# Patient Record
Sex: MALE | Race: WHITE | Employment: OTHER | URBAN - METROPOLITAN AREA
[De-identification: names, ages, dates, MRNs, and addresses within clinical notes are randomized per-mention and may not be internally consistent; named-entity substitution may affect disease eponyms.]

---

## 2017-01-15 ENCOUNTER — HOSPITAL ENCOUNTER (EMERGENCY)
Age: 67
Discharge: HOME | End: 2017-01-15
Attending: EMERGENCY MEDICINE
Payer: COMMERCIAL

## 2017-01-15 ENCOUNTER — APPOINTMENT (OUTPATIENT)
Dept: CT IMAGING | Age: 67
End: 2017-01-15
Attending: PHYSICIAN ASSISTANT
Payer: COMMERCIAL

## 2017-01-15 VITALS
RESPIRATION RATE: 17 BRPM | HEART RATE: 88 BPM | DIASTOLIC BLOOD PRESSURE: 57 MMHG | SYSTOLIC BLOOD PRESSURE: 143 MMHG | BODY MASS INDEX: 39.24 KG/M2 | OXYGEN SATURATION: 95 % | HEIGHT: 67 IN | WEIGHT: 250 LBS | TEMPERATURE: 97.4 F

## 2017-01-15 DIAGNOSIS — R10.9 LEFT FLANK PAIN: Primary | ICD-10-CM

## 2017-01-15 DIAGNOSIS — N21.0 BLADDER STONE: ICD-10-CM

## 2017-01-15 LAB
ALBUMIN SERPL BCP-MCNC: 3.1 G/DL (ref 3.5–5)
ALBUMIN/GLOB SERPL: 0.6 {RATIO} (ref 1.1–2.2)
ALP SERPL-CCNC: 81 U/L (ref 45–117)
ALT SERPL-CCNC: 14 U/L (ref 12–78)
ANION GAP BLD CALC-SCNC: 9 MMOL/L (ref 5–15)
APPEARANCE UR: CLEAR
AST SERPL W P-5'-P-CCNC: 15 U/L (ref 15–37)
BACTERIA URNS QL MICRO: NEGATIVE /HPF
BASOPHILS # BLD AUTO: 0 K/UL (ref 0–0.1)
BASOPHILS # BLD: 0 % (ref 0–1)
BILIRUB SERPL-MCNC: 0.7 MG/DL (ref 0.2–1)
BILIRUB UR QL: NEGATIVE
BUN SERPL-MCNC: 16 MG/DL (ref 6–20)
BUN/CREAT SERPL: 12 (ref 12–20)
CALCIUM SERPL-MCNC: 8.8 MG/DL (ref 8.5–10.1)
CHLORIDE SERPL-SCNC: 105 MMOL/L (ref 97–108)
CO2 SERPL-SCNC: 29 MMOL/L (ref 21–32)
COLOR UR: ABNORMAL
CREAT SERPL-MCNC: 1.29 MG/DL (ref 0.7–1.3)
EOSINOPHIL # BLD: 0.1 K/UL (ref 0–0.4)
EOSINOPHIL NFR BLD: 1 % (ref 0–7)
EPITH CASTS URNS QL MICRO: ABNORMAL /LPF
ERYTHROCYTE [DISTWIDTH] IN BLOOD BY AUTOMATED COUNT: 16.1 % (ref 11.5–14.5)
GLOBULIN SER CALC-MCNC: 4.9 G/DL (ref 2–4)
GLUCOSE SERPL-MCNC: 66 MG/DL (ref 65–100)
GLUCOSE UR STRIP.AUTO-MCNC: NEGATIVE MG/DL
HCT VFR BLD AUTO: 35.1 % (ref 36.6–50.3)
HGB BLD-MCNC: 11 G/DL (ref 12.1–17)
HGB UR QL STRIP: ABNORMAL
KETONES UR QL STRIP.AUTO: NEGATIVE MG/DL
LEUKOCYTE ESTERASE UR QL STRIP.AUTO: NEGATIVE
LYMPHOCYTES # BLD AUTO: 20 % (ref 12–49)
LYMPHOCYTES # BLD: 1.7 K/UL (ref 0.8–3.5)
MCH RBC QN AUTO: 28.2 PG (ref 26–34)
MCHC RBC AUTO-ENTMCNC: 31.3 G/DL (ref 30–36.5)
MCV RBC AUTO: 90 FL (ref 80–99)
MONOCYTES # BLD: 0.6 K/UL (ref 0–1)
MONOCYTES NFR BLD AUTO: 7 % (ref 5–13)
MUCOUS THREADS URNS QL MICRO: ABNORMAL /LPF
NEUTS SEG # BLD: 6.3 K/UL (ref 1.8–8)
NEUTS SEG NFR BLD AUTO: 72 % (ref 32–75)
NITRITE UR QL STRIP.AUTO: NEGATIVE
PH UR STRIP: 6 [PH] (ref 5–8)
PLATELET # BLD AUTO: 235 K/UL (ref 150–400)
POTASSIUM SERPL-SCNC: 3.2 MMOL/L (ref 3.5–5.1)
PROT SERPL-MCNC: 8 G/DL (ref 6.4–8.2)
PROT UR STRIP-MCNC: 30 MG/DL
RBC # BLD AUTO: 3.9 M/UL (ref 4.1–5.7)
RBC #/AREA URNS HPF: ABNORMAL /HPF (ref 0–5)
SODIUM SERPL-SCNC: 143 MMOL/L (ref 136–145)
SP GR UR REFRACTOMETRY: 1.02 (ref 1–1.03)
UA: UC IF INDICATED,UAUC: ABNORMAL
UROBILINOGEN UR QL STRIP.AUTO: 1 EU/DL (ref 0.2–1)
WBC # BLD AUTO: 8.7 K/UL (ref 4.1–11.1)
WBC URNS QL MICRO: ABNORMAL /HPF (ref 0–4)

## 2017-01-15 PROCEDURE — 80053 COMPREHEN METABOLIC PANEL: CPT | Performed by: PHYSICIAN ASSISTANT

## 2017-01-15 PROCEDURE — 74011250636 HC RX REV CODE- 250/636: Performed by: PHYSICIAN ASSISTANT

## 2017-01-15 PROCEDURE — 96374 THER/PROPH/DIAG INJ IV PUSH: CPT

## 2017-01-15 PROCEDURE — 74176 CT ABD & PELVIS W/O CONTRAST: CPT

## 2017-01-15 PROCEDURE — 36415 COLL VENOUS BLD VENIPUNCTURE: CPT | Performed by: PHYSICIAN ASSISTANT

## 2017-01-15 PROCEDURE — 81001 URINALYSIS AUTO W/SCOPE: CPT | Performed by: PHYSICIAN ASSISTANT

## 2017-01-15 PROCEDURE — 51701 INSERT BLADDER CATHETER: CPT

## 2017-01-15 PROCEDURE — 77030005520 HC CATH URETH FOL38 BARD -A

## 2017-01-15 PROCEDURE — 96361 HYDRATE IV INFUSION ADD-ON: CPT

## 2017-01-15 PROCEDURE — 96375 TX/PRO/DX INJ NEW DRUG ADDON: CPT

## 2017-01-15 PROCEDURE — 85025 COMPLETE CBC W/AUTO DIFF WBC: CPT | Performed by: PHYSICIAN ASSISTANT

## 2017-01-15 PROCEDURE — 99283 EMERGENCY DEPT VISIT LOW MDM: CPT

## 2017-01-15 PROCEDURE — 77030011943

## 2017-01-15 RX ORDER — KETOROLAC TROMETHAMINE 30 MG/ML
15 INJECTION, SOLUTION INTRAMUSCULAR; INTRAVENOUS
Status: COMPLETED | OUTPATIENT
Start: 2017-01-15 | End: 2017-01-15

## 2017-01-15 RX ORDER — ONDANSETRON 4 MG/1
4 TABLET, ORALLY DISINTEGRATING ORAL
Qty: 10 TAB | Refills: 0 | Status: SHIPPED | OUTPATIENT
Start: 2017-01-15 | End: 2017-01-26

## 2017-01-15 RX ORDER — MORPHINE SULFATE 2 MG/ML
4 INJECTION, SOLUTION INTRAMUSCULAR; INTRAVENOUS
Status: COMPLETED | OUTPATIENT
Start: 2017-01-15 | End: 2017-01-15

## 2017-01-15 RX ORDER — ERGOCALCIFEROL 1.25 MG/1
50000 CAPSULE ORAL
COMMUNITY
End: 2019-01-07 | Stop reason: ALTCHOICE

## 2017-01-15 RX ORDER — HYDROCODONE BITARTRATE AND ACETAMINOPHEN 5; 325 MG/1; MG/1
1 TABLET ORAL
Qty: 20 TAB | Refills: 0 | Status: SHIPPED | OUTPATIENT
Start: 2017-01-15 | End: 2017-01-26

## 2017-01-15 RX ORDER — ONDANSETRON 2 MG/ML
4 INJECTION INTRAMUSCULAR; INTRAVENOUS
Status: COMPLETED | OUTPATIENT
Start: 2017-01-15 | End: 2017-01-15

## 2017-01-15 RX ORDER — ONDANSETRON 4 MG/1
4 TABLET, ORALLY DISINTEGRATING ORAL
Qty: 10 TAB | Refills: 0 | Status: SHIPPED | OUTPATIENT
Start: 2017-01-15 | End: 2017-01-15

## 2017-01-15 RX ADMIN — Medication 4 MG: at 11:55

## 2017-01-15 RX ADMIN — SODIUM CHLORIDE 1000 ML: 900 INJECTION, SOLUTION INTRAVENOUS at 11:54

## 2017-01-15 RX ADMIN — KETOROLAC TROMETHAMINE 15 MG: 30 INJECTION, SOLUTION INTRAMUSCULAR at 11:55

## 2017-01-15 RX ADMIN — ONDANSETRON 4 MG: 2 INJECTION INTRAMUSCULAR; INTRAVENOUS at 11:55

## 2017-01-15 NOTE — DISCHARGE INSTRUCTIONS
Abdominal Pain: Care Instructions  Your Care Instructions    Abdominal pain has many possible causes. Some aren't serious and get better on their own in a few days. Others need more testing and treatment. If your pain continues or gets worse, you need to be rechecked and may need more tests to find out what is wrong. You may need surgery to correct the problem. Don't ignore new symptoms, such as fever, nausea and vomiting, urination problems, pain that gets worse, and dizziness. These may be signs of a more serious problem. Your doctor may have recommended a follow-up visit in the next 8 to 12 hours. If you are not getting better, you may need more tests or treatment. The doctor has checked you carefully, but problems can develop later. If you notice any problems or new symptoms, get medical treatment right away. Follow-up care is a key part of your treatment and safety. Be sure to make and go to all appointments, and call your doctor if you are having problems. It's also a good idea to know your test results and keep a list of the medicines you take. How can you care for yourself at home? · Rest until you feel better. · To prevent dehydration, drink plenty of fluids, enough so that your urine is light yellow or clear like water. Choose water and other caffeine-free clear liquids until you feel better. If you have kidney, heart, or liver disease and have to limit fluids, talk with your doctor before you increase the amount of fluids you drink. · If your stomach is upset, eat mild foods, such as rice, dry toast or crackers, bananas, and applesauce. Try eating several small meals instead of two or three large ones. · Wait until 48 hours after all symptoms have gone away before you have spicy foods, alcohol, and drinks that contain caffeine. · Do not eat foods that are high in fat. · Avoid anti-inflammatory medicines such as aspirin, ibuprofen (Advil, Motrin), and naproxen (Aleve).  These can cause stomach upset. Talk to your doctor if you take daily aspirin for another health problem. When should you call for help? Call 911 anytime you think you may need emergency care. For example, call if:  · You passed out (lost consciousness). · You pass maroon or very bloody stools. · You vomit blood or what looks like coffee grounds. · You have new, severe belly pain. Call your doctor now or seek immediate medical care if:  · Your pain gets worse, especially if it becomes focused in one area of your belly. · You have a new or higher fever. · Your stools are black and look like tar, or they have streaks of blood. · You have unexpected vaginal bleeding. · You have symptoms of a urinary tract infection. These may include:  ¨ Pain when you urinate. ¨ Urinating more often than usual.  ¨ Blood in your urine. · You are dizzy or lightheaded, or you feel like you may faint. Watch closely for changes in your health, and be sure to contact your doctor if:  · You are not getting better after 1 day (24 hours). Where can you learn more? Go to http://delisa-yahaira.info/. Enter J496      Learning About Diet for Kidney Stone Prevention  What are kidney stones? Kidney stones are made of salts and minerals in the urine that form small \"kvng. \" Stones can form in the kidneys and the ureters (the tubes that lead from the kidneys to the bladder). They can also form in the bladder. Stones may not cause a problem as long as they stay in the kidneys. But they can cause sudden, severe pain. Pain is most likely when the stones travel from the kidneys to the bladder. Kidney stones can cause bloody urine. Kidney stones often run in families. You are more likely to get them if you don't drink enough fluids, mainly water. Certain foods and drinks and some dietary supplements may also increase your risk for kidney stones if you consume too much of them. What can you do to prevent kidney stones?   Changing what you eat may not prevent all types of kidney stones. But for people who have a history of certain kinds of kidney stones, some changes in diet may help. A dietitian can help you set up a meal plan that includes healthy, low-oxalate choices. Here are some general guidelines to get you started. Plan your meals and snacks around foods that are low in oxalate. These foods include:  · Corn, kale, parsnips, and squash,. · Beef, chicken, pork, turkey, and fish. · Milk, butter, cheese, and yogurt. You can eat certain foods that are medium-high in oxalate, but eat them only once in a while. These foods include:  · Bread. · Brown rice. · English muffins. · Figs. · Popcorn. · String beans. · Tomatoes. Limit very high-oxalate foods, including:  · Black tea. · Coffee. · Chocolate. · Dark green vegetables. · Nuts. Here are some other things you can do to help prevent kidney stones. · Drink plenty of fluids. If you have kidney, heart, or liver disease and have to limit fluids, talk with your doctor before you increase the amount of fluids you drink. · Do not take more than the recommended daily dose of vitamins C and D.  · Limit the salt in your diet. · Eat a balanced diet that is not too high in protein. Follow-up care is a key part of your treatment and safety. Be sure to make and go to all appointments, and call your doctor if you are having problems. It's also a good idea to know your test results and keep a list of the medicines you take. Where can you learn more? Go to http://delisa-yahaira.info/. Enter C138 in the search box to learn more about \"Learning About Diet for Kidney Stone Prevention. \"  Current as of: July 26, 2016  Content Version: 11.1  © 0184-6176 [employer], [employer]. Care instructions adapted under license by [employer] (which disclaims liability or warranty for this information).  If you have questions about a medical condition or this instruction, always ask your healthcare professional. [place] [address] any warranty or liability for your use of this information. Kidney Stone: Care Instructions  Your Care Instructions    Kidney stones are formed when salts, minerals, and other substances normally found in the urine clump together. They can be as small as grains of sand or, rarely, as large as golf balls. While the stone is traveling through the ureter, which is the tube that carries urine from the kidney to the bladder, you will probably feel pain. The pain may be mild or very severe. You may also have some blood in your urine. As soon as the stone reaches the bladder, any intense pain should go away. If a stone is too large to pass on its own, you may need a medical procedure to help you pass the stone. The doctor has checked you carefully, but problems can develop later. If you notice any problems or new symptoms, get medical treatment right away. Follow-up care is a key part of your treatment and safety. Be sure to make and go to all appointments, and call your doctor if you are having problems. It's also a good idea to know your test results and keep a list of the medicines you take. How can you care for yourself at home? · Drink plenty of fluids, enough so that your urine is light yellow or clear like water. If you have kidney, heart, or liver disease and have to limit fluids, talk with your doctor before you increase the amount of fluids you drink. · Take pain medicines exactly as directed. Call your doctor if you think you are having a problem with your medicine. ¨ If the doctor gave you a prescription medicine for pain, take it as prescribed. ¨ If you are not taking a prescription pain medicine, ask your doctor if you can take an over-the-counter medicine. Read and follow all instructions on the label. · Your doctor may ask you to strain your urine so that you can collect your kidney stone when it passes.  You can use a kitchen strainer or a tea strainer to catch the stone. Store it in a plastic bag until you see your doctor again. Preventing future kidney stones  Some changes in your diet may help prevent kidney stones. Depending on the cause of your stones, your doctor may recommend that you:  · Drink plenty of fluids, enough so that your urine is light yellow or clear like water. If you have kidney, heart, or liver disease and have to limit fluids, talk with your doctor before you increase the amount of fluids you drink. · Limit coffee, tea, and alcohol. Also avoid grapefruit juice. · Do not take more than the recommended daily dose of vitamins C and D.  · Avoid antacids such as Gaviscon, Maalox, Mylanta, or Tums. · Limit the amount of salt (sodium) in your diet. · Eat a balanced diet that is not too high in protein. · Limit foods that are high in a substance called oxalate, which can cause kidney stones. These foods include dark green vegetables, rhubarb, chocolate, wheat bran, nuts, cranberries, and beans. When should you call for help? Call your doctor now or seek immediate medical care if:  · You cannot keep down fluids. · Your pain gets worse. · You have a fever or chills. · You have new or worse pain in your back just below your rib cage (the flank area). · You have new or more blood in your urine. Watch closely for changes in your health, and be sure to contact your doctor if:  · You do not get better as expected. Where can you learn more? Go to http://delisa-yahaira.info/. Enter E578 in the search box to learn more about \"Kidney Stone: Care Instructions. \"  Current as of: November 20, 2015  Content Version: 11.1  © 5698-8563 [employer]. Care instructions adapted under license by [employer] (which disclaims liability or warranty for this information).  If you have questions about a medical condition or this instruction, always ask your healthcare professional. [employer], [facility] disclaims any warranty or liability for your use of this information. Flank Pain: Care Instructions  Your Care Instructions  Flank pain is pain on the side of the back just below the rib cage and above the waist. It can be on one or both sides. Flank pain has many possible causes, including a kidney stone, a urinary tract infection, or back strain. Flank pain may get better on its own. But don't ignore new symptoms, such as fever, nausea and vomiting, urination problems, pain that gets worse, and dizziness. These may be signs of a more serious problem. You may have to have tests or other treatment. Follow-up care is a key part of your treatment and safety. Be sure to make and go to all appointments, and call your doctor if you are having problems. It's also a good idea to know your test results and keep a list of the medicines you take. How can you care for yourself at home? · Rest until you feel better. · Take pain medicines exactly as directed. ¨ If the doctor gave you a prescription medicine for pain, take it as prescribed. ¨ If you are not taking a prescription pain medicine, ask your doctor if you can take an over-the-counter pain medicine, such as acetaminophen (Tylenol), ibuprofen (Advil, Motrin), or naproxen (Aleve). Read and follow all instructions on the label. · If your doctor prescribed antibiotics, take them as directed. Do not stop taking them just because you feel better. You need to take the full course of antibiotics. · To apply heat, put a warm water bottle, a heating pad set on low, or a warm cloth on the painful area. Do not go to sleep with a heating pad on your skin. · To prevent dehydration, drink plenty of fluids, enough so that your urine is light yellow or clear like water. Choose water and other caffeine-free clear liquids until you feel better.  If you have kidney, heart, or liver disease and have to limit fluids, talk with your doctor before you increase the amount of fluids you drink. When should you call for help? Call your doctor now or seek immediate medical care if:  · Your flank pain gets worse. · You have new symptoms, such as fever, nausea, or vomiting. · You have symptoms of a urinary problem. For example:  ¨ You have blood or pus in your urine. ¨ You have chills or body aches. ¨ It hurts to urinate. ¨ You have groin or belly pain. Watch closely for changes in your health, and be sure to contact your doctor if you do not get better as expected. Where can you learn more? Go to http://delisa-yahaira.info/. Enter S191 in the search box to learn more about \"Flank Pain: Care Instructions. \"  Current as of: May 27, 2016  Content Version: 11.1  © 9749-7578 [employer]. Care instructions adapted under license by [employer] (which disclaims liability or warranty for this information). If you have questions about a medical condition or this instruction, always ask your healthcare professional. [address] any warranty or liability for your use of this information. in the search box to learn more about \"Abdominal Pain: Care Instructions. \"  Current as of: May 27, 2016  Content Version: 11.1  © 1168-5374 [employer]. Care instructions adapted under license by [employer] (which disclaims liability or warranty for this information). If you have questions about a medical condition or this instruction, always ask your healthcare professional. [address] any warranty or liability for your use of this information. We hope that we have addressed all of your medical concerns. The examination and treatment you received in the Emergency Department were for an emergent problem and were not intended as complete care. It is important that you follow up with your healthcare provider(s) for ongoing care.  If your symptoms worsen or do not improve as expected, and you are unable to reach your usual health care provider(s), you should return to the Emergency Department. Today's healthcare is undergoing tremendous change, and patient satisfaction surveys are one of the many tools to assess the quality of medical care. You may receive a survey from the [employer] organization regarding your experience in the Emergency Department. I hope that your experience has been completely positive, particularly the medical care that I provided. As such, please participate in the survey; anything less than excellent does not meet my expectations or intentions. [facility] and [facility] participate in nationally recognized quality of care measures. If your blood pressure is greater than 120/80, as reported below, we urge that you seek medical care to address the potential of high blood pressure, commonly known as hypertension. Hypertension can be hereditary or can be caused by certain medical conditions, pain, stress, or \"white coat syndrome. \"       Please make an appointment with your health care provider(s) for follow up of your Emergency Department visit. VITALS:   Patient Vitals for the past 8 hrs:   Temp Pulse Resp BP SpO2   01/15/17 1111 97.4 °F (36.3 °C) 88 17 180/81 96 %          Thank you for allowing us to provide you with medical care today. We realize that you have many choices for your emergency care needs. Please choose us in the future for any continued health care needs. Luther Smith, [address]: [phone]            Recent Results (from the past 24 hour(s))   CBC WITH AUTOMATED DIFF    Collection Time: 01/15/17 11:47 AM   Result Value Ref Range    WBC 8.7 4.1 - 11.1 K/uL    RBC 3.90 (L) 4.10 - 5.70 M/uL    HGB 11.0 (L) 12.1 - 17.0 g/dL    HCT 35.1 (L) 36.6 - 50.3 %    MCV 90.0 80.0 - 99.0 FL    MCH 28.2 26.0 - 34.0 PG MCHC 31.3 30.0 - 36.5 g/dL    RDW 16.1 (H) 11.5 - 14.5 %    PLATELET 515 601 - 054 K/uL    NEUTROPHILS 72 32 - 75 %    LYMPHOCYTES 20 12 - 49 %    MONOCYTES 7 5 - 13 %    EOSINOPHILS 1 0 - 7 %    BASOPHILS 0 0 - 1 %    ABS. NEUTROPHILS 6.3 1.8 - 8.0 K/UL    ABS. LYMPHOCYTES 1.7 0.8 - 3.5 K/UL    ABS. MONOCYTES 0.6 0.0 - 1.0 K/UL    ABS. EOSINOPHILS 0.1 0.0 - 0.4 K/UL    ABS. BASOPHILS 0.0 0.0 - 0.1 K/UL   METABOLIC PANEL, COMPREHENSIVE    Collection Time: 01/15/17 11:47 AM   Result Value Ref Range    Sodium 143 136 - 145 mmol/L    Potassium 3.2 (L) 3.5 - 5.1 mmol/L    Chloride 105 97 - 108 mmol/L    CO2 29 21 - 32 mmol/L    Anion gap 9 5 - 15 mmol/L    Glucose 66 65 - 100 mg/dL    BUN 16 6 - 20 MG/DL    Creatinine 1.29 0.70 - 1.30 MG/DL    BUN/Creatinine ratio 12 12 - 20      GFR est AA >60 >60 ml/min/1.73m2    GFR est non-AA 56 (L) >60 ml/min/1.73m2    Calcium 8.8 8.5 - 10.1 MG/DL    Bilirubin, total 0.7 0.2 - 1.0 MG/DL    ALT 14 12 - 78 U/L    AST 15 15 - 37 U/L    Alk.  phosphatase 81 45 - 117 U/L    Protein, total 8.0 6.4 - 8.2 g/dL    Albumin 3.1 (L) 3.5 - 5.0 g/dL    Globulin 4.9 (H) 2.0 - 4.0 g/dL    A-G Ratio 0.6 (L) 1.1 - 2.2     URINALYSIS W/ REFLEX CULTURE    Collection Time: 01/15/17  1:14 PM   Result Value Ref Range    Color DARK YELLOW      Appearance CLEAR CLEAR      Specific gravity 1.019 1.003 - 1.030      pH (UA) 6.0 5.0 - 8.0      Protein 30 (A) NEG mg/dL    Glucose NEGATIVE  NEG mg/dL    Ketone NEGATIVE  NEG mg/dL    Bilirubin NEGATIVE  NEG      Blood SMALL (A) NEG      Urobilinogen 1.0 0.2 - 1.0 EU/dL    Nitrites NEGATIVE  NEG      Leukocyte Esterase NEGATIVE  NEG      WBC 0-4 0 - 4 /hpf    RBC 5-10 0 - 5 /hpf    Epithelial cells FEW FEW /lpf    Bacteria NEGATIVE  NEG /hpf    UA:UC IF INDICATED CULTURE NOT INDICATED BY UA RESULT CNI      Mucus TRACE (A) NEG /lpf       Ct Abd Pelv Wo Cont    Result Date: 1/15/2017  INDICATION: Right-sided back pain radiating to front, symptoms since last Wednesday. History of kidney stones. COMPARISON: None TECHNIQUE: Thin axial images were obtained through the abdomen and pelvis. Coronal and sagittal reconstructions were generated. Oral contrast was not administered. CT dose reduction was achieved through use of a standardized protocol tailored for this examination and automatic exposure control for dose modulation. The absence of intravenous contrast material reduces the sensitivity and specificity for evaluation of the solid parenchymal organs of the abdomen. The absence of oral contrast substantially diminishes the capacity of CT to determine bowel abnormalities. FINDINGS: LUNG BASES: Dependent left basilar patchy atelectasis versus consolidation. Small bilateral pleural effusions, greater on the left. INCIDENTALLY IMAGED HEART AND MEDIASTINUM: Unremarkable. LIVER: No mass or biliary dilatation. GALLBLADDER: Unremarkable. SPLEEN: No mass. PANCREAS: No mass or ductal dilatation. ADRENALS: Unremarkable. KIDNEYS/URETERS: No mass, calculus, or hydronephrosis. STOMACH: No oral contrast. Nondistended. SMALL BOWEL: No oral contrast. Nondistended. COLON: No oral contrast. Mild-moderate retained fecal material in the ascending colon through mid transverse colon. Numerous descending and sigmoid colonic diverticula. APPENDIX: Visualized. Nondistended. PERITONEUM: No ascites or pneumoperitoneum. RETROPERITONEUM: No lymphadenopathy or aortic aneurysm. REPRODUCTIVE ORGANS: Substantially enlarged prostate gland measuring nearly 9 cm craniocaudal and up to 6.1 x 6.1 cm transversely. URINARY BLADDER: Diffuse moderate mural thickening. 2 calcifications consistent with retained calculi near right trigonal region, measuring 8 mm and 9 mm, respectively. BONES: Normal bone mineralization. Mild to moderate lumbar and lower thoracic degenerative changes. Mild left SI joint and bilateral hip joint osteoarthrosis.  ADDITIONAL COMMENTS: N/A     IMPRESSION: 1. Substantial enlargement of prostate gland diffuse mural thickening of urinary bladder. 8 mm and 9 mm calculi in dependent portion of urinary bladder. 2. Small bilateral pleural effusions, larger on the left. 3. Left basilar consolidation versus atelectasis.

## 2017-01-15 NOTE — ED TRIAGE NOTES
Patient arrived with c/o right sided back pain radiating into front but not into groin since last Wednesday-no issues urinating. Hx of kidney stones and denies injury or trauma.

## 2017-01-15 NOTE — LETTER
[address] 
[facility] EMERGENCY DEPT 
[address] [facility] [address] [id number] [phone] Work/School Note Date: 1/15/2017 To Whom It May concern: 
 
Kimberly Hayes was seen and treated today in the emergency room by the following provider(s): 
Attending Provider: Mitchel Piña MD.   
 
Kimberly Hayes may return to work on 1/18/17.  
 
Sincerely, 
 
 
 
 
Sona Akins PA-C

## 2017-01-15 NOTE — ED PROVIDER NOTES
HPI Comments: Elise Greenfield is a 73 y.o. male who presents ambulatory to the ED with a c/o flank pain. Pt states left -sided flank pain has been worsening for 4 days (1/11/17). Pt states pain is exacerbated with movement and improved with lying still. Pt states pain is similar to pain he experienced with prior kidney stones. Pt states he was followed by [facility] for prior kidney stones and prostate issues. Pt can ambulate without assistance. He specifically denies any dysuria, hematuria, tingling, numbness, saddle anesthesia, fevers, chills, nausea, vomiting, chest pain, shortness of breath, headache, rash, diarrhea, sweating or weight loss. PCP: Thanh Gale MD  PMHx significant for: kidney stones, HTN, murmur, mitral regurgitation, chronic right knee pain, BPH, DM, arthritis, hypercholesterolemia, enlarged prostate, obesity, dyslipidemia, sleep apnea, colonoscopy, knee replacement  PSHx significant for: stress test, 2D echo, lithotripsy,   Social Hx: Tobacco: former tobacco smoker EtOH: rarely uses Illicit drug use: denies    There are no further complaints or symptoms at this time. Note written by Mauro Lombardi, as dictated by Snehal Smith PA-C 11:23 AM      The history is provided by the patient and the spouse.         Past Medical History:   Diagnosis Date    Abnormal EKG      he says it has been abnormal for years    Arthritis     Chronic pain      right knee    Diabetes ([facility] [address]      type II    Dyslipidemia     Enlarged prostate Dr. Avril Woodson    HTN (hypertension)     Hypercholesterolemia     Kidney stone     Mitral regurgitation      mod-severe on echo 1/5/12    Murmur     Obesity     Other ill-defined conditions(799.89)      BPH    Sleep apnea      stopped using CPAP many years ago       Past Surgical History:   Procedure Laterality Date    Stress test cardiolite  1/4/12     walked 4:30, stopping for severe FISH, no ischemic EKG changes (inferolateral TWI at start), normal perfusion (diaphragmatic attenuation), LVEF 48%, hypertensive respone to exercise    Echo 2d adult  1/4/12     mild-mod LVH, LVEF 60%, probably grade 1 diastolic dysfunction, mod LAE, mod-severe MR (eccentroic ) - had severe HTN during study (173/108),     Echo limited  1/20/12     mod-severe MR    Hx lithotripsy  2011    Hx other surgical       LONNIE 1/31/12 - LVEF 60%, mod-severe MR (post directed), mild-mod LAE, mild atheroma aorta    Hx other surgical       Echo 9/5/13 - mild LVH, LVEF 60 % to 65 %. Mod LAE. Mod MR (post directed)     Hx colonoscopy  2013     small polyps removed; repeat in 5 years; Dr. Anabel Lara Hx knee replacement  9/2014     RIGHT    Hx other surgical       Echo 9/5/13 - mild LVH, LVEF 60 % to 65 %. Mod LAE. Mod MR (post directed)          Family History:   Problem Relation Age of Onset    Coronary Artery Disease Father     Heart Disease Father     Stroke Father     Cancer Father      prostate    Hypertension Mother     Malignant Hyperthermia Neg Hx     Pseudocholinesterase Deficiency Neg Hx     Delayed Awakening Neg Hx     Post-op Nausea/Vomiting Neg Hx     Emergence Delirium Neg Hx     Post-op Cognitive Dysfunction Neg Hx     Other Neg Hx        Social History     Social History    Marital status:      Spouse name: N/A    Number of children: N/A    Years of education: N/A     Occupational History     [place]     [employer]      Social History Main Topics    Smoking status: Former Smoker     Packs/day: 0.25     Years: 20.00     Quit date: 9/9/2013    Smokeless tobacco: Never Used    Alcohol use No      Comment: rarely    Drug use: No    Sexual activity: Not on file     Other Topics Concern    Not on file     Social History Narrative         ALLERGIES: Cephalexin;  Oxycodone; Sulfa (sulfonamide antibiotics); and Tamsulosin    Review of Systems   Constitutional: Negative for appetite change, chills, fatigue and fever. HENT: Negative for congestion, ear pain, postnasal drip, rhinorrhea and sore throat. Eyes: Negative for visual disturbance. Respiratory: Negative for cough, shortness of breath and wheezing. Cardiovascular: Negative for chest pain, palpitations and leg swelling. Gastrointestinal: Negative for abdominal pain, anal bleeding, constipation, diarrhea, nausea and vomiting. Genitourinary: Positive for flank pain. Negative for dysuria and hematuria. Musculoskeletal: Negative for arthralgias and myalgias. Skin: Negative for rash. Allergic/Immunologic: Negative for immunocompromised state. Neurological: Negative for weakness, light-headedness, numbness and headaches. Patient Vitals for the past 12 hrs:   Temp Pulse Resp BP SpO2   01/15/17 1430 - - - 143/57 95 %   01/15/17 1330 - - - 154/63 92 %   01/15/17 1111 97.4 °F (36.3 °C) 88 17 180/81 96 %              Physical Exam   Constitutional: He is oriented to person, place, and time. He appears well-developed and well-nourished. No distress. Above average bmi male   HENT:   Head: Normocephalic and atraumatic. Right Ear: External ear normal.   Left Ear: External ear normal.   Neck: Neck supple. Cardiovascular: Normal rate, regular rhythm, normal heart sounds and intact distal pulses. Exam reveals no gallop and no friction rub. No murmur heard. Pulmonary/Chest: Effort normal and breath sounds normal. No stridor. No respiratory distress. He has no wheezes. He has no rales. He exhibits no tenderness. Abdominal: Soft. Bowel sounds are normal. He exhibits no distension and no mass. There is tenderness. There is no rebound and no guarding. Diffuse left flank TTP with radiation to left groin   Musculoskeletal: Normal range of motion. He exhibits no edema, tenderness or deformity. BACK:diffuse left flank TTP without midline vertebral TTP or TTP throughout no swelling or step off. No discoloration. No deformity or lesions. Neg SLR neg EHL neg DAPHNE. Ambulates without assistance. Distal n/v intact. Cap refill brisk   Neurological: He is alert and oriented to person, place, and time. No cranial nerve deficit. Coordination normal.   Skin: No rash noted. No erythema. No pallor. Psychiatric: He has a normal mood and affect. His behavior is normal.   Nursing note and vitals reviewed. MDM  Number of Diagnoses or Management Options  Bladder stone:   Left flank pain:      Amount and/or Complexity of Data Reviewed  Clinical lab tests: ordered and reviewed  Tests in the radiology section of CPT®: ordered and reviewed  Tests in the medicine section of CPT®: reviewed and ordered  Obtain history from someone other than the patient: yes (wife)  Review and summarize past medical records: yes  Independent visualization of images, tracings, or specimens: yes    Patient Progress  Patient progress: stable    ED Course       Procedures    12:23 PM  Discussed pt, sx, hx and current findings with Dr. Malvin Tineo. He is in agreement with plan   [place]. AUTUMN Smith    1:15 PM  Pt notes he has had to self cath because of his prostate in the past. Requests to self cath for urine  Richard Smith PA-C    2:34 PM   Feeling better, ready to go home  [employer]. AUTUMN Smith    LABORATORY TESTS:  Recent Results (from the past 12 hour(s))   CBC WITH AUTOMATED DIFF    Collection Time: 01/15/17 11:47 AM   Result Value Ref Range    WBC 8.7 4.1 - 11.1 K/uL    RBC 3.90 (L) 4.10 - 5.70 M/uL    HGB 11.0 (L) 12.1 - 17.0 g/dL    HCT 35.1 (L) 36.6 - 50.3 %    MCV 90.0 80.0 - 99.0 FL    MCH 28.2 26.0 - 34.0 PG    MCHC 31.3 30.0 - 36.5 g/dL    RDW 16.1 (H) 11.5 - 14.5 %    PLATELET 647 012 - 885 K/uL    NEUTROPHILS 72 32 - 75 %    LYMPHOCYTES 20 12 - 49 %    MONOCYTES 7 5 - 13 %    EOSINOPHILS 1 0 - 7 %    BASOPHILS 0 0 - 1 %    ABS. NEUTROPHILS 6.3 1.8 - 8.0 K/UL    ABS. LYMPHOCYTES 1.7 0.8 - 3.5 K/UL    ABS. MONOCYTES 0.6 0.0 - 1.0 K/UL    ABS.  EOSINOPHILS 0.1 0.0 - 0.4 K/UL    ABS. BASOPHILS 0.0 0.0 - 0.1 K/UL   METABOLIC PANEL, COMPREHENSIVE    Collection Time: 01/15/17 11:47 AM   Result Value Ref Range    Sodium 143 136 - 145 mmol/L    Potassium 3.2 (L) 3.5 - 5.1 mmol/L    Chloride 105 97 - 108 mmol/L    CO2 29 21 - 32 mmol/L    Anion gap 9 5 - 15 mmol/L    Glucose 66 65 - 100 mg/dL    BUN 16 6 - 20 MG/DL    Creatinine 1.29 0.70 - 1.30 MG/DL    BUN/Creatinine ratio 12 12 - 20      GFR est AA >60 >60 ml/min/1.73m2    GFR est non-AA 56 (L) >60 ml/min/1.73m2    Calcium 8.8 8.5 - 10.1 MG/DL    Bilirubin, total 0.7 0.2 - 1.0 MG/DL    ALT 14 12 - 78 U/L    AST 15 15 - 37 U/L    Alk. phosphatase 81 45 - 117 U/L    Protein, total 8.0 6.4 - 8.2 g/dL    Albumin 3.1 (L) 3.5 - 5.0 g/dL    Globulin 4.9 (H) 2.0 - 4.0 g/dL    A-G Ratio 0.6 (L) 1.1 - 2.2     URINALYSIS W/ REFLEX CULTURE    Collection Time: 01/15/17  1:14 PM   Result Value Ref Range    Color DARK YELLOW      Appearance CLEAR CLEAR      Specific gravity 1.019 1.003 - 1.030      pH (UA) 6.0 5.0 - 8.0      Protein 30 (A) NEG mg/dL    Glucose NEGATIVE  NEG mg/dL    Ketone NEGATIVE  NEG mg/dL    Bilirubin NEGATIVE  NEG      Blood SMALL (A) NEG      Urobilinogen 1.0 0.2 - 1.0 EU/dL    Nitrites NEGATIVE  NEG      Leukocyte Esterase NEGATIVE  NEG      WBC 0-4 0 - 4 /hpf    RBC 5-10 0 - 5 /hpf    Epithelial cells FEW FEW /lpf    Bacteria NEGATIVE  NEG /hpf    UA:UC IF INDICATED CULTURE NOT INDICATED BY UA RESULT CNI      Mucus TRACE (A) NEG /lpf       IMAGING RESULTS:  CT ABD PELV WO CONT   Final Result        Study Result      INDICATION: Right-sided back pain radiating to front, symptoms since last  Wednesday. History of kidney stones.     COMPARISON: None     TECHNIQUE:   Thin axial images were obtained through the abdomen and pelvis. Coronal and  sagittal reconstructions were generated. Oral contrast was not administered.  CT  dose reduction was achieved through use of a standardized protocol tailored for  this examination and automatic exposure control for dose modulation.      The absence of intravenous contrast material reduces the sensitivity and  specificity for evaluation of the solid parenchymal organs of the abdomen. The  absence of oral contrast substantially diminishes the capacity of CT to  determine bowel abnormalities.     FINDINGS:   LUNG BASES: Dependent left basilar patchy atelectasis versus consolidation. Small bilateral pleural effusions, greater on the left. INCIDENTALLY IMAGED HEART AND MEDIASTINUM: Unremarkable. LIVER: No mass or biliary dilatation. GALLBLADDER: Unremarkable. SPLEEN: No mass. PANCREAS: No mass or ductal dilatation. ADRENALS: Unremarkable. KIDNEYS/URETERS: No mass, calculus, or hydronephrosis. STOMACH: No oral contrast. Nondistended. SMALL BOWEL: No oral contrast. Nondistended. COLON: No oral contrast. Mild-moderate retained fecal material in the ascending  colon through mid transverse colon. Numerous descending and sigmoid colonic  diverticula. APPENDIX: Visualized. Nondistended. PERITONEUM: No ascites or pneumoperitoneum. RETROPERITONEUM: No lymphadenopathy or aortic aneurysm. REPRODUCTIVE ORGANS: Substantially enlarged prostate gland measuring nearly 9 cm  craniocaudal and up to 6.1 x 6.1 cm transversely. URINARY BLADDER: Diffuse moderate mural thickening. 2 calcifications consistent  with retained calculi near right trigonal region, measuring 8 mm and 9 mm,  respectively. BONES: Normal bone mineralization. Mild to moderate lumbar and lower thoracic  degenerative changes. Mild left SI joint and bilateral hip joint osteoarthrosis. ADDITIONAL COMMENTS: N/A     IMPRESSION  IMPRESSION:     1. Substantial enlargement of prostate gland diffuse mural thickening of urinary  bladder. 8 mm and 9 mm calculi in dependent portion of urinary bladder. 2. Small bilateral pleural effusions, larger on the left.   3. Left basilar consolidation versus atelectasis.                MEDICATIONS GIVEN:  Medications   sodium chloride 0.9 % bolus infusion 1,000 mL (0 mL IntraVENous IV Completed 1/15/17 1441)   morphine injection 4 mg (4 mg IntraVENous Given 1/15/17 1155)   ondansetron (ZOFRAN) injection 4 mg (4 mg IntraVENous Given 1/15/17 1155)   ketorolac (TORADOL) injection 15 mg (15 mg IntraVENous Given 1/15/17 1155)       IMPRESSION:  1. Left flank pain    2. Bladder stone        PLAN:  1. Discharge Medication List as of 1/15/2017  2:40 PM      START taking these medications    Details   HYDROcodone-acetaminophen (NORCO) 5-325 mg per tablet Take 1 Tab by mouth every four (4) hours as needed for Pain. Max Daily Amount: 6 Tabs., Print, Disp-20 Tab, R-0         CONTINUE these medications which have CHANGED    Details   ondansetron (ZOFRAN ODT) 4 mg disintegrating tablet Take 1 Tab by mouth every eight (8) hours as needed for Nausea. , Print, Disp-10 Tab, R-0         CONTINUE these medications which have NOT CHANGED    Details   ergocalciferol (VITAMIN D2) 50,000 unit capsule Take 50,000 Units by mouth., Historical Med      insulin detemir (LEVEMIR) 100 unit/mL injection by SubCUTAneous route nightly., Historical Med      insulin lispro (HUMALOG) 100 unit/mL injection by SubCUTAneous route., Historical Med      traMADol (ULTRAM) 50 mg tablet Take 50 mg by mouth every six (6) hours as needed for Pain., Historical Med      finasteride (PROSCAR) 5 mg tablet Take 5 mg by mouth daily. , Historical Med      amlodipine (NORVASC) 5 mg tablet Take 1 tablet by mouth daily. , Normal, Disp-90 tablet, R-3      losartan (COZAAR) 100 mg tablet TAKE 1 TABLET BY MOUTH DAILY, NormalPatient will need to be seen prior to additional refills. Disp-30 Tab, R-0      aspirin delayed-release 81 mg tablet Take  by mouth daily. , Historical Med      ascorbic acid (VITAMIN C) 500 mg tablet Take 500 mg by mouth daily. Historical Med, 500 mg           2.    Follow-up Information     Follow up With Details [facility] Schedule an appointment as soon as possible for a visit 2-4 days for recheck [phone]          Return to ED if worse       2:34 PM  Pt has been reexamined. Pt has no new complaints, changes or physical findings. Care plan outlined and precautions discussed. All available results were reviewed with pt. All medications were reviewed with pt. All of pt's questions and concerns were addressed. Pt agrees to F/U as instructed and agrees to return to ED upon further deterioration. Pt is ready to go home.   Felix Avila PA-C

## 2017-01-26 ENCOUNTER — HOSPITAL ENCOUNTER (OUTPATIENT)
Dept: PREADMISSION TESTING | Age: 67
Discharge: HOME | End: 2017-01-26
Payer: COMMERCIAL

## 2017-01-26 VITALS
HEART RATE: 70 BPM | SYSTOLIC BLOOD PRESSURE: 143 MMHG | DIASTOLIC BLOOD PRESSURE: 68 MMHG | OXYGEN SATURATION: 96 % | HEIGHT: 67 IN | WEIGHT: 239 LBS | BODY MASS INDEX: 37.51 KG/M2 | RESPIRATION RATE: 20 BRPM | TEMPERATURE: 97.7 F

## 2017-01-26 PROBLEM — N13.8 BPH (BENIGN PROSTATIC HYPERTROPHY) WITH URINARY OBSTRUCTION: Status: ACTIVE | Noted: 2017-01-26

## 2017-01-26 PROBLEM — N40.1 BPH (BENIGN PROSTATIC HYPERTROPHY) WITH URINARY OBSTRUCTION: Status: ACTIVE | Noted: 2017-01-26

## 2017-01-26 PROBLEM — N21.0 BLADDER STONES: Status: ACTIVE | Noted: 2017-01-26

## 2017-01-26 PROCEDURE — 80048 BASIC METABOLIC PNL TOTAL CA: CPT | Performed by: UROLOGY

## 2017-01-26 PROCEDURE — 36415 COLL VENOUS BLD VENIPUNCTURE: CPT | Performed by: UROLOGY

## 2017-01-26 PROCEDURE — 85027 COMPLETE CBC AUTOMATED: CPT | Performed by: UROLOGY

## 2017-01-26 RX ORDER — AMLODIPINE BESYLATE 10 MG/1
10 TABLET ORAL DAILY
COMMUNITY

## 2017-01-26 RX ORDER — CELECOXIB 400 MG/1
400 CAPSULE ORAL DAILY
Status: ON HOLD | COMMUNITY
End: 2017-03-15 | Stop reason: CLARIF

## 2017-01-26 RX ORDER — CIPROFLOXACIN 500 MG/1
TABLET ORAL 2 TIMES DAILY
Status: ON HOLD | COMMUNITY
End: 2017-03-15 | Stop reason: CLARIF

## 2017-01-26 NOTE — PERIOP NOTES
[facility]  PREOPERATIVE INSTRUCTIONS    Surgery Date:   2/2/17  Surgery arrival time given by surgeon: NO   If no,[facility] [address] staff will call you between 4 PM- 8 PM the day before surgery with your arrival time. If your surgery is on a Monday, we will call you the preceding Friday. Please call [phone] after 8 PM if you did not receive your arrival time. 1. Please report at the designated time to the [facility] [address]. Bring your insurance card, photo identification, and any copayment ( if applicable). 2. You must have a responsible adult to drive you home. You need to have a responsible adult to stay with you the first 24 hours after surgery if you are going home the same day of your surgery and you should not drive a car for 24 hours following your surgery. 3. Nothing to eat or drink after midnight the night before surgery. This includes no water, gum, mints, coffee, juice, etc.  Please note special instructions, if applicable, below for medications. 4. MEDICATIONS TO TAKE THE MORNING OF SURGERY WITH A SIP OF WATER: evening dose of Levemir 10 units 2/1/17,Amlodipine  5. No alcoholic beverages 24 hours before or after your surgery. 6. If you are being admitted to the hospital,please leave personal belongings/luggage in your car until you have an assigned hospital room number. 7. Stop Aspirin and/or any non-steroidal anti-inflammatory drugs (i.e. Ibuprofen, Naproxen, Advil, Aleve) as directed by your surgeon. You may take Tylenol. Stop herbal supplements 1 week prior to  surgery. 8. If you are currently taking Plavix, Coumadin,or any other blood-thinning/anticoagulant medication contact your surgeon for instructions. 9. Please wear comfortable clothes. Wear your glasses instead of contacts. We ask that all money, jewelry and valuables be left at home. Wear no make up, particularly mascara, the day of surgery. 10.  All body piercings, rings,and jewelry need to be removed and left at home. Please wear your hair loose or down. Please no pony-tails, buns, or any metal hair accessories. If you shower the morning of surgery, please do not apply any lotions, powders, or deodorants afterwards. Do not shave any body area within 24 hours of your surgery. 11. Please follow all instructions to avoid any potential surgical cancellation. 12.  Should your physical condition change, (i.e. fever, cold, flu, etc.) please notify your surgeon as soon as possible. 13. It is important to be on time. If a situation occurs where you may be delayed, please call:  [phone] / onthe day of surgery. 14. The Preadmission Testing staff can be reached at 21 [phone]. .  15. Special instructions: free  parking    The patient was contacted  in person. He  verbalize  understanding of all instructions does not  need reinforcement.

## 2017-01-27 LAB
ANION GAP BLD CALC-SCNC: 9 MMOL/L (ref 5–15)
BUN SERPL-MCNC: 24 MG/DL (ref 6–20)
BUN/CREAT SERPL: 18 (ref 12–20)
CALCIUM SERPL-MCNC: 8.9 MG/DL (ref 8.5–10.1)
CHLORIDE SERPL-SCNC: 103 MMOL/L (ref 97–108)
CO2 SERPL-SCNC: 30 MMOL/L (ref 21–32)
CREAT SERPL-MCNC: 1.37 MG/DL (ref 0.7–1.3)
ERYTHROCYTE [DISTWIDTH] IN BLOOD BY AUTOMATED COUNT: 16.5 % (ref 11.5–14.5)
GLUCOSE SERPL-MCNC: 88 MG/DL (ref 65–100)
HCT VFR BLD AUTO: 35.6 % (ref 36.6–50.3)
HGB BLD-MCNC: 10.7 G/DL (ref 12.1–17)
MCH RBC QN AUTO: 28.1 PG (ref 26–34)
MCHC RBC AUTO-ENTMCNC: 30.1 G/DL (ref 30–36.5)
MCV RBC AUTO: 93.4 FL (ref 80–99)
PLATELET # BLD AUTO: 242 K/UL (ref 150–400)
POTASSIUM SERPL-SCNC: 3.3 MMOL/L (ref 3.5–5.1)
RBC # BLD AUTO: 3.81 M/UL (ref 4.1–5.7)
SODIUM SERPL-SCNC: 142 MMOL/L (ref 136–145)
WBC # BLD AUTO: 8.4 K/UL (ref 4.1–11.1)

## 2017-02-01 ENCOUNTER — ANESTHESIA EVENT (OUTPATIENT)
Dept: SURGERY | Age: 67
DRG: 713 | End: 2017-02-01
Payer: COMMERCIAL

## 2017-02-01 NOTE — H&P
[employer]   urology   [facility]  [address] Sab[address] Rivers  [phone]  [address]   [phone]       ProgressNotes: Osorio Arellano MD          Current Medications Reason for Appointment    1. F/U BLADDER STONES. F/U Male    History of Present Illness    BPH / LUTS:   Presentation   Date 01/16/2017  Main c/o Irritative, Obstructive bladder calculi - Back Pain  Duration 1-2, weeks  Nature Constant, Stable  Other Sxs denies hematuria, N/V/F/C  Risk Factors / Pertinant +/- none  Previous Treatment Proscar He had visual changes w/ one dose of Flomax in the past!!! He had severe constipation on initial visit - now resolved. He is ready to proceed w/ cystolitholipaxy / TURP. Taking  Vital Signs   Humalog  /80 mm Hg, Ht 67 in, Wt 250 lbs, BMI 39.15 Index. Levemir     Amlodipine Besylate     Losartan Potassium     ASA     Finasteride , Notes: 5MG     Tramadol-Acetaminophen     Colcrys     Ciprofloxacin HCl 500 MG Tablet 1 tablet Orally every 12 hrs, stop date 03/17/2017     Ibuprofen 600 MG Tablet 1 tablet Orally Three times a day     Medication List reviewed and reconciled with the patient      Examination    Urinalysis:  RBCs: none. WBCs: none. BACTERIA: none. EPITHELIAL CELLS: none. Crystals none. Past Medical History Physical Examination   SLEEP APNEA/CPAP. Male Pre-op:   Genl: WDWN, NAD. Skin No obvious lesion or rash. HEENT grossly nl, EOMI, neck supple - FROM, nl dentition. Chest unlabored / nl respiratory mvmt w/o audible wheeze. Heart RRR, no MGR. Lung CTA, BSEB. Abd S/NT/ND, no hernia, no CVAT.  Nl male, T b/l descended w/o tenderness or mass. Rectal Nl tone / no masses 4+ Prostate w/o nodule or induration. Musculo-Skeletal / Ext FROM, no edema, Nl gait. Neuro non-focal.      LEAKY HEART VALVE.      GOUT.      HTN. ELEVATED CHOLESTEROL. DM.      KIDNEY STONES. Surgical History Assessments   ESWL  1.  Benign prostatic hyperplasia with lower urinary tract symptoms - N40.1 (Primary)   KNEE REPLACEMENT  2. Chronic prostatitis without hematuria - N41.1    3. Bladder calculi - N21.0    4. Left flank pain - R10.9    5. Other obstructive and reflux uropathy - N13.8    6. Low back pain - M54.5    7. Other chronic pain - G89.29   Family History Treatment   No Family History documented. 1. Benign prostatic hyperplasia with lower urinary tract symptoms   Notes: For TURP - , d/w pt options / P/P/R/B, pt consents to proceed. Routine pre-op instructions given. Will schedule. 2. Chronic prostatitis without hematuria   Notes: as above. 3. Bladder calculi   Notes: For Cystolitholipaxy, d/w pt options / P/P/R/B, pt consents to proceed. Routine pre-op instructions given. Will schedule. 4. Low back pain   Start Celebrex Capsule, 400 MG, 1 capsule with food, Orally, Once a day, 30 day(s), 30, Refills 3  Notes: ? neuro / related to constipation - eval per PCP. 5. Others   Notes: Learning About Transurethral Resection of the Prostate (TURP) material was printed. Social History    Tobacco/EtOH/Other:   Cigarette Hx: former smoker, QUIT 2016. Other Tobacco Products: none. Alcohol Use: social.   Drug Use: never. Allergies    SULFA    OXYCODONE    TAMSULOSIN    CEPHALEXIN    Hospitalization/Major Diagnostic Procedure Procedure Codes   No Hospitalization History. [id number] URINALYSIS   Review of Systems Follow Up   14 Point ROS:   General Feeling well / no F/C, weight changes. Eyes Denies dry or irritated eyes / vision changes. Head Pt denies nosebleeds, sinus issues, sore throat, and dry mouth. CV Denies recent CP, palpitations, edema. Pulm Denies cough, wheezing, SOB. GI Denies abd pain, N/V, constipation, and diarrhea. Musculo-Skeletal Denies muscle / joint ache, back pain, swelling. Skin Denies lesion or rash. Neuro Denies weakness, numbness, dizziness, and HA.  Psych Denies depression, anxiety, EtOH, and other substance abuse. Endocrine Denies fatigue, increased thirst, temp intolerance. Heme / Hermilo Fickle Denies swollen glands, easy bruising or bleeding. Allergy Denies runny nose, hives, itching, and sneezing. Other Pt denies other symptoms. .  See HPI.     - Surgery and post-op scheduling TBA

## 2017-02-02 ENCOUNTER — HOSPITAL ENCOUNTER (INPATIENT)
Age: 67
LOS: 2 days | Discharge: HOME | DRG: 713 | End: 2017-02-04
Attending: UROLOGY | Admitting: UROLOGY
Payer: COMMERCIAL

## 2017-02-02 ENCOUNTER — ANESTHESIA (OUTPATIENT)
Dept: SURGERY | Age: 67
DRG: 713 | End: 2017-02-02
Payer: COMMERCIAL

## 2017-02-02 ENCOUNTER — SURGERY (OUTPATIENT)
Age: 67
End: 2017-02-02

## 2017-02-02 PROBLEM — N40.0 BPH (BENIGN PROSTATIC HYPERPLASIA): Status: ACTIVE | Noted: 2017-02-02

## 2017-02-02 LAB
GLUCOSE BLD STRIP.AUTO-MCNC: 108 MG/DL (ref 65–100)
GLUCOSE BLD STRIP.AUTO-MCNC: 141 MG/DL (ref 65–100)
GLUCOSE BLD STRIP.AUTO-MCNC: 148 MG/DL (ref 65–100)
SERVICE CMNT-IMP: ABNORMAL

## 2017-02-02 PROCEDURE — 74011636637 HC RX REV CODE- 636/637: Performed by: UROLOGY

## 2017-02-02 PROCEDURE — 74011250636 HC RX REV CODE- 250/636

## 2017-02-02 PROCEDURE — 77030018830 HC SOL IRR GLYC ICUM-A: Performed by: UROLOGY

## 2017-02-02 PROCEDURE — 88305 TISSUE EXAM BY PATHOLOGIST: CPT | Performed by: UROLOGY

## 2017-02-02 PROCEDURE — 74011000250 HC RX REV CODE- 250

## 2017-02-02 PROCEDURE — 0TCB8ZZ EXTIRPATION OF MATTER FROM BLADDER, VIA NATURAL OR ARTIFICIAL OPENING ENDOSCOPIC: ICD-10-PCS | Performed by: UROLOGY

## 2017-02-02 PROCEDURE — 77030019927 HC TBNG IRR CYSTO BAXT -A: Performed by: UROLOGY

## 2017-02-02 PROCEDURE — 77030032490 HC SLV COMPR SCD KNE COVD -B

## 2017-02-02 PROCEDURE — 77030027138 HC INCENT SPIROMETER -A

## 2017-02-02 PROCEDURE — 76060000064 HC AMB SURG ANES 2 TO 2.5 HR: Performed by: UROLOGY

## 2017-02-02 PROCEDURE — 77030008684 HC TU ET CUF COVD -B: Performed by: NURSE ANESTHETIST, CERTIFIED REGISTERED

## 2017-02-02 PROCEDURE — 77030020782 HC GWN BAIR PAWS FLX 3M -B

## 2017-02-02 PROCEDURE — 76030000004 HC AMB SURG OR TIME 2 TO 2.5: Performed by: UROLOGY

## 2017-02-02 PROCEDURE — 74011250636 HC RX REV CODE- 250/636: Performed by: UROLOGY

## 2017-02-02 PROCEDURE — 77030012890

## 2017-02-02 PROCEDURE — 74011000250 HC RX REV CODE- 250: Performed by: ANESTHESIOLOGY

## 2017-02-02 PROCEDURE — 77030026438 HC STYL ET INTUB CARD -A: Performed by: NURSE ANESTHETIST, CERTIFIED REGISTERED

## 2017-02-02 PROCEDURE — 82962 GLUCOSE BLOOD TEST: CPT

## 2017-02-02 PROCEDURE — 77030018836 HC SOL IRR NACL ICUM -A

## 2017-02-02 PROCEDURE — 77030018846 HC SOL IRR STRL H20 ICUM -A: Performed by: UROLOGY

## 2017-02-02 PROCEDURE — 0VT08ZZ RESECTION OF PROSTATE, VIA NATURAL OR ARTIFICIAL OPENING ENDOSCOPIC: ICD-10-PCS | Performed by: UROLOGY

## 2017-02-02 PROCEDURE — 77030032490 HC SLV COMPR SCD KNE COVD -B: Performed by: UROLOGY

## 2017-02-02 PROCEDURE — 74011250636 HC RX REV CODE- 250/636: Performed by: ANESTHESIOLOGY

## 2017-02-02 PROCEDURE — 77030005546 HC CATH URETH FOL 3W BARD -A: Performed by: UROLOGY

## 2017-02-02 PROCEDURE — 77030011640 HC PAD GRND REM COVD -A: Performed by: UROLOGY

## 2017-02-02 PROCEDURE — 77030010547 HC BG URIN W/UMETER -A

## 2017-02-02 PROCEDURE — 65270000029 HC RM PRIVATE

## 2017-02-02 PROCEDURE — 77030011274 HC ELECTRD CUT LP RWOL -F: Performed by: UROLOGY

## 2017-02-02 PROCEDURE — 74011258636 HC RX REV CODE- 258/636: Performed by: UROLOGY

## 2017-02-02 PROCEDURE — 76210000036 HC AMBSU PH I REC 1.5 TO 2 HR: Performed by: UROLOGY

## 2017-02-02 PROCEDURE — 74011250637 HC RX REV CODE- 250/637: Performed by: UROLOGY

## 2017-02-02 PROCEDURE — 76030000021 HC AMB SURG 2 TO 2.5 HR INTENSV-TIER 1: Performed by: UROLOGY

## 2017-02-02 RX ORDER — ROCURONIUM BROMIDE 10 MG/ML
INJECTION, SOLUTION INTRAVENOUS AS NEEDED
Status: DISCONTINUED | OUTPATIENT
Start: 2017-02-02 | End: 2017-02-02 | Stop reason: HOSPADM

## 2017-02-02 RX ORDER — SODIUM CHLORIDE 0.9 % (FLUSH) 0.9 %
5-10 SYRINGE (ML) INJECTION AS NEEDED
Status: DISCONTINUED | OUTPATIENT
Start: 2017-02-02 | End: 2017-02-04 | Stop reason: HOSPADM

## 2017-02-02 RX ORDER — FINASTERIDE 5 MG/1
5 TABLET, FILM COATED ORAL DAILY
Status: DISCONTINUED | OUTPATIENT
Start: 2017-02-03 | End: 2017-02-04 | Stop reason: HOSPADM

## 2017-02-02 RX ORDER — GLYCOPYRROLATE 0.2 MG/ML
INJECTION INTRAMUSCULAR; INTRAVENOUS AS NEEDED
Status: DISCONTINUED | OUTPATIENT
Start: 2017-02-02 | End: 2017-02-02 | Stop reason: HOSPADM

## 2017-02-02 RX ORDER — PHENAZOPYRIDINE HYDROCHLORIDE 100 MG/1
100 TABLET, FILM COATED ORAL
Status: DISCONTINUED | OUTPATIENT
Start: 2017-02-02 | End: 2017-02-04 | Stop reason: HOSPADM

## 2017-02-02 RX ORDER — SUCCINYLCHOLINE CHLORIDE 20 MG/ML
INJECTION INTRAMUSCULAR; INTRAVENOUS AS NEEDED
Status: DISCONTINUED | OUTPATIENT
Start: 2017-02-02 | End: 2017-02-02 | Stop reason: HOSPADM

## 2017-02-02 RX ORDER — LEVOFLOXACIN 5 MG/ML
500 INJECTION, SOLUTION INTRAVENOUS
Status: COMPLETED | OUTPATIENT
Start: 2017-02-02 | End: 2017-02-02

## 2017-02-02 RX ORDER — SODIUM CHLORIDE 0.9 % (FLUSH) 0.9 %
5-10 SYRINGE (ML) INJECTION EVERY 8 HOURS
Status: DISCONTINUED | OUTPATIENT
Start: 2017-02-02 | End: 2017-02-02 | Stop reason: HOSPADM

## 2017-02-02 RX ORDER — SODIUM CHLORIDE, SODIUM LACTATE, POTASSIUM CHLORIDE, CALCIUM CHLORIDE 600; 310; 30; 20 MG/100ML; MG/100ML; MG/100ML; MG/100ML
125 INJECTION, SOLUTION INTRAVENOUS CONTINUOUS
Status: DISCONTINUED | OUTPATIENT
Start: 2017-02-02 | End: 2017-02-02 | Stop reason: HOSPADM

## 2017-02-02 RX ORDER — ATROPA BELLADONNA AND OPIUM 16.2; 6 MG/1; MG/1
SUPPOSITORY RECTAL AS NEEDED
Status: DISCONTINUED | OUTPATIENT
Start: 2017-02-02 | End: 2017-02-02 | Stop reason: HOSPADM

## 2017-02-02 RX ORDER — PROCHLORPERAZINE EDISYLATE 5 MG/ML
5 INJECTION INTRAMUSCULAR; INTRAVENOUS
Status: DISCONTINUED | OUTPATIENT
Start: 2017-02-02 | End: 2017-02-04 | Stop reason: HOSPADM

## 2017-02-02 RX ORDER — LEVOFLOXACIN 5 MG/ML
500 INJECTION, SOLUTION INTRAVENOUS EVERY 24 HOURS
Status: COMPLETED | OUTPATIENT
Start: 2017-02-03 | End: 2017-02-03

## 2017-02-02 RX ORDER — DEXTROSE, SODIUM CHLORIDE, SODIUM LACTATE, POTASSIUM CHLORIDE, AND CALCIUM CHLORIDE 5; .6; .31; .03; .02 G/100ML; G/100ML; G/100ML; G/100ML; G/100ML
75 INJECTION, SOLUTION INTRAVENOUS CONTINUOUS
Status: DISCONTINUED | OUTPATIENT
Start: 2017-02-02 | End: 2017-02-03

## 2017-02-02 RX ORDER — ONDANSETRON 2 MG/ML
INJECTION INTRAMUSCULAR; INTRAVENOUS AS NEEDED
Status: DISCONTINUED | OUTPATIENT
Start: 2017-02-02 | End: 2017-02-02 | Stop reason: HOSPADM

## 2017-02-02 RX ORDER — DOCUSATE SODIUM 100 MG/1
100 CAPSULE, LIQUID FILLED ORAL 2 TIMES DAILY
Status: DISCONTINUED | OUTPATIENT
Start: 2017-02-02 | End: 2017-02-04 | Stop reason: HOSPADM

## 2017-02-02 RX ORDER — ASCORBIC ACID 500 MG
500 TABLET ORAL DAILY
Status: DISCONTINUED | OUTPATIENT
Start: 2017-02-03 | End: 2017-02-04 | Stop reason: HOSPADM

## 2017-02-02 RX ORDER — FENTANYL CITRATE 50 UG/ML
INJECTION, SOLUTION INTRAMUSCULAR; INTRAVENOUS AS NEEDED
Status: DISCONTINUED | OUTPATIENT
Start: 2017-02-02 | End: 2017-02-02 | Stop reason: HOSPADM

## 2017-02-02 RX ORDER — CELECOXIB 100 MG/1
400 CAPSULE ORAL DAILY
Status: DISCONTINUED | OUTPATIENT
Start: 2017-02-03 | End: 2017-02-04 | Stop reason: HOSPADM

## 2017-02-02 RX ORDER — ACETAMINOPHEN 325 MG/1
650 TABLET ORAL
Status: DISCONTINUED | OUTPATIENT
Start: 2017-02-02 | End: 2017-02-04 | Stop reason: HOSPADM

## 2017-02-02 RX ORDER — AMLODIPINE BESYLATE 5 MG/1
10 TABLET ORAL DAILY
Status: DISCONTINUED | OUTPATIENT
Start: 2017-02-03 | End: 2017-02-04 | Stop reason: HOSPADM

## 2017-02-02 RX ORDER — MIDAZOLAM HYDROCHLORIDE 1 MG/ML
INJECTION, SOLUTION INTRAMUSCULAR; INTRAVENOUS AS NEEDED
Status: DISCONTINUED | OUTPATIENT
Start: 2017-02-02 | End: 2017-02-02 | Stop reason: HOSPADM

## 2017-02-02 RX ORDER — PROPOFOL 10 MG/ML
INJECTION, EMULSION INTRAVENOUS AS NEEDED
Status: DISCONTINUED | OUTPATIENT
Start: 2017-02-02 | End: 2017-02-02 | Stop reason: HOSPADM

## 2017-02-02 RX ORDER — NEOSTIGMINE METHYLSULFATE 1 MG/ML
INJECTION INTRAVENOUS AS NEEDED
Status: DISCONTINUED | OUTPATIENT
Start: 2017-02-02 | End: 2017-02-02 | Stop reason: HOSPADM

## 2017-02-02 RX ORDER — ATROPA BELLADONNA AND OPIUM 16.2; 6 MG/1; MG/1
1 SUPPOSITORY RECTAL
Status: DISCONTINUED | OUTPATIENT
Start: 2017-02-02 | End: 2017-02-04 | Stop reason: HOSPADM

## 2017-02-02 RX ORDER — SODIUM CHLORIDE 0.9 % (FLUSH) 0.9 %
5-10 SYRINGE (ML) INJECTION EVERY 8 HOURS
Status: DISCONTINUED | OUTPATIENT
Start: 2017-02-02 | End: 2017-02-04 | Stop reason: HOSPADM

## 2017-02-02 RX ORDER — SODIUM CHLORIDE 0.9 % (FLUSH) 0.9 %
5-10 SYRINGE (ML) INJECTION AS NEEDED
Status: DISCONTINUED | OUTPATIENT
Start: 2017-02-02 | End: 2017-02-02 | Stop reason: HOSPADM

## 2017-02-02 RX ORDER — LOSARTAN POTASSIUM 50 MG/1
100 TABLET ORAL DAILY
Status: DISCONTINUED | OUTPATIENT
Start: 2017-02-03 | End: 2017-02-04 | Stop reason: HOSPADM

## 2017-02-02 RX ORDER — NALOXONE HYDROCHLORIDE 0.4 MG/ML
0.2 INJECTION, SOLUTION INTRAMUSCULAR; INTRAVENOUS; SUBCUTANEOUS
Status: DISCONTINUED | OUTPATIENT
Start: 2017-02-02 | End: 2017-02-02 | Stop reason: HOSPADM

## 2017-02-02 RX ORDER — LIDOCAINE HYDROCHLORIDE 10 MG/ML
0.1 INJECTION, SOLUTION EPIDURAL; INFILTRATION; INTRACAUDAL; PERINEURAL AS NEEDED
Status: DISCONTINUED | OUTPATIENT
Start: 2017-02-02 | End: 2017-02-02 | Stop reason: HOSPADM

## 2017-02-02 RX ORDER — HYDROMORPHONE HYDROCHLORIDE 1 MG/ML
.25-1 INJECTION, SOLUTION INTRAMUSCULAR; INTRAVENOUS; SUBCUTANEOUS
Status: DISCONTINUED | OUTPATIENT
Start: 2017-02-02 | End: 2017-02-02 | Stop reason: HOSPADM

## 2017-02-02 RX ORDER — INSULIN LISPRO 100 [IU]/ML
20 INJECTION, SOLUTION INTRAVENOUS; SUBCUTANEOUS
Status: DISCONTINUED | OUTPATIENT
Start: 2017-02-02 | End: 2017-02-04 | Stop reason: HOSPADM

## 2017-02-02 RX ORDER — FLUMAZENIL 0.1 MG/ML
0.2 INJECTION INTRAVENOUS
Status: DISCONTINUED | OUTPATIENT
Start: 2017-02-02 | End: 2017-02-02 | Stop reason: HOSPADM

## 2017-02-02 RX ORDER — DIPHENHYDRAMINE HYDROCHLORIDE 50 MG/ML
12.5 INJECTION, SOLUTION INTRAMUSCULAR; INTRAVENOUS AS NEEDED
Status: DISCONTINUED | OUTPATIENT
Start: 2017-02-02 | End: 2017-02-02 | Stop reason: HOSPADM

## 2017-02-02 RX ORDER — LIDOCAINE HYDROCHLORIDE 20 MG/ML
INJECTION, SOLUTION EPIDURAL; INFILTRATION; INTRACAUDAL; PERINEURAL AS NEEDED
Status: DISCONTINUED | OUTPATIENT
Start: 2017-02-02 | End: 2017-02-02 | Stop reason: HOSPADM

## 2017-02-02 RX ORDER — HYDROCODONE BITARTRATE AND ACETAMINOPHEN 10; 325 MG/1; MG/1
1 TABLET ORAL
Status: DISCONTINUED | OUTPATIENT
Start: 2017-02-02 | End: 2017-02-04 | Stop reason: HOSPADM

## 2017-02-02 RX ORDER — HYDROMORPHONE HYDROCHLORIDE 1 MG/ML
1 INJECTION, SOLUTION INTRAMUSCULAR; INTRAVENOUS; SUBCUTANEOUS
Status: DISCONTINUED | OUTPATIENT
Start: 2017-02-02 | End: 2017-02-04 | Stop reason: HOSPADM

## 2017-02-02 RX ORDER — ERGOCALCIFEROL 1.25 MG/1
50000 CAPSULE ORAL
Status: DISCONTINUED | OUTPATIENT
Start: 2017-02-08 | End: 2017-02-04 | Stop reason: HOSPADM

## 2017-02-02 RX ADMIN — FENTANYL CITRATE 100 MCG: 50 INJECTION, SOLUTION INTRAMUSCULAR; INTRAVENOUS at 12:41

## 2017-02-02 RX ADMIN — DOCUSATE SODIUM 100 MG: 100 CAPSULE ORAL at 18:12

## 2017-02-02 RX ADMIN — NEOSTIGMINE METHYLSULFATE 2 MG: 1 INJECTION INTRAVENOUS at 14:52

## 2017-02-02 RX ADMIN — GLYCOPYRROLATE 0.4 MG: 0.2 INJECTION INTRAMUSCULAR; INTRAVENOUS at 14:52

## 2017-02-02 RX ADMIN — SODIUM CHLORIDE, SODIUM LACTATE, POTASSIUM CHLORIDE, AND CALCIUM CHLORIDE: 600; 310; 30; 20 INJECTION, SOLUTION INTRAVENOUS at 13:32

## 2017-02-02 RX ADMIN — ROCURONIUM BROMIDE 10 MG: 10 INJECTION, SOLUTION INTRAVENOUS at 13:45

## 2017-02-02 RX ADMIN — ATROPA BELLADONNA AND OPIUM 1 SUPPOSITORY: 16.2; 6 SUPPOSITORY RECTAL at 13:55

## 2017-02-02 RX ADMIN — SODIUM CHLORIDE, SODIUM LACTATE, POTASSIUM CHLORIDE, AND CALCIUM CHLORIDE: 600; 310; 30; 20 INJECTION, SOLUTION INTRAVENOUS at 15:01

## 2017-02-02 RX ADMIN — LIDOCAINE HYDROCHLORIDE 60 MG: 20 INJECTION, SOLUTION EPIDURAL; INFILTRATION; INTRACAUDAL; PERINEURAL at 12:50

## 2017-02-02 RX ADMIN — Medication 10 ML: at 22:00

## 2017-02-02 RX ADMIN — MIDAZOLAM HYDROCHLORIDE 2 MG: 1 INJECTION, SOLUTION INTRAMUSCULAR; INTRAVENOUS at 12:41

## 2017-02-02 RX ADMIN — LIDOCAINE HYDROCHLORIDE 0.1 ML: 10 INJECTION, SOLUTION EPIDURAL; INFILTRATION; INTRACAUDAL; PERINEURAL at 12:02

## 2017-02-02 RX ADMIN — HYDROMORPHONE HYDROCHLORIDE 1 MG: 1 INJECTION, SOLUTION INTRAMUSCULAR; INTRAVENOUS; SUBCUTANEOUS at 22:00

## 2017-02-02 RX ADMIN — INSULIN DETEMIR 20 UNITS: 100 INJECTION, SOLUTION SUBCUTANEOUS at 21:57

## 2017-02-02 RX ADMIN — ROCURONIUM BROMIDE 10 MG: 10 INJECTION, SOLUTION INTRAVENOUS at 12:50

## 2017-02-02 RX ADMIN — SUCCINYLCHOLINE CHLORIDE 100 MG: 20 INJECTION INTRAMUSCULAR; INTRAVENOUS at 12:50

## 2017-02-02 RX ADMIN — LEVOFLOXACIN 500 MG: 5 INJECTION, SOLUTION INTRAVENOUS at 12:38

## 2017-02-02 RX ADMIN — FENTANYL CITRATE 50 MCG: 50 INJECTION, SOLUTION INTRAMUSCULAR; INTRAVENOUS at 14:42

## 2017-02-02 RX ADMIN — INSULIN LISPRO 20 UNITS: 100 INJECTION, SOLUTION INTRAVENOUS; SUBCUTANEOUS at 18:00

## 2017-02-02 RX ADMIN — PHENAZOPYRIDINE HYDROCHLORIDE 100 MG: 100 TABLET ORAL at 19:26

## 2017-02-02 RX ADMIN — SODIUM CHLORIDE, SODIUM LACTATE, POTASSIUM CHLORIDE, AND CALCIUM CHLORIDE 125 ML/HR: 600; 310; 30; 20 INJECTION, SOLUTION INTRAVENOUS at 12:02

## 2017-02-02 RX ADMIN — SODIUM CHLORIDE, SODIUM LACTATE, POTASSIUM CHLORIDE, CALCIUM CHLORIDE, AND DEXTROSE MONOHYDRATE 75 ML/HR: 600; 310; 30; 20; 5 INJECTION, SOLUTION INTRAVENOUS at 22:05

## 2017-02-02 RX ADMIN — ROCURONIUM BROMIDE 20 MG: 10 INJECTION, SOLUTION INTRAVENOUS at 12:59

## 2017-02-02 RX ADMIN — ONDANSETRON 4 MG: 2 INJECTION INTRAMUSCULAR; INTRAVENOUS at 13:00

## 2017-02-02 RX ADMIN — FENTANYL CITRATE 50 MCG: 50 INJECTION, SOLUTION INTRAMUSCULAR; INTRAVENOUS at 14:01

## 2017-02-02 RX ADMIN — PROPOFOL 150 MG: 10 INJECTION, EMULSION INTRAVENOUS at 12:50

## 2017-02-02 NOTE — INTERVAL H&P NOTE
H&P Update:  Yazmin Black was seen and examined. History and physical has been reviewed. The patient has been examined.  There have been no significant clinical changes since the completion of the originally dated History and Physical.    Signed By: Peri Carmona MD     February 2, 2017 12:38 PM

## 2017-02-02 NOTE — IP AVS SNAPSHOT
Current Discharge Medication List  
  
Take these medications at their scheduled times Dose & Instructions Dispensing Information Comments Morning Noon Evening Bedtime CeleBREX 400 mg capsule Generic drug:  celecoxib Your next dose is: Today, Tomorrow Other:  ____________ Dose:  400 mg Take 400 mg by mouth daily. Refills:  0  
     
   
   
   
  
 CIPRO 500 mg tablet Generic drug:  ciprofloxacin HCl Your next dose is: Today, Tomorrow Other:  ____________ Take  by mouth two (2) times a day. Refills:  0  
     
   
   
   
  
 finasteride 5 mg tablet Commonly known as:  PROSCAR Your next dose is: Today, Tomorrow Other:  ____________ Dose:  5 mg Take 5 mg by mouth daily. Refills:  0 HumaLOG 100 unit/mL injection Generic drug:  insulin lispro Your next dose is: Today, Tomorrow Other:  ____________ Dose:  20 Units 20 Units by SubCUTAneous route Before breakfast, lunch, and dinner. Refills:  0 LEVEMIR 100 unit/mL injection Generic drug:  insulin detemir Your next dose is: Today, Tomorrow Other:  ____________ Dose:  20 Units 20 Units by SubCUTAneous route nightly. Refills:  0 NORVASC 10 mg tablet Generic drug:  amLODIPine Your next dose is: Today, Tomorrow Other:  ____________ Dose:  10 mg Take 10 mg by mouth daily. Refills:  0  
     
   
   
   
  
 VITAMIN C 500 mg tablet Generic drug:  ascorbic acid (vitamin C) Your next dose is: Today, Tomorrow Other:  ____________ Dose:  500 mg Take 500 mg by mouth daily. Refills:  0  
     
   
   
   
  
 VITAMIN D2 50,000 unit capsule Generic drug:  ergocalciferol Your next dose is: Today, Tomorrow Other:  ____________ Dose:  85716 Units Take 50,000 Units by mouth every seven (7) days. Every Wednesday Refills:  0 Take these medications as needed Dose & Instructions Dispensing Information Comments Morning Noon Evening Bedtime  
 oxyCODONE-acetaminophen 5-325 mg per tablet Commonly known as:  PERCOCET Your next dose is: Today, Tomorrow Other:  ____________ Dose:  1-2 Tab Take 1-2 Tabs by mouth every four (4) hours as needed for Pain. Max Daily Amount: 12 Tabs. Quantity:  40 Tab Refills:  0 phenazopyridine 100 mg tablet Commonly known as:  PYRIDIUM Your next dose is: Today, Tomorrow Other:  ____________ Dose:  100 mg Take 1 Tab by mouth three (3) times daily as needed (burning) for up to 10 days. Quantity:  30 Tab Refills:  1 Take these medications as directed Dose & Instructions Dispensing Information Comments Morning Noon Evening Bedtime  
 losartan 100 mg tablet Commonly known as:  COZAAR Your next dose is: Today, Tomorrow Other:  ____________ TAKE 1 TABLET BY MOUTH DAILY Quantity:  30 Tab Refills:  0 Patient will need to be seen prior to additional refills. Where to Get Your Medications Information about where to get these medications is not yet available ! Ask your nurse or doctor about these medications  
  oxyCODONE-acetaminophen 5-325 mg per tablet  
 phenazopyridine 100 mg tablet

## 2017-02-02 NOTE — IP AVS SNAPSHOT
Amada Adamson 
 
 
 [address] [place] [address] 
[phone] Patient: Boy Christensen MRN: [id number] IZE:[birth year] You are allergic to the following Allergen Reactions Cephalexin Other (comments)  
 unknown Oxycodone Other (comments) Pt does not desire to take; causes altered mental status and constipation Sulfa (Sulfonamide Antibiotics) Hives Tamsulosin Other (comments) Vision loss Recent Documentation Height Weight BMI Smoking Status 1.702 m 104.9 kg 36.22 kg/m2 Former Smoker Emergency Contacts Name Discharge Info Relation Home Work Mobile Ester Duncan DISCHARGE CAREGIVER [3] Spouse [3]   [phone] About your hospitalization You were admitted on:  February 2, 2017 You last received care in the:  [facility] 5M1 MED SURG 1 You were discharged on:  February 4, 2017 Unit phone number:  [phone] Why you were hospitalized Your primary diagnosis was:  Bph (Benign Prostatic Hypertrophy) With Urinary Obstruction Your diagnoses also included:  Bladder Stones, Bph (Benign Prostatic Hyperplasia), Ckd Stage 3 Due To Type 1 Diabetes Mellitus (Hcc) Providers Seen During Your Hospitalizations Provider Role Specialty Primary office phone La Nails MD Attending Provider Urology [phone] Your Primary Care Physician (PCP) Primary Care Physician Office Phone Office Fax Lv Orellana [phone] Follow-up Information Follow up With Details Comments Contact Info Abad Dean MD   [address] [facility] [address] [place] [address] 
[phone] Current Discharge Medication List  
  
START taking these medications Dose & Instructions Dispensing Information Comments Morning Noon Evening Bedtime  
 oxyCODONE-acetaminophen 5-325 mg per tablet Commonly known as:  PERCOCET Your next dose is: Today, Tomorrow Other:  _________ Dose:  1-2 Tab Take 1-2 Tabs by mouth every four (4) hours as needed for Pain. Max Daily Amount: 12 Tabs. Quantity:  40 Tab Refills:  0 phenazopyridine 100 mg tablet Commonly known as:  PYRIDIUM Your next dose is: Today, Tomorrow Other:  _________ Dose:  100 mg Take 1 Tab by mouth three (3) times daily as needed (burning) for up to 10 days. Quantity:  30 Tab Refills:  1 CONTINUE these medications which have NOT CHANGED Dose & Instructions Dispensing Information Comments Morning Noon Evening Bedtime CeleBREX 400 mg capsule Generic drug:  celecoxib Your next dose is: Today, Tomorrow Other:  _________ Dose:  400 mg Take 400 mg by mouth daily. Refills:  0  
     
   
   
   
  
 CIPRO 500 mg tablet Generic drug:  ciprofloxacin HCl Your next dose is: Today, Tomorrow Other:  _________ Take  by mouth two (2) times a day. Refills:  0  
     
   
   
   
  
 finasteride 5 mg tablet Commonly known as:  PROSCAR Your next dose is: Today, Tomorrow Other:  _________ Dose:  5 mg Take 5 mg by mouth daily. Refills:  0 HumaLOG 100 unit/mL injection Generic drug:  insulin lispro Your next dose is: Today, Tomorrow Other:  _________ Dose:  20 Units 20 Units by SubCUTAneous route Before breakfast, lunch, and dinner. Refills:  0 LEVEMIR 100 unit/mL injection Generic drug:  insulin detemir Your next dose is: Today, Tomorrow Other:  _________ Dose:  20 Units 20 Units by SubCUTAneous route nightly. Refills:  0  
     
   
   
   
  
 losartan 100 mg tablet Commonly known as:  COZAAR Your next dose is: Today, Tomorrow Other:  _________ TAKE 1 TABLET BY MOUTH DAILY Quantity:  30 Tab Refills:  0 Patient will need to be seen prior to additional refills. NORVASC 10 mg tablet Generic drug:  amLODIPine Your next dose is: Today, Tomorrow Other:  _________ Dose:  10 mg Take 10 mg by mouth daily. Refills:  0  
     
   
   
   
  
 VITAMIN C 500 mg tablet Generic drug:  ascorbic acid (vitamin C) Your next dose is: Today, Tomorrow Other:  _________ Dose:  500 mg Take 500 mg by mouth daily. Refills:  0  
     
   
   
   
  
 VITAMIN D2 50,000 unit capsule Generic drug:  ergocalciferol Your next dose is: Today, Tomorrow Other:  _________ Dose:  42756 Units Take 50,000 Units by mouth every seven (7) days. Every Wednesday Refills:  0 STOP taking these medications   
 aspirin delayed-release 81 mg tablet Where to Get Your Medications Information on where to get these meds will be given to you by the nurse or doctor. ! Ask your nurse or doctor about these medications  
  oxyCODONE-acetaminophen 5-325 mg per tablet  
 phenazopyridine 100 mg tablet Discharge Instructions   
  
[place] 
urology [address] [facility] p [phone] 
[facility] [address], [address]   f  [phone] PATIENT INSTRUCTIONS: 
 
After general anesthesia or intravenous sedation, for 24 hours or while taking prescription Narcotics: · Limit your activities · Do not drive and operate hazardous machinery · Do not make important personal or business decisions · Do not drink alcoholic beverages Please contact [address] [place] [address] Urology at [phone] if: · Temperature over 101 · Vomiting / Unable to tolerate liquids · Severe pain not responsive to pain medications · Unable to urinate for 6-8 hours Discharge Orders None MyChart Announcement We are excited to announce that we are making your provider's discharge notes available to you in [employer]. You will see these notes when they are completed and signed by the physician that discharged you from your recent hospital stay. If you have any questions or concerns about any information you see in Up[employer]xt, please call the Health Information Department where you were seen or reach out to your Primary Care Provider for more information about your plan of care. Introducing [facility]! [facility] introduce portal paciente MyChart . Ahora se puede acceder a partes de savage expediente médico, enviar por correo electrónico la oficina de savage médico y solicitar renovaciones de medicamentos en línea. En savage navegador de Internet , Colletta Lipoma a [email or web address]. [employer]est Manju clic en el usuario por [place]ann [place]? Trude Patches clic aquí en la sesión Jordis [place]. Verá la página de registro [place]. Ingrese savage código de [facility] saroj y apple aparece a continuación. Uschris no tendrá que [employer] código después de ashli completado el proceso de registro . Si usted no se inscribe antes de la fecha de caducidad , debe solicitar un nuevo código. · MyCM[place] Código de acceso : [id number] Expires: 4/15/2017 11:46 AM 
 
Ingresa los últimos cuatro dígitos de savage Número de Seguro Social ( xxxx ) y fecha de nacimiento ( dd / mm / aaaa ) apple se indica y manju clic en Enviar. Milly será llevado a la siguiente página de registro . Crear un ID [facility] . Esta será savage ID de inicio de sesión de [facility] y no puede ser [place] , por lo que pensar en kristina que es Janell Qiu y fácil de recordar . Crear kristina contraseña MyCM[place] . ted puede cambiar savage contraseña en cualquier momento . Ingrese savage Password Reset de preguntas y Mcdaniel . Alligator se puede utilizar en un momento posterior si usted olvida savage contraseña.  
Introduzca savage dirección de correo electrónico . Reshma Raygoza recibirá Orlin Wiley notificación por correo electrónico cuando la nueva información está disponible en MyChart . Diane Pay clic en Registrarse. Shelda Kub eva y descargar porciones de savage expediente médico. 
Karen clic en el enlace de descarga del menú Resumen para descargar kristina copia portátil de savage información médica . Si tiene [employer] , por favor visite la sección de preguntas frecuentes del sitio web MyChart . Recuerde, MyChart NO es que se utilizará para las necesidades urgentes. Para emergencias médicas , llame al 911 . Ahora disponible en savage iPhone y Android ! General Information Please provide this summary of care documentation to your next provider. Patient Signature:  ____________________________________________________________ Date:  ____________________________________________________________  
  
Abdirahman Rome Provider Signature:  ____________________________________________________________ Date:  ____________________________________________________________

## 2017-02-02 NOTE — ROUTINE PROCESS
TRANSFER - OUT REPORT:    Verbal report given to Regina Mendoza RN on Luca Sierra  being transferred to [phone] for routine post - op       Report consisted of patients Situation, Background, Assessment and   Recommendations(SBAR). Information from the following report(s) SBAR was reviewed with the receiving nurse. Lines:   Peripheral IV 02/02/17 Left Antecubital (Active)   Site Assessment Clean, dry, & intact 2/2/2017  4:55 PM   Phlebitis Assessment 0 2/2/2017  4:55 PM   Infiltration Assessment 0 2/2/2017  4:55 PM   Dressing Status Clean, dry, & intact 2/2/2017  4:55 PM   Dressing Type Transparent 2/2/2017  4:55 PM   Hub Color/Line Status Pink; Infusing 2/2/2017  4:55 PM        Opportunity for questions and clarification was provided.       Patient transported with:   Registered Nurse

## 2017-02-02 NOTE — ANESTHESIA POSTPROCEDURE EVALUATION
Post-Anesthesia Evaluation and Assessment    Patient: Memo Kay MRN: [id number]  SSN: [id number]    YOB: 1950  Age: 73 y.o. Sex: male       Cardiovascular Function/Vital Signs  Visit Vitals    /58    Pulse 81    Temp 36.8 °C (98.2 °F)    Resp 26    Ht 5' 7\" (1.702 m)    Wt 104.9 kg (231 lb 4.2 oz)    SpO2 94%    BMI 36.22 kg/m2       Patient is status post general anesthesia for Procedure(s):  CYSTOLITHOLIPAXY, TRANS URETHERAL RESECTION PROSTATE . Nausea/Vomiting: None    Postoperative hydration reviewed and adequate. Pain:  Pain Scale 1: Adult Nonverbal Pain Scale (02/02/17 1518)  Pain Intensity 1: 0 (02/02/17 1518)   Managed    Neurological Status:   Neuro (WDL): Exceptions to WDL (02/02/17 1518)  Neuro  Neurologic State: Drowsy (02/02/17 1518)  LUE Motor Response: Purposeful (02/02/17 1518)  LLE Motor Response: Purposeful (02/02/17 1518)  RUE Motor Response: Purposeful (02/02/17 1518)  RLE Motor Response: Purposeful (02/02/17 1518)   At baseline    Mental Status and Level of Consciousness: Arousable    Pulmonary Status:   O2 Device: Nasal cannula (02/02/17 1518)   Adequate oxygenation and airway patent    Complications related to anesthesia: None    Post-anesthesia assessment completed.  No concerns    Signed By: Tereso Vargas DO     February 2, 2017

## 2017-02-02 NOTE — ANESTHESIA PREPROCEDURE EVALUATION
Anesthetic History               Review of Systems / Medical History      Pulmonary        Sleep apnea: CPAP           Neuro/Psych   Within defined limits           Cardiovascular    Hypertension: well controlled  Valvular problems/murmurs: mitral insufficiency        Hyperlipidemia      Comments: Moderately severe MR   GI/Hepatic/Renal                Endo/Other    Diabetes: poorly controlled, type 2, using insulin    Obesity and arthritis     Other Findings              Physical Exam    Airway  Mallampati: II    Neck ROM: normal range of motion   Mouth opening: Normal     Cardiovascular    Rhythm: regular  Rate: normal         Dental  No notable dental hx       Pulmonary  Breath sounds clear to auscultation               Abdominal         Other Findings            Anesthetic Plan    ASA: 3  Patient did not consent to regional anesthesiaAnesthesia type: general          Induction: Intravenous  Anesthetic plan and risks discussed with: Patient and Spouse      Informed consent obtained.

## 2017-02-02 NOTE — INTERVAL H&P NOTE
H&P Update:  Savannah Verduzco was seen and examined. History and physical has been reviewed. The patient has been examined.  There have been no significant clinical changes since the completion of the originally dated History and Physical.    Signed By: Tricia Roland MD     February 2, 2017 12:38 PM

## 2017-02-02 NOTE — OP NOTES
[place]  [facility]  [phone] [facility] [address] Nata Villafana  [phone]  [place] [address] [place]   [phone]    operative report      Patient: Rushie Leventhal MRN: [id number]  SSN: [id number]    YOB: 1950  Age: 73 y.o. Sex: male          Pre-operative Diagnosis: BLADDER CALCULOUS, BPH  Post-operative Diagnosis: BLADDER CALCULOUS, BPH  Surgeon: Charlette Spicer MD  Assistant: Surgeon(s):  Charlette Spicer MD  Anesthesia:  General  Findings: 2 2cm bladder stones / b/l BPH  Estimated Blood Loss:    100cc     Drains: 20Fr / 20cc 3-way christine  Specimens: prostate chips  Implants: * No implants in log *      INDICATIONS FOR THE PROCEDURE:   The patient is a very pleasant 73y.o.-year-old white male who has had substantial voiding sxs and gross hematuria and was noted to have a bladder stone and significant b/l obstructive BPH on evaluation. After detailed discussion of his options, he has agreed to proceed with cystolitholipaxy and TURP. The procedure, its purpose, risks, and benefits were explained in great detail to the patient and his wife, and they have consented to proceed. DESCRIPTION OF PROCEDURE:   The patient was taken to the operating room and positively identified as Rushie Leventhal. He was placed on the operating table in the supine position and underwent general endotrachial anesthesia. IV antibiotics had been ordered and administered within 1 hour of the surgery start time per protocol. The patient was then placed in lithotomy position and his genitalia, lower   abdomen and upper thighs were sterilely prepped and draped in the usual   fashion. A 22. 5-Norwegian cystoscope sheath with 30-degree lens was then used   to perform cystourethroscopy. The urethra had normal caliber. On entering   the prostatic fossa, moderate bilobar BPH was noted. On entering the bladder, 1+   trabeculation was noted. Careful cystoscopy was then performed with both   the 30- and 70-degree lenses.  No other abnormalities were noted outside of   the mild trabeculation. There were no diverticula seen. There were 2 2 cm   calculi seen within the bladder. The ureteral orifices were normal and   orthotopic in position with clear effluent bilaterally. They were well away   from the area of resection. The stone was then broken up using a 500nm Holmium laser fiber. Fragments of the stone were easily removed using the Elic Evacuator. Repeat cystoscopy showed no residual stone fragments. The bladder was left full and the cystoscope and sheath were withdrawn and   a 24-Portuguese resectoscope sheath with [place] obturator was then   introduced without difficulty. The bladder was drained and the obturator   was replaced with the resectoscope with an appropriate size loop. Repeat   cystoscopy was performed with similar findings. On pulling back into the   prostatic fossa, again bilobar hypertrophy was noted. Resection then began at the patient's left lateral lobe from the 2 o'clock   to the 6 o'clock position from the level of the bladder neck to the   verumontanum. Similar resection was then carried out in the patient's right   lateral lobe from the 10 o'clock to the 6 o'clock position. Anterior tissue   between the 10 o'clock and 2 o'clock position was then resected. Bleeding was handled   quite easily with the coagulation current. Prostate chips were removed   throughout the case using the [facility] evacuator. At the end of the   case, a well-resected prostatic fossa was noted with excellent hemostasis. The bladder was thoroughly investigated and there were no residual chips   within the bladder. With this, the patient's bladder was left full and   resectoscope and sheath were withdrawn. Good coaptation of the sphincter was noted on withdrawing the resectoscope. A 22-Portuguese, 3-way Valdez catheter was placed without difficulty and light   pink effluent was obtained.  The catheter irrigated easily, 20 mL of sterile   water was placed in the balloon and this was left to gravity drainage. Continuous bladder irrigation was started. . A #16 B and O suppository was placed. The patient tolerated the procedure well, was taken down from lithotomy   position, reversed from anesthesia, extubated in the operating room and   transferred to the recovery room in satisfactory condition.    Reggie Aguila MD

## 2017-02-02 NOTE — PROGRESS NOTES
Primary Nurse Ada Valdovinos RN and Dianelys Turner RN performed a dual skin assessment on this patient No impairment noted  Christopher score is 23

## 2017-02-02 NOTE — BRIEF OP NOTE
BRIEF OPERATIVE NOTE    Date of Procedure: 2/2/2017   Preoperative Diagnosis: BLADDER CALCULOUS, BPH  Postoperative Diagnosis: BLADDER CALCULOUS, BPH    Procedure(s):  CYSTOLITHOLIPAXY, TRANSURETHRAL RESECTION PROSTATE   Surgeon(s) and Role:     * Lindsay Bhatt MD - Primary            Surgical Staff:  Circ-1: Daphne Ramos RN  Scrub Tech-1: Arnulfo Simons  Event Time In   Incision Start 1305   Incision Close 1458     Anesthesia: General   Estimated Blood Loss: 100cc  Specimens:   ID Type Source Tests Collected by Time Destination   1 : prostate chips Preservative Prostate  Lindsay Bhatt MD 2/2/2017 1356 Pathology      Findings: 2 2cm stones / large - long b/l obstr prostate   Complications: none  Implants: * No implants in log *

## 2017-02-03 PROBLEM — E10.22 CKD STAGE 3 DUE TO TYPE 1 DIABETES MELLITUS (HCC): Chronic | Status: ACTIVE | Noted: 2017-02-03

## 2017-02-03 PROBLEM — N18.30 CKD STAGE 3 DUE TO TYPE 1 DIABETES MELLITUS (HCC): Chronic | Status: ACTIVE | Noted: 2017-02-03

## 2017-02-03 LAB
GLUCOSE BLD STRIP.AUTO-MCNC: 137 MG/DL (ref 65–100)
GLUCOSE BLD STRIP.AUTO-MCNC: 199 MG/DL (ref 65–100)
GLUCOSE BLD STRIP.AUTO-MCNC: 80 MG/DL (ref 65–100)
GLUCOSE BLD STRIP.AUTO-MCNC: 83 MG/DL (ref 65–100)
SERVICE CMNT-IMP: ABNORMAL
SERVICE CMNT-IMP: ABNORMAL
SERVICE CMNT-IMP: NORMAL
SERVICE CMNT-IMP: NORMAL

## 2017-02-03 PROCEDURE — 74011636637 HC RX REV CODE- 636/637: Performed by: UROLOGY

## 2017-02-03 PROCEDURE — 77030018836 HC SOL IRR NACL ICUM -A

## 2017-02-03 PROCEDURE — 65270000029 HC RM PRIVATE

## 2017-02-03 PROCEDURE — 77030010547 HC BG URIN W/UMETER -A

## 2017-02-03 PROCEDURE — 74011250636 HC RX REV CODE- 250/636: Performed by: UROLOGY

## 2017-02-03 PROCEDURE — 82962 GLUCOSE BLOOD TEST: CPT

## 2017-02-03 PROCEDURE — 74011250637 HC RX REV CODE- 250/637: Performed by: UROLOGY

## 2017-02-03 RX ADMIN — Medication 10 ML: at 21:24

## 2017-02-03 RX ADMIN — INSULIN LISPRO 20 UNITS: 100 INJECTION, SOLUTION INTRAVENOUS; SUBCUTANEOUS at 12:40

## 2017-02-03 RX ADMIN — Medication 10 ML: at 06:57

## 2017-02-03 RX ADMIN — CELECOXIB 400 MG: 100 CAPSULE ORAL at 08:46

## 2017-02-03 RX ADMIN — LOSARTAN POTASSIUM 100 MG: 50 TABLET, FILM COATED ORAL at 08:47

## 2017-02-03 RX ADMIN — HYDROCODONE BITARTRATE AND ACETAMINOPHEN 1 TABLET: 10; 325 TABLET ORAL at 06:56

## 2017-02-03 RX ADMIN — PHENAZOPYRIDINE HYDROCHLORIDE 100 MG: 100 TABLET ORAL at 08:46

## 2017-02-03 RX ADMIN — INSULIN LISPRO 20 UNITS: 100 INJECTION, SOLUTION INTRAVENOUS; SUBCUTANEOUS at 08:45

## 2017-02-03 RX ADMIN — INSULIN DETEMIR 20 UNITS: 100 INJECTION, SOLUTION SUBCUTANEOUS at 21:24

## 2017-02-03 RX ADMIN — FINASTERIDE 5 MG: 5 TABLET, FILM COATED ORAL at 08:46

## 2017-02-03 RX ADMIN — AMLODIPINE BESYLATE 10 MG: 5 TABLET ORAL at 08:46

## 2017-02-03 RX ADMIN — LEVOFLOXACIN 500 MG: 5 INJECTION, SOLUTION INTRAVENOUS at 06:56

## 2017-02-03 RX ADMIN — DOCUSATE SODIUM 100 MG: 100 CAPSULE ORAL at 08:46

## 2017-02-03 RX ADMIN — PHENAZOPYRIDINE HYDROCHLORIDE 100 MG: 100 TABLET ORAL at 17:03

## 2017-02-03 RX ADMIN — HYDROMORPHONE HYDROCHLORIDE 1 MG: 1 INJECTION, SOLUTION INTRAMUSCULAR; INTRAVENOUS; SUBCUTANEOUS at 21:23

## 2017-02-03 RX ADMIN — PHENAZOPYRIDINE HYDROCHLORIDE 100 MG: 100 TABLET ORAL at 12:40

## 2017-02-03 RX ADMIN — OXYCODONE HYDROCHLORIDE AND ACETAMINOPHEN 500 MG: 500 TABLET ORAL at 08:46

## 2017-02-03 RX ADMIN — HYDROMORPHONE HYDROCHLORIDE 1 MG: 1 INJECTION, SOLUTION INTRAMUSCULAR; INTRAVENOUS; SUBCUTANEOUS at 04:10

## 2017-02-03 NOTE — PROGRESS NOTES
Bedside shift change report given to Cherry (oncoming nurse) by Marcy Herman (offgoing nurse). Report included the following information SBAR, Procedure Summary, Intake/Output, MAR and Recent Results.

## 2017-02-03 NOTE — PROGRESS NOTES
[place]   [facility]   [facility]  [address] [facility] [address] Vishal Salgado  [phone]  [address] [address] f   [phone]       Received CDMP Query. Creat slightly elevated 1.23 - 1.37 likely sources: DM, HTN, chronic urinary obstruction   Hopefully will improve w/ tx of obstruction, but given DM, HTN likely chronic / progressive. If \"CKD 3\" is a diagnosis w/ pt meeting criteria, will add to record. Long term mgmt per pt's PCP.

## 2017-02-03 NOTE — PROGRESS NOTES
CM Note:  Met with pt for d/c planning. PTA pt was independent with ADL's, used a cane, was working and driving. DME at home: commode riser, RW and shower chair. Pt has never had home health. His emergency contact is his wife, Jung Oh ([phone]). Pt has Rx coverage and gets his medications by mail and from [facility] on [address]. No needs anticipated for d/c. Will continue to follow. JOSE A Henriquez    Care Management Interventions  PCP Verified by CM: Yes (PCP is Dr. Rose King.)  Current Support Network: Lives with Spouse (Pt lives with his wife in a 2 story house.   MBR on ground floor with 4 steps to enter.)  Confirm Follow Up Transport: Family (Pt's wife to drive him home at d/c.)  Discharge Location  Discharge Placement: Home with two level

## 2017-02-03 NOTE — PROGRESS NOTES
[place]   [facility]   [facility]  [address] Tamra Hicks  [phone]  [place], [place] [id number] f   [phone]       Urine light pink - no clots / off CBI. Comfortable.    Voiding trial in AM.

## 2017-02-03 NOTE — PROGRESS NOTES
Urine output became darker with streaks of red with a few small clots at top of Valdez. Urine output slowed down. Irrigated patient with three 60 mL syringes of NS. 14-15 medium size clots were returned with irrigation. Urine output returned to light pink. Patient tolerated well and Valdez is draining without difficulty. 0002 Valdez bag changed. Tubing clogged. 8213 Urine output became darker with a clot at the top of Valdez. Irrigation was not running through. Irrigated patient with three 60 mL syringes of NS. Two medium size clots were returned with irrigation. Urine output returned to light pink. Patient tolerated well and Valdez is draining without difficulty.

## 2017-02-03 NOTE — CDMP QUERY
1.   Please give the significance if possible of the recent lab values of serum creatinine ranging 1.23 to 1.37 with corresponding GFR of 52 to 59 in a poorly controlled diabetic who is also obese and has hypertension. =>CKD 3  =>Other Explanation of clinical findings  =>Unable to Determine (no explanation of clinical findings)    The medical record reflects the following clinical findings, treatment, and risk factors:    Risk Factors: poorly controlled diabetes, hypertension, obesity    Clinical Indicators: Serum creatinine ranging 1.23 to 1.37 on several occasions since September 2016 with corresponding GFR of 52 59 in a 72 y/o white male. Treatment: monitoring of serum chemistries. Please clarify and document your clinical opinion in the progress notes and discharge summary including the definitive and/or presumptive diagnosis, (suspected or probable), related to the above clinical findings. Please include clinical findings supporting your diagnosis. Thanks for your time.     Barrera Sauceda RN, BSN  [phone]

## 2017-02-03 NOTE — PROGRESS NOTES
[place]   urology  [address] [place] [phone]  Brandi gallego  [phone]          progress note      Patient: Ben Oconnor MRN: [id number]  SSN: [id number]    YOB: 1950  Age: 73 y.o. Sex: male        ADMITTED:  2017 to Reggie Aguila MD by Reggie Aguila MD for BLADDER CALCULOUS, BPH  BPH (benign prostatic hyperplasia)   POD #  1 Day Post-Op Procedure(s):  CYSTOLITHOLIPAXY, TRANS URETHERAL RESECTION PROSTATE        Ben Oconnor is comfortable. Had some clots last PM - urine still pink. Kaleb diet. Needing pain meds for chronic back issues. Vitals:  Temp (24hrs), Av.4 °F (36.9 °C), Min:97.8 °F (36.6 °C), Max:99.8 °F (37.7 °C)  Blood pressure 153/70, pulse 90, temperature 99.8 °F (37.7 °C), resp. rate 18, height 5' 7\" (1.702 m), weight 104.9 kg (231 lb 4.2 oz), SpO2 90 %. I&O's:   190 -  0700  In: 30088 [P.O.:360; I.V.:2200]  Out: 30635 [Urine:14090]    07 -  1900  In: 900 [I.V.:900]  Out: 2100 [Urine:2100]   Exam:  Const:  Pt. Comfortable / NAD   Abd: S/NT/ND  No CVAT    Bladder Non Palpable     Labs:  CBC   Lab Results   Component Value Date/Time    WBC 8.4 2017 03:10 PM    WBC 8.7 01/15/2017 11:47 AM    PLATELET 755  03:10 PM   BMP   Lab Results   Component Value Date/Time    Sodium 142 2017 03:10 PM    Potassium 3.3 2017 03:10 PM    Chloride 103 2017 03:10 PM    CO2 30 2017 03:10 PM    BUN 24 2017 03:10 PM    Creatinine 1.37 2017 03:10 PM    Creatinine 1.29 01/15/2017 11:47 AM    Glucose 88 2017 03:10 PM    Calcium 8.9 2017 03:10 PM      Cultures:      Imaging:       Assessment/Plan:       S/P cystolitholipaxy / TURP  - doing well  - still w/ blood - will cap off CBI, but leave in hospital 1 more night to allow bleeding to resolve  - irrigate prn / TOV in AM  - will try to get better mattress. .. / Al Dao ambulating / pulm toilet       Signed By: Reggie Aguila MD  - February 3, 2017

## 2017-02-03 NOTE — WOUND CARE
Spoke with pt and nurse re back pain from severe disc disease. Pt known to me from yesterday. No skin breakdown. Pt is in agreement that he would like to get a new bed. Bed ordered.

## 2017-02-03 NOTE — PROGRESS NOTES
Bedside and Verbal shift change report given to Merlyn Eaton RN (oncoming nurse) by Alex Desai RN (offgoing nurse). Report included the following information SBAR, Kardex, Procedure Summary, Intake/Output, MAR, Accordion and Recent Results.

## 2017-02-04 VITALS
HEIGHT: 67 IN | OXYGEN SATURATION: 96 % | SYSTOLIC BLOOD PRESSURE: 144 MMHG | BODY MASS INDEX: 36.3 KG/M2 | WEIGHT: 231.26 LBS | DIASTOLIC BLOOD PRESSURE: 82 MMHG | RESPIRATION RATE: 18 BRPM | HEART RATE: 78 BPM | TEMPERATURE: 98.2 F

## 2017-02-04 LAB
GLUCOSE BLD STRIP.AUTO-MCNC: 121 MG/DL (ref 65–100)
GLUCOSE BLD STRIP.AUTO-MCNC: 96 MG/DL (ref 65–100)
SERVICE CMNT-IMP: ABNORMAL
SERVICE CMNT-IMP: NORMAL

## 2017-02-04 PROCEDURE — 74011636637 HC RX REV CODE- 636/637: Performed by: UROLOGY

## 2017-02-04 PROCEDURE — 74011250636 HC RX REV CODE- 250/636: Performed by: UROLOGY

## 2017-02-04 PROCEDURE — 51798 US URINE CAPACITY MEASURE: CPT

## 2017-02-04 PROCEDURE — 74011250637 HC RX REV CODE- 250/637: Performed by: UROLOGY

## 2017-02-04 PROCEDURE — 82962 GLUCOSE BLOOD TEST: CPT

## 2017-02-04 RX ORDER — PHENAZOPYRIDINE HYDROCHLORIDE 100 MG/1
100 TABLET, FILM COATED ORAL
Qty: 30 TAB | Refills: 1 | Status: SHIPPED | OUTPATIENT
Start: 2017-02-04 | End: 2017-02-14

## 2017-02-04 RX ORDER — OXYCODONE AND ACETAMINOPHEN 5; 325 MG/1; MG/1
1-2 TABLET ORAL
Qty: 40 TAB | Refills: 0 | Status: ON HOLD | OUTPATIENT
Start: 2017-02-04 | End: 2017-03-15 | Stop reason: CLARIF

## 2017-02-04 RX ADMIN — PHENAZOPYRIDINE HYDROCHLORIDE 100 MG: 100 TABLET ORAL at 08:21

## 2017-02-04 RX ADMIN — INSULIN LISPRO 20 UNITS: 100 INJECTION, SOLUTION INTRAVENOUS; SUBCUTANEOUS at 08:21

## 2017-02-04 RX ADMIN — AMLODIPINE BESYLATE 10 MG: 5 TABLET ORAL at 08:21

## 2017-02-04 RX ADMIN — HYDROMORPHONE HYDROCHLORIDE 1 MG: 1 INJECTION, SOLUTION INTRAMUSCULAR; INTRAVENOUS; SUBCUTANEOUS at 05:20

## 2017-02-04 RX ADMIN — CELECOXIB 400 MG: 100 CAPSULE ORAL at 08:21

## 2017-02-04 RX ADMIN — Medication 10 ML: at 05:21

## 2017-02-04 RX ADMIN — FINASTERIDE 5 MG: 5 TABLET, FILM COATED ORAL at 08:21

## 2017-02-04 RX ADMIN — LOSARTAN POTASSIUM 100 MG: 50 TABLET, FILM COATED ORAL at 08:21

## 2017-02-04 RX ADMIN — OXYCODONE HYDROCHLORIDE AND ACETAMINOPHEN 500 MG: 500 TABLET ORAL at 08:21

## 2017-02-04 NOTE — PROGRESS NOTES
[place]   urology  [address] [place] [phone]  Gonzalo gallego  [phone]          progress note      Patient: Savannah Verduzco MRN: [id number]  SSN: [id number]    YOB: 1950  Age: 73 y.o. Sex: male        ADMITTED:  2017 to Tricia Roland MD by Tricia Roland MD for BLADDER CALCULOUS, BPH  BPH (benign prostatic hyperplasia)   POD #  2 Days Post-Op Procedure(s):  CYSTOLITHOLIPAXY, TRANS URETHERAL RESECTION PROSTATE        Savannah Verduzco is voiding well - 350/void w/ . Urine tea colored - no clots. Mild nausea, no other issues. Vitals:  Temp (24hrs), Av.2 °F (36.8 °C), Min:98 °F (36.7 °C), Max:98.3 °F (36.8 °C)  Blood pressure 144/82, pulse 78, temperature 98.2 °F (36.8 °C), resp. rate 18, height 5' 7\" (1.702 m), weight 104.9 kg (231 lb 4.2 oz), SpO2 96 %. I&O's:   1901 -  0700  In: 86813 [P.O.:600; I.V.:900]  Out: 16419 [Urine:98010]    07 -  1900  In: -   Out: 350 [Urine:350]   Exam:  Const:  Pt.  Comfortable / NAD   Abd: S/NT/ND  No CVAT    Bladder Non Palpable     Labs:  CBC   Lab Results   Component Value Date/Time    WBC 8.4 2017 03:10 PM    WBC 8.7 01/15/2017 11:47 AM    PLATELET 893  03:10 PM   BMP   Lab Results   Component Value Date/Time    Sodium 142 2017 03:10 PM    Potassium 3.3 2017 03:10 PM    Chloride 103 2017 03:10 PM    CO2 30 2017 03:10 PM    BUN 24 2017 03:10 PM    Creatinine 1.37 2017 03:10 PM    Creatinine 1.29 01/15/2017 11:47 AM    Glucose 88 2017 03:10 PM    Calcium 8.9 2017 03:10 PM      Cultures:      Imaging:       Assessment/Plan:       S/P Cystolitholipaxy / TURP  - Voiding well, will need to monitor PVR long term  - clear for d/c  - routine d/c instructions - pulm toilet / stool softeners / avoiding pelvic stress / hydration    CRF 3 - long term mgmt per PCP       Signed By: Tricia Roland MD  - 2017

## 2017-02-04 NOTE — PROGRESS NOTES
Assumed care of patient. Patient up in chair with family at bedside. No complaints at this time. Off going RN removed christine catheter at 0600. Per patient he has voided x 1. No complaints of painful urination. 0945  Patient voided approximately 350 cc of urine. , patient with no complaints of discomfort.

## 2017-02-04 NOTE — PROGRESS NOTES
José Miguel D/C at 0524. Patient voided [id number]. Bedside and Verbal shift change report given to Lynae Favre, RN (oncoming nurse) by Toño Olivas RN (offgoing nurse). Report included the following information SBAR, Kardex, Intake/Output, MAR, Accordion and Recent Results.

## 2017-02-04 NOTE — DISCHARGE SUMMARY
[place]  urology    [phone] [facility] [address] [phone]  [facility] [address]    [phone]        Patient: Luca Sierra MRN: [id number]  SSN: [id number]    YOB: 1950  Age: 73 y.o. Sex: male             ADMISSION:  to Dennis Restrepo MD by Dennis Restrepo MD  2/2/2017 ADMISSION DIAGNOSIS: BLADDER CALCULOUS, BPH  BPH (benign prostatic hyperplasia)  2/4/2017 DISCHARGE DIAGNOSIS: Same  CONSULTS: none  IMAGING: none  PROCEDURES: POD# 2 Days Post-Op Procedure(s):  CYSTOLITHOLIPAXY, TRANS URETHERAL RESECTION PROSTATE   PATHOLOGY: BPH    RECENT LABS:   Lab Results   Component Value Date/Time    WBC 8.4 01/26/2017 03:10 PM    HCT 35.6 01/26/2017 03:10 PM    PLATELET 890 63/42/7123 03:10 PM    Sodium 142 01/26/2017 03:10 PM    Potassium 3.3 01/26/2017 03:10 PM    Chloride 103 01/26/2017 03:10 PM    CO2 30 01/26/2017 03:10 PM    BUN 24 01/26/2017 03:10 PM    Creatinine 1.37 01/26/2017 03:10 PM    Glucose 88 01/26/2017 03:10 PM    Calcium 8.9 01/26/2017 03:10 PM    INR 1.2 09/12/2013 03:45 AM            HOSPITAL COURSE: uncomplicated  CONDITION ON D/C:  excellent    COMPLICATIONS: None identified. DISCHARGE TO:     Home. FOLLOWUP: 2 weeks.       Dennis Restrepo MD 2/4/2017 11:31 AM

## 2017-02-04 NOTE — PROGRESS NOTES
Assumed care of patient. Patient up in chair with family at bedside. No complaints at this time. Off going RN removed christine catheter at 0600. Per patient he has voided x 1. No complaints of painful urination. 0945  Patient voided approximately 350 cc of urine.

## 2017-02-04 NOTE — PROGRESS NOTES
Peripheral IV removed. RN reviewed discharge instructions with patient. Dr. Daja Wilson gave patient written RXs directly and gave patient specific follow up instructions and information regarding contacting him should problems arise once he is discharged home. Patient discharged via wheelchair by volunteer services.

## 2017-02-04 NOTE — DISCHARGE INSTRUCTIONS
[place]  urology  [phone] [facility] [address] [phone]  [place] [address]  f  [phone]    PATIENT INSTRUCTIONS:    After general anesthesia or intravenous sedation, for 24 hours or while taking prescription Narcotics:  · Limit your activities  · Do not drive and operate hazardous machinery  · Do not make important personal or business decisions  · Do not drink alcoholic beverages      Please contact [facility] at [phone] if:  · Temperature over 101  · Vomiting / Unable to tolerate liquids  · Severe pain not responsive to pain medications  · Unable to urinate for 6-8 hours

## 2017-02-04 NOTE — PROGRESS NOTES
Bedside and Verbal shift change report given to Cristian Hameed., (oncoming nurse) by Siri Unger. (offgoing nurse). Report included the following information SBAR, Kardex, ED Summary, Procedure Summary, Intake/Output, MAR, Accordion, Recent Results and Med Rec Status.

## 2017-02-28 NOTE — PERIOP NOTES
[facility] Chart Audit - Patient Encounter Reviewed

## 2017-03-13 ENCOUNTER — HOSPITAL ENCOUNTER (OUTPATIENT)
Dept: MRI IMAGING | Age: 67
Discharge: HOME | End: 2017-03-13
Attending: ORTHOPAEDIC SURGERY
Payer: COMMERCIAL

## 2017-03-13 DIAGNOSIS — M48.061 SPINAL STENOSIS, LUMBAR: ICD-10-CM

## 2017-03-13 PROCEDURE — 72148 MRI LUMBAR SPINE W/O DYE: CPT

## 2017-03-15 ENCOUNTER — HOSPITAL ENCOUNTER (INPATIENT)
Age: 67
LOS: 9 days | Discharge: HOME HEALTH | DRG: 477 | End: 2017-03-24
Attending: ORTHOPAEDIC SURGERY | Admitting: ORTHOPAEDIC SURGERY
Payer: COMMERCIAL

## 2017-03-15 ENCOUNTER — APPOINTMENT (OUTPATIENT)
Dept: GENERAL RADIOLOGY | Age: 67
DRG: 477 | End: 2017-03-15
Attending: PHYSICIAN ASSISTANT
Payer: COMMERCIAL

## 2017-03-15 DIAGNOSIS — M46.46 DISCITIS OF LUMBAR REGION: ICD-10-CM

## 2017-03-15 DIAGNOSIS — M46.40 DISCITIS: ICD-10-CM

## 2017-03-15 PROBLEM — E11.9 DM (DIABETES MELLITUS) (HCC): Status: ACTIVE | Noted: 2017-03-15

## 2017-03-15 LAB
ABO + RH BLD: NORMAL
ALBUMIN SERPL BCP-MCNC: 2.7 G/DL (ref 3.5–5)
ALBUMIN/GLOB SERPL: 0.5 {RATIO} (ref 1.1–2.2)
ALP SERPL-CCNC: 67 U/L (ref 45–117)
ALT SERPL-CCNC: 12 U/L (ref 12–78)
ANION GAP BLD CALC-SCNC: 8 MMOL/L (ref 5–15)
APTT PPP: 29.2 SEC (ref 22.1–32.5)
AST SERPL W P-5'-P-CCNC: 14 U/L (ref 15–37)
BASOPHILS # BLD AUTO: 0 K/UL (ref 0–0.1)
BASOPHILS # BLD: 0 % (ref 0–1)
BILIRUB SERPL-MCNC: 0.5 MG/DL (ref 0.2–1)
BLOOD GROUP ANTIBODIES SERPL: NORMAL
BUN SERPL-MCNC: 17 MG/DL (ref 6–20)
BUN/CREAT SERPL: 13 (ref 12–20)
CALCIUM SERPL-MCNC: 8.6 MG/DL (ref 8.5–10.1)
CHLORIDE SERPL-SCNC: 101 MMOL/L (ref 97–108)
CO2 SERPL-SCNC: 29 MMOL/L (ref 21–32)
CREAT SERPL-MCNC: 1.27 MG/DL (ref 0.7–1.3)
CRP SERPL-MCNC: 10.73 MG/DL
EOSINOPHIL # BLD: 0 K/UL (ref 0–0.4)
EOSINOPHIL NFR BLD: 1 % (ref 0–7)
ERYTHROCYTE [DISTWIDTH] IN BLOOD BY AUTOMATED COUNT: 16.5 % (ref 11.5–14.5)
ERYTHROCYTE [SEDIMENTATION RATE] IN BLOOD: 124 MM/HR (ref 0–20)
GLOBULIN SER CALC-MCNC: 5 G/DL (ref 2–4)
GLUCOSE BLD STRIP.AUTO-MCNC: 137 MG/DL (ref 65–100)
GLUCOSE SERPL-MCNC: 89 MG/DL (ref 65–100)
HCT VFR BLD AUTO: 30.9 % (ref 36.6–50.3)
HGB BLD-MCNC: 9.2 G/DL (ref 12.1–17)
INR PPP: 1.2 (ref 0.9–1.1)
LYMPHOCYTES # BLD AUTO: 17 % (ref 12–49)
LYMPHOCYTES # BLD: 1 K/UL (ref 0.8–3.5)
MCH RBC QN AUTO: 26.3 PG (ref 26–34)
MCHC RBC AUTO-ENTMCNC: 29.8 G/DL (ref 30–36.5)
MCV RBC AUTO: 88.3 FL (ref 80–99)
MONOCYTES # BLD: 0.3 K/UL (ref 0–1)
MONOCYTES NFR BLD AUTO: 6 % (ref 5–13)
NEUTS SEG # BLD: 4.8 K/UL (ref 1.8–8)
NEUTS SEG NFR BLD AUTO: 76 % (ref 32–75)
PLATELET # BLD AUTO: 206 K/UL (ref 150–400)
POTASSIUM SERPL-SCNC: 3.3 MMOL/L (ref 3.5–5.1)
PROT SERPL-MCNC: 7.7 G/DL (ref 6.4–8.2)
PROTHROMBIN TIME: 11.8 SEC (ref 9–11.1)
RBC # BLD AUTO: 3.5 M/UL (ref 4.1–5.7)
SERVICE CMNT-IMP: ABNORMAL
SODIUM SERPL-SCNC: 138 MMOL/L (ref 136–145)
SPECIMEN EXP DATE BLD: NORMAL
THERAPEUTIC RANGE,PTTT: NORMAL SECS (ref 58–77)
WBC # BLD AUTO: 6.2 K/UL (ref 4.1–11.1)

## 2017-03-15 PROCEDURE — 85652 RBC SED RATE AUTOMATED: CPT | Performed by: PHYSICIAN ASSISTANT

## 2017-03-15 PROCEDURE — 87205 SMEAR GRAM STAIN: CPT | Performed by: PHYSICIAN ASSISTANT

## 2017-03-15 PROCEDURE — 85730 THROMBOPLASTIN TIME PARTIAL: CPT | Performed by: PHYSICIAN ASSISTANT

## 2017-03-15 PROCEDURE — 80053 COMPREHEN METABOLIC PANEL: CPT | Performed by: PHYSICIAN ASSISTANT

## 2017-03-15 PROCEDURE — 85610 PROTHROMBIN TIME: CPT | Performed by: PHYSICIAN ASSISTANT

## 2017-03-15 PROCEDURE — 87040 BLOOD CULTURE FOR BACTERIA: CPT | Performed by: PHYSICIAN ASSISTANT

## 2017-03-15 PROCEDURE — 36415 COLL VENOUS BLD VENIPUNCTURE: CPT | Performed by: PHYSICIAN ASSISTANT

## 2017-03-15 PROCEDURE — 77030032490 HC SLV COMPR SCD KNE COVD -B

## 2017-03-15 PROCEDURE — 77030003666 HC NDL SPINAL BD -A

## 2017-03-15 PROCEDURE — 82962 GLUCOSE BLOOD TEST: CPT

## 2017-03-15 PROCEDURE — 86900 BLOOD TYPING SEROLOGIC ABO: CPT | Performed by: PHYSICIAN ASSISTANT

## 2017-03-15 PROCEDURE — 71020 XR CHEST PA LAT: CPT

## 2017-03-15 PROCEDURE — 87186 SC STD MICRODIL/AGAR DIL: CPT | Performed by: PHYSICIAN ASSISTANT

## 2017-03-15 PROCEDURE — 93005 ELECTROCARDIOGRAM TRACING: CPT

## 2017-03-15 PROCEDURE — 87077 CULTURE AEROBIC IDENTIFY: CPT | Performed by: PHYSICIAN ASSISTANT

## 2017-03-15 PROCEDURE — 86140 C-REACTIVE PROTEIN: CPT | Performed by: PHYSICIAN ASSISTANT

## 2017-03-15 PROCEDURE — 65270000029 HC RM PRIVATE

## 2017-03-15 PROCEDURE — 85025 COMPLETE CBC W/AUTO DIFF WBC: CPT | Performed by: PHYSICIAN ASSISTANT

## 2017-03-15 PROCEDURE — 89050 BODY FLUID CELL COUNT: CPT | Performed by: PHYSICIAN ASSISTANT

## 2017-03-15 PROCEDURE — 74011250637 HC RX REV CODE- 250/637: Performed by: PHYSICIAN ASSISTANT

## 2017-03-15 RX ORDER — LOSARTAN POTASSIUM 50 MG/1
100 TABLET ORAL DAILY
Status: DISCONTINUED | OUTPATIENT
Start: 2017-03-16 | End: 2017-03-24 | Stop reason: HOSPADM

## 2017-03-15 RX ORDER — HYDROMORPHONE HYDROCHLORIDE 1 MG/ML
1 INJECTION, SOLUTION INTRAMUSCULAR; INTRAVENOUS; SUBCUTANEOUS
Status: DISCONTINUED | OUTPATIENT
Start: 2017-03-15 | End: 2017-03-24 | Stop reason: HOSPADM

## 2017-03-15 RX ORDER — ACETAMINOPHEN 500 MG
1000 TABLET ORAL
Status: DISCONTINUED | OUTPATIENT
Start: 2017-03-15 | End: 2017-03-24 | Stop reason: HOSPADM

## 2017-03-15 RX ORDER — DEXTROSE 50 % IN WATER (D50W) INTRAVENOUS SYRINGE
12.5-25 AS NEEDED
Status: DISCONTINUED | OUTPATIENT
Start: 2017-03-15 | End: 2017-03-24 | Stop reason: HOSPADM

## 2017-03-15 RX ORDER — SODIUM CHLORIDE 0.9 % (FLUSH) 0.9 %
5-10 SYRINGE (ML) INJECTION EVERY 8 HOURS
Status: DISCONTINUED | OUTPATIENT
Start: 2017-03-15 | End: 2017-03-24 | Stop reason: HOSPADM

## 2017-03-15 RX ORDER — SODIUM CHLORIDE 0.9 % (FLUSH) 0.9 %
5-10 SYRINGE (ML) INJECTION AS NEEDED
Status: DISCONTINUED | OUTPATIENT
Start: 2017-03-15 | End: 2017-03-24 | Stop reason: HOSPADM

## 2017-03-15 RX ORDER — ONDANSETRON 2 MG/ML
6 INJECTION INTRAMUSCULAR; INTRAVENOUS
Status: DISCONTINUED | OUTPATIENT
Start: 2017-03-15 | End: 2017-03-24 | Stop reason: HOSPADM

## 2017-03-15 RX ORDER — INSULIN LISPRO 100 [IU]/ML
INJECTION, SOLUTION INTRAVENOUS; SUBCUTANEOUS
Status: DISCONTINUED | OUTPATIENT
Start: 2017-03-15 | End: 2017-03-24 | Stop reason: HOSPADM

## 2017-03-15 RX ORDER — MAGNESIUM SULFATE 100 %
4 CRYSTALS MISCELLANEOUS AS NEEDED
Status: DISCONTINUED | OUTPATIENT
Start: 2017-03-15 | End: 2017-03-24 | Stop reason: HOSPADM

## 2017-03-15 RX ORDER — FINASTERIDE 5 MG/1
5 TABLET, FILM COATED ORAL DAILY
Status: DISCONTINUED | OUTPATIENT
Start: 2017-03-16 | End: 2017-03-24 | Stop reason: HOSPADM

## 2017-03-15 RX ORDER — NEBIVOLOL 10 MG/1
10 TABLET ORAL DAILY
COMMUNITY
End: 2018-07-31

## 2017-03-15 RX ORDER — TRAMADOL HYDROCHLORIDE 50 MG/1
100 TABLET ORAL
Status: DISCONTINUED | OUTPATIENT
Start: 2017-03-15 | End: 2017-03-24 | Stop reason: HOSPADM

## 2017-03-15 RX ORDER — NEBIVOLOL 5 MG/1
10 TABLET ORAL DAILY
Status: DISCONTINUED | OUTPATIENT
Start: 2017-03-16 | End: 2017-03-24 | Stop reason: HOSPADM

## 2017-03-15 RX ORDER — AMLODIPINE BESYLATE 5 MG/1
10 TABLET ORAL DAILY
Status: DISCONTINUED | OUTPATIENT
Start: 2017-03-16 | End: 2017-03-24 | Stop reason: HOSPADM

## 2017-03-15 RX ORDER — DIPHENHYDRAMINE HYDROCHLORIDE 50 MG/ML
25 INJECTION, SOLUTION INTRAMUSCULAR; INTRAVENOUS
Status: DISCONTINUED | OUTPATIENT
Start: 2017-03-15 | End: 2017-03-24 | Stop reason: HOSPADM

## 2017-03-15 RX ORDER — HYDROMORPHONE HYDROCHLORIDE 2 MG/1
2 TABLET ORAL
Status: DISCONTINUED | OUTPATIENT
Start: 2017-03-15 | End: 2017-03-24 | Stop reason: HOSPADM

## 2017-03-15 RX ADMIN — Medication 10 ML: at 21:22

## 2017-03-15 RX ADMIN — TRAMADOL HYDROCHLORIDE 100 MG: 50 TABLET, FILM COATED ORAL at 20:03

## 2017-03-15 NOTE — IP AVS SNAPSHOT
Jose Eduardo Mendiolaerin 
 
 
 [address] [facility] [address] 100[address] [facility] 
[phone] Patient: Pelon Griffin MRN: [id number] You are allergic to the following Allergen Reactions Cephalexin Other (comments)  
 unknown Oxycodone Other (comments) Pt does not desire to take; causes altered mental status and constipation Sulfa (Sulfonamide Antibiotics) Hives Tamsulosin Other (comments) Vision loss Recent Documentation Height Weight BMI Smoking Status 1.702 m 102 kg 35.22 kg/m2 Former Smoker Emergency Contacts Name Discharge Info Relation Home Work Mobile Cici Duncangy DISCHARGE CAREGIVER [3] Spouse [3]   [phone] About your hospitalization You were admitted on:  March 15, 2017 You last received care in the:  [facility] 4M POST SURG ORT 1 You were discharged on:  March 24, 2017 Unit phone number:  [phone] Why you were hospitalized Your primary diagnosis was:  Discitis Your diagnoses also included:  James (Obstructive Sleep Apnea), Mitral Regurgitation, Htn (Hypertension), Dyslipidemia, Ckd Stage 3 Due To Type 1 Diabetes Mellitus (Hcc), Bph (Benign Prostatic Hypertrophy) With Urinary Obstruction, Dm (Diabetes Mellitus) (Hcc) Providers Seen During Your Hospitalizations Provider Role Specialty Primary office phone Marylin Harada, MD Attending Provider Orthopedic Surgery [phone] Your Primary Care Physician (PCP) Primary Care Physician Office Phone Office Fax Debbi April [phone] Follow-up Information Follow up With Details Comments Contact Info [address] [place] [address] [address] [phone] [address] Melina Toney  
[phone] Flako Mcdaniel MD On 4/28/2017 2:40 pm  [address] [facility] [address] Marc [phone] 1007 [facility] 
[phone] Manuel Gatica MD   [address] [facility] 100[address] [facility] 
[phone] Your Appointments Friday April 28, 2017  2:40 PM EDT HOSPITAL DISCHARGE with Anjali De León MD  
[facility] ([facility]) [address] [place] [address] 1007 [facility]  
[phone] Current Discharge Medication List  
  
START taking these medications Dose & Instructions Dispensing Information Comments Morning Noon Evening Bedtime  
 cefTRIAXone 2 gram 2 g, ADDaptor 1 Device IVPB Your last dose was: Your next dose is:    
   
   
 Dose:  2 g  
2 g by IntraVENous route every twenty-four (24) hours. Quantity:  60 Dose Refills:  0 HYDROmorphone 2 mg tablet Commonly known as:  DILAUDID Your last dose was: Your next dose is:    
   
   
 Dose:  2 mg Take 1 Tab by mouth every four (4) hours as needed. Max Daily Amount: 12 mg. Quantity:  90 Tab Refills:  0 CONTINUE these medications which have NOT CHANGED Dose & Instructions Dispensing Information Comments Morning Noon Evening Bedtime BYSTOLIC 10 mg tablet Generic drug:  nebivolol Your last dose was: Your next dose is:    
   
   
 Dose:  10 mg Take 10 mg by mouth daily. Refills:  0  
     
   
   
   
  
 finasteride 5 mg tablet Commonly known as:  PROSCAR Your last dose was: Your next dose is:    
   
   
 Dose:  5 mg Take 5 mg by mouth daily. Refills:  0 HumaLOG 100 unit/mL injection Generic drug:  insulin lispro Your last dose was: Your next dose is:    
   
   
 Dose:  20 Units 20 Units by SubCUTAneous route Before breakfast, lunch, and dinner. Refills:  0 LEVEMIR 100 unit/mL injection Generic drug:  insulin detemir Your last dose was: Your next dose is:    
   
   
 Dose:  30 Units 30 Units by SubCUTAneous route nightly. Refills:  0  
     
   
   
   
  
 losartan 100 mg tablet Commonly known as:  COZAAR Your last dose was: Your next dose is: TAKE 1 TABLET BY MOUTH DAILY Quantity:  30 Tab Refills:  0 Patient will need to be seen prior to additional refills. NORVASC 10 mg tablet Generic drug:  amLODIPine Your last dose was: Your next dose is:    
   
   
 Dose:  10 mg Take 10 mg by mouth daily. Refills:  0  
     
   
   
   
  
 VITAMIN C 500 mg tablet Generic drug:  ascorbic acid (vitamin C) Your last dose was: Your next dose is:    
   
   
 Dose:  500 mg Take 500 mg by mouth daily. Refills:  0 ASK your doctor about these medications Dose & Instructions Dispensing Information Comments Morning Noon Evening Bedtime VITAMIN D2 50,000 unit capsule Generic drug:  ergocalciferol Your last dose was: Your next dose is:    
   
   
 Dose:  67219 Units Take 50,000 Units by mouth every Wednesday. Every Wednesday Refills:  0 Where to Get Your Medications Information on where to get these meds will be given to you by the nurse or doctor. ! Ask your nurse or doctor about these medications  
  cefTRIAXone 2 gram 2 g, ADDaptor 1 Device IVPB HYDROmorphone 2 mg tablet Discharge Instructions [address] Marylene Oak Georganna Rocks, M.D. Main Office: [phone] Lumbar Surgery Discharge Instructions Activities ? You are going home a well person, be as active as possible. Your only exercise should be walking. Start with short frequent walks and increase your walking distance each day.  Start with walking twice a day for 5 minutes and increase your distance each day 2-3 minutes until you reach 25 minutes twice a day. Limit the amount of time you sit to 20-30 minute intervals. Sitting for prolonged periods of time will be uncomfortable for you following your surgery. ? No bending, lifting (of 5lbs or more), twisting, or straining. ? Do not drive until your doctor states you may do so. However, you may ride in a car for short distances to doctors appointments. ? If you are required to wear a brace, you should wear it at all times when you are out of bed. You may remove it when sleeping unless your physician advises you against it. ? When you are in the bed, you may lay on your back or on either side. Do not lie on your stomach. ? You may resume sexual relations 6 weeks after surgery. ? Continue to use your incentive spirometer for deep breathing exercises. Driving ? You may not drive or return to work until instructed by your physician. However, you may ride in the car for doctors appointments ONLY. Brace ? If you have a back brace, you should wear your brace at all times when you are out of bed. Do not wear the brace while in bed or showering. ? Remember to always wear a cotton t-shirt underneath your brace. ? Do not bend or twist when your brace is off. Diet ? You may resume your regular home diet as tolerated. If your throat is sore, you should eat soft foods for the first couple of days. ? Be sure to drink plenty of fluids; it is important to keep yourself hydrated. ? Avoid alcoholic beverages and ABSOLUTELY NO tobacco products. Tobacco products will interfere with your healing. If you continue to use tobacco, you may end up needing another surgery in the future. Showering ? You may shower in approximately 4 days after your surgery if your incision is not draining. ? Leave the dressing on during your shower but avoid getting the dressing wet. ? Reminder- your brace can be removed while showering. Remember to not bend or twist while your brace is off.   
? NO not use tub baths, swimming pools or Jacuzzis. Caring for your incision ? Leave the clear plastic dressing on x 7 days after surgery. At that point, you may remove the dressing and keep the incision open to air. ? You will probably have steri-strips on your incision (small, white pieces of tape). Do not pull the steri-strips; they will fall off on their own after several days. If you have sutures or staples, they will be removed when you see your physician. ? Do not rub or apply any lotions or ointments to your incision site. Medications ? Check with your physician before taking any anti-inflammatory medications. (Advil, Aleve, Ibuprofen, Aspirin) ? Take your pain medication as directed ? Do NOT take additional Tylenol if your prescribed pain medication has acetaminophen in it (Endocet/Percocet, Lortab, Norco) ? It is important to have regular bowel movements. Pain medications can be constipating. Stool softeners, warm prune juice, increasing your water intake, and increasing fiber in your diet can help in preventing constipation. ? Do NOT take laxatives if at all possible except in severe situations. It can result in a vicious cycle of constipation and diarrhea. Follow Up 
? Once you are home, call your physicians office to schedule an appointment for your four week postoperative visit. ? You will need to follow up with Dr Cody Leong regarding your antibiotics. Please contact his office to schedule your follow up appointment. ? You will need to follow up with Dr Domingo Baugh regarding your heart. Please contact his office to schedule your follow up appointment. Notify your physician if you develop any of the following conditions: 
? Fever above 101 degrees for 24 hours. ? Nausea or vomiting. ? Severe headache. 
? Inability to urinate. ? Loss of bowel or bladder function: 
? Inability to urinate ? Loss of bowel or bladder function (sudden onset of incontinence) ? Changes in sensation in your extremities (numbness, tingling, loss of color). ? Severe pain or pain not relieved by medications. ? Redness, swelling, or drainage from your incision. ? Persistent pain in the chest.  
? Pain in the calf of either leg. 
? Increased weakness (if this is greater than before your surgery). Discharge Instructions Attachments/References CEFTRIAXONE (BY INJECTION) (ENGLISH) HYDROMORPHONE (BY MOUTH) (ENGLISH) Discharge Orders None [facility] Announcement We are excited to announce that we are making your provider's discharge notes available to you in Apartama. You will see these notes when they are completed and signed by the physician that discharged you from your recent hospital stay. If you have any questions or concerns about any information you see in Apartama, please call the Health Information Department where you were seen or reach out to your Primary Care Provider for more information about your plan of care. Introducing [facility]! [facility] introduce portal paciente Apartama . Ahora se puede acceder a partes de savage expediente médico, enviar por correo electrónico la oficina de savage médico y solicitar renovaciones de medicamentos en línea. En savage navegador de Internet , Lavon Luna a [email or web address]t Karen clic en el usuario por Ross Anette? Annia Sea clic aquí en la sesión Steffan Apley. Verá la página de registro Republic. Ingrese savage código de [facility] saroj y apple aparece a continuación. Usted no tendrá que [employer] código después de ashli completado el proceso de registro . Si usted no se inscribe antes de la fecha de caducidad , debe solicitar un nuevo código. · Apartama Código de acceso : [id number] Expires: 4/15/2017 12:46 PM 
 
 Ingresa los últimos cuatro dígitos de savage Número de Seguro Social ( xxxx ) y fecha de nacimiento ( dd / mm / aaaa ) apple se indica y karen clic en Enviar. Usted será llevado a la siguiente página de registro . Crear un ID MyChart . Esta será savage ID de inicio de sesión de MyChart y no puede ser [place] , por lo que pensar en kristina que es Nixon Palmer y fácil de recordar . Crear kristina contraseña MyChart . Usted puede cambiar savage contraseña en cualquier momento . Ingrese savage Password Reset de preguntas y Mcdaniel . [place] se puede utilizar en un momento posterior si usted olvida savage contraseña. Introduzca savage dirección de correo electrónico . Tyler Foster recibirá kristina notificación por correo electrónico cuando la nueva información está disponible en MyChart . Sutton Germán clic en Registrarse. Radha Michelle eva y descargar porciones de savage expediente médico. 
Karen clic en el enlace de descarga del menú Resumen para descargar kristina copia portátil de savage información médica . Si tiene [employer] , por favor visite la sección de preguntas frecuentes del sitio web MyChart . Recuerde, MyChart NO es que se utilizará para las necesidades urgentes. Para emergencias médicas , llame al 911 . Ahora disponible en savage iPhone y Android ! General Information Please provide this summary of care documentation to your next provider. Patient Signature:  ____________________________________________________________ Date:  ____________________________________________________________  
  
Destiny  Provider Signature:  ____________________________________________________________ Date:  ____________________________________________________________ More Information Ceftriaxone (By injection) Ceftriaxone (ctl-bppc-DA-one) Treats infections. This medicine is a cephalosporin antibiotic. Brand Name(s):Amerinet Choice cefTRIAXone, Novaplus cefTRIAXone, PremierPro Rx cefTRIAXone There may be other brand names for this medicine. When This Medicine Should Not Be Used: This medicine is not right for everyone. Do not use it if you had an allergic reaction to any other cephalosporin antibiotic. How to Use This Medicine:  
Injectable · Your doctor will prescribe your exact dose and tell you how often it should be given. This medicine is given as a shot into a muscle or through a needle placed into a vein. · A nurse or other health provider will give you this medicine. · Missed dose: You must use this medicine on a fixed schedule. Call your doctor or pharmacist if you miss a dose. Drugs and Foods to Avoid: Ask your doctor or pharmacist before using any other medicine, including over-the-counter medicines, vitamins, and herbal products. Warnings While Using This Medicine: · Tell your doctor if you are pregnant or breastfeeding, or if you have kidney disease, liver disease, anemia, gallbladder disease, pancreas problems, or a history of stomach or bowel disease, such as colitis. Tell your doctor if you are allergic to penicillin. · This medicine can cause diarrhea. Call your doctor if the diarrhea becomes severe, does not stop, or is bloody. Do not take any medicine to stop diarrhea until you have talked to your doctor. Diarrhea can occur 2 months or more after you stop taking this medicine. · Your doctor will do lab tests at regular visits to check on the effects of this medicine. Keep all appointments. · Take all of the medicine in your prescription to clear up your infection, even if you feel better after the first few doses. · Call your doctor if your symptoms do not improve or if they get worse. Possible Side Effects While Using This Medicine:  
Call your doctor right away if you notice any of these side effects: · Allergic reaction: Itching or hives, swelling in your face or hands, swelling or tingling in your mouth or throat, chest tightness, trouble breathing · Dark urine or pale stools, nausea, vomiting, loss of appetite, stomach pain, yellow skin or eyes · Severe diarrhea, diarrhea that contains blood, or vomiting · Shortness of breath, tiredness, uneven heartbeat · Unusual bleeding, bruising, or weakness If you notice these less serious side effects, talk with your doctor: · Change or loss of taste · Dizziness or headache · Mild rash or itching skin · Pain, redness, or swelling where the shot is given · Vaginal itching or discharge If you notice other side effects that you think are caused by this medicine, tell your doctor. Call your doctor for medical advice about side effects. You may report side effects to FDA at 3-968-UJL-3833 © 2016 [address] is for End User's use only and may not be sold, redistributed or otherwise used for commercial purposes. The above information is an  only. It is not intended as medical advice for individual conditions or treatments. Talk to your doctor, nurse or pharmacist before following any medical regimen to see if it is safe and effective for you. Hydromorphone (By mouth) Hydromorphone (nbx-twuh-EVB-fone) Treats moderate to severe pain. This medicine is a narcotic pain reliever. Brand Name(s):Nhi Ceballos There may be other brand names for this medicine. When This Medicine Should Not Be Used: This medicine is not right for everyone. Do not use it if you had an allergic reaction to hydromorphone or sulfites, or if you have severe lung or breathing problems, paralytic ileus, or stomach or bowel blockage or narrowing. How to Use This Medicine:  
Long Acting Capsule, Liquid, Tablet, Long Acting Tablet · Take your medicine as directed. Your dose may need to be changed several times to find what works best for you. An overdose can be dangerous. Follow directions carefully so you do not get too much medicine at one time. · Measure the oral liquid medicine with a marked measuring spoon, oral syringe, or medicine cup. · Swallow the extended-release capsule whole. Do not crush, break, or chew it. · Swallow the extended-release tablet whole. Do not crush, break, or chew it. · If you take the extended-release tablet, part of the tablet may pass into your stools. This is normal and is nothing to worry about. · If you get any of the liquid medicine on your skin, rinse it with cool water right away. · Hydromorphone extended-release capsules or tablets work differently than regular hydromorphone tablets, even at the same dose. Do not switch from one form to another unless your doctor tells you to. · This medicine should come with a Medication Guide. Ask your pharmacist for a copy if you do not have one. · Missed dose:  
¨ Extended-release capsules or tablets: If you miss a dose, skip the missed dose and take your next dose at the usual time the next day. Do not double doses. ¨ Liquid: Take a dose as soon as you remember. If it is almost time for your next dose, wait until then and take a regular dose. Do not take extra medicine to make up for a missed dose. · Store the medicine in a closed container at room temperature, away from heat, moisture, and direct light. Store the medicine in a safe and secure place. Do not throw unused medicine in the trash. Ask your pharmacist about the best way to dispose of medicine you do not use. Drugs and Foods to Avoid: Ask your doctor or pharmacist before using any other medicine, including over-the-counter medicines, vitamins, and herbal products. · Some medicines can affect how hydromorphone works. Tell your doctor if you are using any of the following: ¨ A phenothiazine medicine ¨ An MAO inhibitor (MAOI) within the past 14 days · Tell your doctor if you use anything else that makes you sleepy. Some examples are allergy medicine, narcotic pain medicine, and alcohol.  Tell your doctor if you are also using buprenorphine, butorphanol, nalbuphine, pentazocine, or a muscle relaxer. · Do not drink alcohol while you are using this medicine. Warnings While Using This Medicine: · Tell your doctor if you are pregnant or breastfeeding, or if you have kidney disease, liver disease, lung disease or breathing problems (such as asthma or COPD), low blood pressure, an underactive thyroid, Hamblen disease, cystic fibrosis, pancreas problems, gallbladder problems, an enlarged prostate, trouble urinating, or stomach or bowel problems. Tell your doctor if you have a history of head injury, brain tumor, seizures, depression, or alcohol or drug abuse. · This medicine may cause the following problems: 
¨ High risk of overdose, which can lead to death ¨ Respiratory depression (serious breathing problem that can be life-threatening) · This medicine can be habit-forming. Do not use more than your prescribed dose. Call your doctor if you think your medicine is not working. · Do not stop using this medicine suddenly. Your doctor will need to slowly decrease your dose before you stop it completely. · This medicine may make you dizzy, drowsy, or lightheaded. Do not drive or do anything else that could be dangerous until you know how this medicine affects you. Sit or lie down if you feel dizzy. Stand up carefully. · This medicine may cause constipation, especially with long-term use. Ask your doctor if you should use a laxative to prevent and treat constipation. · Keep all medicine out of the reach of children. Never share your medicine with anyone. Possible Side Effects While Using This Medicine:  
Call your doctor right away if you notice any of these side effects: · Allergic reaction: Itching or hives, swelling in your face or hands, swelling or tingling in your mouth or throat, chest tightness, trouble breathing · Blue lips, fingernails, or skin · Extreme dizziness or weakness, shallow breathing, slow or uneven heartbeat, sweating, cold or clammy skin, seizures · Severe confusion, lightheadedness, dizziness, fainting · Severe constipation, stomach pain, or vomiting · Trouble breathing or slow breathing If you notice these less serious side effects, talk with your doctor: · Mild constipation, nausea, or vomiting · Mild sleepiness or tiredness If you notice other side effects that you think are caused by this medicine, tell your doctor. Call your doctor for medical advice about side effects. You may report side effects to FDA at 9-930-MAW-6682 © 2016 [address] is for End User's use only and may not be sold, redistributed or otherwise used for commercial purposes. The above information is an  only. It is not intended as medical advice for individual conditions or treatments. Talk to your doctor, nurse or pharmacist before following any medical regimen to see if it is safe and effective for you.

## 2017-03-15 NOTE — PROGRESS NOTES
See H&P by Dr. Shaq Alexander. Pt. Direct admit from office, examined by Dr. Shaq Alexander there. Pending IR aspiration tomorrow morning, US or Flouro, with possible drain left in place. Gram stain and cultures ordered. ID consulted, appreciate recs, hold IV abx until after aspiration and Dr. Shaq Alexander discusses plan. Thank you for allowing us to take part in this patients care. Ninoska Maurice PA-C  Orthopaedic Surgery [address]  Pgr.  [phone]

## 2017-03-15 NOTE — CONSULTS
[facility]  [address] [place] [address], [place] [address]  [phone]    Hospitalist Consult Note      NAME:  Duke Mehta   :   1950   MRN:  [id number]     Requesting Physician Harvey Yusuf MD   Reason for Consult:  Medical management      PCP:  Olean Hammans, MD     Date/Time:  3/15/2017 4:21 PM       Assessment and plan:   1. Discitis (3/15/2017)/ chronic back pain. Planned for aspiration of lower spine by IR. Check BC . No ABx until culture from aspiration is obtained. ID is consulted. 2.  HTN (hypertension). Continue home amlodipine and cozaar. 3.  DM (diabetes mellitus) ([facility] () without complication. Hold home levemir and cover with SSI      4. Dyslipidemia . No on medication. 5.   Mitral regurgitation. Follows with Dr Osman Her      Overview: mod-severe on echo 12    6. MARIN (obstructive sleep apnea) (9/10/2013). Not using CPAP any more. Overview: Hasn't used CPAP in 10 years. 7.  BPH (benign prostatic hypertrophy) with urinary obstruction (2017). Continue proscar          Thank you for the consult. Will follow along with you. Subjective:     CHIEF COMPLAINT: back pain     HISTORY OF PRESENT ILLNESS:     Mr. Kasi Erazo is a 73 y.o.  male who is admitted to the orthopedics Service with discitis. We are asked to evaluate for medical managment. Mr. Kasi Erazo with PMH of DM, HTN, MARIN, hyperlipidemia, BPH, chronic back pain was admitted by Dr Efren Pimentel for evaluation and management of abnormal MRI of L spine. Pt has been having chronic back pain for months. He has tries pain meds without improvement. Denies fever. Back pain is constant, sharp, moderate to severe in intensity.        Past Medical History:   Diagnosis Date    Abnormal EKG     he says it has been abnormal for years    Arthritis     Chronic pain     lower lumbar    Diabetes ([facility]     type II    Dyslipidemia     Enlarged prostate Dr. Mikey Cote  HTN (hypertension)     Hypercholesterolemia     Kidney stone     Mitral regurgitation     mod-severe on echo 1/5/12    Murmur     Obesity     Other ill-defined conditions(799.89)     BPH    Sleep apnea     stopped using CPAP many years ago        Past Surgical History:   Procedure Laterality Date    ECHO 2D ADULT  1/4/12    mild-mod LVH, LVEF 60%, probably grade 1 diastolic dysfunction, mod LAE, mod-severe MR (eccentroic ) - had severe HTN during study (173/108),     ECHO LIMITED  1/20/12    mod-severe MR    HX COLONOSCOPY  2013    small polyps removed; repeat in 5 years; Dr. Tyler Hernandez  9/2014    RIGHT    HX LITHOTRIPSY  2011    HX OTHER SURGICAL      LONNIE 1/31/12 - LVEF 60%, mod-severe MR (post directed), mild-mod LAE, mild atheroma aorta    HX OTHER SURGICAL      Echo 9/5/13 - mild LVH, LVEF 60 % to 65 %. Mod LAE. Mod MR (post directed)     HX OTHER SURGICAL      Echo 9/5/13 - mild LVH, LVEF 60 % to 65 %. Mod LAE.  Mod MR (post directed)     STRESS TEST CARDIOLITE  1/4/12    walked 4:30, stopping for severe FISH, no ischemic EKG changes (inferolateral TWI at start), normal perfusion (diaphragmatic attenuation), LVEF 48%, hypertensive respone to exercise       Social History   Substance Use Topics    Smoking status: Former Smoker     Packs/day: 0.25     Years: 20.00     Quit date: 9/9/2013    Smokeless tobacco: Never Used    Alcohol use No        Family History   Problem Relation Age of Onset    Coronary Artery Disease Father     Heart Disease Father     Stroke Father     Cancer Father      prostate    Hypertension Mother     Malignant Hyperthermia Neg Hx     Pseudocholinesterase Deficiency Neg Hx     Delayed Awakening Neg Hx     Post-op Nausea/Vomiting Neg Hx     Emergence Delirium Neg Hx     Post-op Cognitive Dysfunction Neg Hx     Other Neg Hx         Allergies   Allergen Reactions    Cephalexin Other (comments)     unknown    Oxycodone Other (comments)     Pt does not desire to take; causes altered mental status and constipation    Sulfa (Sulfonamide Antibiotics) Hives    Tamsulosin Other (comments)     Vision loss        Prior to Admission medications    Medication Sig Start Date End Date Taking? Authorizing Provider   oxyCODONE-acetaminophen (PERCOCET) 5-325 mg per tablet Take 1-2 Tabs by mouth every four (4) hours as needed for Pain. Max Daily Amount: 12 Tabs. 2/4/17   Dennis Restrepo MD   amLODIPine (NORVASC) 10 mg tablet Take 10 mg by mouth daily. Historical Provider   celecoxib (CELEBREX) 400 mg capsule Take 400 mg by mouth daily. Historical Provider   ciprofloxacin HCl (CIPRO) 500 mg tablet Take  by mouth two (2) times a day. Historical Provider   ergocalciferol (VITAMIN D2) 50,000 unit capsule Take 50,000 Units by mouth every seven (7) days. Every Wednesday    Riri Pardo MD   insulin detemir (LEVEMIR) 100 unit/mL injection 20 Units by SubCUTAneous route nightly. Riri Pardo MD   insulin lispro (HUMALOG) 100 unit/mL injection 20 Units by SubCUTAneous route Before breakfast, lunch, and dinner. Riri Pardo MD   finasteride (PROSCAR) 5 mg tablet Take 5 mg by mouth daily. Riri Pardo MD   losartan (COZAAR) 100 mg tablet TAKE 1 TABLET BY MOUTH DAILY 6/20/14   Dima Grady MD   ascorbic acid (VITAMIN C) 500 mg tablet Take 500 mg by mouth daily.     Historical Provider         Current Facility-Administered Medications:     sodium chloride (NS) flush 5-10 mL, 5-10 mL, IntraVENous, Q8H, Abner Garcia PA-C    sodium chloride (NS) flush 5-10 mL, 5-10 mL, IntraVENous, PRN, Abner Garcia PA-C    traMADol Karli Bathe) tablet 100 mg, 100 mg, Oral, Q6H PRN, Abner Garcia PA-C    HYDROmorphone (DILAUDID) tablet 2 mg, 2 mg, Oral, Q4H PRN, Abner Garcia PA-C    HYDROmorphone (PF) (DILAUDID) injection 1 mg, 1 mg, IntraVENous, Q3H PRN, Abner Garcia PA-C    diphenhydrAMINE (BENADRYL) injection 25 mg, 25 mg, IntraVENous, Q4H PRN, Tisha Zuniga [employer] Garcia    acetaminophen (TYLENOL) tablet 1,000 mg, 1,000 mg, Oral, Q6H PRN, Abner Garcia PA-C    ondansetron [facility]) injection 6 mg, 6 mg, IntraVENous, Q6H PRN, Owen Rodriguez PA-C      Review of Systems:  (bold if positive, if negative)    Gen:  Eyes:  ENT:  CVS:  Pulm:  GI:    :    MS:  Pain,Skin:  Psych:  Endo:    Hem:  Renal:    Neuro:            Objective:      VITALS:    Vital signs reviewed; most recent are:    Visit Vitals    /78 (BP 1 Location: Left arm, BP Patient Position: At rest)    Pulse 67    Temp 98.7 °F (37.1 °C)    Resp 17    SpO2 98%     SpO2 Readings from Last 6 Encounters:   03/15/17 98%   02/04/17 96%   01/26/17 96%   01/15/17 95%   09/07/16 100%   07/26/16 96%        No intake or output data in the 24 hours ending 03/15/17 1621         Exam:     Physical Exam:    Gen:  Well-developed, well-nourished, in no acute distress  HEENT:  Pink conjunctivae, PERRL, hearing intact to voice, moist mucous membranes  Neck:  Supple, without masses, thyroid non-tender  Resp:  No accessory muscle use, clear breath sounds without wheezes rales or rhonchi  Card:  Grade 3/6 systolic murmur, normal S1, S2 without thrills, bruits or peripheral edema  Abd:  Soft, non-tender, non-distended, normoactive bowel sounds are present, no palpable organomegaly and no detectable hernias  Lymph:  No cervical or inguinal adenopathy  Musc:  No cyanosis or clubbing  Skin:  No rashes or ulcers, skin turgor is good  Neuro:  Cranial nerves are grossly intact, no focal motor weakness, follows commands appropriately  Psych:  Good insight, oriented to person, place and time, alert       Labs:    No results for input(s): WBC, HGB, HCT, PLT, HGBEXT, HCTEXT, PLTEXT in the last 72 hours. No results for input(s): NA, K, CL, CO2, GLU, BUN, CREA, CA, MG, PHOS, ALB, TBIL, TBILI, SGOT, ALT in the last 72 hours.   Lab Results   Component Value Date/Time    Glucose (POC) 96 02/04/2017 11:32 AM    Glucose (POC) 121 02/04/2017 07:31 AM     No results for input(s): PH, PCO2, PO2, HCO3, FIO2 in the last 72 hours. No results for input(s): INR in the last 72 hours.     No lab exists for component: INREXT    Telemetry reviewed:       Risk of deterioration: medium      Total time spent with patient: 48 895 [address] discussed with: Patient, Family, Nursing Staff and >50% of time spent in counseling and coordination of care    Discussed:  Care Plan       ___________________________________________________    Attending Physician: Yamilka Strong MD

## 2017-03-15 NOTE — IP AVS SNAPSHOT
Current Discharge Medication List  
  
START taking these medications Dose & Instructions Dispensing Information Comments Morning Noon Evening Bedtime  
 cefTRIAXone 2 gram 2 g, ADDaptor 1 Device IVPB Your last dose was: Your next dose is:    
   
   
 Dose:  2 g  
2 g by IntraVENous route every twenty-four (24) hours. Quantity:  60 Dose Refills:  0 HYDROmorphone 2 mg tablet Commonly known as:  DILAUDID Your last dose was: Your next dose is:    
   
   
 Dose:  2 mg Take 1 Tab by mouth every four (4) hours as needed. Max Daily Amount: 12 mg. Quantity:  90 Tab Refills:  0 CONTINUE these medications which have NOT CHANGED Dose & Instructions Dispensing Information Comments Morning Noon Evening Bedtime BYSTOLIC 10 mg tablet Generic drug:  nebivolol Your last dose was: Your next dose is:    
   
   
 Dose:  10 mg Take 10 mg by mouth daily. Refills:  0  
     
   
   
   
  
 finasteride 5 mg tablet Commonly known as:  PROSCAR Your last dose was: Your next dose is:    
   
   
 Dose:  5 mg Take 5 mg by mouth daily. Refills:  0 HumaLOG 100 unit/mL injection Generic drug:  insulin lispro Your last dose was: Your next dose is:    
   
   
 Dose:  20 Units 20 Units by SubCUTAneous route Before breakfast, lunch, and dinner. Refills:  0 LEVEMIR 100 unit/mL injection Generic drug:  insulin detemir Your last dose was: Your next dose is:    
   
   
 Dose:  30 Units 30 Units by SubCUTAneous route nightly. Refills:  0  
     
   
   
   
  
 losartan 100 mg tablet Commonly known as:  COZAAR Your last dose was: Your next dose is: TAKE 1 TABLET BY MOUTH DAILY Quantity:  30 Tab Refills:  0 Patient will need to be seen prior to additional refills. NORVASC 10 mg tablet Generic drug:  amLODIPine Your last dose was: Your next dose is:    
   
   
 Dose:  10 mg Take 10 mg by mouth daily. Refills:  0  
     
   
   
   
  
 VITAMIN C 500 mg tablet Generic drug:  ascorbic acid (vitamin C) Your last dose was: Your next dose is:    
   
   
 Dose:  500 mg Take 500 mg by mouth daily. Refills:  0 ASK your doctor about these medications Dose & Instructions Dispensing Information Comments Morning Noon Evening Bedtime VITAMIN D2 50,000 unit capsule Generic drug:  ergocalciferol Your last dose was: Your next dose is:    
   
   
 Dose:  20198 Units Take 50,000 Units by mouth every Wednesday. Every Wednesday Refills:  0 Where to Get Your Medications Information on where to get these meds will be given to you by the nurse or doctor. ! Ask your nurse or doctor about these medications  
  cefTRIAXone 2 gram 2 g, ADDaptor 1 Device IVPB HYDROmorphone 2 mg tablet

## 2017-03-15 NOTE — PROGRESS NOTES
[facility]: MED RECONCILIATION    Comments/Recommendations:     Medications added:     · Bystolic    Medications removed:    · Cipro -completed therapy  · Celebrex - no longer takes  · Percocet - no longer takes    Medications adjusted:    · Levemir changed from 20 units to 30 units QHS    Information obtained from: Patient    Significant PMH/Disease States:   Past Medical History:   Diagnosis Date    Abnormal EKG     he says it has been abnormal for years    Arthritis     Chronic pain     lower lumbar    Diabetes ([address]     type II    Dyslipidemia     Enlarged prostate Dr. Sona Huizar    HTN (hypertension)     Hypercholesterolemia     Kidney stone     Mitral regurgitation     mod-severe on echo 1/5/12    Murmur     Obesity     Other ill-defined conditions(799.89)     BPH    Sleep apnea     stopped using CPAP many years ago     Chief Complaint for this Admission: No chief complaint on file. Allergies: Cephalexin; Oxycodone; Sulfa (sulfonamide antibiotics); and Tamsulosin    Prior to Admission Medications:     Medication Documentation Review Audit       Reviewed by Zacarias Jalloh (Pharmacist) on 03/15/17 at 1654         Medication Sig Documenting Provider Last Dose Status Taking? amLODIPine (NORVASC) 10 mg tablet Take 10 mg by mouth daily. Historical Provider 3/15/2017 am Active Yes    ascorbic acid (VITAMIN C) 500 mg tablet Take 500 mg by mouth daily. Historical Provider 3/15/2017 am Active Yes    ergocalciferol (VITAMIN D2) 50,000 unit capsule Take 50,000 Units by mouth every Wednesday. Every Wednesday Riri Pardo MD 3/8/2017 am Active Yes    finasteride (PROSCAR) 5 mg tablet Take 5 mg by mouth daily. Riri Pardo MD 3/15/2017 am Active Yes    insulin detemir (LEVEMIR) 100 unit/mL injection 30 Units by SubCUTAneous route nightly.  Riri Pardo MD 3/14/2017 am Active Yes             Med Note (NEPTALI LOUISE Feb 2, 2017  5:45 PM): Dose is correct      insulin lispro (HUMALOG) 100 unit/mL injection 20 Units by SubCUTAneous route Before breakfast, lunch, and dinner. Phys MD Edvin 3/15/2017 am Active Yes             Med Note (NEPTALI LOUISE   Thu Feb 2, 2017  5:45 PM): Dose is correct      losartan (COZAAR) 100 mg tablet TAKE 1 TABLET BY MOUTH DAILY Dima Grady MD 3/15/2017 am Active Yes    nebivolol (BYSTOLIC) 10 mg tablet Take 10 mg by mouth daily.  Historical Provider 3/15/2017 am Active Yes                    Jose A Davis   Contact: [phone]

## 2017-03-16 ENCOUNTER — APPOINTMENT (OUTPATIENT)
Dept: GENERAL RADIOLOGY | Age: 67
DRG: 477 | End: 2017-03-16
Attending: RADIOLOGY
Payer: COMMERCIAL

## 2017-03-16 LAB
ANION GAP BLD CALC-SCNC: 5 MMOL/L (ref 5–15)
APPEARANCE UR: ABNORMAL
BACTERIA URNS QL MICRO: NEGATIVE /HPF
BASOPHILS # BLD AUTO: 0 K/UL (ref 0–0.1)
BASOPHILS # BLD: 0 % (ref 0–1)
BILIRUB UR QL: NEGATIVE
BUN SERPL-MCNC: 18 MG/DL (ref 6–20)
BUN/CREAT SERPL: 15 (ref 12–20)
CALCIUM SERPL-MCNC: 8.4 MG/DL (ref 8.5–10.1)
CHLORIDE SERPL-SCNC: 103 MMOL/L (ref 97–108)
CO2 SERPL-SCNC: 29 MMOL/L (ref 21–32)
COLOR UR: ABNORMAL
CREAT SERPL-MCNC: 1.24 MG/DL (ref 0.7–1.3)
EOSINOPHIL # BLD: 0.1 K/UL (ref 0–0.4)
EOSINOPHIL NFR BLD: 1 % (ref 0–7)
EPITH CASTS URNS QL MICRO: ABNORMAL /LPF
ERYTHROCYTE [DISTWIDTH] IN BLOOD BY AUTOMATED COUNT: 16.4 % (ref 11.5–14.5)
GLUCOSE BLD STRIP.AUTO-MCNC: 103 MG/DL (ref 65–100)
GLUCOSE BLD STRIP.AUTO-MCNC: 107 MG/DL (ref 65–100)
GLUCOSE BLD STRIP.AUTO-MCNC: 122 MG/DL (ref 65–100)
GLUCOSE BLD STRIP.AUTO-MCNC: 133 MG/DL (ref 65–100)
GLUCOSE BLD STRIP.AUTO-MCNC: 148 MG/DL (ref 65–100)
GLUCOSE SERPL-MCNC: 125 MG/DL (ref 65–100)
GLUCOSE UR STRIP.AUTO-MCNC: NEGATIVE MG/DL
HCT VFR BLD AUTO: 29.6 % (ref 36.6–50.3)
HGB BLD-MCNC: 8.8 G/DL (ref 12.1–17)
HGB UR QL STRIP: ABNORMAL
HYALINE CASTS URNS QL MICRO: ABNORMAL /LPF (ref 0–5)
KETONES UR QL STRIP.AUTO: NEGATIVE MG/DL
LEUKOCYTE ESTERASE UR QL STRIP.AUTO: ABNORMAL
LYMPHOCYTES # BLD AUTO: 15 % (ref 12–49)
LYMPHOCYTES # BLD: 1.1 K/UL (ref 0.8–3.5)
MCH RBC QN AUTO: 26.4 PG (ref 26–34)
MCHC RBC AUTO-ENTMCNC: 29.7 G/DL (ref 30–36.5)
MCV RBC AUTO: 88.9 FL (ref 80–99)
MONOCYTES # BLD: 0.5 K/UL (ref 0–1)
MONOCYTES NFR BLD AUTO: 6 % (ref 5–13)
NEUTS SEG # BLD: 6 K/UL (ref 1.8–8)
NEUTS SEG NFR BLD AUTO: 78 % (ref 32–75)
NITRITE UR QL STRIP.AUTO: NEGATIVE
PH UR STRIP: 6 [PH] (ref 5–8)
PLATELET # BLD AUTO: 185 K/UL (ref 150–400)
POTASSIUM SERPL-SCNC: 3.6 MMOL/L (ref 3.5–5.1)
PROT UR STRIP-MCNC: 30 MG/DL
RBC # BLD AUTO: 3.33 M/UL (ref 4.1–5.7)
RBC #/AREA URNS HPF: ABNORMAL /HPF (ref 0–5)
SERVICE CMNT-IMP: ABNORMAL
SODIUM SERPL-SCNC: 137 MMOL/L (ref 136–145)
SP GR UR REFRACTOMETRY: 1.02 (ref 1–1.03)
UA: UC IF INDICATED,UAUC: ABNORMAL
UROBILINOGEN UR QL STRIP.AUTO: 1 EU/DL (ref 0.2–1)
WBC # BLD AUTO: 7.7 K/UL (ref 4.1–11.1)
WBC URNS QL MICRO: >100 /HPF (ref 0–4)

## 2017-03-16 PROCEDURE — 99153 MOD SED SAME PHYS/QHP EA: CPT

## 2017-03-16 PROCEDURE — 20225 BONE BIOPSY TROCAR/NDL DEEP: CPT

## 2017-03-16 PROCEDURE — 65270000029 HC RM PRIVATE

## 2017-03-16 PROCEDURE — 81001 URINALYSIS AUTO W/SCOPE: CPT | Performed by: PHYSICIAN ASSISTANT

## 2017-03-16 PROCEDURE — 76000 FLUOROSCOPY <1 HR PHYS/QHP: CPT

## 2017-03-16 PROCEDURE — 74011250636 HC RX REV CODE- 250/636: Performed by: INTERNAL MEDICINE

## 2017-03-16 PROCEDURE — 82962 GLUCOSE BLOOD TEST: CPT

## 2017-03-16 PROCEDURE — 62267 INTERDISCAL PERQ ASPIR DX: CPT

## 2017-03-16 PROCEDURE — 74011250637 HC RX REV CODE- 250/637: Performed by: PHYSICIAN ASSISTANT

## 2017-03-16 PROCEDURE — 80048 BASIC METABOLIC PNL TOTAL CA: CPT | Performed by: INTERNAL MEDICINE

## 2017-03-16 PROCEDURE — 74011000250 HC RX REV CODE- 250

## 2017-03-16 PROCEDURE — 74011250636 HC RX REV CODE- 250/636: Performed by: RADIOLOGY

## 2017-03-16 PROCEDURE — 36415 COLL VENOUS BLD VENIPUNCTURE: CPT | Performed by: INTERNAL MEDICINE

## 2017-03-16 PROCEDURE — 99152 MOD SED SAME PHYS/QHP 5/>YRS: CPT

## 2017-03-16 PROCEDURE — 77030007812 HC INTRO SPN TRCR KYPH -D

## 2017-03-16 PROCEDURE — 87086 URINE CULTURE/COLONY COUNT: CPT | Performed by: PHYSICIAN ASSISTANT

## 2017-03-16 PROCEDURE — 77030011218 HC DEV BIOP BN KYPH -C

## 2017-03-16 PROCEDURE — 85025 COMPLETE CBC W/AUTO DIFF WBC: CPT | Performed by: INTERNAL MEDICINE

## 2017-03-16 PROCEDURE — 74011250637 HC RX REV CODE- 250/637: Performed by: INTERNAL MEDICINE

## 2017-03-16 RX ORDER — VANCOMYCIN/0.9 % SOD CHLORIDE 1.5G/250ML
1500 PLASTIC BAG, INJECTION (ML) INTRAVENOUS EVERY 12 HOURS
Status: DISCONTINUED | OUTPATIENT
Start: 2017-03-17 | End: 2017-03-20

## 2017-03-16 RX ORDER — SODIUM CHLORIDE 9 MG/ML
75 INJECTION, SOLUTION INTRAVENOUS CONTINUOUS
Status: DISCONTINUED | OUTPATIENT
Start: 2017-03-16 | End: 2017-03-24 | Stop reason: HOSPADM

## 2017-03-16 RX ORDER — LIDOCAINE HYDROCHLORIDE 10 MG/ML
INJECTION, SOLUTION EPIDURAL; INFILTRATION; INTRACAUDAL; PERINEURAL
Status: COMPLETED
Start: 2017-03-16 | End: 2017-03-16

## 2017-03-16 RX ORDER — FENTANYL CITRATE 50 UG/ML
100 INJECTION, SOLUTION INTRAMUSCULAR; INTRAVENOUS
Status: DISCONTINUED | OUTPATIENT
Start: 2017-03-16 | End: 2017-03-16

## 2017-03-16 RX ORDER — LIDOCAINE HYDROCHLORIDE 10 MG/ML
10 INJECTION INFILTRATION; PERINEURAL
Status: ACTIVE | OUTPATIENT
Start: 2017-03-16 | End: 2017-03-17

## 2017-03-16 RX ORDER — HYDROMORPHONE HYDROCHLORIDE 2 MG/1
4 TABLET ORAL
Status: DISCONTINUED | OUTPATIENT
Start: 2017-03-16 | End: 2017-03-24 | Stop reason: HOSPADM

## 2017-03-16 RX ORDER — GENTAMICIN SULFATE 80 MG/100ML
80 INJECTION, SOLUTION INTRAVENOUS EVERY 12 HOURS
Status: DISCONTINUED | OUTPATIENT
Start: 2017-03-16 | End: 2017-03-24

## 2017-03-16 RX ORDER — MIDAZOLAM HYDROCHLORIDE 1 MG/ML
5 INJECTION, SOLUTION INTRAMUSCULAR; INTRAVENOUS
Status: DISCONTINUED | OUTPATIENT
Start: 2017-03-16 | End: 2017-03-16

## 2017-03-16 RX ORDER — LIDOCAINE HYDROCHLORIDE 10 MG/ML
10 INJECTION INFILTRATION; PERINEURAL
Status: CANCELLED | OUTPATIENT
Start: 2017-03-16 | End: 2017-03-16

## 2017-03-16 RX ADMIN — FENTANYL CITRATE 25 MCG: 50 INJECTION, SOLUTION INTRAMUSCULAR; INTRAVENOUS at 17:20

## 2017-03-16 RX ADMIN — FENTANYL CITRATE 25 MCG: 50 INJECTION, SOLUTION INTRAMUSCULAR; INTRAVENOUS at 17:00

## 2017-03-16 RX ADMIN — FENTANYL CITRATE 25 MCG: 50 INJECTION, SOLUTION INTRAMUSCULAR; INTRAVENOUS at 18:00

## 2017-03-16 RX ADMIN — FENTANYL CITRATE 25 MCG: 50 INJECTION, SOLUTION INTRAMUSCULAR; INTRAVENOUS at 17:50

## 2017-03-16 RX ADMIN — LOSARTAN POTASSIUM 100 MG: 50 TABLET, FILM COATED ORAL at 08:53

## 2017-03-16 RX ADMIN — Medication 10 ML: at 13:49

## 2017-03-16 RX ADMIN — Medication 10 ML: at 22:17

## 2017-03-16 RX ADMIN — GENTAMICIN SULFATE 80 MG: 80 INJECTION, SOLUTION INTRAVENOUS at 22:15

## 2017-03-16 RX ADMIN — FENTANYL CITRATE 25 MCG: 50 INJECTION, SOLUTION INTRAMUSCULAR; INTRAVENOUS at 17:10

## 2017-03-16 RX ADMIN — Medication 10 ML: at 08:54

## 2017-03-16 RX ADMIN — HYDROMORPHONE HYDROCHLORIDE 2 MG: 2 TABLET ORAL at 11:02

## 2017-03-16 RX ADMIN — SODIUM CHLORIDE 75 ML/HR: 900 INJECTION, SOLUTION INTRAVENOUS at 23:25

## 2017-03-16 RX ADMIN — FINASTERIDE 5 MG: 5 TABLET, FILM COATED ORAL at 08:53

## 2017-03-16 RX ADMIN — FENTANYL CITRATE 25 MCG: 50 INJECTION, SOLUTION INTRAMUSCULAR; INTRAVENOUS at 17:45

## 2017-03-16 RX ADMIN — AMLODIPINE BESYLATE 10 MG: 5 TABLET ORAL at 08:53

## 2017-03-16 RX ADMIN — MIDAZOLAM HYDROCHLORIDE 1 MG: 1 INJECTION, SOLUTION INTRAMUSCULAR; INTRAVENOUS at 18:00

## 2017-03-16 RX ADMIN — MIDAZOLAM HYDROCHLORIDE 1 MG: 1 INJECTION, SOLUTION INTRAMUSCULAR; INTRAVENOUS at 17:30

## 2017-03-16 RX ADMIN — FENTANYL CITRATE 25 MCG: 50 INJECTION, SOLUTION INTRAMUSCULAR; INTRAVENOUS at 17:40

## 2017-03-16 RX ADMIN — MIDAZOLAM HYDROCHLORIDE 1 MG: 1 INJECTION, SOLUTION INTRAMUSCULAR; INTRAVENOUS at 17:40

## 2017-03-16 RX ADMIN — MIDAZOLAM HYDROCHLORIDE 1 MG: 1 INJECTION, SOLUTION INTRAMUSCULAR; INTRAVENOUS at 17:50

## 2017-03-16 RX ADMIN — VANCOMYCIN HYDROCHLORIDE 2500 MG: 10 INJECTION, POWDER, LYOPHILIZED, FOR SOLUTION INTRAVENOUS at 19:08

## 2017-03-16 RX ADMIN — SODIUM CHLORIDE 75 ML/HR: 900 INJECTION, SOLUTION INTRAVENOUS at 15:49

## 2017-03-16 RX ADMIN — FENTANYL CITRATE 25 MCG: 50 INJECTION, SOLUTION INTRAMUSCULAR; INTRAVENOUS at 17:25

## 2017-03-16 RX ADMIN — MIDAZOLAM HYDROCHLORIDE 1 MG: 1 INJECTION, SOLUTION INTRAMUSCULAR; INTRAVENOUS at 17:35

## 2017-03-16 RX ADMIN — MIDAZOLAM HYDROCHLORIDE 1 MG: 1 INJECTION, SOLUTION INTRAMUSCULAR; INTRAVENOUS at 17:45

## 2017-03-16 RX ADMIN — MIDAZOLAM HYDROCHLORIDE 1 MG: 1 INJECTION, SOLUTION INTRAMUSCULAR; INTRAVENOUS at 17:55

## 2017-03-16 RX ADMIN — MIDAZOLAM HYDROCHLORIDE 1 MG: 1 INJECTION, SOLUTION INTRAMUSCULAR; INTRAVENOUS at 17:25

## 2017-03-16 RX ADMIN — NEBIVOLOL HYDROCHLORIDE 10 MG: 5 TABLET ORAL at 08:53

## 2017-03-16 RX ADMIN — LIDOCAINE HYDROCHLORIDE 10 ML: 10 INJECTION, SOLUTION EPIDURAL; INFILTRATION; INTRACAUDAL; PERINEURAL at 19:01

## 2017-03-16 NOTE — PROGRESS NOTES
Spiritual Care Partner Volunteer visited patient in 26 on 3.16.17.   Documented by:    Johanne Lugo M.Div, M.S, Alfie Roach1 available at [address] [place] [address]([id number])

## 2017-03-16 NOTE — PROGRESS NOTES
3/16/2017 12:31 PM Met with pt and pt's wife, Naif Blackwood. Charted address and phone number confirmed. Pt lives with his wife in a 1 story home in [facility], there are 4 steps to enter. Pt was independent with adls and driving prior to admission. Pt has had HH in the past [facility] [address] [facility], pt would like to have [facility] HH at discharge. Pt owns a RW and cane. Pt has rx coverage. HH order requested and received. [place] referral sent to [facility] via [employer]. CM will follow. MARILU Velasquez   Care Management Interventions  PCP Verified by CM: Yes Robles De Los Santos )  Mode of Transport at Discharge:  Other (see comment) (Pt's wife, Naif Blackwood )  Transition of Care Consult (CM Consult): [address]: No  Reason Outside [place]: Out of service area  Current Support Network: Lives with Spouse, Own Home  Confirm Follow Up Transport: Family

## 2017-03-16 NOTE — PROGRESS NOTES
Ortho Spine Daily Progress Note    Date of Surgery:  * No surgery found *      Patient: Mitchel Serrano   YOB: 1950  Age: 73 y.o. SUBJECTIVE:       The patient was found to have a discitis and is admitted for pain control and for pending IR draining. The patient c/o moderate back pain, severe when upright. Pain = 5/10 laying, 10/10 when upright. The patient's pain is adequately controlled. He denies CP, SOB, N/V/D, dizziness, abdominal pain, HA. +flatus, - BM. OBJECTIVE:     Vital Signs:    Temp (24hrs), Av °F (37.2 °C), Min:98.7 °F (37.1 °C), Max:99.3 °F (37.4 °C)    Patient Vitals for the past 8 hrs:   BP Temp Pulse Resp SpO2   17 0849 157/78 99.2 °F (37.3 °C) 72 17 94 %   17 0334 160/79 99 °F (37.2 °C) 77 17 95 %           Physical Exam:  General: A&Ox3, NAD. Respiratory: Respirations are unlabored. Abdomen: S/NT  Musculoskeletal: Calves are S/NT, Dorsi/plantar flexion/EHL 5/5, Ever DP 1+  Neurological:  Ever LE's NVI    Laboratory Values:             Recent Labs      17   0522  03/15/17   1708   HGB  8.8*  9.2*   PLT  185  206   INR   --   1.2*   CREA  1.24  1.27   GLU  125*  89     Lab Results   Component Value Date/Time    Sodium 137 2017 05:22 AM    Potassium 3.6 2017 05:22 AM    Chloride 103 2017 05:22 AM    CO2 29 2017 05:22 AM    Glucose 125 2017 05:22 AM    BUN 18 2017 05:22 AM    Creatinine 1.24 2017 05:22 AM    Calcium 8.4 2017 05:22 AM       INDICATION: Recent low back pain at the L4-5 level with no surgery     EXAMINATION: MRI LUMBAR SPINE without CONTRAST     COMPARISON: 3/30/2011     TECHNIQUE: MR imaging of the lumbar spine was performed with sagittal T1, T2,  STIR; axial T1, T2. NO CONTRAST ADMINISTERED     FINDINGS:     Grade 1 anterolisthesis of L4 on L5. Minimal retrolisthesis of L5 on S1. No  other malalignment. Progressive moderate L3-4 and L5-S1 degenerative disc  disease.  No evidence for acute fracture. There is minimal increased fluid signal  and edema within the L3-4 and L5-S1 disc space extending to the endplates more  so at T7-2. No epidural or paraspinal fluid collection. No evidence for acute  fracture. No abnormality of the distal spinal cord, conus medullaris, or cauda  equina.     T12-L1: Small disc bulge causing no central spinal canal or neural foraminal  stenosis     L1/2: Mild broad-based disc bulge causing no central spinal canal or neural  foraminal stenosis     L2/3: No significant disc abnormality, central spinal canal stenosis, or neural  foraminal stenosis.      L3/4: Small disc osteophyte complex without central stenosis. Mild bilateral  neural foraminal stenosis     L4/5: Anterolisthesis with small disc bulge, severe facet arthropathy, and  ligamentum flavum hypertrophy causing mild to moderate central stenosis. Narrowing of the lateral recess bilaterally with mild to moderate bilateral  neural foraminal narrowing. Facet arthropathy is worse on the left with synovial  joint effusion, edema, and posterior synovial cyst.     L5/S1: Disc osteophyte complex without central stenosis. Left lateral recess  stenosis with moderate left and mild right neural foraminal narrowing. Moderate  facet arthropathy.     T2 hyperintense left renal lesion is likely a cyst.     IMPRESSION:     There is minimal increased fluid signal and edema within the L3-4 and L5-S1 disc  space extending to the endplates more so at X6-9. This is concerning for  discitis at both levels. No epidural or paraspinal fluid collection. Severe  degeneration versus infection in the left facet joint at L4-5.     Degenerative changes as described above. PLAN:     L3-4/L5-S1 Discitis IR drainage and drain placement today. Hemodynamics Hgb today is 8.8. Stable. Pain Control Adequate, continue current regimen. DVT Prophylaxis Continue with SCD'S, Ankle Pump Exercises, Mobilize.        Discharge Disposition Discharge plan: Plan on aspiration and drainage by IR today. ID consult. Pain control. Will continue to follow closely.         Signed By: Jerri Goldberg, PA-C  March 16, 2017 8:56 AM

## 2017-03-16 NOTE — PROGRESS NOTES
[facility] Dosing Services: Antimicrobial Stewardship Progress Note  Consult for antibiotic dosing of Vancomycin/Gentamicin by Dr. Vinicio Rosado  Pharmacist reviewed antibiotic appropriateness for 73year old male for indication of Discitis/Osteomyelitis/Bacteremia (Gentamicin synergy for enterococcus discitis/bacteremia)  Day of Therapy: 1    Plan:  Vancomycin therapy:  Loading dose: Vancomycin 2500 mg IV x1 dose   Maintenance dose: Vancomycin 1500 mg IV q`2hr  Dose calculated to approximate a therapeutic trough of 15-20 mcg/mL. Last trough level / Plan for level: after 3rd dose  Pharmacy to follow daily and will make changes to dose and/or frequency based on clinical status. Gentamicin synergy dosing  Begin Gentamicin 80 mg IV q12hr  Goal peak = 3-5 mcg/ml  Goal trough = 0.5-1.5 mcg/ml (Less than 1)  Levels after 3rd dose. Other Antimicrobial  (not dosed by pharmacist)   none   Cultures     3/16/2017: Urine: pending  3/15/2017: Blood: GPC 1/2 bottles pending  3/15/2017: Blood: GPC 1/2 bottles pending   Serum Creatinine     Lab Results   Component Value Date/Time    Creatinine 1.24 03/16/2017 05:22 AM    Creatinine (POC) 2.0 07/26/2016 07:24 AM       Creatinine Clearance Estimated Creatinine Clearance: 67.6 mL/min (based on Cr of 1.24).      Temp   98.6 °F (37 °C)    WBC   Lab Results   Component Value Date/Time    WBC 7.7 03/16/2017 05:22 AM       H/H   Lab Results   Component Value Date/Time    HGB 8.8 03/16/2017 05:22 AM        Platelets   Lab Results   Component Value Date/Time    PLATELET 751 79/67/5130 05:22 AM        Pharmacist: Signed Greyson Hernández Contact information: [phone]

## 2017-03-16 NOTE — PROGRESS NOTES
Problem: Falls - Risk of  Goal: *Absence of falls  Outcome: Progressing Towards Goal  Calls for assistance        Problem: Patient Education: Go to Patient Education Activity  Goal: Patient/Family Education  Outcome: Progressing Towards Goal  Family assists patient

## 2017-03-16 NOTE — PROGRESS NOTES
Verbal report given to [facility], RN on 4th floor after patient procedure. Patient to be transported back to floor via transport service. Patient is on o2 at 2lpm via nasal cannula for comfort only while sleeping due to medication. Patient can be removed off O2 when more alert. Patient will wake and then get sleepy again, able to answer questions and verbalize understanding at this time of procedure being complete. Patient states he is thirsty.

## 2017-03-16 NOTE — PROGRESS NOTES
1630 Patient received from floor via wheelchair to xray/fluoro #3. Patient alert and oriented, able to sign consent for procedure. Has left hand IV inserted previously by anesthesia. Patient complains of pain at 5/10. Vitals WNL, patient assessed for airway and respiratory status, all WNL. Patient has been NPO, no antibiotics have been given while awaiting this procedure, has had no blood thinners during admission per patient. 1638 patient placed on table, prone position, Fentanyl given for discomfort. 1730 time out called  1731 procedure began  1815 procedure complete. Patient procedure site dressed in bulky dry dressed, gown changed, patient assisted to stretcher for comfort lying in an at rest position, denies pain or nausea. Sample collected and sent to lab. Patient received a total of 8mg Versed and 200mcg Fentanyl.

## 2017-03-16 NOTE — PROGRESS NOTES
[address] [place] [address], [address] [place] [address]  [phone]      Medical Progress Note      NAME: Thaddeus South   :  1950  MRM:  [id number]    Date/Time: 3/16/2017  9:38 AM       Assessment and Plan:   1. Discitis (3/15/2017)/ chronic back pain. Planned for aspiration of lower spine by IR. Check BC . No ABx until culture from aspiration is obtained. ID is consulted.      2. HTN (hypertension). Continue home amlodipine and cozaar.      3. DM (diabetes mellitus) ([facility] [address] () without complication. Hold home levemir and cover with SSI      4. Dyslipidemia . No on medication.      5. Mitral regurgitation. Follows with Dr Bryce Leigh  Overview: mod-severe on echo 12     6. MARIN (obstructive sleep apnea) (9/10/2013). Not using CPAP any more. Overview: Hasn't used CPAP in 10 years.       7. BPH (benign prostatic hypertrophy) with urinary obstruction (2017). Continue proscar             Subjective:     Chief Complaint:  Back pain     ROS:  (bold if positive, if negative)      Tolerating PT  Tolerating Diet        Objective:     Last 24hrs VS reviewed since prior progress note.  Most recent are:    Visit Vitals    /78 (BP 1 Location: Right arm, BP Patient Position: At rest)    Pulse 72    Temp 99.2 °F (37.3 °C)    Resp 17    SpO2 94%     SpO2 Readings from Last 6 Encounters:   17 94%   17 96%   17 96%   01/15/17 95%   16 100%   16 96%        No intake or output data in the 24 hours ending 17 0938     Physical Exam:    Gen:  Well-developed, well-nourished, in no acute distress  HEENT:  Pink conjunctivae, PERRL, hearing intact to voice, moist mucous membranes  Neck:  Supple, without masses, thyroid non-tender  Resp:  No accessory muscle use, clear breath sounds without wheezes rales or rhonchi  Card:  No murmurs, normal S1, S2 without thrills, bruits or peripheral edema  Abd:  Soft, non-tender, non-distended, normoactive bowel sounds are present, no palpable organomegaly and no detectable hernias  Lymph:  No cervical or inguinal adenopathy  Musc:  No cyanosis or clubbing  Skin:  No rashes or ulcers, skin turgor is good  Neuro:  Cranial nerves are grossly intact, no focal motor weakness, follows commands appropriately  Psych:  Good insight, oriented to person, place and time, alert  __________________________________________________________________  Medications Reviewed: (see below)  Medications:     Current Facility-Administered Medications   Medication Dose Route Frequency    HYDROmorphone (DILAUDID) tablet 4 mg  4 mg Oral Q4H PRN    sodium chloride (NS) flush 5-10 mL  5-10 mL IntraVENous Q8H    sodium chloride (NS) flush 5-10 mL  5-10 mL IntraVENous PRN    traMADol (ULTRAM) tablet 100 mg  100 mg Oral Q6H PRN    HYDROmorphone (DILAUDID) tablet 2 mg  2 mg Oral Q4H PRN    HYDROmorphone (PF) (DILAUDID) injection 1 mg  1 mg IntraVENous Q3H PRN    diphenhydrAMINE (BENADRYL) injection 25 mg  25 mg IntraVENous Q4H PRN    acetaminophen (TYLENOL) tablet 1,000 mg  1,000 mg Oral Q6H PRN    ondansetron (ZOFRAN) injection 6 mg  6 mg IntraVENous Q6H PRN    insulin lispro (HUMALOG) injection   SubCUTAneous AC&HS    glucose chewable tablet 16 g  4 Tab Oral PRN    dextrose (D50W) injection syrg 12.5-25 g  12.5-25 g IntraVENous PRN    glucagon (GLUCAGEN) injection 1 mg  1 mg IntraMUSCular PRN    amLODIPine (NORVASC) tablet 10 mg  10 mg Oral DAILY    finasteride (PROSCAR) tablet 5 mg  5 mg Oral DAILY    losartan (COZAAR) tablet 100 mg  100 mg Oral DAILY    nebivolol (BYSTOLIC) tablet 10 mg  10 mg Oral DAILY        Lab Data Reviewed: (see below)  Lab Review:     Recent Labs      03/16/17   0522  03/15/17   1708   WBC  7.7  6.2   HGB  8.8*  9.2*   HCT  29.6*  30.9*   PLT  185  206     Recent Labs      03/16/17   0522  03/15/17   1708   NA  137  138   K  3.6  3.3*   CL  103  101   CO2  29  29   GLU  125*  89   BUN  18  17   CREA  1.24 1. 27   CA  8.4*  8.6   ALB   --   2.7*   TBILI   --   0.5   SGOT   --   14*   ALT   --   12   INR   --   1.2*     Lab Results   Component Value Date/Time    Glucose (POC) 133 03/16/2017 07:54 AM    Glucose (POC) 137 03/15/2017 09:36 PM    Glucose (POC) 96 02/04/2017 11:32 AM    Glucose (POC) 121 02/04/2017 07:31 AM    Glucose (POC) 137 02/03/2017 08:39 PM     No results for input(s): PH, PCO2, PO2, HCO3, FIO2 in the last 72 hours. Recent Labs      03/15/17   1708   INR  1.2*     All Micro Results     Procedure Component Value Units Date/Time    CULTURE, BLOOD [[id number]] Collected:  03/15/17 1708    Order Status:  Completed Specimen:  Blood from Blood Updated:  03/16/17 0921     Special Requests: NO SPECIAL REQUESTS        Culture result:         ONE OF TWO BOTTLES HAS BEEN FLAGGED POSITIVE BY INSTRUMENT. BOTTLE HAS BEEN SENT TO [facility] TO ASSESS FOR POSSIBLE GROWTH. REMAINING BOTTLE(S) HAS/HAVE NO GROWTH SO FAR    CULTURE, URINE [[id number]] Collected:  03/16/17 0338    Order Status:  Completed Updated:  03/16/17 0425    CULTURE, BLOOD [[id number]] Collected:  03/15/17 1739    Order Status:  Completed Specimen:  Blood from Blood Updated:  03/15/17 1911    CULTURE, BODY FLUID Asif Keita [[id number]]     Order Status:  Sent Specimen:  Miscellaneous Fluid           I have reviewed notes of prior 24hr. Other pertinent lab:       Total time spent with patient: [address] discussed with: Patient, Nursing Staff and >50% of time spent in counseling and coordination of care    Discussed:  Care Plan    Prophylaxis:  SCD's    Disposition:  Home w/Family           ___________________________________________________    Attending Physician: Yamilka Strong MD

## 2017-03-16 NOTE — CONSULTS
[facility] Infectious Diseases Consult  Justin Fermin MD,  WESTON Greene DO, NP     [address] [facility], [address] [place] [address]     Office: [phone]       Fax: [phone]        Reason for consult: discitis L3-L4     Date of Consultation:  March 16, 2017  Date of Admission: 3/15/2017   Referring Physician: Portillo Barrientos      Impression:   L3-L4 discitis -  IR aspiration is planned and antibiotics are being held until this is completed in hopes of getting good cultures to guide treatment. CRP 10.73, . Patient will eventually need PICC line for long course of IV antibiotics. Bacteremia - blood cultures growing GPCs 2/4 bottles; patient has had no fever or chills, no aching joints - only describes fatigue and anorexia    Plan:   Once L-spine aspiration completed, start vanc/synergy dose gent  Get surveillance blood cultures tomorrow AM  2D echo to rule out vegetations   Await aspirate cultures   Ortho following      This is a 73yo male with PMH that includes DM, HTN, MARIN, hyperlipidemia, BPH and chronic back pain. He was a direct admit from Dr. Benita Navraro office for evaluation and management of abnormal L-spine MRI. He has been found to have L3-L4 discitis. He c/o moderate back pain, severe when upright. Pain = 5/10 laying, 10/10 when upright. The patient's pain is adequately controlled. He denies CP, SOB, N/V/D, dizziness, abdominal pain, HA.   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 Edith Arias    HTN (hypertension)     Hypercholesterolemia     Kidney stone     Mitral regurgitation     mod-severe on echo 1/5/12    Murmur     Obesity     Other ill-defined conditions(799.89)     BPH    Sleep apnea     stopped using CPAP many years ago      Family History   Problem Relation Age of Onset    Coronary Artery Disease Father     Heart Disease Father     Stroke Father     Cancer Father      prostate    Hypertension Mother     Malignant Hyperthermia Neg Hx     Pseudocholinesterase Deficiency Neg Hx     Delayed Awakening Neg Hx     Post-op Nausea/Vomiting Neg Hx     Emergence Delirium Neg Hx     Post-op Cognitive Dysfunction Neg Hx     Other Neg Hx       Social History   Substance Use Topics    Smoking status: Former Smoker     Packs/day: 0.25     Years: 20.00     Quit date: 9/9/2013    Smokeless tobacco: Never Used    Alcohol use No     Past Surgical History:   Procedure Laterality Date    ECHO 2D ADULT  1/4/12    mild-mod LVH, LVEF 60%, probably grade 1 diastolic dysfunction, mod LAE, mod-severe MR (eccentroic ) - had severe HTN during study (173/108),     ECHO LIMITED  1/20/12    mod-severe MR    HX COLONOSCOPY  2013    small polyps removed; repeat in 5 years; Dr. Wilber Ponce  9/2014    RIGHT    HX LITHOTRIPSY  2011    HX OTHER SURGICAL      LONNIE 1/31/12 - LVEF 60%, mod-severe MR (post directed), mild-mod LAE, mild atheroma aorta    HX OTHER SURGICAL      Echo 9/5/13 - mild LVH, LVEF 60 % to 65 %. Mod LAE. Mod MR (post directed)     HX OTHER SURGICAL      Echo 9/5/13 - mild LVH, LVEF 60 % to 65 %. Mod LAE.  Mod MR (post directed)     STRESS TEST CARDIOLITE  1/4/12    walked 4:30, stopping for severe FISH, no ischemic EKG changes (inferolateral TWI at start), normal perfusion (diaphragmatic attenuation), LVEF 48%, hypertensive respone to exercise      Current Facility-Administered Medications   Medication Dose Route Frequency Last Dose    HYDROmorphone (DILAUDID) tablet 4 mg  4 mg Oral Q4H PRN      sodium chloride (NS) flush 5-10 mL  5-10 mL IntraVENous Q8H 10 mL at 03/16/17 1349    sodium chloride (NS) flush 5-10 mL  5-10 mL IntraVENous PRN      traMADol (ULTRAM) tablet 100 mg  100 mg Oral Q6H  mg at 03/15/17 2003    HYDROmorphone (DILAUDID) tablet 2 mg  2 mg Oral Q4H PRN 2 mg at 03/16/17 1102    HYDROmorphone (PF) (DILAUDID) injection 1 mg  1 mg IntraVENous Q3H PRN      diphenhydrAMINE (BENADRYL) injection 25 mg  25 mg IntraVENous Q4H PRN      acetaminophen (TYLENOL) tablet 1,000 mg  1,000 mg Oral Q6H PRN      ondansetron (ZOFRAN) injection 6 mg  6 mg IntraVENous Q6H PRN      insulin lispro (HUMALOG) injection   SubCUTAneous AC&HS Stopped at 03/15/17 2200    glucose chewable tablet 16 g  4 Tab Oral PRN      dextrose (D50W) injection syrg 12.5-25 g  12.5-25 g IntraVENous PRN      glucagon (GLUCAGEN) injection 1 mg  1 mg IntraMUSCular PRN      amLODIPine (NORVASC) tablet 10 mg  10 mg Oral DAILY 10 mg at 03/16/17 0853    finasteride (PROSCAR) tablet 5 mg  5 mg Oral DAILY 5 mg at 03/16/17 0853    losartan (COZAAR) tablet 100 mg  100 mg Oral DAILY 100 mg at 03/16/17 0853    nebivolol (BYSTOLIC) tablet 10 mg  10 mg Oral DAILY 10 mg at 03/16/17 7136     Allergies   Allergen Reactions    Cephalexin Other (comments)     unknown    Oxycodone Other (comments)     Pt does not desire to take; causes altered mental status and constipation    Sulfa (Sulfonamide Antibiotics) Hives    Tamsulosin Other (comments)     Vision loss        Review of Systems:   Other than noted in the HPI above, a 12 point review of systems is negative for any other constitutional, opthalmologic, ENT, cardiovascular, pulmonary, gastrointestinal, urinary, neurologic, psychiatric, lymphatic, hematologic, oncologic, integument or musculoskeletal issues. Exam:   Gen: WDWN, NAD  HEENT: moist mucous membranes  Resp: CTAB  Card: RRR, no peripheral edema  Abd: Soft, NTND  Neuro: A&O x3  Psych: normal affect    Objective:   Blood pressure 157/75, pulse 68, temperature 98.6 °F (37 °C), resp. rate 16, SpO2 92 %. Temp (24hrs), Av °F (37.2 °C), Min:98.6 °F (37 °C), Max:99.3 °F (37.4 °C)    Recent Labs      17   0522  03/15/17   1708   NA  137  138   K  3.6  3.3*   CL  103  101   CO2  29  29   BUN  18  17   CREA  1.24  1.27   GLU  125*  89   ALB   --   2.7*   WBC  7.7  6.2   HGB  8.8*  9.2*   HCT  29.6*  30.9*   PLT  185  206       Microbiology:      Lab Results   Component Value Date/Time    Culture result:  03/15/2017 05:39 PM     GRAM POSITIVE COCCI   IN PAIRS AND CHAINS  GROWING IN 1 OF 2 BOTTLES DRAWN  (SITE= L AC)      Culture result: REMAINING BOTTLE(S) HAS/HAVE NO GROWTH SO FAR 03/15/2017 05:39 PM    Culture result:  03/15/2017 05:08 PM     GRAM POSITIVE COCCI   IN PAIRS AND CHAINS  GROWING IN 1 OF 2 BOTTLES DRAWN  (SITE = RAC)      Culture result: REMAINING BOTTLE(S) HAS/HAVE NO GROWTH SO FAR 03/15/2017 05:08 PM       Studies:  reviewed      We appreciate your consult and the opportunity to participate in your patient's care. Please call us with any questions or concerns. Omar Pereira NP    Signed By:

## 2017-03-16 NOTE — ROUTINE PROCESS
Bedside and Verbal shift change report given to Valarie Heller RN (oncoming nurse) by Royer Gomez RN (offgoing nurse). Report included the following information SBAR, Procedure Summary, MAR and Accordion.

## 2017-03-16 NOTE — INTERDISCIPLINARY ROUNDS
[address]    Patient Information    Name: Angel Mariee  Age: 73 y.o. Admission Date: 3/15/2017  Surgery/Procedure:   Attending Provider: Palak Fitzgerald MD  Surgeon: Reece Wu   Problem List: Principal Problem:    Discitis (3/15/2017)    Active Problems:    HTN (hypertension) ()      Dyslipidemia ()      Mitral regurgitation ()      Overview: mod-severe on echo 1/5/12      MARIN (obstructive sleep apnea) (9/10/2013)      Overview: Hasn't used CPAP in 10 years. 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 ([facility] [address] (2/3/2017)      DM (diabetes mellitus) ([facility] (3/15/2017)      During rounds the following quality care indicators and evidence based practices were addressed :       Pain Assessment  Pain Intensity 1: 5 (03/16/17 0849)  Pain Location 1: Back  Pain Intervention(s) 1: Medication (see MAR)  Patient Stated Pain Goal: [place]:     Pain meds: dilaudid,tramadol  Antibiotics completed: Yes  Anticoagulation:  none    SCDs: in place  Valdez: N   Bowels:     Goals For Today: Progress with PT, pain control    RRAT Score:           Discharge Management/Planning:    Readmit Risk Assessment Information:   High Risk            25       Total Score        3 Relationship with PCP    2 Patient Living Status    4 More than 1 Admission in calendar year    5 Patient Insurance is Medicare, Medicaid or Self Pay    11 Charlson Comorbidity Score        Criteria that do not apply:    Patient Length of Stay > 5              Anticipated Date of Discharge: TBD    Interdisciplinary team rounds were held with the following team members: Nurse Practitioner, Case Management, Nursing, Pharmacy, and Rehab. See clinical pathway and/or care plan for interventions and desired outcomes.

## 2017-03-16 NOTE — PROGRESS NOTES
Bedside and Verbal shift change report given to Daniel Webster RN (oncoming nurse) by Chiquita Armenta RN (offgoing nurse). Report included the following information SBAR, Kardex, Procedure Summary, Intake/Output, MAR and Recent Results.

## 2017-03-17 LAB
APPEARANCE FLD: CLEAR
ATRIAL RATE: 74 BPM
BACTERIA SPEC CULT: NORMAL
CALCULATED P AXIS, ECG09: 34 DEGREES
CALCULATED R AXIS, ECG10: -7 DEGREES
CALCULATED T AXIS, ECG11: 13 DEGREES
CC UR VC: NORMAL
COLOR FLD: COLORLESS
DIAGNOSIS, 93000: NORMAL
EOSINOPHIL # FLD: 5 %
GLUCOSE BLD STRIP.AUTO-MCNC: 122 MG/DL (ref 65–100)
GLUCOSE BLD STRIP.AUTO-MCNC: 126 MG/DL (ref 65–100)
GLUCOSE BLD STRIP.AUTO-MCNC: 177 MG/DL (ref 65–100)
GLUCOSE BLD STRIP.AUTO-MCNC: 196 MG/DL (ref 65–100)
LYMPHOCYTES NFR FLD: 10 %
MONOS+MACROS NFR FLD: 1 %
NEUTS SEG NFR FLD: 84 %
NUC CELL # FLD: 9 /CU MM (ref 0–5)
P-R INTERVAL, ECG05: 158 MS
Q-T INTERVAL, ECG07: 392 MS
QRS DURATION, ECG06: 86 MS
QTC CALCULATION (BEZET), ECG08: 435 MS
RBC # FLD: >100 /CU MM
SERVICE CMNT-IMP: ABNORMAL
SERVICE CMNT-IMP: NORMAL
SPECIMEN SOURCE FLD: ABNORMAL
VENTRICULAR RATE, ECG03: 74 BPM

## 2017-03-17 PROCEDURE — 82962 GLUCOSE BLOOD TEST: CPT

## 2017-03-17 PROCEDURE — 87040 BLOOD CULTURE FOR BACTERIA: CPT | Performed by: INTERNAL MEDICINE

## 2017-03-17 PROCEDURE — 74011250637 HC RX REV CODE- 250/637: Performed by: PHYSICIAN ASSISTANT

## 2017-03-17 PROCEDURE — 65270000029 HC RM PRIVATE

## 2017-03-17 PROCEDURE — 74011250637 HC RX REV CODE- 250/637: Performed by: INTERNAL MEDICINE

## 2017-03-17 PROCEDURE — 74011250636 HC RX REV CODE- 250/636: Performed by: INTERNAL MEDICINE

## 2017-03-17 PROCEDURE — 93306 TTE W/DOPPLER COMPLETE: CPT

## 2017-03-17 PROCEDURE — 36415 COLL VENOUS BLD VENIPUNCTURE: CPT | Performed by: INTERNAL MEDICINE

## 2017-03-17 RX ORDER — LEVOFLOXACIN 500 MG/1
500 TABLET, FILM COATED ORAL
Status: DISCONTINUED | OUTPATIENT
Start: 2017-03-18 | End: 2017-03-17

## 2017-03-17 RX ADMIN — Medication 10 ML: at 13:45

## 2017-03-17 RX ADMIN — Medication 10 ML: at 05:49

## 2017-03-17 RX ADMIN — NEBIVOLOL HYDROCHLORIDE 10 MG: 5 TABLET ORAL at 08:49

## 2017-03-17 RX ADMIN — VANCOMYCIN HYDROCHLORIDE 1500 MG: 10 INJECTION, POWDER, LYOPHILIZED, FOR SOLUTION INTRAVENOUS at 05:49

## 2017-03-17 RX ADMIN — INSULIN LISPRO 2 UNITS: 100 INJECTION, SOLUTION INTRAVENOUS; SUBCUTANEOUS at 13:43

## 2017-03-17 RX ADMIN — HYDROMORPHONE HYDROCHLORIDE 2 MG: 2 TABLET ORAL at 20:53

## 2017-03-17 RX ADMIN — ACETAMINOPHEN 1000 MG: 500 TABLET, FILM COATED ORAL at 01:47

## 2017-03-17 RX ADMIN — FINASTERIDE 5 MG: 5 TABLET, FILM COATED ORAL at 08:47

## 2017-03-17 RX ADMIN — AMLODIPINE BESYLATE 10 MG: 5 TABLET ORAL at 08:50

## 2017-03-17 RX ADMIN — VANCOMYCIN HYDROCHLORIDE 1500 MG: 10 INJECTION, POWDER, LYOPHILIZED, FOR SOLUTION INTRAVENOUS at 17:32

## 2017-03-17 RX ADMIN — GENTAMICIN SULFATE 80 MG: 80 INJECTION, SOLUTION INTRAVENOUS at 22:31

## 2017-03-17 RX ADMIN — LOSARTAN POTASSIUM 100 MG: 50 TABLET, FILM COATED ORAL at 08:48

## 2017-03-17 RX ADMIN — GENTAMICIN SULFATE 80 MG: 80 INJECTION, SOLUTION INTRAVENOUS at 08:46

## 2017-03-17 RX ADMIN — HYDROMORPHONE HYDROCHLORIDE 4 MG: 2 TABLET ORAL at 15:29

## 2017-03-17 NOTE — INTERDISCIPLINARY ROUNDS
[address]    Patient Information    Name: Kristie Mean  Age: 73 y.o. Admission Date: 3/15/2017  Surgery/Procedure:   Attending Provider: Jeremy Kim MD  Surgeon: Anyi Organ   Problem List: Principal Problem:    Discitis (3/15/2017)    Active Problems:    HTN (hypertension) ()      Dyslipidemia ()      Mitral regurgitation ()      Overview: mod-severe on echo 1/5/12      MARIN (obstructive sleep apnea) (9/10/2013)      Overview: Hasn't used CPAP in 10 years.  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 ([facility] [address] (2/3/2017)      DM (diabetes mellitus) ([facility] (3/15/2017)      During rounds the following quality care indicators and evidence based practices were addressed :                              Pain Assessment  Pain Intensity 1: 2 (03/17/17 0551)  Pain Location 1: Back  Pain Intervention(s) 1: Medication (see MAR)  Patient Stated Pain Goal: 2    Pain meds: Dilaudid, Tramadol  Antibiotics completed: Vancomycin, Gentamycin, Levaquin  Anticoagulation:  None  Valdez: N   Goals For Today: pain control    RRAT Score:      Readmit Risk Tool  Support Systems: Spouse/Significant Other/Partner  Relationship with Primary Physician Group: Seen at least one time within the past 6 months    Discharge Management/Planning:    Readmit Risk Assessment Information:   High Risk            25       Total Score        3 Relationship with PCP    2 Patient Living Status    4 More than 1 Admission in calendar year    5 Patient Insurance is Medicare, Medicaid or Self Pay    11 Charlson Comorbidity Score        Criteria that do not apply:    Patient Length of Stay > 5         Readmit Risk Tool  Support Systems: Spouse/Significant Other/Partner  Relationship with Primary Physician Group: Seen at least one time within the past 6 months    [address]: [facility], [facility]  Rehab/SNF Facility:  Anticipated Date of Discharge: TBD, most likely Monday    Interdisciplinary team rounds were held with the following team members: Nurse Practitioner, Case Management, Nursing, Pharmacy, and Rehab. See clinical pathway and/or care plan for interventions and desired outcomes.

## 2017-03-17 NOTE — PROGRESS NOTES
Ortho Spine Daily Progress Note    Date of Surgery:  * No surgery found *      Patient: Duke Loose   YOB: 1950  Age: 73 y.o. SUBJECTIVE:   Lumbar Discitis    The patient is without c/o at this time. The patient's post operative pain is adequately controlled. The patient has IR aspiration yesterday and tolerated well. One episode of elevated temp last evening, now normotensive. He denies CP, SOB, N/V/D, dizziness, abdominal pain, HA. +flatus, - BM. OBJECTIVE:     Vital Signs:    Temp (24hrs), Av.7 °F (37.1 °C), Min:97.7 °F (36.5 °C), Max:100.8 °F (38.2 °C)    Patient Vitals for the past 8 hrs:   BP Temp Pulse Resp SpO2   17 0753 125/60 98.1 °F (36.7 °C) (!) 56 20 99 %   17 0551 141/63 97.7 °F (36.5 °C) 61 17 96 %   17 0131 149/68 (!) 100.8 °F (38.2 °C) 76 21 94 %           Physical Exam:  General: A&Ox3, NAD. Respiratory: Respirations are unlabored. Abdomen: S/NT  Surgical site: C,D and I   Musculoskeletal: Calves are S/NT, Dorsi/plantar flexion/EHL intact, Ever DP 1+  Neurological:  Ever LE's NVI    Laboratory Values:             Recent Labs      17   0522  03/15/17   1708   HGB  8.8*  9.2*   PLT  185  206   INR   --   1.2*   CREA  1.24  1.27   GLU  125*  89     Lab Results   Component Value Date/Time    Sodium 137 2017 05:22 AM    Potassium 3.6 2017 05:22 AM    Chloride 103 2017 05:22 AM    CO2 29 2017 05:22 AM    Glucose 125 2017 05:22 AM    BUN 18 2017 05:22 AM    Creatinine 1.24 2017 05:22 AM    Calcium 8.4 2017 05:22 AM       PLAN:     Lumbar Discitis Await culture results. Hemodynamics Stable. Wound Continue dressing changes PRN. Post Operative Pain Pain Control: Adequate, continue current regimen. DVT Prophylaxis Continue with SCD'S, Ankle Pump Exercises, Mobilize.        Discharge Disposition Discharge plan: Pt had epidural steroid injections in past for treatment of back pain and this is likely the source of discitis. Empiric Vancomycin  and Gentomicin IV coverage until final culture results available. Will likely require PICC line for Abx coverage after discharge. Will leave this up to ID. Surgery only indicated if discitis fails not respond to Abx. Appreciate ID guidance - thank you! Will follow as needed.         Signed By: Priyanka Jack PA-C  March 17, 2017 8:26 AM

## 2017-03-17 NOTE — PROGRESS NOTES
3/17/2017 3:50 PM Spoke with Libertad Murray at [address] who confirmed they can accept pt and planning for discharge on Monday, 3/20. CM will follow up.     3/17/2017 2:53 PM [facility] cannot accept pt. Referral sent to [address] via [employer] [facility]. 3/17/2017 11:41 AM Call made to [facility] with [facility] regarding acceptance of referral. [facility] will follow up with CM.  Araceli Gomez, JUNIORW

## 2017-03-17 NOTE — PROGRESS NOTES
Rajesh Gerardo [facility] 79  [address] [facility], [place], [address] [place] [address]  [phone]      Medical Progress Note      NAME: Bibiana Goldstein   :  1950  MRM:  [id number]    Date/Time: 3/17/2017  9:38 AM       Assessment and Plan:   1. Discitis (3/15/2017)/ chronic back pain/ bacteremia. S/P aspiration of lower spine by IR. Check culture. BC: gram positive cocci. Started on vanc and gent. ID following. Check echocardiogram to R/O vegatation.      2. HTN (hypertension). Continue home amlodipine and cozaar.      3. DM [facility]diabetes mellitus) ([facility] () without complication. Hold home levemir and cover with SSI. blood glucose stable.       4. Dyslipidemia . No on medication.      5. Mitral regurgitation. Follows with Dr Della Zavala  Overview: mod-severe on echo 12     6. MARIN (obstructive sleep apnea) (9/10/2013). Not using CPAP any more. Overview: Hasn't used CPAP in 10 years.       7. BPH (benign prostatic hypertrophy) with urinary obstruction (2017). Continue proscar     8.  UTI(POA). Add quinolone ( allergic to cephalosporin). Check UC. ID may adjust ABx. Subjective:     Chief Complaint:  Back pain is better     ROS:  (bold if positive, if negative)      Tolerating PT  Tolerating Diet        Objective:     Last 24hrs VS reviewed since prior progress note.  Most recent are:    Visit Vitals    /60 (BP 1 Location: Left arm)    Pulse (P) 66    Temp 98.1 °F (36.7 °C)    Resp 20    Ht 5' 7\" (1.702 m)    Wt 102 kg (224 lb 13.9 oz)    SpO2 99%    BMI 35.22 kg/m2     SpO2 Readings from Last 6 Encounters:   17 99%   17 96%   17 96%   01/15/17 95%   16 100%   16 96%    O2 Flow Rate (L/min): 2 l/min   No intake or output data in the 24 hours ending 17 0920     Physical Exam:    Gen:  Well-developed, well-nourished, in no acute distress  HEENT:  Pink conjunctivae, PERRL, hearing intact to voice, moist mucous membranes  Neck:  Supple, without masses, thyroid non-tender  Resp:  No accessory muscle use, clear breath sounds without wheezes rales or rhonchi  Card:  No murmurs, normal S1, S2 without thrills, bruits or peripheral edema  Abd:  Soft, non-tender, non-distended, normoactive bowel sounds are present, no palpable organomegaly and no detectable hernias  Lymph:  No cervical or inguinal adenopathy  Musc:  No cyanosis or clubbing  Skin:  No rashes or ulcers, skin turgor is good  Neuro:  Cranial nerves are grossly intact, no focal motor weakness, follows commands appropriately  Psych:  Good insight, oriented to person, place and time, alert  __________________________________________________________________  Medications Reviewed: (see below)  Medications:     Current Facility-Administered Medications   Medication Dose Route Frequency    HYDROmorphone (DILAUDID) tablet 4 mg  4 mg Oral Q4H PRN    0.9% sodium chloride infusion  75 mL/hr IntraVENous CONTINUOUS    vancomycin (VANCOCIN) 1500 mg in  ml infusion  1,500 mg IntraVENous Q12H    gentamicin in Saline (Iso-osm) (GARAMYCIN) 80 mg/100 mL IVPB premix 80 mg  80 mg IntraVENous Q12H    sodium chloride (NS) flush 5-10 mL  5-10 mL IntraVENous Q8H    sodium chloride (NS) flush 5-10 mL  5-10 mL IntraVENous PRN    traMADol (ULTRAM) tablet 100 mg  100 mg Oral Q6H PRN    HYDROmorphone (DILAUDID) tablet 2 mg  2 mg Oral Q4H PRN    HYDROmorphone (PF) (DILAUDID) injection 1 mg  1 mg IntraVENous Q3H PRN    diphenhydrAMINE (BENADRYL) injection 25 mg  25 mg IntraVENous Q4H PRN    acetaminophen (TYLENOL) tablet 1,000 mg  1,000 mg Oral Q6H PRN    ondansetron (ZOFRAN) injection 6 mg  6 mg IntraVENous Q6H PRN    insulin lispro (HUMALOG) injection   SubCUTAneous AC&HS    glucose chewable tablet 16 g  4 Tab Oral PRN    dextrose (D50W) injection syrg 12.5-25 g  12.5-25 g IntraVENous PRN    glucagon (GLUCAGEN) injection 1 mg  1 mg IntraMUSCular PRN    amLODIPine (NORVASC) tablet 10 mg  10 mg Oral DAILY    finasteride (PROSCAR) tablet 5 mg  5 mg Oral DAILY    losartan (COZAAR) tablet 100 mg  100 mg Oral DAILY    nebivolol (BYSTOLIC) tablet 10 mg  10 mg Oral DAILY        Lab Data Reviewed: (see below)  Lab Review:     Recent Labs      03/16/17   0522  03/15/17   1708   WBC  7.7  6.2   HGB  8.8*  9.2*   HCT  29.6*  30.9*   PLT  185  206     Recent Labs      03/16/17   0522  03/15/17   1708   NA  137  138   K  3.6  3.3*   CL  103  101   CO2  29  29   GLU  125*  89   BUN  18  17   CREA  1.24  1.27   CA  8.4*  8.6   ALB   --   2.7*   TBILI   --   0.5   SGOT   --   14*   ALT   --   12   INR   --   1.2*     Lab Results   Component Value Date/Time    Glucose (POC) 126 03/17/2017 07:48 AM    Glucose (POC) 148 03/16/2017 10:13 PM    Glucose (POC) 103 03/16/2017 07:12 PM    Glucose (POC) 107 03/16/2017 04:12 PM    Glucose (POC) 122 03/16/2017 11:45 AM     No results for input(s): PH, PCO2, PO2, HCO3, FIO2 in the last 72 hours.   Recent Labs      03/15/17   1708   INR  1.2*     All Micro Results     Procedure Component Value Units Date/Time    CULTURE, BODY FLUID Jennifer Reasons STAIN [[id number]] Collected:  03/15/17 1800    Order Status:  Completed Specimen:  Miscellaneous Fluid Updated:  03/17/17 0847     Special Requests: NO SPECIAL REQUESTS        GRAM STAIN NO ORGANISMS SEEN        Culture result: PENDING    CULTURE, BLOOD, PERIPHERAL [[id number]]     Order Status:  Sent Specimen:  Blood     CULTURE, BLOOD, PERIPHERAL [[id number]]     Order Status:  Sent Specimen:  Blood     CULTURE, BLOOD [[id number]]  (Abnormal) Collected:  03/15/17 1739    Order Status:  Completed Specimen:  Blood from Blood Updated:  03/16/17 1832     Special Requests: NO SPECIAL REQUESTS        Culture result:       GRAM POSITIVE COCCI  IN PAIRS AND CHAINS  GROWING IN BOTH BOTTLES DRAWN  (SITE= L AC)   (A)    CULTURE, BLOOD [[id number]]  (Abnormal) Collected:  03/15/17 1708    Order Status:  Completed Specimen:  Blood from Blood Updated:  03/16/17 1645     Special Requests: NO SPECIAL REQUESTS        Culture result:       GRAM POSITIVE COCCI   IN PAIRS AND CHAINS  GROWING IN BOTH BOTTLES DRAWN  (SITE = [facility])   (A)    CULTURE, URINE [[id number]] Collected:  03/16/17 0338    Order Status:  Completed Updated:  03/16/17 1019          I have reviewed notes of prior 24hr. Other pertinent lab:       Total time spent with patient: [address] discussed with: Patient, Nursing Staff and >50% of time spent in counseling and coordination of care    Discussed:  Care Plan    Prophylaxis:  SCD's    Disposition:  Home w/Family           ___________________________________________________    Attending Physician: Kael Chandra MD

## 2017-03-17 NOTE — PROGRESS NOTES
Problem: Falls - Risk of  Goal: *Absence of falls  Outcome: Progressing Towards Goal  No falls during admission, decreased safety awareness overnight, bed alarm placed following   Goal: *Knowledge of fall prevention  Outcome: Progressing Towards Goal  Family assists with ADLs and ambulation        Problem: General Infection Care Plan (Adult and Pediatric)  Goal: *Absence of infection signs and symptoms  Outcome: Progressing Towards Goal  No s/s of infection

## 2017-03-17 NOTE — PROGRESS NOTES
Rajesh Gerardo [facility] [place] 79  [address] [facility], [address], [address] [place] [address]  [phone]      Medical Progress Note      NAME: Kristie Bosch   :  1950  MRM:  [id number]    Date/Time: 3/17/2017  9:38 AM       Assessment and Plan:   1. Discitis (3/15/2017)/ chronic back pain/ bacteremia. S/P aspiration of lower spine by IR. Check culture. BC: gram positive cocci. Started on vanc and gent. ID following. Check echocardiogram to R/O vegatation.      2. HTN (hypertension). Continue home amlodipine and cozaar.      3. DM [facility]diabetes mellitus) ([facility] () without complication. Hold home levemir and cover with SSI. blood glucose stable.       4. Dyslipidemia . No on medication.      5. Mitral regurgitation. Follows with Dr Kamilah Ortiz  Overview: mod-severe on echo 12     6. MARIN (obstructive sleep apnea) (9/10/2013). Not using CPAP any more. Overview: Hasn't used CPAP in 10 years.       7. BPH (benign prostatic hypertrophy) with urinary obstruction (2017). Continue proscar                  Subjective:     Chief Complaint:  Back pain is better     ROS:  (bold if positive, if negative)      Tolerating PT  Tolerating Diet        Objective:     Last 24hrs VS reviewed since prior progress note.  Most recent are:    Visit Vitals    /68 (BP 1 Location: Left arm)    Pulse (!) 59    Temp 98.1 °F (36.7 °C)    Resp 20    Ht 5' 7\" (1.702 m)    Wt 102 kg (224 lb 13.9 oz)    SpO2 96%    BMI 35.22 kg/m2     SpO2 Readings from Last 6 Encounters:   17 96%   17 96%   17 96%   01/15/17 95%   16 100%   16 96%    O2 Flow Rate (L/min): 2 l/min   No intake or output data in the 24 hours ending 17 1327     Physical Exam:    Gen:  Well-developed, well-nourished, in no acute distress  HEENT:  Pink conjunctivae, PERRL, hearing intact to voice, moist mucous membranes  Neck:  Supple, without masses, thyroid non-tender  Resp:  No accessory muscle use, clear breath sounds without wheezes rales or rhonchi  Card:  No murmurs, normal S1, S2 without thrills, bruits or peripheral edema  Abd:  Soft, non-tender, non-distended, normoactive bowel sounds are present, no palpable organomegaly and no detectable hernias  Lymph:  No cervical or inguinal adenopathy  Musc:  No cyanosis or clubbing  Skin:  No rashes or ulcers, skin turgor is good  Neuro:  Cranial nerves are grossly intact, no focal motor weakness, follows commands appropriately  Psych:  Good insight, oriented to person, place and time, alert  __________________________________________________________________  Medications Reviewed: (see below)  Medications:     Current Facility-Administered Medications   Medication Dose Route Frequency    [START ON 3/18/2017] Vancomycin TROUGH @0530 on 3/18/2017   Other ONCE    HYDROmorphone (DILAUDID) tablet 4 mg  4 mg Oral Q4H PRN    0.9% sodium chloride infusion  75 mL/hr IntraVENous CONTINUOUS    vancomycin (VANCOCIN) 1500 mg in  ml infusion  1,500 mg IntraVENous Q12H    gentamicin in Saline (Iso-osm) (GARAMYCIN) 80 mg/100 mL IVPB premix 80 mg  80 mg IntraVENous Q12H    sodium chloride (NS) flush 5-10 mL  5-10 mL IntraVENous Q8H    sodium chloride (NS) flush 5-10 mL  5-10 mL IntraVENous PRN    traMADol (ULTRAM) tablet 100 mg  100 mg Oral Q6H PRN    HYDROmorphone (DILAUDID) tablet 2 mg  2 mg Oral Q4H PRN    HYDROmorphone (PF) (DILAUDID) injection 1 mg  1 mg IntraVENous Q3H PRN    diphenhydrAMINE (BENADRYL) injection 25 mg  25 mg IntraVENous Q4H PRN    acetaminophen (TYLENOL) tablet 1,000 mg  1,000 mg Oral Q6H PRN    ondansetron (ZOFRAN) injection 6 mg  6 mg IntraVENous Q6H PRN    insulin lispro (HUMALOG) injection   SubCUTAneous AC&HS    glucose chewable tablet 16 g  4 Tab Oral PRN    dextrose (D50W) injection syrg 12.5-25 g  12.5-25 g IntraVENous PRN    glucagon (GLUCAGEN) injection 1 mg  1 mg IntraMUSCular PRN    amLODIPine (NORVASC) tablet 10 mg  10 mg Oral DAILY    finasteride (PROSCAR) tablet 5 mg  5 mg Oral DAILY    losartan (COZAAR) tablet 100 mg  100 mg Oral DAILY    nebivolol (BYSTOLIC) tablet 10 mg  10 mg Oral DAILY        Lab Data Reviewed: (see below)  Lab Review:     Recent Labs      03/16/17   0522  03/15/17   1708   WBC  7.7  6.2   HGB  8.8*  9.2*   HCT  29.6*  30.9*   PLT  185  206     Recent Labs      03/16/17   0522  03/15/17   1708   NA  137  138   K  3.6  3.3*   CL  103  101   CO2  29  29   GLU  125*  89   BUN  18  17   CREA  1.24  1.27   CA  8.4*  8.6   ALB   --   2.7*   TBILI   --   0.5   SGOT   --   14*   ALT   --   12   INR   --   1.2*     Lab Results   Component Value Date/Time    Glucose (POC) 196 03/17/2017 11:36 AM    Glucose (POC) 126 03/17/2017 07:48 AM    Glucose (POC) 148 03/16/2017 10:13 PM    Glucose (POC) 103 03/16/2017 07:12 PM    Glucose (POC) 107 03/16/2017 04:12 PM     No results for input(s): PH, PCO2, PO2, HCO3, FIO2 in the last 72 hours. Recent Labs      03/15/17   1708   INR  1.2*     All Micro Results     Procedure Component Value Units Date/Time    CULTURE, BLOOD, PERIPHERAL [[id number]] Collected:  03/17/17 0900    Order Status:  Completed Specimen:  Blood Updated:  03/17/17 1141     Special Requests: NO SPECIAL REQUESTS        Culture result: NO GROWTH AFTER 1 HOUR       CULTURE, BLOOD, PERIPHERAL [[id number]] Collected:  03/17/17 0912    Order Status:  Completed Specimen:  Blood Updated:  03/17/17 1141     Special Requests: NO SPECIAL REQUESTS        Culture result: NO GROWTH AFTER 1 HOUR       CULTURE, BLOOD [[id number]]  (Abnormal) Collected:  03/15/17 1739    Order Status:  Completed Specimen:  Blood from Blood Updated:  03/17/17 1050     Special Requests: NO SPECIAL REQUESTS        Culture result:       ALPHA STREPTOCOCCUS, NOT S.  PNEUMONIAE  (POSSIBLY TWO DIFFERENT COLONY TYPES)  GROWING IN BOTH BOTTLES DRAWN  (SITE = LAC)   (A)    CULTURE, BLOOD [[id number]]  (Abnormal) Collected:  03/15/17 1708    Order Status:  Completed Specimen:  Blood from Blood Updated:  03/17/17 1047     Special Requests: NO SPECIAL REQUESTS        Culture result:       ALPHA STREPTOCOCCUS, NOT S. PNEUMONIAE  (POSSIBLY TWO DIFFERENT COLONY TYPES)  GROWING IN BOTH BOTTLES DRAWN  (SITE = [facility])   (A)    CULTURE, URINE [[id number]] Collected:  03/16/17 0338    Order Status:  Completed Specimen:  Urine Updated:  03/17/17 1005     Special Requests: --        NO SPECIAL REQUESTS  Reflexed from [id number]       [place] Count --        11098  COLONIES/mL       Culture result: MIXED SKIN NANNETTE ISOLATED       CULTURE, BODY FLUID W Tomeka Posadas [[id number]] Collected:  03/15/17 1800    Order Status:  Completed Specimen:  Miscellaneous Fluid Updated:  03/17/17 0847     Special Requests: NO SPECIAL REQUESTS        GRAM STAIN NO ORGANISMS SEEN        Culture result: PENDING          I have reviewed notes of prior 24hr. Other pertinent lab:       Total time spent with patient: [address] discussed with: Patient, Nursing Staff and >50% of time spent in counseling and coordination of care    Discussed:  Care Plan    Prophylaxis:  SCD's    Disposition:  Home w/Family           ___________________________________________________    Attending Physician: Bita Montano MD

## 2017-03-17 NOTE — CONSULTS
Maggi Mixon MD [facility] [address]  Office [phone]  Mobile [phone]    Date of  Admission: 3/15/2017  2:21 PM  PCP- Antonio Rincon MD    Rushie Leventhal is a 73 y.o. male admitted for discitis; Discitis of lumbar region. 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 Sx x 2 months - low back pain, low grade fevers. Echo today with 11x 8 mm semimobile mass on atrial surface of anterior mitral leaflet with eccentric posterior MR, likely moderate-severe. Has known MVP with mod-severe MR by echo 2013/LONNIE 2012, asx, previously followed by Dr Shani Villa. No hx of HF or AF. Denies any exertional sx. Patient denies any exertional chest pain, dyspnea, palpitations, syncope, orthopnea, edema or paroxysmal nocturnal dyspnea. Had dental work last fall but takes antibx prophylaxis due to hx of TKR    Cardiac testing  No specialty comments available.         Past Medical History:   Diagnosis Date    Abnormal EKG     he says it has been abnormal for years    Arthritis     Chronic pain     lower lumbar    Diabetes ([address]     type II    Dyslipidemia     Enlarged prostate Dr. Stephanie Sparks    HTN (hypertension)     Hypercholesterolemia     Kidney stone     Mitral regurgitation     mod-severe on echo 1/5/12    Murmur     Obesity     Other ill-defined conditions(799.89)     BPH    Sleep apnea     stopped using CPAP many years ago      Past Surgical History:   Procedure Laterality Date    ECHO 2D ADULT  1/4/12    mild-mod LVH, LVEF 60%, probably grade 1 diastolic dysfunction, mod LAE, mod-severe MR (eccentroic ) - had severe HTN during study (173/108),     ECHO LIMITED  1/20/12    mod-severe MR    HX COLONOSCOPY  2013    small polyps removed; repeat in 5 years; Dr. Danii Livingston  9/2014    RIGHT    HX LITHOTRIPSY  2011    HX OTHER SURGICAL      LONNIE 1/31/12 - LVEF 60%, mod-severe MR (post directed), mild-mod LAE, mild atheroma aorta    HX OTHER SURGICAL      Echo 9/5/13 - mild LVH, LVEF 60 % to 65 %. Mod LAE. Mod MR (post directed)     HX OTHER SURGICAL      Echo 9/5/13 - mild LVH, LVEF 60 % to 65 %. Mod LAE. Mod MR (post directed)     STRESS TEST CARDIOLITE  1/4/12    walked 4:30, stopping for severe FISH, no ischemic EKG changes (inferolateral TWI at start), normal perfusion (diaphragmatic attenuation), LVEF 48%, hypertensive respone to exercise     No current facility-administered medications on file prior to encounter. Current Outpatient Prescriptions on File Prior to Encounter   Medication Sig Dispense Refill    amLODIPine (NORVASC) 10 mg tablet Take 10 mg by mouth daily.  ergocalciferol (VITAMIN D2) 50,000 unit capsule Take 50,000 Units by mouth every Wednesday. Every Wednesday      insulin detemir (LEVEMIR) 100 unit/mL injection 30 Units by SubCUTAneous route nightly.  insulin lispro (HUMALOG) 100 unit/mL injection 20 Units by SubCUTAneous route Before breakfast, lunch, and dinner.  finasteride (PROSCAR) 5 mg tablet Take 5 mg by mouth daily.  losartan (COZAAR) 100 mg tablet TAKE 1 TABLET BY MOUTH DAILY 30 Tab 0    ascorbic acid (VITAMIN C) 500 mg tablet Take 500 mg by mouth daily.        Allergies   Allergen Reactions    Cephalexin Other (comments)     unknown    Oxycodone Other (comments)     Pt does not desire to take; causes altered mental status and constipation    Sulfa (Sulfonamide Antibiotics) Hives    Tamsulosin Other (comments)     Vision loss               Review of Symptoms:  Constitutional: negative for chills and anorexia  Respiratory: negative for cough, sputum, hemoptysis or pleurisy/chest pain  Gastrointestinal: negative for dysphagia, odynophagia, melena and abdominal pain  Musculoskeletal:positive for back pain  Neurological: negative for vertigo, seizures and memory problems  Other systems reviewed and negative except as above. Physical Exam    Visit Vitals    /68 (BP 1 Location: Left arm)    Pulse (!) 59    Temp 98.1 °F (36.7 °C)    Resp 20    Ht 5' 7\" (1.702 m)    Wt 224 lb 13.9 oz (102 kg)    SpO2 96%    BMI 35.22 kg/m2     Visit Vitals    /68 (BP 1 Location: Left arm)    Pulse (!) 59    Temp 98.1 °F (36.7 °C)    Resp 20    Ht 5' 7\" (1.702 m)    Wt 224 lb 13.9 oz (102 kg)    SpO2 96%    BMI 35.22 kg/m2     General Appearance:  Well developed, well nourished,alert and oriented x 3, and individual in no acute distress. Ears/Nose/Mouth/Throat:   Hearing grossly normal.         Neck: Supple. JVP nl   Chest:   Lungs clear to auscultation bilaterally. Cardiovascular:  Regular rate and rhythm, S1, S2 normal, apical grade 3 blowing systolic murmur. Abdomen:   Soft, non-tender, bowel sounds are active. Extremities: No edema bilaterally. No stigmata of endocarditis   Skin: Warm and dry.                Cardiographics    ECG: normal sinus rhythm, nonspecific ST and T waves changes    Echocardiogram: LVEF 65%, 11x 8 mm semimobile mass on atrial surface of anterior mitral leaflet, mod-severe MR, LAE    Recent Results (from the past 12 hour(s))   GLUCOSE, POC    Collection Time: 03/17/17  7:48 AM   Result Value Ref Range    Glucose (POC) 126 (H) 65 - 100 mg/dL    Performed by Jalen Sinha (PCT)    CULTURE, BLOOD, PERIPHERAL    Collection Time: 03/17/17  9:00 AM   Result Value Ref Range    Special Requests: NO SPECIAL REQUESTS      Culture result: NO GROWTH AFTER 1 HOUR     CULTURE, BLOOD, PERIPHERAL    Collection Time: 03/17/17  9:12 AM   Result Value Ref Range    Special Requests: NO SPECIAL REQUESTS      Culture result: NO GROWTH AFTER 1 HOUR     GLUCOSE, POC    Collection Time: 03/17/17 11:36 AM   Result Value Ref Range    Glucose (POC) 196 (H) 65 - 100 mg/dL    Performed by Hoang Dutta (PCT)    GLUCOSE, POC    Collection Time: 03/17/17  5:24 PM   Result Value Ref Range    Glucose (POC) 122 (H) 65 - 100 mg/dL    Performed by Patricia Busby (PCT)

## 2017-03-17 NOTE — PROGRESS NOTES
[facility] Infectious Diseases Progress Note  Jim Davison MD, WESTON Sethi DO, NP     [address] [facility], [address] [place] [address]  Office: [phone]   Fax: [phone]    3/17/2017      Assessment & Plan:   L3-L4 discitis - in patient with h/o epidural steroid injections in the past. Now s/p IR aspiration 3/16/17 - cultures pending. CRP 10.73, . Patient will eventually need PICC line for long course of IV antibiotics. · Continue vanc/synergy dose gentamicin  · Awaiting OR culture results     Bacteremia - blood cultures growing Group A Strep 2/4 bottles; patient has had no fever or chills, no aching joints - only describes fatigue and anorexia. Surveillance blood cultures NGTD. Urine culture NGTD. · Continue abx as above  · Follow surveillance cultures  · 2D echo pending          Subjective:   Patient reports pain controlled, no F/C.     Objective:     Vitals:   Visit Vitals    /68 (BP 1 Location: Left arm)    Pulse (!) 59    Temp 98.1 °F (36.7 °C)    Resp 20    Ht 5' 7\" (1.702 m)    Wt 102 kg (224 lb 13.9 oz)    SpO2 96%    BMI 35.22 kg/m2     Temp (24hrs), Av.7 °F (37.1 °C), Min:97.7 °F (36.5 °C), Max:100.8 °F (38.2 °C)    Recent Labs      17   0522  03/15/17   1708   NA  137  138   K  3.6  3.3*   CL  103  101   CO2  29  29   BUN  18  17   CREA  1.24  1.27   GLU  125*  89   ALB   --   2.7*   WBC  7.7  6.2   HGB  8.8*  9.2*   HCT  29.6*  30.9*   PLT  185  206       Exam:    Gen: WDWN, NAD  HEENT: moist mucous membranes  Resp: CTAB  Card: RRR, no peripheral edema  Abd: Soft, NTND  Neuro: A&O x3  Psych: normal affect       Cultures:     Lab Results   Component Value Date/Time    Culture result: NO GROWTH AFTER 1 HOUR 2017 09:12 AM    Culture result: NO GROWTH AFTER 1 HOUR 2017 09:00 AM    Culture result: MIXED SKIN NANNETTE ISOLATED 2017 03:38 AM       Radiology: reviewed       Medications:        Current Facility-Administered Medications   Medication Dose Route Frequency Last Dose    [START ON 3/18/2017] Vancomycin TROUGH @0530 on 3/18/2017   Other ONCE      HYDROmorphone (DILAUDID) tablet 4 mg  4 mg Oral Q4H PRN      0.9% sodium chloride infusion  75 mL/hr IntraVENous CONTINUOUS 75 mL/hr at 03/16/17 2325    vancomycin (VANCOCIN) 1500 mg in  ml infusion  1,500 mg IntraVENous Q12H 1,500 mg at 03/17/17 0549    gentamicin in Saline (Iso-osm) (GARAMYCIN) 80 mg/100 mL IVPB premix 80 mg  80 mg IntraVENous Q12H 80 mg at 03/17/17 0846    sodium chloride (NS) flush 5-10 mL  5-10 mL IntraVENous Q8H 10 mL at 03/17/17 1345    sodium chloride (NS) flush 5-10 mL  5-10 mL IntraVENous PRN      traMADol (ULTRAM) tablet 100 mg  100 mg Oral Q6H  mg at 03/15/17 2003    HYDROmorphone (DILAUDID) tablet 2 mg  2 mg Oral Q4H PRN 2 mg at 03/16/17 1102    HYDROmorphone (PF) (DILAUDID) injection 1 mg  1 mg IntraVENous Q3H PRN      diphenhydrAMINE (BENADRYL) injection 25 mg  25 mg IntraVENous Q4H PRN      acetaminophen (TYLENOL) tablet 1,000 mg  1,000 mg Oral Q6H PRN 1,000 mg at 03/17/17 0147    ondansetron (ZOFRAN) injection 6 mg  6 mg IntraVENous Q6H PRN      insulin lispro (HUMALOG) injection   SubCUTAneous AC&HS 2 Units at 03/17/17 1343    glucose chewable tablet 16 g  4 Tab Oral PRN      dextrose (D50W) injection syrg 12.5-25 g  12.5-25 g IntraVENous PRN      glucagon (GLUCAGEN) injection 1 mg  1 mg IntraMUSCular PRN      amLODIPine (NORVASC) tablet 10 mg  10 mg Oral DAILY 10 mg at 03/17/17 0850    finasteride (PROSCAR) tablet 5 mg  5 mg Oral DAILY 5 mg at 03/17/17 0847    losartan (COZAAR) tablet 100 mg  100 mg Oral DAILY 100 mg at 03/17/17 0848    nebivolol (BYSTOLIC) tablet 10 mg  10 mg Oral DAILY 10 mg at 03/17/17 600 Baljit Hunter NP    Signed By: March 17, 2017 March 17, 2017

## 2017-03-18 LAB
ANION GAP BLD CALC-SCNC: 7 MMOL/L (ref 5–15)
BASOPHILS # BLD AUTO: 0 K/UL (ref 0–0.1)
BASOPHILS # BLD: 0 % (ref 0–1)
BUN SERPL-MCNC: 17 MG/DL (ref 6–20)
BUN/CREAT SERPL: 15 (ref 12–20)
CALCIUM SERPL-MCNC: 8.2 MG/DL (ref 8.5–10.1)
CHLORIDE SERPL-SCNC: 107 MMOL/L (ref 97–108)
CO2 SERPL-SCNC: 27 MMOL/L (ref 21–32)
CREAT SERPL-MCNC: 1.13 MG/DL (ref 0.7–1.3)
DATE LAST DOSE: ABNORMAL
DATE LAST DOSE: ABNORMAL
EOSINOPHIL # BLD: 0.2 K/UL (ref 0–0.4)
EOSINOPHIL NFR BLD: 4 % (ref 0–7)
ERYTHROCYTE [DISTWIDTH] IN BLOOD BY AUTOMATED COUNT: 16.3 % (ref 11.5–14.5)
GENTAMICIN RAND SERPL-MCNC: 0.8 UG/ML (ref 1–2)
GLUCOSE BLD STRIP.AUTO-MCNC: 115 MG/DL (ref 65–100)
GLUCOSE BLD STRIP.AUTO-MCNC: 125 MG/DL (ref 65–100)
GLUCOSE BLD STRIP.AUTO-MCNC: 131 MG/DL (ref 65–100)
GLUCOSE BLD STRIP.AUTO-MCNC: 181 MG/DL (ref 65–100)
GLUCOSE SERPL-MCNC: 129 MG/DL (ref 65–100)
HCT VFR BLD AUTO: 28.7 % (ref 36.6–50.3)
HGB BLD-MCNC: 8.7 G/DL (ref 12.1–17)
LYMPHOCYTES # BLD AUTO: 24 % (ref 12–49)
LYMPHOCYTES # BLD: 1.4 K/UL (ref 0.8–3.5)
MCH RBC QN AUTO: 26.7 PG (ref 26–34)
MCHC RBC AUTO-ENTMCNC: 30.3 G/DL (ref 30–36.5)
MCV RBC AUTO: 88 FL (ref 80–99)
MONOCYTES # BLD: 0.4 K/UL (ref 0–1)
MONOCYTES NFR BLD AUTO: 7 % (ref 5–13)
NEUTS SEG # BLD: 3.9 K/UL (ref 1.8–8)
NEUTS SEG NFR BLD AUTO: 65 % (ref 32–75)
PLATELET # BLD AUTO: 211 K/UL (ref 150–400)
POTASSIUM SERPL-SCNC: 3.6 MMOL/L (ref 3.5–5.1)
RBC # BLD AUTO: 3.26 M/UL (ref 4.1–5.7)
REPORTED DOSE,DOSE: ABNORMAL UNITS
REPORTED DOSE,DOSE: ABNORMAL UNITS
REPORTED DOSE/TIME,TMG: 1000
REPORTED DOSE/TIME,TMG: 1800
SERVICE CMNT-IMP: ABNORMAL
SODIUM SERPL-SCNC: 141 MMOL/L (ref 136–145)
VANCOMYCIN TROUGH SERPL-MCNC: 16.4 UG/ML (ref 5–10)
WBC # BLD AUTO: 5.9 K/UL (ref 4.1–11.1)

## 2017-03-18 PROCEDURE — 36415 COLL VENOUS BLD VENIPUNCTURE: CPT | Performed by: INTERNAL MEDICINE

## 2017-03-18 PROCEDURE — 74011250637 HC RX REV CODE- 250/637: Performed by: PHYSICIAN ASSISTANT

## 2017-03-18 PROCEDURE — 80170 ASSAY OF GENTAMICIN: CPT | Performed by: INTERNAL MEDICINE

## 2017-03-18 PROCEDURE — 74011250636 HC RX REV CODE- 250/636: Performed by: ORTHOPAEDIC SURGERY

## 2017-03-18 PROCEDURE — 65270000029 HC RM PRIVATE

## 2017-03-18 PROCEDURE — 80048 BASIC METABOLIC PNL TOTAL CA: CPT | Performed by: INTERNAL MEDICINE

## 2017-03-18 PROCEDURE — 74011250636 HC RX REV CODE- 250/636: Performed by: INTERNAL MEDICINE

## 2017-03-18 PROCEDURE — 82962 GLUCOSE BLOOD TEST: CPT

## 2017-03-18 PROCEDURE — 85025 COMPLETE CBC W/AUTO DIFF WBC: CPT | Performed by: INTERNAL MEDICINE

## 2017-03-18 PROCEDURE — 74011250637 HC RX REV CODE- 250/637: Performed by: INTERNAL MEDICINE

## 2017-03-18 PROCEDURE — 80202 ASSAY OF VANCOMYCIN: CPT | Performed by: INTERNAL MEDICINE

## 2017-03-18 RX ORDER — ENOXAPARIN SODIUM 100 MG/ML
40 INJECTION SUBCUTANEOUS EVERY 24 HOURS
Status: DISCONTINUED | OUTPATIENT
Start: 2017-03-18 | End: 2017-03-24 | Stop reason: HOSPADM

## 2017-03-18 RX ADMIN — HYDROMORPHONE HYDROCHLORIDE 2 MG: 2 TABLET ORAL at 07:58

## 2017-03-18 RX ADMIN — AMLODIPINE BESYLATE 10 MG: 5 TABLET ORAL at 08:48

## 2017-03-18 RX ADMIN — GENTAMICIN SULFATE 80 MG: 80 INJECTION, SOLUTION INTRAVENOUS at 21:27

## 2017-03-18 RX ADMIN — HYDROMORPHONE HYDROCHLORIDE 2 MG: 2 TABLET ORAL at 14:15

## 2017-03-18 RX ADMIN — VANCOMYCIN HYDROCHLORIDE 1500 MG: 10 INJECTION, POWDER, LYOPHILIZED, FOR SOLUTION INTRAVENOUS at 07:12

## 2017-03-18 RX ADMIN — Medication 10 ML: at 07:12

## 2017-03-18 RX ADMIN — INSULIN LISPRO 2 UNITS: 100 INJECTION, SOLUTION INTRAVENOUS; SUBCUTANEOUS at 17:15

## 2017-03-18 RX ADMIN — HYDROMORPHONE HYDROCHLORIDE 4 MG: 2 TABLET ORAL at 20:52

## 2017-03-18 RX ADMIN — GENTAMICIN SULFATE 80 MG: 80 INJECTION, SOLUTION INTRAVENOUS at 09:59

## 2017-03-18 RX ADMIN — VANCOMYCIN HYDROCHLORIDE 1500 MG: 10 INJECTION, POWDER, LYOPHILIZED, FOR SOLUTION INTRAVENOUS at 18:36

## 2017-03-18 RX ADMIN — HYDROMORPHONE HYDROCHLORIDE 4 MG: 2 TABLET ORAL at 03:26

## 2017-03-18 RX ADMIN — LOSARTAN POTASSIUM 100 MG: 50 TABLET, FILM COATED ORAL at 08:48

## 2017-03-18 RX ADMIN — FINASTERIDE 5 MG: 5 TABLET, FILM COATED ORAL at 08:49

## 2017-03-18 RX ADMIN — ENOXAPARIN SODIUM 40 MG: 40 INJECTION SUBCUTANEOUS at 14:16

## 2017-03-18 RX ADMIN — SODIUM CHLORIDE 75 ML/HR: 900 INJECTION, SOLUTION INTRAVENOUS at 21:31

## 2017-03-18 NOTE — ROUTINE PROCESS
Bedside and Verbal shift change report given to Javier Torre RN (oncoming nurse) by JOSE A Mock (offgoing nurse). Report included the following information SBAR, Kardex, OR Summary and MAR.

## 2017-03-18 NOTE — PROGRESS NOTES
Bedside and Verbal shift change report given to Angel chang RN (oncoming nurse) by George Scales RN (offgoing nurse). Report included the following information SBAR, Kardex, Procedure Summary and MAR.

## 2017-03-18 NOTE — PROGRESS NOTES
No fever  No exertional sx  Chronic low back pain, worse with activity  Systolic BP mildly elevated on triple Rx, likely aggravated by pain  Murmur of MR unchanged    Will proceed with LONNIE Tuesday morning

## 2017-03-18 NOTE — PROGRESS NOTES
[facility] Dosing Services: Antimicrobial Stewardship Progress Note  Consult for antibiotic dosing of Vancomycin/Gentamicin by Dr. Susan Hill  Pharmacist reviewed antibiotic appropriateness for 73year old male for indication of Discitis/Osteomyelitis/Bacteremia (Gentamicin synergy for enterococcus discitis/bacteremia)  Day of Therapy: 3    Ht Readings from Last 1 Encounters:   03/16/17 170.2 cm (67\")        Wt Readings from Last 1 Encounters:   03/16/17 102 kg (224 lb 13.9 oz)      Vancomycin therapy:  Current maintenance dose: 1500(mg) every 12 hours (frequency). Dose calculated to approximate a therapeutic trough of 15-20 mcg/mL. Last trough level: 16.4 mcg/ml this am, drawn correctly, level is therapeutic. Plan for level / Adjustment in Therapy:continue same  Dose administration notes:   Doses given appropriately as scheduled    Gentamicin synergy dosing  Current maintenance dose: Gentamicin 80 mg IV q12hr  Goal peak = 3-5 mcg/ml  Goal trough = 0.5-1.5 mcg/ml (Less than 1)  Levels -will order around tonights doses (this am dose has not been charted yet so will order levels later)    Other Antimicrobial   (not dosed by pharmacist) none   Cultures 3/17/2017: Blood: NG x20hrs pending  3/17/2017: Blood: NG x20hr pending  3/16/2017: Urine: Mixed skin mariya final  3/15/2017: Disc space: pending  3/15/2017: Blood: alpha strep both bottles pending  3/15/2017: Blood: alpha strep both bottles pending   Serum Creatinine Lab Results   Component Value Date/Time    Creatinine 1.13 03/18/2017 05:06 AM    Creatinine (POC) 2.0 07/26/2016 07:24 AM         Creatinine Clearance Estimated Creatinine Clearance: 74.2 mL/min (based on Cr of 1.13).      Temp Temp: 98.5 °F (36.9 °C)       WBC Lab Results   Component Value Date/Time    WBC 5.9 03/18/2017 05:06 AM        H/H Lab Results   Component Value Date/Time    HGB 8.7 03/18/2017 05:06 AM        Platelets    Lab Results   Component Value Date/Time    PLATELET 487 31/10/0896 05:06 AM          Pharmacist Dotty Riley Contact information: [phone]

## 2017-03-18 NOTE — PROGRESS NOTES
Problem: Falls - Risk of  Goal: *Knowledge of fall prevention  Outcome: Progressing Towards Goal  Patient educated to call for assistance with ambulating. Patient demonstrated understanding. Bed alarm on, gripper socks used, call bell within reach, family member at bedside.

## 2017-03-18 NOTE — PROGRESS NOTES
[facility] Gerardo [facility] [address], [place], [address] [facility]  [phone]      Medical Progress Note      NAME: Miri Norwood   :  1950  MRM:  [id number]    Date/Time: 3/18/2017  9:38 AM       Assessment and Plan:   1. Discitis (3/15/2017)/ chronic back pain/ bacteremia. S/P aspiration of lower spine by IR. Check culture. BC: gram positive cocci. Started on vanc and gent. ID following. LONNIE given mass on TTE. Will need MV repair v. Replaement, no acute indication (heart block, abscess, heart failure etc)      2. HTN (hypertension). Remains elevated and not ideal in setting of MR but no evidence of pulm edema one may expect with this. Continue home amlodipine, bystolic and cozaar.      3. DM (diabetes mellitus) ([facility] [address] (4597) without complication. Hold home levemir and cover with SSI. blood glucose stable without it      4. Dyslipidemia . Not on medication.      5. Mitral regurgitation. Follows with Dr Zhao Negrete, will need repair v. Replacement at some point     6. MARIN (obstructive sleep apnea) (9/10/2013). Not using CPAP any more. Overview: Hasn't used CPAP in 10 years.       7. BPH (benign prostatic hypertrophy) with urinary obstruction (2017). Continue proscar        Subjective:     Chief Complaint:  Back pain is okay unless moving    ROS:  (bold if positive, if negative)      Tolerating PT  Tolerating Diet        Objective:     Last 24hrs VS reviewed since prior progress note.  Most recent are:    Visit Vitals    /75 (BP 1 Location: Left arm, BP Patient Position: At rest)    Pulse 67    Temp 98.9 °F (37.2 °C)    Resp 18    Ht 5' 7\" (1.702 m)    Wt 102 kg (224 lb 13.9 oz)    SpO2 97%    BMI 35.22 kg/m2     SpO2 Readings from Last 6 Encounters:   17 97%   17 96%   17 96%   01/15/17 95%   16 100%   16 96%    O2 Flow Rate (L/min): 2 l/min       Intake/Output Summary (Last 24 hours) at 17 7834  Last data filed at 17 7760 Gross per 24 hour   Intake                0 ml   Output              200 ml   Net             -200 ml        Physical Exam:    Gen:  Well-developed, well-nourished, in no acute distress  HEENT:  Pink conjunctivae, PERRL, hearing intact to voice, moist mucous membranes  Neck:  Supple, without masses, thyroid non-tender  Resp:  No accessory muscle use, clear breath sounds without wheezes rales or rhonchi  Card:  No murmurs, normal S1, S2 without thrills, bruits or peripheral edema  Abd:  Soft, non-tender, non-distended, normoactive bowel sounds are present, no palpable organomegaly and no detectable hernias  Lymph:  No cervical or inguinal adenopathy  Musc:  No cyanosis or clubbing  Skin:  No rashes or ulcers, skin turgor is good  Neuro:  Cranial nerves are grossly intact, no focal motor weakness, follows commands appropriately  Psych:  Good insight, oriented to person, place and time, alert  __________________________________________________________________  Medications Reviewed: (see below)  Medications:     Current Facility-Administered Medications   Medication Dose Route Frequency    enoxaparin (LOVENOX) injection 40 mg  40 mg SubCUTAneous Q24H    Gentamicin TROUGH @2130 tonight   Other ONCE    Gentamicin PEAK @2330 tonight   Other ONCE    HYDROmorphone (DILAUDID) tablet 4 mg  4 mg Oral Q4H PRN    0.9% sodium chloride infusion  75 mL/hr IntraVENous CONTINUOUS    vancomycin (VANCOCIN) 1500 mg in  ml infusion  1,500 mg IntraVENous Q12H    gentamicin in Saline (Iso-osm) (GARAMYCIN) 80 mg/100 mL IVPB premix 80 mg  80 mg IntraVENous Q12H    sodium chloride (NS) flush 5-10 mL  5-10 mL IntraVENous Q8H    sodium chloride (NS) flush 5-10 mL  5-10 mL IntraVENous PRN    traMADol (ULTRAM) tablet 100 mg  100 mg Oral Q6H PRN    HYDROmorphone (DILAUDID) tablet 2 mg  2 mg Oral Q4H PRN    HYDROmorphone (PF) (DILAUDID) injection 1 mg  1 mg IntraVENous Q3H PRN    diphenhydrAMINE (BENADRYL) injection 25 mg  25 mg IntraVENous Q4H PRN    acetaminophen (TYLENOL) tablet 1,000 mg  1,000 mg Oral Q6H PRN    ondansetron (ZOFRAN) injection 6 mg  6 mg IntraVENous Q6H PRN    insulin lispro (HUMALOG) injection   SubCUTAneous AC&HS    glucose chewable tablet 16 g  4 Tab Oral PRN    dextrose (D50W) injection syrg 12.5-25 g  12.5-25 g IntraVENous PRN    glucagon (GLUCAGEN) injection 1 mg  1 mg IntraMUSCular PRN    amLODIPine (NORVASC) tablet 10 mg  10 mg Oral DAILY    finasteride (PROSCAR) tablet 5 mg  5 mg Oral DAILY    losartan (COZAAR) tablet 100 mg  100 mg Oral DAILY    nebivolol (BYSTOLIC) tablet 10 mg  10 mg Oral DAILY        Lab Data Reviewed: (see below)  Lab Review:     Recent Labs      03/18/17   0506  03/16/17   0522   WBC  5.9  7.7   HGB  8.7*  8.8*   HCT  28.7*  29.6*   PLT  211  185     Recent Labs      03/18/17   0506  03/16/17   0522   NA  141  137   K  3.6  3.6   CL  107  103   CO2  27  29   GLU  129*  125*   BUN  17 18   CREA  1.13  1.24   CA  8.2*  8.4*     Lab Results   Component Value Date/Time    Glucose (POC) 181 03/18/2017 04:37 PM    Glucose (POC) 125 03/18/2017 11:29 AM    Glucose (POC) 131 03/18/2017 07:38 AM    Glucose (POC) 177 03/17/2017 08:57 PM    Glucose (POC) 122 03/17/2017 05:24 PM     No results for input(s): PH, PCO2, PO2, HCO3, FIO2 in the last 72 hours. No results for input(s): INR in the last 72 hours. No lab exists for component: INREXT, INREXT  All Micro Results     Procedure Component Value Units Date/Time    CULTURE, BLOOD, PERIPHERAL [[id number]] Collected:  03/17/17 0900    Order Status:  Completed Specimen:  Blood Updated:  03/18/17 3098     Special Requests: NO SPECIAL REQUESTS        Culture result:       ONE OF TWO BOTTLES HAS BEEN FLAGGED POSITIVE BY INSTRUMENT. BOTTLE HAS BEEN SENT TO [address] [place] [address] LABORATORY TO ASSESS FOR POSSIBLE GROWTH.   REMAINING BOTTLE(S) HAS/HAVE NO GROWTH SO FAR      CULTURE, BODY FLUID Ashley Bolls STAIN [[id number]] Collected:  03/15/17 1800    Order Status: Completed Specimen:  Miscellaneous Fluid Updated:  03/18/17 1021     Special Requests: NO SPECIAL REQUESTS        GRAM STAIN NO ORGANISMS SEEN        Culture result: NO GROWTH 1 DAY       CULTURE, BLOOD, PERIPHERAL [[id number]] Collected:  03/17/17 0912    Order Status:  Completed Specimen:  Blood Updated:  03/18/17 0710     Special Requests: NO SPECIAL REQUESTS        Culture result: NO GROWTH AFTER 20 HOURS       CULTURE, BLOOD [[id number]]  (Abnormal) Collected:  03/15/17 1739    Order Status:  Completed Specimen:  Blood from Blood Updated:  03/17/17 1050     Special Requests: NO SPECIAL REQUESTS        Culture result:       ALPHA STREPTOCOCCUS, NOT S. PNEUMONIAE  (POSSIBLY TWO DIFFERENT COLONY TYPES)  GROWING IN BOTH BOTTLES DRAWN  (SITE = LAC)   (A)    CULTURE, BLOOD [[id number]]  (Abnormal) Collected:  03/15/17 1708    Order Status:  Completed Specimen:  Blood from Blood Updated:  03/17/17 1047     Special Requests: NO SPECIAL REQUESTS        Culture result:       ALPHA STREPTOCOCCUS, NOT S. PNEUMONIAE  (POSSIBLY TWO DIFFERENT COLONY TYPES)  GROWING IN BOTH BOTTLES DRAWN  (SITE = RAC)   (A)    CULTURE, URINE [[id number]] Collected:  03/16/17 0338    Order Status:  Completed Specimen:  Urine Updated:  03/17/17 1005     Special Requests: --        NO SPECIAL REQUESTS  Reflexed from [id number]       [place] Count --        [id number]  COLONIES/mL       Culture result: MIXED SKIN NANNETTE ISOLATED             I have reviewed notes of prior 24hr. Other pertinent lab:       Total time spent with patient: [address] discussed with: Patient, Family, Nursing Staff, Consultant/Specialist and >50% of time spent in counseling and coordination of care    Discussed:  Care Plan    Prophylaxis:  SCD's    Disposition:  Home w/Family           ___________________________________________________    Attending Physician: Jaylyn Terrell DO

## 2017-03-18 NOTE — PROGRESS NOTES
Bedside and Verbal shift change report given to [address] [place] [address] Ruslan (oncoming nurse) by Marcial Stokes RN (offgoing nurse). Report included the following information SBAR, Kardex, Procedure Summary, Intake/Output, MAR, Accordion and Recent Results.

## 2017-03-19 LAB
BACTERIA SPEC CULT: ABNORMAL
DATE LAST DOSE: ABNORMAL
GENTAMICIN PEAK SERPL-MCNC: 3.5 UG/ML (ref 5–10)
GLUCOSE BLD STRIP.AUTO-MCNC: 129 MG/DL (ref 65–100)
GLUCOSE BLD STRIP.AUTO-MCNC: 141 MG/DL (ref 65–100)
GLUCOSE BLD STRIP.AUTO-MCNC: 143 MG/DL (ref 65–100)
GLUCOSE BLD STRIP.AUTO-MCNC: 167 MG/DL (ref 65–100)
REPORTED DOSE,DOSE: ABNORMAL UNITS
REPORTED DOSE/TIME,TMG: 2200
SERVICE CMNT-IMP: ABNORMAL

## 2017-03-19 PROCEDURE — 74011250637 HC RX REV CODE- 250/637: Performed by: INTERNAL MEDICINE

## 2017-03-19 PROCEDURE — 74011250637 HC RX REV CODE- 250/637: Performed by: PHYSICIAN ASSISTANT

## 2017-03-19 PROCEDURE — 74011250636 HC RX REV CODE- 250/636: Performed by: INTERNAL MEDICINE

## 2017-03-19 PROCEDURE — 36415 COLL VENOUS BLD VENIPUNCTURE: CPT | Performed by: ORTHOPAEDIC SURGERY

## 2017-03-19 PROCEDURE — 82962 GLUCOSE BLOOD TEST: CPT

## 2017-03-19 PROCEDURE — 65270000029 HC RM PRIVATE

## 2017-03-19 PROCEDURE — 87040 BLOOD CULTURE FOR BACTERIA: CPT | Performed by: ORTHOPAEDIC SURGERY

## 2017-03-19 PROCEDURE — 77030012890

## 2017-03-19 PROCEDURE — 74011250636 HC RX REV CODE- 250/636: Performed by: ORTHOPAEDIC SURGERY

## 2017-03-19 RX ORDER — HYDRALAZINE HYDROCHLORIDE 25 MG/1
25 TABLET, FILM COATED ORAL 3 TIMES DAILY
Status: DISCONTINUED | OUTPATIENT
Start: 2017-03-19 | End: 2017-03-24 | Stop reason: HOSPADM

## 2017-03-19 RX ADMIN — INSULIN LISPRO 2 UNITS: 100 INJECTION, SOLUTION INTRAVENOUS; SUBCUTANEOUS at 17:16

## 2017-03-19 RX ADMIN — HYDRALAZINE HYDROCHLORIDE 25 MG: 25 TABLET, FILM COATED ORAL at 12:59

## 2017-03-19 RX ADMIN — VANCOMYCIN HYDROCHLORIDE 1500 MG: 10 INJECTION, POWDER, LYOPHILIZED, FOR SOLUTION INTRAVENOUS at 05:17

## 2017-03-19 RX ADMIN — VANCOMYCIN HYDROCHLORIDE 1500 MG: 10 INJECTION, POWDER, LYOPHILIZED, FOR SOLUTION INTRAVENOUS at 17:17

## 2017-03-19 RX ADMIN — AMLODIPINE BESYLATE 10 MG: 5 TABLET ORAL at 08:31

## 2017-03-19 RX ADMIN — HYDRALAZINE HYDROCHLORIDE 25 MG: 25 TABLET, FILM COATED ORAL at 21:44

## 2017-03-19 RX ADMIN — INSULIN LISPRO 2 UNITS: 100 INJECTION, SOLUTION INTRAVENOUS; SUBCUTANEOUS at 12:59

## 2017-03-19 RX ADMIN — HYDROMORPHONE HYDROCHLORIDE 2 MG: 2 TABLET ORAL at 05:17

## 2017-03-19 RX ADMIN — HYDROMORPHONE HYDROCHLORIDE 2 MG: 2 TABLET ORAL at 21:45

## 2017-03-19 RX ADMIN — SODIUM CHLORIDE 75 ML/HR: 900 INJECTION, SOLUTION INTRAVENOUS at 15:33

## 2017-03-19 RX ADMIN — ENOXAPARIN SODIUM 40 MG: 40 INJECTION SUBCUTANEOUS at 15:05

## 2017-03-19 RX ADMIN — LOSARTAN POTASSIUM 100 MG: 50 TABLET, FILM COATED ORAL at 08:31

## 2017-03-19 RX ADMIN — HYDROMORPHONE HYDROCHLORIDE 4 MG: 2 TABLET ORAL at 15:05

## 2017-03-19 RX ADMIN — GENTAMICIN SULFATE 80 MG: 80 INJECTION, SOLUTION INTRAVENOUS at 09:41

## 2017-03-19 RX ADMIN — HYDRALAZINE HYDROCHLORIDE 25 MG: 25 TABLET, FILM COATED ORAL at 17:16

## 2017-03-19 RX ADMIN — NEBIVOLOL HYDROCHLORIDE 10 MG: 5 TABLET ORAL at 08:31

## 2017-03-19 RX ADMIN — FINASTERIDE 5 MG: 5 TABLET, FILM COATED ORAL at 08:31

## 2017-03-19 RX ADMIN — Medication 10 ML: at 21:47

## 2017-03-19 RX ADMIN — Medication 10 ML: at 05:17

## 2017-03-19 RX ADMIN — HYDROMORPHONE HYDROCHLORIDE 2 MG: 2 TABLET ORAL at 00:57

## 2017-03-19 RX ADMIN — GENTAMICIN SULFATE 80 MG: 80 INJECTION, SOLUTION INTRAVENOUS at 21:46

## 2017-03-19 NOTE — PROGRESS NOTES
Orthopaedic Progress Note  Post Op day: * No surgery found *    2017 2:15 PM     Patient: Berna Tavera MRN: [id number]  SSN: [id number]    YOB: 1950  Age: 73 y.o. Sex: male      Admit date:  3/15/2017  Date of Surgery:  * No surgery found *   Procedures:    Admitting Physician:  Laurent Russo MD   Surgeon:  * Surgery not found *    Consulting Physician(s): Treatment Team: Attending Provider: Laurent Russo MD; Consulting Provider: Joan Christie MD; Consulting Provider: Luz Dailey MD; Utilization Review: Annabel Greene RN; Consulting Provider: Tyler Burnette NP; Care Manager: Ute Yoo; Consulting Provider: Ida Monsalve NP; Consulting Provider: Ashish Forbes MD    SUBJECTIVE:     Berna Tavera is a 73 y.o. male s/p IR aspiration/ disk biopsy; pt without complaints. Plans for LONNIE on Wednesday. On IV Garamycin and Vanc. OBJECTIVE:       Physical Exam:  General: Alert, cooperative, no distress. Respiratory: Respirations unlabored  Neurological:  Neurovascular exam within normal limits. Motor: + DF/PF. Musculoskeletal: Calves soft, supple, non-tender upon palpation. Left great toe with slight discoloration at most distal aspect. Blanches well. Full ROM. Dorsum of left foot with very slight pink tinge of color. No pain/ sensitivity. Dressing/Wound:  Clean, dry and intact. No significant erythema or swelling.       Vital Signs:      Patient Vitals for the past 8 hrs:   BP Temp Pulse Resp SpO2   17 0906 154/74 97.4 °F (36.3 °C) 73 18 98 %                                          Temp (24hrs), Av.1 °F (36.7 °C), Min:97.4 °F (36.3 °C), Max:98.9 °F (37.2 °C)      Labs:        Recent Labs      17   0506   HCT  28.7*   HGB  8.7*     Lab Results   Component Value Date/Time    Sodium 141 2017 05:06 AM    Potassium 3.6 2017 05:06 AM    Chloride 107 2017 05:06 AM    CO2 27 2017 05:06 AM    Glucose 129 03/18/2017 05:06 AM    BUN 17 03/18/2017 05:06 AM    Creatinine 1.13 03/18/2017 05:06 AM    Calcium 8.2 03/18/2017 05:06 AM       PT/OT:                Patient mobility                         ASSESSMENT / PLAN:   Principal Problem:    Discitis (3/15/2017)    Active Problems:    HTN (hypertension) ()      Dyslipidemia ()      Mitral regurgitation ()      Overview: mod-severe on echo 1/5/12      MARIN (obstructive sleep apnea) (9/10/2013)      Overview: Hasn't used CPAP in 10 years. 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 ([facility] [address] (2/3/2017)      DM (diabetes mellitus) ([facility] (3/15/2017)                    Pain Control: Controlled   DVT Prophylaxis: Lovenox daily   Hemodynamics: Stable. Awaiting LONNIE on Wednesday and final culture results   Activity: As tolerated   Disposition: TBD     Signed By:  Zayra Valles NP    [address] [facility]. [phone]

## 2017-03-19 NOTE — PROGRESS NOTES
Rajesh Gerardo [facility] [address]  [address] [facility], [place], [address] [place] [address]  [phone]      Medical Progress Note      NAME: Victor Manuel Nation   :  1950  MRM:  [id number]    Date/Time: 3/19/2017  9:38 AM       Assessment and Plan:   1. Discitis (3/15/2017)/ chronic back pain/ bacteremia. S/P aspiration of lower spine by IR. Initial Bcx: alpha strep, repeat BC: gram positive cocci. Repeat surveillance cx today. Started on vanc and gent. ID following. LONNIE given mass on TTE. Will need MV repair v. Replaement, no acute indication (heart block, abscess, heart failure etc). Reports color change in L great toe, no evidence to support septic embolic at this time but watch closely     2. HTN (hypertension). Remains elevated and not ideal in setting of MR but no evidence of pulm edema one may expect with this. Given persistent elevation, will add afterload reduction with hydralazine PO. Continue home amlodipine, bystolic and cozaar.      3. DM (diabetes mellitus) ([facility] [address] (4678) without complication. Hold home levemir and cover with SSI. blood glucose stable without it      4. Dyslipidemia . Not on medication.      5. Mitral regurgitation. Follows with Dr Fidelia Knight, will need repair v. Replacement at some point     6. MARIN (obstructive sleep apnea) (9/10/2013). Not using CPAP any more. Overview: Hasn't used CPAP in 10 years.       7. BPH (benign prostatic hypertrophy) with urinary obstruction (2017). Continue proscar        Subjective:     Chief Complaint:  Back pain is okay. Wife notes ?color change of L great toe this AM. He denies pain at this site. ROS:  (bold if positive, if negative)      Tolerating PT  Tolerating Diet        Objective:     Last 24hrs VS reviewed since prior progress note.  Most recent are:    Visit Vitals    /74 (BP 1 Location: Left arm, BP Patient Position: At rest)    Pulse 73    Temp 97.4 °F (36.3 °C)    Resp 18    Ht 5' 7\" (1.702 m)    Wt 102 kg (224 lb 13.9 oz)    SpO2 98%    BMI 35.22 kg/m2     SpO2 Readings from Last 6 Encounters:   03/19/17 98%   02/04/17 96%   01/26/17 96%   01/15/17 95%   09/07/16 100%   07/26/16 96%    O2 Flow Rate (L/min): 2 l/min     No intake or output data in the 24 hours ending 03/19/17 0950     Physical Exam:    Gen:  Well-developed, well-nourished, in no acute distress  HEENT:  Pink conjunctivae, PERRL, hearing intact to voice, moist mucous membranes  Neck:  Supple, without masses, thyroid non-tender  Resp:  No accessory muscle use, clear breath sounds without wheezes rales or rhonchi  Card:  3/6 holosystolic heard throughout precordium, normal S1, S2 without thrills, bruits or peripheral edema  Abd:  Soft, non-tender, non-distended, normoactive bowel sounds are present, no palpable organomegaly and no detectable hernias  Lymph:  No cervical or inguinal adenopathy  Musc:  No cyanosis or clubbing  Skin:  No rashes or ulcers, skin turgor is good.  L great toe without evidence of significant color change  Neuro:  Cranial nerves are grossly intact, no focal motor weakness, follows commands appropriately  Psych:  Good insight, oriented to person, place and time, alert  __________________________________________________________________  Medications Reviewed: (see below)  Medications:     Current Facility-Administered Medications   Medication Dose Route Frequency    hydrALAZINE (APRESOLINE) tablet 25 mg  25 mg Oral TID    enoxaparin (LOVENOX) injection 40 mg  40 mg SubCUTAneous Q24H    HYDROmorphone (DILAUDID) tablet 4 mg  4 mg Oral Q4H PRN    0.9% sodium chloride infusion  75 mL/hr IntraVENous CONTINUOUS    vancomycin (VANCOCIN) 1500 mg in  ml infusion  1,500 mg IntraVENous Q12H    gentamicin in Saline (Iso-osm) (GARAMYCIN) 80 mg/100 mL IVPB premix 80 mg  80 mg IntraVENous Q12H    sodium chloride (NS) flush 5-10 mL  5-10 mL IntraVENous Q8H    sodium chloride (NS) flush 5-10 mL  5-10 mL IntraVENous PRN    traMADol (ULTRAM) tablet 100 mg  100 mg Oral Q6H PRN    HYDROmorphone (DILAUDID) tablet 2 mg  2 mg Oral Q4H PRN    HYDROmorphone (PF) (DILAUDID) injection 1 mg  1 mg IntraVENous Q3H PRN    diphenhydrAMINE (BENADRYL) injection 25 mg  25 mg IntraVENous Q4H PRN    acetaminophen (TYLENOL) tablet 1,000 mg  1,000 mg Oral Q6H PRN    ondansetron (ZOFRAN) injection 6 mg  6 mg IntraVENous Q6H PRN    insulin lispro (HUMALOG) injection   SubCUTAneous AC&HS    glucose chewable tablet 16 g  4 Tab Oral PRN    dextrose (D50W) injection syrg 12.5-25 g  12.5-25 g IntraVENous PRN    glucagon (GLUCAGEN) injection 1 mg  1 mg IntraMUSCular PRN    amLODIPine (NORVASC) tablet 10 mg  10 mg Oral DAILY    finasteride (PROSCAR) tablet 5 mg  5 mg Oral DAILY    losartan (COZAAR) tablet 100 mg  100 mg Oral DAILY    nebivolol (BYSTOLIC) tablet 10 mg  10 mg Oral DAILY        Lab Data Reviewed: (see below)  Lab Review:     Recent Labs      03/18/17   0506   WBC  5.9   HGB  8.7*   HCT  28.7*   PLT  211     Recent Labs      03/18/17   0506   NA  141   K  3.6   CL  107   CO2  27   GLU  129*   BUN  17   CREA  1.13   CA  8.2*     Lab Results   Component Value Date/Time    Glucose (POC) 129 03/19/2017 07:33 AM    Glucose (POC) 115 03/18/2017 10:32 PM    Glucose (POC) 181 03/18/2017 04:37 PM    Glucose (POC) 125 03/18/2017 11:29 AM    Glucose (POC) 131 03/18/2017 07:38 AM     No results for input(s): PH, PCO2, PO2, HCO3, FIO2 in the last 72 hours. No results for input(s): INR in the last 72 hours.     No lab exists for component: INREXT, INREXT  All Micro Results     Procedure Component Value Units Date/Time    CULTURE, BLOOD [[id number]]     Order Status:  Sent Specimen:  Blood from Blood     CULTURE, BLOOD, PERIPHERAL [[id number]]  (Abnormal) Collected:  03/17/17 0912    Order Status:  Completed Specimen:  Blood Updated:  03/18/17 2835     Special Requests: NO SPECIAL REQUESTS        Culture result:       GRAM POSITIVE COCCI  IN CHAINS  GROWING IN 1 OF 2 BOTTLES DRAWN  ( R AC SITE)   (A)            PRELIMINARY REPORT OF  GRAM POSITIVE COCCI  IN CHAINS  GROWING IN 1 OF 2 BOTTLES DRAWN  CALLED TO AND READ BACK BY  MS NE NARANJO ([address]) ON 3/18/17 AT 2342. HH   (A)              REMAINING BOTTLE(S) HAS/HAVE NO GROWTH SO FAR    CULTURE, BLOOD, PERIPHERAL [[id number]]  (Abnormal) Collected:  03/17/17 0900    Order Status:  Completed Specimen:  Blood Updated:  03/18/17 2040     Special Requests: NO SPECIAL REQUESTS        Culture result:       GRAM POSITIVE COCCI  IN CHAINS  GROWING IN 1 OF 2 BOTTLES DRAWN  ( L AC SITE)   (A)            PRELIMINARY REPORT OF  GRAM POSITIVE COCCI  IN CHAINS  GROWING IN 1 OF 2 BOTTLES DRAWN  CALLED TO AND READ BACK BY  MS NE NARANJO ([address]) ON 3/18/17 AT 2040. HH    (A)              REMAINING BOTTLE(S) HAS/HAVE NO GROWTH SO FAR    CULTURE, BODY FLUID Walker Fluke STAIN [[id number]] Collected:  03/15/17 1800    Order Status:  Completed Specimen:  Miscellaneous Fluid Updated:  03/18/17 1021     Special Requests: NO SPECIAL REQUESTS        GRAM STAIN NO ORGANISMS SEEN        Culture result: NO GROWTH 1 DAY       CULTURE, BLOOD [[id number]]  (Abnormal) Collected:  03/15/17 1739    Order Status:  Completed Specimen:  Blood from Blood Updated:  03/17/17 1050     Special Requests: NO SPECIAL REQUESTS        Culture result:       ALPHA STREPTOCOCCUS, NOT S. PNEUMONIAE  (POSSIBLY TWO DIFFERENT COLONY TYPES)  GROWING IN BOTH BOTTLES DRAWN  (SITE = LAC)   (A)    CULTURE, BLOOD [[id number]]  (Abnormal) Collected:  03/15/17 1708    Order Status:  Completed Specimen:  Blood from Blood Updated:  03/17/17 1047     Special Requests: NO SPECIAL REQUESTS        Culture result:       ALPHA STREPTOCOCCUS, NOT S.  PNEUMONIAE  (POSSIBLY TWO DIFFERENT COLONY TYPES)  GROWING IN BOTH BOTTLES DRAWN  (SITE = RAC)   (A)    CULTURE, URINE [[id number]] Collected:  03/16/17 0338    Order Status:  Completed Specimen:  Urine Updated:  03/17/17 1005     Special Requests: --        NO SPECIAL REQUESTS  Reflexed from [id number]       [place] Count --        [id number]  COLONIES/mL       Culture result: MIXED SKIN NANNETTE ISOLATED             I have reviewed notes of prior 24hr. Other pertinent lab:       Total time spent with patient: [place] 59 discussed with: Patient, Family, Nursing Staff and >50% of time spent in counseling and coordination of care    Discussed:  Care Plan    Prophylaxis:  SCD's    Disposition:  Home w/Family           ___________________________________________________    Attending Physician: Shailesh Montiel,

## 2017-03-19 NOTE — PROGRESS NOTES
Bedside and Verbal shift change report given to Sierra Siu RN (oncoming nurse) by Harini Rogers RN (offgoing nurse). Report included the following information SBAR, Kardex, Procedure Summary, Intake/Output, MAR, Accordion and Recent Results.

## 2017-03-19 NOTE — PROGRESS NOTES
No fever  No exertional sx  Chronic low back pain, worse with activity  Systolic BP mildly elevated on triple Rx, likely aggravated by pain - stable  Murmur of MR unchanged    Will proceed with LONNIE Tuesday morning

## 2017-03-19 NOTE — PROGRESS NOTES
[facility] Dosing Services: Antimicrobial Stewardship Progress Note  Consult for antibiotic dosing of Vancomycin/Gentamicin by Dr. Dang Garcia  Pharmacist reviewed antibiotic appropriateness for 73year old male for indication of Discitis/Osteomyelitis/Bacteremia (Gentamicin synergy for enterococcus discitis/bacteremia)  Day of Therapy: 4    Ht Readings from Last 1 Encounters:   03/16/17 170.2 cm (67\")        Wt Readings from Last 1 Encounters:   03/16/17 102 kg (224 lb 13.9 oz)      Vancomycin therapy:  Current maintenance dose: 1500(mg) every 12 hours (frequency). Dose calculated to approximate a therapeutic trough of 15-20 mcg/mL. Last trough level: therapeutic on 3/18/2017  Plan for level / Adjustment in Therapy:continue same  Dose administration notes:   Doses given appropriately as scheduled    Gentamicin synergy dosing  Current maintenance dose: Gentamicin 80 mg IV q12hr  Goal peak = 3-5 mcg/ml  Goal trough = 0.5-1.5 mcg/ml (Less than 1)  Levels were drawn last night. Trough = 0.8 mcg/ml and peak = 3.5 mcg/ml. Levels are therapeutic for synergy. Continue current regimen    Other Antimicrobial   (not dosed by pharmacist) none   Cultures 3/17/2017: Blood: GPC 1/2 bottles pending  3/17/2017: Blood: GPC 1/2 bottles pending  3/16/2017: Urine: Mixed skin mariya final  3/15/2017: Disc space: NG x2 days pending  3/15/2017: Blood: alpha strep both bottles sensitive to Vancomycin final  3/15/2017: Blood: alpha strep both bottles final   Serum Creatinine Lab Results   Component Value Date/Time    Creatinine 1.13 03/18/2017 05:06 AM    Creatinine (POC) 2.0 07/26/2016 07:24 AM         Creatinine Clearance Estimated Creatinine Clearance: 74.2 mL/min (based on Cr of 1.13).      Temp Temp: 97.4 °F (36.3 °C)       WBC Lab Results   Component Value Date/Time    WBC 5.9 03/18/2017 05:06 AM        H/H Lab Results   Component Value Date/Time    HGB 8.7 03/18/2017 05:06 AM        Platelets    Lab Results   Component Value Date/Time    PLATELET 837 80/08/8213 05:06 AM          Pharmacist [address] [place] [address] information: [phone]

## 2017-03-20 ENCOUNTER — APPOINTMENT (OUTPATIENT)
Dept: INTERVENTIONAL RADIOLOGY/VASCULAR | Age: 67
DRG: 477 | End: 2017-03-20
Attending: ORTHOPAEDIC SURGERY
Payer: COMMERCIAL

## 2017-03-20 LAB
GLUCOSE BLD STRIP.AUTO-MCNC: 113 MG/DL (ref 65–100)
GLUCOSE BLD STRIP.AUTO-MCNC: 124 MG/DL (ref 65–100)
GLUCOSE BLD STRIP.AUTO-MCNC: 196 MG/DL (ref 65–100)
GLUCOSE BLD STRIP.AUTO-MCNC: 199 MG/DL (ref 65–100)
SERVICE CMNT-IMP: ABNORMAL

## 2017-03-20 PROCEDURE — 0QB03ZX EXCISION OF LUMBAR VERTEBRA, PERCUTANEOUS APPROACH, DIAGNOSTIC: ICD-10-PCS | Performed by: RADIOLOGY

## 2017-03-20 PROCEDURE — 74011250636 HC RX REV CODE- 250/636: Performed by: INTERNAL MEDICINE

## 2017-03-20 PROCEDURE — 0SB23ZX EXCISION OF LUMBAR VERTEBRAL DISC, PERCUTANEOUS APPROACH, DIAGNOSTIC: ICD-10-PCS | Performed by: RADIOLOGY

## 2017-03-20 PROCEDURE — 74011250637 HC RX REV CODE- 250/637: Performed by: PHYSICIAN ASSISTANT

## 2017-03-20 PROCEDURE — 77002 NEEDLE LOCALIZATION BY XRAY: CPT

## 2017-03-20 PROCEDURE — 74011250636 HC RX REV CODE- 250/636: Performed by: ORTHOPAEDIC SURGERY

## 2017-03-20 PROCEDURE — 74011000258 HC RX REV CODE- 258: Performed by: INTERNAL MEDICINE

## 2017-03-20 PROCEDURE — 62267 INTERDISCAL PERQ ASPIR DX: CPT

## 2017-03-20 PROCEDURE — 82962 GLUCOSE BLOOD TEST: CPT

## 2017-03-20 PROCEDURE — 65270000029 HC RM PRIVATE

## 2017-03-20 PROCEDURE — 74011250637 HC RX REV CODE- 250/637: Performed by: INTERNAL MEDICINE

## 2017-03-20 RX ADMIN — HYDROMORPHONE HYDROCHLORIDE 2 MG: 2 TABLET ORAL at 19:57

## 2017-03-20 RX ADMIN — AMLODIPINE BESYLATE 10 MG: 5 TABLET ORAL at 10:13

## 2017-03-20 RX ADMIN — ENOXAPARIN SODIUM 40 MG: 40 INJECTION SUBCUTANEOUS at 14:51

## 2017-03-20 RX ADMIN — INSULIN LISPRO 2 UNITS: 100 INJECTION, SOLUTION INTRAVENOUS; SUBCUTANEOUS at 11:30

## 2017-03-20 RX ADMIN — FINASTERIDE 5 MG: 5 TABLET, FILM COATED ORAL at 10:22

## 2017-03-20 RX ADMIN — VANCOMYCIN HYDROCHLORIDE 1500 MG: 10 INJECTION, POWDER, LYOPHILIZED, FOR SOLUTION INTRAVENOUS at 06:24

## 2017-03-20 RX ADMIN — Medication 10 ML: at 13:14

## 2017-03-20 RX ADMIN — SODIUM CHLORIDE 75 ML/HR: 900 INJECTION, SOLUTION INTRAVENOUS at 16:25

## 2017-03-20 RX ADMIN — HYDRALAZINE HYDROCHLORIDE 25 MG: 25 TABLET, FILM COATED ORAL at 22:21

## 2017-03-20 RX ADMIN — HYDROMORPHONE HYDROCHLORIDE 2 MG: 2 TABLET ORAL at 06:24

## 2017-03-20 RX ADMIN — HYDRALAZINE HYDROCHLORIDE 25 MG: 25 TABLET, FILM COATED ORAL at 18:01

## 2017-03-20 RX ADMIN — NEBIVOLOL HYDROCHLORIDE 10 MG: 5 TABLET ORAL at 10:14

## 2017-03-20 RX ADMIN — GENTAMICIN SULFATE 80 MG: 80 INJECTION, SOLUTION INTRAVENOUS at 22:21

## 2017-03-20 RX ADMIN — LOSARTAN POTASSIUM 100 MG: 50 TABLET, FILM COATED ORAL at 10:15

## 2017-03-20 RX ADMIN — CEFTRIAXONE 2 G: 2 INJECTION, POWDER, FOR SOLUTION INTRAMUSCULAR; INTRAVENOUS at 13:06

## 2017-03-20 RX ADMIN — GENTAMICIN SULFATE 80 MG: 80 INJECTION, SOLUTION INTRAVENOUS at 10:11

## 2017-03-20 RX ADMIN — HYDRALAZINE HYDROCHLORIDE 25 MG: 25 TABLET, FILM COATED ORAL at 10:14

## 2017-03-20 NOTE — PROGRESS NOTES
3/20/2017 1:54 PM Awaiting final iv abx orders for discharge. Pt will discharge home with [address] for [facility] for iv abx. CM will follow up.  MARILU Kevin

## 2017-03-20 NOTE — PROGRESS NOTES
Rajesh Rubielsen [facility] [address]  [address], [place], [address] [facility]  [phone]      Medical Progress Note      NAME: Earlene Chan   :  1950  MRM:  [id number]    Date/Time: 3/20/2017  9:38 AM       Assessment and Plan:   1. Discitis (3/15/2017)/ chronic back pain/ bacteremia. S/P aspiration of lower spine by IR. Initial Bcx: alpha strep, repeat BC: gram positive cocci. Repeat surveillance cx today. Started on vanc and gent. ID following and narrow per them. LONNIE given mass on TTE. Will need MV repair v. Replaement, no acute indication (heart block, abscess, heart failure etc). Reports color change in L great toe, no evidence to support septic embolic at this time but watch closely     2. HTN (hypertension). Improved with afterload reduction with hydralazine PO. Continue home amlodipine, bystolic and cozaar.      3. [facility] () without complication. Hold home levemir and cover with SSI. blood glucose stable without it      4. Dyslipidemia . Not on medication.      5. Mitral regurgitation. Follows with Dr Uyen Hyde, will need repair v. Replacement at some point     6. MARIN (obstructive sleep apnea) (9/10/2013). Not using CPAP any more. Overview: Hasn't used CPAP in 10 years.       7. BPH (benign prostatic hypertrophy) with urinary obstruction (2017). Continue proscar        Subjective:     Chief Complaint:  Back pain is okay. ROS:  (bold if positive, if negative)      Tolerating PT  Tolerating Diet        Objective:     Last 24hrs VS reviewed since prior progress note.  Most recent are:    Visit Vitals    /77 (BP 1 Location: Left arm)    Pulse 64    Temp 98.9 °F (37.2 °C)    Resp 20    Ht 5' 7\" (1.702 m)    Wt 102 kg (224 lb 13.9 oz)    SpO2 97%    BMI 35.22 kg/m2     SpO2 Readings from Last 6 Encounters:   17 97%   17 96%   17 96%   01/15/17 95%   16 100%   07/26/16 96%    O2 Flow Rate (L/min): 2 l/min     No intake or output data in the 24 hours ending 03/20/17 9857     Physical Exam:    Gen:  Well-developed, well-nourished, in no acute distress  HEENT:  Pink conjunctivae, PERRL, hearing intact to voice, moist mucous membranes  Neck:  Supple, without masses, thyroid non-tender  Resp:  No accessory muscle use, clear breath sounds without wheezes rales or rhonchi  Card:  3/6 holosystolic heard throughout precordium, normal S1, S2 without thrills, bruits or peripheral edema  Abd:  Soft, non-tender, non-distended, normoactive bowel sounds are present, no palpable organomegaly and no detectable hernias  Lymph:  No cervical or inguinal adenopathy  Musc:  No cyanosis or clubbing  Skin:  No rashes or ulcers, skin turgor is good.  L great toe without evidence of significant color change  Neuro:  Cranial nerves are grossly intact, no focal motor weakness, follows commands appropriately  Psych:  Good insight, oriented to person, place and time, alert  __________________________________________________________________  Medications Reviewed: (see below)  Medications:     Current Facility-Administered Medications   Medication Dose Route Frequency    cefTRIAXone (ROCEPHIN) 2 g in 0.9% sodium chloride (MBP/ADV) 50 mL  2 g IntraVENous Q24H    hydrALAZINE (APRESOLINE) tablet 25 mg  25 mg Oral TID    enoxaparin (LOVENOX) injection 40 mg  40 mg SubCUTAneous Q24H    HYDROmorphone (DILAUDID) tablet 4 mg  4 mg Oral Q4H PRN    0.9% sodium chloride infusion  75 mL/hr IntraVENous CONTINUOUS    gentamicin in Saline (Iso-osm) (GARAMYCIN) 80 mg/100 mL IVPB premix 80 mg  80 mg IntraVENous Q12H    sodium chloride (NS) flush 5-10 mL  5-10 mL IntraVENous Q8H    sodium chloride (NS) flush 5-10 mL  5-10 mL IntraVENous PRN    traMADol (ULTRAM) tablet 100 mg  100 mg Oral Q6H PRN    HYDROmorphone (DILAUDID) tablet 2 mg  2 mg Oral Q4H PRN    HYDROmorphone (PF) (DILAUDID) injection 1 mg  1 mg IntraVENous Q3H PRN    diphenhydrAMINE (BENADRYL) injection 25 mg  25 mg IntraVENous Q4H PRN    acetaminophen (TYLENOL) tablet 1,000 mg  1,000 mg Oral Q6H PRN    ondansetron (ZOFRAN) injection 6 mg  6 mg IntraVENous Q6H PRN    insulin lispro (HUMALOG) injection   SubCUTAneous AC&HS    glucose chewable tablet 16 g  4 Tab Oral PRN    dextrose (D50W) injection syrg 12.5-25 g  12.5-25 g IntraVENous PRN    glucagon (GLUCAGEN) injection 1 mg  1 mg IntraMUSCular PRN    amLODIPine (NORVASC) tablet 10 mg  10 mg Oral DAILY    finasteride (PROSCAR) tablet 5 mg  5 mg Oral DAILY    losartan (COZAAR) tablet 100 mg  100 mg Oral DAILY    nebivolol (BYSTOLIC) tablet 10 mg  10 mg Oral DAILY        Lab Data Reviewed: (see below)  Lab Review:     Recent Labs      03/18/17   0506   WBC  5.9   HGB  8.7*   HCT  28.7*   PLT  211     Recent Labs      03/18/17   0506   NA  141   K  3.6   CL  107   CO2  27   GLU  129*   BUN  17   CREA  1.13   CA  8.2*     Lab Results   Component Value Date/Time    Glucose (POC) 113 03/20/2017 04:17 PM    Glucose (POC) 199 03/20/2017 11:24 AM    Glucose (POC) 124 03/20/2017 07:18 AM    Glucose (POC) 167 03/19/2017 10:16 PM    Glucose (POC) 141 03/19/2017 04:46 PM     No results for input(s): PH, PCO2, PO2, HCO3, FIO2 in the last 72 hours. No results for input(s): INR in the last 72 hours.     No lab exists for component: INREXT, INREXT  All Micro Results     Procedure Component Value Units Date/Time    CULTURE, BODY FLUID Kinjal Valerio STAIN [[id number]] Collected:  03/15/17 1800    Order Status:  Completed Specimen:  Miscellaneous Fluid Updated:  03/20/17 1107     Special Requests: NO SPECIAL REQUESTS        GRAM STAIN NO ORGANISMS SEEN        Culture result: NO GROWTH 3 DAYS       CULTURE, BLOOD, PAIRED [[id number]] Collected:  03/19/17 2001    Order Status:  Completed Specimen:  Blood Updated:  03/20/17 1102     Special Requests: NO SPECIAL REQUESTS        Culture result: NO GROWTH AFTER 12 HOURS       CULTURE, BLOOD [[id number]] Collected:  03/19/17 2017 Order Status:  Canceled Specimen:  Blood from Blood Updated:  03/19/17 2042    CULTURE, BLOOD, PERIPHERAL [[id number]]  (Abnormal) Collected:  03/17/17 0912    Order Status:  Completed Specimen:  Blood Updated:  03/19/17 1329     Special Requests: NO SPECIAL REQUESTS        Culture result:       ALPHA STREPTOCOCCUS, NOT S. PNEUMONIAE  GROWING IN 1 OF 2 BOTTLES DRAWN  ( R AC SITE)  PLEASE REFER TO PREVIOUS CULTURE [id number] FOR SENSITIVITIES   (A)            PRELIMINARY REPORT OF  GRAM POSITIVE COCCI  IN CHAINS  GROWING IN 1 OF 2 BOTTLES DRAWN  CALLED TO AND READ BACK BY  MS NE NARANJO ([address]) ON 3/18/17 AT 2342. HH   (A)              REMAINING BOTTLE(S) HAS/HAVE NO GROWTH SO FAR    CULTURE, BLOOD, PERIPHERAL [[id number]]  (Abnormal) Collected:  03/17/17 0900    Order Status:  Completed Specimen:  Blood Updated:  03/19/17 1328     Special Requests: NO SPECIAL REQUESTS        Culture result:       ALPHA STREPTOCOCCUS, NOT S. PNEUMONIAE  GROWING IN 1 OF 2 BOTTLES DRAWN  ( L AC SITE)  PLEASE REFER TO PREVIOUS CULTURE [id number] SENSITIVITIES   (A)            PRELIMINARY REPORT OF  GRAM POSITIVE COCCI  IN CHAINS  GROWING IN 1 OF 2 BOTTLES DRAWN  CALLED TO AND READ BACK BY  MS NE NARANJO ([address]) ON 3/18/17 AT 2040. HH    (A)              REMAINING BOTTLE(S) HAS/HAVE NO GROWTH SO FAR    CULTURE, BLOOD [[id number]]  (Abnormal) Collected:  03/15/17 1708    Order Status:  Completed Specimen:  Blood from Blood Updated:  03/19/17 1002     Special Requests: NO SPECIAL REQUESTS        Culture result:       ALPHA STREPTOCOCCUS, NOT S.  PNEUMONIAE  (TWO DIFFERENT COLONY TYPES/STRAINS)  GROWING IN BOTH BOTTLES DRAWN  (SITE = [facility])  (PLEASE REFER TO [facility] [id number], ALSO COLLECTED 3/15/17, FOR SENSITIVITIES)   (A)    CULTURE, BLOOD [[id number]]  (Abnormal)  (Susceptibility) Collected:  03/15/17 1739    Order Status:  Completed Specimen:  Blood from Blood Updated:  03/19/17 1000     Special Requests: NO SPECIAL REQUESTS Culture result:       ALPHA STREPTOCOCCUS  (NOT S. PNEUMONIAE)  GROWING IN BOTH BOTTLES DRAWN  (SITE = LAC)  (SENSITIVITIES PERFORMED BY E-TEST)   (A)            ALPHA STREPTOCOCCUS  (NOT S. PENUMONIAE) (2ND COLONY TYPE/STRAIN)  ALSO GROWING IN BOTH BOTTLES DRAWN (SITE = L AC)  (SENSITIVITIES PERFORMED BY E-TEST)   (A)    CULTURE, URINE [[id number]] Collected:  03/16/17 0338    Order Status:  Completed Specimen:  Urine Updated:  03/17/17 1005     Special Requests: --        NO SPECIAL REQUESTS  Reflexed from [id number]       [place] Count --        [phone]  COLONIES/mL       Culture result: MIXED SKIN NANNETTE ISOLATED             I have reviewed notes of prior 24hr. Other pertinent lab:       Total time spent with patient: [place] [address] discussed with: Patient, Family, Nursing Staff and >50% of time spent in counseling and coordination of care    Discussed:  Care Plan    Prophylaxis:  SCD's    Disposition:  Home w/Family           ___________________________________________________    Attending Physician: Sarkis Valle DO

## 2017-03-20 NOTE — PROGRESS NOTES
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 Cardiology attending:  Pt seen and examined. LONNIE tomorrow. The procedure was discussed at length with the patient, including risks/benefits, potential findings and treatment options. .He agrees to proceed. ASA 3  Airway 2    Theodore Johnson MD    Subjective:   Lodema Mcardle is a 73y.o. year old male w/ hx:HTN cx by MVP, DM, MARIN (no CPAP)  Admitted with progressive low back pain, found to have discitis, strep bacteremia, on iv antibx. Sx x 2 months - low back pain, low grade fevers.      Echo   with 11x 8 mm semimobile mass on atrial surface of anterior mitral leaflet with eccentric posterior MR, likely moderate-severe. Has known MVP with mod-severe MR by echo /LONNIE , asx, previously followed by Dr Viral Lucero. No hx of HF or AF.      Denies any exertional sx. Patient denies any exertional chest pain, dyspnea, palpitations, syncope, orthopnea, edema or paroxysmal nocturnal dyspnea.     Had dental work last fall but takes antibx prophylaxis due to hx of TKR       Overnight activities reviewed:    - -140/60 range this am    - no labs      Cardiac ROS:Patient denies any exertional chest pain, dyspnea, palpitations, syncope, orthopnea, edema or paroxysmal nocturnal dyspnea. Review of Systems: chronic low back pain No nausea, indigestion, vomiting, , cough, sputum. No bleeding. Taking po. Appetite . CARDIAC EVALUATION   ECHO 3/15/2017: EF 65-70%, mild LAE, mild LVH, mod-severe LAE, mod MR,    spherical, echogenic, fixed mass, 14 mm x 8 mm)- anterior leaflet, on the atrial aspect.     LONNIE 3/21/2017    Objective:     Visit Vitals    /63 (BP 1 Location: Left arm, BP Patient Position: At rest)    Pulse 62    Temp 98.3 °F (36.8 °C)    Resp 18    Ht 5' 7\" (1.702 m)    Wt 224 lb 13.9 oz (102 kg)    SpO2 95%    BMI 35.22 kg/m2      O2 Flow Rate (L/min): 2 l/min O2 Device: Room air    Temp (24hrs), Av.2 °F (36.8 °C), Min:98 °F (36.7 °C), Max:98.3 °F (36.8 °C)      No intake or output data in the 24 hours ending 17 0927      General: AAOx3 cooperative, no acute distress. HEENT: Atraumatic. Pink and moist.  Anicteric sclerae. Neck: Supple,     Lungs: CTA bilaterally. No wheezing/rhonchi/crackles. Heart: PMI:nondisplaced,   Regular rhythm, 4/6  systolic murmur @ apex-> axilla, no S3, no rubs, no gallops. No JVD. No carotid bruits. Abdomen: Soft, non-distended, non-tender. + Bowel sounds. No bruits. Extremities: No edema, no clubbing, no cyanosis. No calf tenderness  Neurologic: Grossly intact. Alert and oriented X 3. No acute neurological distress. Psych: Good insight. Not anxious nor agitated.       Care Plan discussed with:    Comments   Patient y    Family  y    RN     Care Manager                    Consultant:          Data Review:   CMP: No results found for: NA, K, CL, CO2, AGAP, GLU, BUN, CREA, GFRAA, GFRNA, CA, MG, PHOS, ALB, TBIL, TBILI, TP, ALB, GLOB, AGRAT, SGOT, ALT, GPT  CBC: No results found for: WBC, HGB, HCT, PLT, HGBEXT, HCTEXT, PLTEXT, HGBEXT, HCTEXT, PLTEXT  All Cardiac Markers in the last 24 hours: No results found for: CPK, CKMMB, CKMB, RCK3, CKMBT, CKNDX, CKND1, RASHID, TROPT, TROIQ, FALLON, TROPT, TNIPOC, BNP, BNPP  Recent Glucose Results:   Lab Results   Component Value Date/Time    GLUCPOC 124 (H) 2017 07:18 AM    GLUCPOC 167 (H) 2017 10:16 PM    GLUCPOC 141 (H) 2017 04:46 PM     ABG: No results found for: PH, PHI, PCO2, PCO2I, PO2, PO2I, HCO3, HCO3I, FIO2, FIO2I  LIPIDS:  Cholesterol, total   Date Value Ref Range Status   2014 182 100 - 199 mg/dL Final     Triglyceride   Date Value Ref Range Status   04/03/2014 211 (H) 0 - 149 mg/dL Final     HDL Cholesterol   Date Value Ref Range Status   04/03/2014 35 (L) >39 mg/dL Final     Comment:     According to ATP-III Guidelines, HDL-C >59 mg/dL is considered a  negative risk factor for CHD.      LDL, calculated   Date Value Ref Range Status   04/03/2014 105 (H) 0 - 99 mg/dL Final     VLDL, calculated   Date Value Ref Range Status   04/03/2014 42 (H) 5 - 40 mg/dL Final       Medications reviewed  Current Facility-Administered Medications   Medication Dose Route Frequency    hydrALAZINE (APRESOLINE) tablet 25 mg  25 mg Oral TID    enoxaparin (LOVENOX) injection 40 mg  40 mg SubCUTAneous Q24H    HYDROmorphone (DILAUDID) tablet 4 mg  4 mg Oral Q4H PRN    0.9% sodium chloride infusion  75 mL/hr IntraVENous CONTINUOUS    vancomycin (VANCOCIN) 1500 mg in  ml infusion  1,500 mg IntraVENous Q12H    gentamicin in Saline (Iso-osm) (GARAMYCIN) 80 mg/100 mL IVPB premix 80 mg  80 mg IntraVENous Q12H    sodium chloride (NS) flush 5-10 mL  5-10 mL IntraVENous Q8H    sodium chloride (NS) flush 5-10 mL  5-10 mL IntraVENous PRN    traMADol (ULTRAM) tablet 100 mg  100 mg Oral Q6H PRN    HYDROmorphone (DILAUDID) tablet 2 mg  2 mg Oral Q4H PRN    HYDROmorphone (PF) (DILAUDID) injection 1 mg  1 mg IntraVENous Q3H PRN    diphenhydrAMINE (BENADRYL) injection 25 mg  25 mg IntraVENous Q4H PRN    acetaminophen (TYLENOL) tablet 1,000 mg  1,000 mg Oral Q6H PRN    ondansetron (ZOFRAN) injection 6 mg  6 mg IntraVENous Q6H PRN    insulin lispro (HUMALOG) injection   SubCUTAneous AC&HS    glucose chewable tablet 16 g  4 Tab Oral PRN    dextrose (D50W) injection syrg 12.5-25 g  12.5-25 g IntraVENous PRN    glucagon (GLUCAGEN) injection 1 mg  1 mg IntraMUSCular PRN    amLODIPine (NORVASC) tablet 10 mg  10 mg Oral DAILY    finasteride (PROSCAR) tablet 5 mg  5 mg Oral DAILY    losartan (COZAAR) tablet 100 mg 100 mg Oral DAILY    nebivolol (BYSTOLIC) tablet 10 mg  10 mg Oral DAILY         Bria Ibrahim RN, ACNP-BC, AACC

## 2017-03-20 NOTE — PROGRESS NOTES
Cardiac and ID workup continue. Back pain improving  Calves soft  Motor and sensory intact lower ext. Will DC home when workup complete.

## 2017-03-20 NOTE — PROGRESS NOTES
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 Repeat BC's NGSF. Narrow abx to ceftriaxone/gent. Needs 6 weeks IV abx    MVE. LONNIE 3/21    L3-L4 discitis. S/p IR aspiration 3/16/17 - cultures NGSF    History cephalexin allergy. Patient has no recollection of any allergic reaction to abx other than to sulfa. Will start ceftriaxone and monitor closely. D/w patient and wife who are agreable          Subjective:   Patient reports pain controlled, no F/C. Objective:     Vitals:   Visit Vitals    /72 (BP 1 Location: Left arm, BP Patient Position: At rest)    Pulse 66    Temp 98.4 °F (36.9 °C)    Resp 18    Ht 5' 7\" (1.702 m)    Wt 102 kg (224 lb 13.9 oz)    SpO2 97%    BMI 35.22 kg/m2     Temp (24hrs), Av.2 °F (36.8 °C), Min:98 °F (36.7 °C), Max:98.4 °F (36.9 °C)    Recent Labs      17   0506   NA  141   K  3.6   CL  107   CO2  27   BUN  17   CREA  1.13   GLU  129*   WBC  5.9   HGB  8.7*   HCT  28.7*   PLT  211       Exam:    Gen: WDWN, NAD  HEENT: moist mucous membranes  Resp: no distress  Card:no peripheral edema  Skin: no rash  Neuro: A&O x3  Psych: normal affect       Cultures:     Lab Results   Component Value Date/Time    Culture result:  2017 09:12 AM     ALPHA STREPTOCOCCUS, NOT S. PNEUMONIAE  GROWING IN 1 OF 2 BOTTLES DRAWN  ( R  SITE)  PLEASE REFER TO PREVIOUS CULTURE [id number] FOR SENSITIVITIES      Culture result:  2017 09:12 AM     PRELIMINARY REPORT OF  GRAM POSITIVE COCCI  IN CHAINS  GROWING IN 1 OF 2 BOTTLES DRAWN  CALLED TO AND READ BACK BY  MS NE NARANJO ([address] [place] [address]) ON 3/18/17 AT 2342.  [facility]      Culture result: REMAINING BOTTLE(S) HAS/HAVE NO GROWTH SO FAR 2017 09:12 AM Radiology: reviewed       Medications:        Current Facility-Administered Medications   Medication Dose Route Frequency Last Dose    hydrALAZINE (APRESOLINE) tablet 25 mg  25 mg Oral TID 25 mg at 03/20/17 1014    enoxaparin (LOVENOX) injection 40 mg  40 mg SubCUTAneous Q24H 40 mg at 03/19/17 1505    HYDROmorphone (DILAUDID) tablet 4 mg  4 mg Oral Q4H PRN 4 mg at 03/19/17 1505    0.9% sodium chloride infusion  75 mL/hr IntraVENous CONTINUOUS 75 mL/hr at 03/19/17 1533    vancomycin (VANCOCIN) 1500 mg in  ml infusion  1,500 mg IntraVENous Q12H 1,500 mg at 03/20/17 0624    gentamicin in Saline (Iso-osm) (GARAMYCIN) 80 mg/100 mL IVPB premix 80 mg  80 mg IntraVENous Q12H 80 mg at 03/20/17 1011    sodium chloride (NS) flush 5-10 mL  5-10 mL IntraVENous Q8H 10 mL at 03/19/17 2147    sodium chloride (NS) flush 5-10 mL  5-10 mL IntraVENous PRN      traMADol (ULTRAM) tablet 100 mg  100 mg Oral Q6H  mg at 03/15/17 2003    HYDROmorphone (DILAUDID) tablet 2 mg  2 mg Oral Q4H PRN 2 mg at 03/20/17 0624    HYDROmorphone (PF) (DILAUDID) injection 1 mg  1 mg IntraVENous Q3H PRN      diphenhydrAMINE (BENADRYL) injection 25 mg  25 mg IntraVENous Q4H PRN      acetaminophen (TYLENOL) tablet 1,000 mg  1,000 mg Oral Q6H PRN 1,000 mg at 03/17/17 0147    ondansetron (ZOFRAN) injection 6 mg  6 mg IntraVENous Q6H PRN      insulin lispro (HUMALOG) injection   SubCUTAneous AC&HS Stopped at 03/19/17 2200    glucose chewable tablet 16 g  4 Tab Oral PRN      dextrose (D50W) injection syrg 12.5-25 g  12.5-25 g IntraVENous PRN      glucagon (GLUCAGEN) injection 1 mg  1 mg IntraMUSCular PRN      amLODIPine (NORVASC) tablet 10 mg  10 mg Oral DAILY 10 mg at 03/20/17 1013    finasteride (PROSCAR) tablet 5 mg  5 mg Oral DAILY 5 mg at 03/20/17 1022    losartan (COZAAR) tablet 100 mg  100 mg Oral DAILY 100 mg at 03/20/17 1015    nebivolol (BYSTOLIC) tablet 10 mg  10 mg Oral DAILY 10 mg at 03/20/17 1014       Rashaun Damon Frank DO    Signed By: March 20, 2017 March 20, 2017

## 2017-03-20 NOTE — PROGRESS NOTES
Bedside and Verbal shift change report given to Gunjan Deluna RN (oncoming nurse) by Juan Rios RN (offgoing nurse). Report included the following information SBAR, Kardex, Procedure Summary, Intake/Output, MAR, Accordion and Recent Results.

## 2017-03-21 LAB
BACTERIA SPEC CULT: NORMAL
BUN SERPL-MCNC: 16 MG/DL (ref 6–20)
CREAT SERPL-MCNC: 1.21 MG/DL (ref 0.7–1.3)
GLUCOSE BLD STRIP.AUTO-MCNC: 117 MG/DL (ref 65–100)
GLUCOSE BLD STRIP.AUTO-MCNC: 128 MG/DL (ref 65–100)
GLUCOSE BLD STRIP.AUTO-MCNC: 135 MG/DL (ref 65–100)
GLUCOSE BLD STRIP.AUTO-MCNC: 142 MG/DL (ref 65–100)
GRAM STN SPEC: NORMAL
SERVICE CMNT-IMP: ABNORMAL
SERVICE CMNT-IMP: NORMAL

## 2017-03-21 PROCEDURE — 74011250636 HC RX REV CODE- 250/636: Performed by: ORTHOPAEDIC SURGERY

## 2017-03-21 PROCEDURE — B246ZZ4 ULTRASONOGRAPHY OF RIGHT AND LEFT HEART, TRANSESOPHAGEAL: ICD-10-PCS | Performed by: SPECIALIST

## 2017-03-21 PROCEDURE — 74011250637 HC RX REV CODE- 250/637: Performed by: INTERNAL MEDICINE

## 2017-03-21 PROCEDURE — 77030012935 HC DRSG AQUACEL BMS -B

## 2017-03-21 PROCEDURE — 99152 MOD SED SAME PHYS/QHP 5/>YRS: CPT

## 2017-03-21 PROCEDURE — 82565 ASSAY OF CREATININE: CPT | Performed by: ORTHOPAEDIC SURGERY

## 2017-03-21 PROCEDURE — 65270000029 HC RM PRIVATE

## 2017-03-21 PROCEDURE — 74011000258 HC RX REV CODE- 258: Performed by: INTERNAL MEDICINE

## 2017-03-21 PROCEDURE — 93312 ECHO TRANSESOPHAGEAL: CPT

## 2017-03-21 PROCEDURE — 74011250636 HC RX REV CODE- 250/636: Performed by: PHYSICIAN ASSISTANT

## 2017-03-21 PROCEDURE — 84520 ASSAY OF UREA NITROGEN: CPT | Performed by: ORTHOPAEDIC SURGERY

## 2017-03-21 PROCEDURE — 74011250637 HC RX REV CODE- 250/637: Performed by: PHYSICIAN ASSISTANT

## 2017-03-21 PROCEDURE — 74011250636 HC RX REV CODE- 250/636: Performed by: SPECIALIST

## 2017-03-21 PROCEDURE — 36415 COLL VENOUS BLD VENIPUNCTURE: CPT | Performed by: ORTHOPAEDIC SURGERY

## 2017-03-21 PROCEDURE — 74011000250 HC RX REV CODE- 250: Performed by: SPECIALIST

## 2017-03-21 PROCEDURE — 82962 GLUCOSE BLOOD TEST: CPT

## 2017-03-21 PROCEDURE — 74011250636 HC RX REV CODE- 250/636: Performed by: INTERNAL MEDICINE

## 2017-03-21 RX ORDER — SODIUM CHLORIDE 0.9 % (FLUSH) 0.9 %
5-10 SYRINGE (ML) INJECTION EVERY 8 HOURS
Status: CANCELLED | OUTPATIENT
Start: 2017-03-21

## 2017-03-21 RX ORDER — LIDOCAINE 50 MG/G
OINTMENT TOPICAL AS NEEDED
Status: DISCONTINUED | OUTPATIENT
Start: 2017-03-21 | End: 2017-03-21 | Stop reason: HOSPADM

## 2017-03-21 RX ORDER — FENTANYL CITRATE 50 UG/ML
25-200 INJECTION, SOLUTION INTRAMUSCULAR; INTRAVENOUS
Status: DISCONTINUED | OUTPATIENT
Start: 2017-03-21 | End: 2017-03-21 | Stop reason: HOSPADM

## 2017-03-21 RX ORDER — SODIUM CHLORIDE 0.9 % (FLUSH) 0.9 %
5-10 SYRINGE (ML) INJECTION AS NEEDED
Status: CANCELLED | OUTPATIENT
Start: 2017-03-21

## 2017-03-21 RX ORDER — MIDAZOLAM HYDROCHLORIDE 1 MG/ML
.5-1 INJECTION, SOLUTION INTRAMUSCULAR; INTRAVENOUS
Status: DISCONTINUED | OUTPATIENT
Start: 2017-03-21 | End: 2017-03-21 | Stop reason: HOSPADM

## 2017-03-21 RX ORDER — LIDOCAINE HYDROCHLORIDE 20 MG/ML
JELLY TOPICAL ONCE
Status: COMPLETED | OUTPATIENT
Start: 2017-03-21 | End: 2017-03-21

## 2017-03-21 RX ADMIN — LIDOCAINE: 50 OINTMENT TOPICAL at 07:52

## 2017-03-21 RX ADMIN — Medication 10 ML: at 22:21

## 2017-03-21 RX ADMIN — HYDRALAZINE HYDROCHLORIDE 25 MG: 25 TABLET, FILM COATED ORAL at 22:20

## 2017-03-21 RX ADMIN — MIDAZOLAM HYDROCHLORIDE 2 MG: 1 INJECTION, SOLUTION INTRAMUSCULAR; INTRAVENOUS at 08:11

## 2017-03-21 RX ADMIN — FINASTERIDE 5 MG: 5 TABLET, FILM COATED ORAL at 10:46

## 2017-03-21 RX ADMIN — HYDROMORPHONE HYDROCHLORIDE 1 MG: 1 INJECTION, SOLUTION INTRAMUSCULAR; INTRAVENOUS; SUBCUTANEOUS at 20:14

## 2017-03-21 RX ADMIN — HYDRALAZINE HYDROCHLORIDE 25 MG: 25 TABLET, FILM COATED ORAL at 16:59

## 2017-03-21 RX ADMIN — INSULIN LISPRO 2 UNITS: 100 INJECTION, SOLUTION INTRAVENOUS; SUBCUTANEOUS at 16:58

## 2017-03-21 RX ADMIN — LOSARTAN POTASSIUM 100 MG: 50 TABLET, FILM COATED ORAL at 10:46

## 2017-03-21 RX ADMIN — SODIUM CHLORIDE 75 ML/HR: 900 INJECTION, SOLUTION INTRAVENOUS at 19:02

## 2017-03-21 RX ADMIN — FENTANYL CITRATE 25 MCG: 50 INJECTION, SOLUTION INTRAMUSCULAR; INTRAVENOUS at 08:14

## 2017-03-21 RX ADMIN — FENTANYL CITRATE 50 MCG: 50 INJECTION, SOLUTION INTRAMUSCULAR; INTRAVENOUS at 08:27

## 2017-03-21 RX ADMIN — AMLODIPINE BESYLATE 10 MG: 5 TABLET ORAL at 10:45

## 2017-03-21 RX ADMIN — Medication 10 ML: at 14:24

## 2017-03-21 RX ADMIN — FENTANYL CITRATE 25 MCG: 50 INJECTION, SOLUTION INTRAMUSCULAR; INTRAVENOUS at 08:12

## 2017-03-21 RX ADMIN — LIDOCAINE HYDROCHLORIDE: 20 JELLY TOPICAL at 08:32

## 2017-03-21 RX ADMIN — MIDAZOLAM HYDROCHLORIDE 1 MG: 1 INJECTION, SOLUTION INTRAMUSCULAR; INTRAVENOUS at 08:17

## 2017-03-21 RX ADMIN — CEFTRIAXONE 2 G: 2 INJECTION, POWDER, FOR SOLUTION INTRAMUSCULAR; INTRAVENOUS at 13:04

## 2017-03-21 RX ADMIN — Medication 10 ML: at 05:10

## 2017-03-21 RX ADMIN — HYDROMORPHONE HYDROCHLORIDE 2 MG: 2 TABLET ORAL at 13:03

## 2017-03-21 RX ADMIN — ENOXAPARIN SODIUM 40 MG: 40 INJECTION SUBCUTANEOUS at 14:24

## 2017-03-21 RX ADMIN — HYDRALAZINE HYDROCHLORIDE 25 MG: 25 TABLET, FILM COATED ORAL at 10:46

## 2017-03-21 RX ADMIN — MIDAZOLAM HYDROCHLORIDE 1 MG: 1 INJECTION, SOLUTION INTRAMUSCULAR; INTRAVENOUS at 08:15

## 2017-03-21 RX ADMIN — MIDAZOLAM HYDROCHLORIDE 1 MG: 1 INJECTION, SOLUTION INTRAMUSCULAR; INTRAVENOUS at 08:20

## 2017-03-21 RX ADMIN — NEBIVOLOL HYDROCHLORIDE 10 MG: 5 TABLET ORAL at 10:46

## 2017-03-21 RX ADMIN — GENTAMICIN SULFATE 80 MG: 80 INJECTION, SOLUTION INTRAVENOUS at 10:38

## 2017-03-21 RX ADMIN — BENZOCAINE 3 SPRAY: 200 SPRAY DENTAL; ORAL; PERIODONTAL at 08:03

## 2017-03-21 RX ADMIN — GENTAMICIN SULFATE 80 MG: 80 INJECTION, SOLUTION INTRAVENOUS at 22:20

## 2017-03-21 NOTE — PROGRESS NOTES
Nutrition Assessment:    RECOMMENDATIONS/INTERVENTION(S):   Continue CCD (Diabetic) diet  Start snacks BID to meet energy & protein needs  Monitor po intakes, labs & alter diet accordingly  Obtain A1C    ASSESSMENT:   3/21: Pt seen for LOS. Chart reviewed. 73 yom admitted for discitis of lumbar region. Pmhx includes DM, CKD stage III, HTN, dyslipidemia. Ortho following. ID following - PICC placement and 6 wks IV abx. Visited pt this afternoon. Tolerating diet w/ good po intakes. Not following certain diet at home. DM dx last summer. States -140, unsure of last A1C. Wife at beside. Receptive to CCD education. Labs/meds reviewed. -[phone]. Renal labs WDL. Skin: would to back (aspiration of lower spine by IR). SUBJECTIVE/OBJECTIVE:     Diet Order: Consistent carb (LIVAN)  % Eaten:  No data found. Pertinent Medications: [x] Reviewed    Chemistries:  Lab Results   Component Value Date/Time    Sodium 141 03/18/2017 05:06 AM    Potassium 3.6 03/18/2017 05:06 AM    Chloride 107 03/18/2017 05:06 AM    CO2 27 03/18/2017 05:06 AM    Anion gap 7 03/18/2017 05:06 AM    Glucose 129 03/18/2017 05:06 AM    BUN 16 03/21/2017 04:14 AM    Creatinine 1.21 03/21/2017 04:14 AM    BUN/Creatinine ratio 15 03/18/2017 05:06 AM    GFR est AA >60 03/21/2017 04:14 AM    GFR est non-AA >60 03/21/2017 04:14 AM    Calcium 8.2 03/18/2017 05:06 AM    AST (SGOT) 14 03/15/2017 05:08 PM    Alk. phosphatase 67 03/15/2017 05:08 PM    Protein, total 7.7 03/15/2017 05:08 PM    Albumin 2.7 03/15/2017 05:08 PM    Globulin 5.0 03/15/2017 05:08 PM    A-G Ratio 0.5 03/15/2017 05:08 PM    ALT (SGPT) 12 03/15/2017 05:08 PM      Anthropometrics: Height: 5' 7\" (170.2 cm) Weight: 102 kg (224 lb 13.9 oz)    IBW (%IBW): 73.5 kg (162 lb) (138.81 %) UBW (%UBW):   (  %)    BMI: Body mass index is 35.22 kg/(m^2).     This BMI is indicative of:   [] Underweight    [] Normal    [] Overweight    [x]  Obesity    []  Extreme Obesity (BMI>40)  Estimated Nutrition Needs (Based on): 2041 Kcals/day (RMR (1763) x 1.3 AF - 250) , 102 g (81-  (0.8-1.0 g/kg x actual body wt)) Protein  Carbohydrate: At Least 130 g/day  Fluids: 2000 mL/day    Last BM: 3/20   []Active     []Hyperactive  []Hypoactive       [] Absent   BS  Skin:    [] Intact   [x] Incision  [] Breakdown   [] DTI   [] Tears/Excoriation/Abrasion  []Edema [] Other:    Wt Readings from Last 30 Encounters:   03/21/17 102 kg (224 lb 13.9 oz)   03/13/17 104.8 kg (231 lb)   02/02/17 104.9 kg (231 lb 4.2 oz)   01/26/17 108.4 kg (239 lb)   01/15/17 113.4 kg (250 lb)   09/07/16 107.5 kg (237 lb)   07/26/16 107 kg (236 lb)   09/09/14 122 kg (269 lb)   04/10/14 121.6 kg (268 lb)   04/03/14 119.7 kg (264 lb)   09/10/13 119.7 kg (264 lb)   09/05/13 119.3 kg (263 lb)   08/27/13 119.7 kg (264 lb)   01/31/12 117.9 kg (260 lb)   01/20/12 121.1 kg (267 lb)   01/05/12 117.9 kg (260 lb)   12/20/11 117.9 kg (260 lb)      NUTRITION DIAGNOSES:   Problem:  Altered nutrition-related lab values     Etiology: related to endocrine dysfunction     Signs/Symptoms: as evidenced by elevated BG levels      NUTRITION INTERVENTIONS:  Meals/Snacks: General/healthful diet           Comprehensive Nutrition Education: Purpose of nutrition education - CCD     GOAL:   Pt will consume >75% of rec diet and snacks in the next 3-5 days    Cultural, Presybeterian, or Ethnic Dietary Needs: None     LEARNING NEEDS (Diet, Food/Nutrient-Drug Interaction):    [] None Identified   [x] Identified and Education Provided/Documented   [] Identified and Pt declined/was not appropriate      [] Interdisciplinary Care Plan Reviewed/Documented    [] Discharge Needs:      [x] No Nutrition Related Discharge Needs    NUTRITION RISK:   Pt Is At Nutrition Risk  [x]     No Nutrition Risk Identified  []       PT SEEN FOR:    []  MD Consult: []Calorie Count      []Diabetic Diet Education        []Diet Education     []Electrolyte Management     []General Nutrition Management and Supplements     []Management of Tube Feeding     []TPN Recommendations    []  RN Referral:  []MST score >=2     []Enteral/Parenteral Nutrition PTA     []Pregnant: Gestational DM or Multigestation                 [] Pressure Ulcer    []  Low BMI      [x]  Length of Stay       [] Dysphagia Diet         [] [address] [place] [address]   Pager [phone]

## 2017-03-21 NOTE — ROUTINE PROCESS
TRANSFER - OUT REPORT:    Verbal report given to [facility] AT FAMILIA Duncan(name) on Kristie Mean  being transferred to room 414(unit) for ordered procedure       Report consisted of patients Situation, Background, Assessment and   Recommendations(SBAR). Information from the following report(s) SBAR, Procedure Summary and MAR was reviewed with the receiving nurse. Lines:   Peripheral IV 03/17/17 Right Antecubital (Active)   Site Assessment Clean, dry, & intact 3/20/2017  7:20 PM   Phlebitis Assessment 0 3/20/2017  7:20 PM   Infiltration Assessment 0 3/20/2017  7:20 PM   Dressing Status Clean, dry, & intact 3/20/2017  7:20 PM   Dressing Type Transparent 3/20/2017  7:20 PM   Hub Color/Line Status Pink; Infusing 3/20/2017  7:20 PM   Action Taken Open ports on tubing capped 3/18/2017  2:34 AM   Alcohol Cap Used Yes 3/20/2017  3:14 AM        Opportunity for questions and clarification was provided.       Patient transported with:   Registered Nurse

## 2017-03-21 NOTE — PROCEDURES
Transesophageal Echo    Date of Procedure: 3/21/2017   Preoperative Diagnosis: MR, endocarditis  Postoperative Diagnosis: see below    Procedure: LONNIE  Cardiologist: Zhao Ramirez MD  Anesthesia: local + IV sedation  Estimated Blood Loss: None  Findings: 8x11 mm semimobile echogenic mass (some central clearing) attached to atrial surface of anterior mitral leaflet, prolapsing into LV. Distal tip of anterior leaflet prolapses. Linear strands, semimobile attached to atrial surface of posterior leaflet. No annular abscess. Eccentric posterior jet of MR, clearly severe. Severe LAE, LVH, EF 70%, aortic/pulmonic/tricuspid valves normal. Aorta nl  . See full LONNIE note.   Complications: none    Mitral valve endocarditis  - vegetation > 10 mm, anterior leaflet, mobile  No abscess  Severe MR, asymptomatic    Needs semielective MVR - favor early surgery  Preop right and left heart cath  Determine timing with team

## 2017-03-21 NOTE — PROGRESS NOTES
[facility] Infectious Diseases Progress Note  Omar Contreras MD, WSETON Lewis DO, NP     [address] [facility], [address] [place] [address]  Office: [phone]   Fax: [phone]    3/21/2017      Assessment & Plan:   Alpha streptococcal bacteremia complicated by lumbar spine discitis and MV endocarditis. Ondontogenic etiology? Repeat BC's NGSF. Narrowed abx to ceftriaxone/gent. Needs 6 weeks IV ceftriaxine and 2 weeks gentimicin    MVE. LONNIE 3/21 reveals  > 10 mm vegetation, anterior leaflet, with no abscess and severe MR. Needs semielective MVR - favor early surgery per cardiology    L3-L4 discitis. S/p IR aspiration 3/16/17 - cultures NGSF    History cephalexin allergy. Patient has no recollection of any allergic reaction to abx other than to sulfa. Currently tolerating ceftriaxone           Subjective:   Patient reports pain controlled, no F/C. Objective:     Vitals:   Visit Vitals    /79 (BP 1 Location: Right arm, BP Patient Position: At rest)    Pulse 66    Temp 98.6 °F (37 °C)    Resp 18    Ht 5' 7\" (1.702 m)    Wt 102 kg (224 lb 13.9 oz)    SpO2 99%    BMI 35.22 kg/m2     Temp (24hrs), Av.5 °F (36.9 °C), Min:97.7 °F (36.5 °C), Max:98.9 °F (37.2 °C)    Recent Labs      17   0414   BUN  16   CREA  1.21       Exam:    Gen: WDWN, NAD  HEENT: moist mucous membranes  Resp: no distress  Card:no peripheral edema. Murmur  Skin: no rash  Neuro: A&O x3  Psych: normal affect       Cultures:     Lab Results   Component Value Date/Time    Culture result: NO GROWTH 2 DAYS 2017 08:01 PM    Culture result:  2017 09:12 AM     ALPHA STREPTOCOCCUS, NOT S.  PNEUMONIAE  GROWING IN 1 OF 2 BOTTLES DRAWN  ( R AC SITE)  PLEASE REFER TO PREVIOUS CULTURE [id number] FOR SENSITIVITIES      Culture result:  2017 09:12 AM     PRELIMINARY REPORT OF  GRAM POSITIVE COCCI  IN CHAINS  GROWING IN 1 OF 2 BOTTLES DRAWN  CALLED TO AND READ BACK BY  MS NE NARANJO [facility] 4MORT) ON 3/18/17 AT 2342.  [facility]      Culture result: REMAINING BOTTLE(S) HAS/HAVE NO GROWTH SO FAR 03/17/2017 09:12 AM       Radiology: reviewed       Medications:        Current Facility-Administered Medications   Medication Dose Route Frequency Last Dose    cefTRIAXone (ROCEPHIN) 2 g in 0.9% sodium chloride (MBP/ADV) 50 mL  2 g IntraVENous Q24H 2 g at 03/20/17 1306    hydrALAZINE (APRESOLINE) tablet 25 mg  25 mg Oral TID 25 mg at 03/21/17 1046    enoxaparin (LOVENOX) injection 40 mg  40 mg SubCUTAneous Q24H 40 mg at 03/20/17 1451    HYDROmorphone (DILAUDID) tablet 4 mg  4 mg Oral Q4H PRN 4 mg at 03/19/17 1505    0.9% sodium chloride infusion  75 mL/hr IntraVENous CONTINUOUS 75 mL/hr at 03/20/17 1625    gentamicin in Saline (Iso-osm) (GARAMYCIN) 80 mg/100 mL IVPB premix 80 mg  80 mg IntraVENous Q12H 80 mg at 03/21/17 1038    sodium chloride (NS) flush 5-10 mL  5-10 mL IntraVENous Q8H 10 mL at 03/21/17 0510    sodium chloride (NS) flush 5-10 mL  5-10 mL IntraVENous PRN      traMADol (ULTRAM) tablet 100 mg  100 mg Oral Q6H  mg at 03/15/17 2003    HYDROmorphone (DILAUDID) tablet 2 mg  2 mg Oral Q4H PRN 2 mg at 03/20/17 1957    HYDROmorphone (PF) (DILAUDID) injection 1 mg  1 mg IntraVENous Q3H PRN      diphenhydrAMINE (BENADRYL) injection 25 mg  25 mg IntraVENous Q4H PRN      acetaminophen (TYLENOL) tablet 1,000 mg  1,000 mg Oral Q6H PRN 1,000 mg at 03/17/17 0147    ondansetron (ZOFRAN) injection 6 mg  6 mg IntraVENous Q6H PRN      insulin lispro (HUMALOG) injection   SubCUTAneous AC&HS Stopped at 03/20/17 1630    glucose chewable tablet 16 g  4 Tab Oral PRN      dextrose (D50W) injection syrg 12.5-25 g  12.5-25 g IntraVENous PRN      glucagon (GLUCAGEN) injection 1 mg  1 mg IntraMUSCular PRN      amLODIPine (NORVASC) tablet 10 mg  10 mg Oral DAILY 10 mg at 03/21/17 1045    finasteride (PROSCAR) tablet 5 mg  5 mg Oral DAILY 5 mg at 03/21/17 1046    losartan (COZAAR) tablet 100 mg  100 mg Oral DAILY 100 mg at 03/21/17 1046    nebivolol (BYSTOLIC) tablet 10 mg  10 mg Oral DAILY 10 mg at 03/21/17 1046       Mustapha Fuentes DO    Signed By: March 21, 2017 March 21, 2017

## 2017-03-21 NOTE — PROGRESS NOTES
TRANSFER - IN REPORT:    Verbal report received from   ophelia(name) on Skbogdan Greenfield  being received from cath lab(unit) for routine progression of care      Report consisted of patients Situation, Background, Assessment and   Recommendations(SBAR). Information from the following report(s) Procedure Summary was reviewed with the receiving nurse. Opportunity for questions and clarification was provided. Assessment completed upon patients arrival to unit and care assumed.

## 2017-03-21 NOTE — ADVANCED PRACTICE NURSE
OP appt arranged to see Dr Tootie Kirk.  Will need to complete 6 weeks of IV abx then proceed with R & L heart cath and CTS eval. Appt on AVS.

## 2017-03-21 NOTE — ROUTINE PROCESS
TRANSFER - IN REPORT:    Verbal report received from Bedside RN(name) on Erlin Lance  being received from room 414(unit) for ordered procedure      Report consisted of patients Situation, Background, Assessment and   Recommendations(SBAR). Information from the following report(s) SBAR and MAR was reviewed with the receiving nurse. Opportunity for questions and clarification was provided. Assessment completed upon patients arrival to unit and care assumed.

## 2017-03-21 NOTE — PROGRESS NOTES
TRANSFER - OUT REPORT:    Verbal report given to Nisa(name) on Boy Christensen  being transferred to 4th floor(unit) for routine progression of care       Report consisted of patients Situation, Background, Assessment and   Recommendations(SBAR). Information from the following report(s) SBAR was reviewed with the receiving nurse. Lines:   Peripheral IV 03/17/17 Right Antecubital (Active)   Site Assessment Clean, dry, & intact 3/20/2017  7:20 PM   Phlebitis Assessment 0 3/20/2017  7:20 PM   Infiltration Assessment 0 3/20/2017  7:20 PM   Dressing Status Clean, dry, & intact 3/20/2017  7:20 PM   Dressing Type Transparent 3/20/2017  7:20 PM   Hub Color/Line Status Pink; Infusing 3/20/2017  7:20 PM   Action Taken Open ports on tubing capped 3/18/2017  2:34 AM   Alcohol Cap Used Yes 3/20/2017  3:14 AM        Opportunity for questions and clarification was provided.       Patient transported with:   Registered Nurse

## 2017-03-21 NOTE — INTERDISCIPLINARY ROUNDS
[address]    Patient Information    Name: Miri Norwood  Age: 73 y.o. Admission Date: 3/15/2017  Surgery/Procedure:   Attending Provider: Carleen Rico MD  Surgeon: Mayank Cowart   Problem List: Principal Problem:    Discitis (3/15/2017)    Active Problems:    HTN (hypertension) ()      Dyslipidemia ()      Mitral regurgitation ()      Overview: mod-severe on echo 1/5/12      MARIN (obstructive sleep apnea) (9/10/2013)      Overview: Hasn't used CPAP in 10 years.  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 ([facility] [address] (2/3/2017)      DM (diabetes mellitus) ([facility] (3/15/2017)      During rounds the following quality care indicators and evidence based practices were addressed :       Pain Assessment  Pain Intensity 1: 0 (03/21/17 0845)  Pain Location 1: Back  Pain Intervention(s) 1: Medication (see MAR)  Patient Stated Pain Goal: [address] [place] [address] Avenue: Home recovery, home aide  Rehab/SNF Facility:     Pain meds: dilaudid, tramadol  Antibiotics: Rocephin and gentomycin  Anticoagulation:  Lovenox    SCDs:   Valdez: N   Bowels:     Goals For Today: Progress with PT, pain control    RRAT Score:      Readmit Risk Tool  Support Systems: Spouse/Significant Other/Partner  Relationship with Primary Physician Group: Seen at least one time within the past 6 months    Discharge Management/Planning:    Readmit Risk Assessment Information:   High Risk            28       Total Score        3 Relationship with PCP    2 Patient Living Status    3 Patient Length of Stay > 5    4 More than 1 Admission in calendar year    5 Patient Insurance is Medicare, Medicaid or Self Pay    11 Charlson Comorbidity Score         Readmit Risk Tool  Support Systems: Spouse/Significant Other/Partner  Relationship with Primary Physician Group: Seen at least one time within the past 6 months    Anticipated Date of Discharge: TBD    Interdisciplinary team rounds were held with the following team members: Nurse Practitioner, Case Management, Nursing, Pharmacy, and Rehab. See clinical pathway and/or care plan for interventions and desired outcomes.

## 2017-03-21 NOTE — PROGRESS NOTES
Rajesh Rubielsen [facility] [address] [facility]  [address] [facility], [address] [place] [address]  [phone]      Medical Progress Note      NAME: Berna Tavera   :  1950  MRM:  [id number]    Date/Time: 3/21/2017  9:38 AM       Assessment and Plan:   1. Discitis (3/15/2017)/ chronic back pain/ bacteremia / Infective endocarditis of mitral valve. S/P aspiration of lower spine by IR. Initial Bcx: alpha strep, repeat BC: same. Repeat surveillance cx NGTD. Now on gent and CTX per ID. Will need PICC and 6 wk of Abx followed by MR repair v. Replacement with pre-op RHC/LHC     2. HTN (hypertension). Improved with afterload reduction with hydralazine PO and pain control. Continue home amlodipine, bystolic and cozaar.      3. DM (diabetes mellitus) ([facility] [address] () without complication. Hold home levemir and cover with SSI. blood glucose stable without it      4. Dyslipidemia . Not on medication.      5. Mitral regurgitation. Follows with Dr Kami Lackey, will need repair v. Replacement with pre-op [facility]     6. MARIN (obstructive sleep apnea) (9/10/2013). Not using CPAP any more.      7. BPH (benign prostatic hypertrophy) with urinary obstruction (2017). Continue proscar        Subjective:     Chief Complaint:  Feels okay. LONNIE today    ROS:  (bold if positive, if negative)      Tolerating PT  Tolerating Diet        Objective:     Last 24hrs VS reviewed since prior progress note.  Most recent are:    Visit Vitals    /77    Pulse 61    Temp 98.5 °F (36.9 °C)    Resp 18    Ht 5' 7\" (1.702 m)    Wt 102 kg (224 lb 13.9 oz)    SpO2 96%    BMI 35.22 kg/m2     SpO2 Readings from Last 6 Encounters:   17 96%   17 96%   17 96%   01/15/17 95%   16 100%   16 96%    O2 Flow Rate (L/min): 2 l/min     No intake or output data in the 24 hours ending 17 0413     Physical Exam:    Gen:  Well-developed, well-nourished, in no acute distress  HEENT:  Pink conjunctivae, PERRL, hearing intact to voice, moist mucous membranes  Neck:  Supple, without masses, thyroid non-tender  Resp:  No accessory muscle use, clear breath sounds without wheezes rales or rhonchi  Card:  3/6 holosystolic heard throughout precordium, normal S1, S2 without thrills, bruits or peripheral edema  Abd:  Soft, non-tender, non-distended, normoactive bowel sounds are present, no palpable organomegaly and no detectable hernias  Lymph:  No cervical or inguinal adenopathy  Musc:  No cyanosis or clubbing  Skin:  No rashes or ulcers, skin turgor is good.  L great toe without evidence of significant color change  Neuro:  Cranial nerves are grossly intact, no focal motor weakness, follows commands appropriately  Psych:  Good insight, oriented to person, place and time, alert  __________________________________________________________________  Medications Reviewed: (see below)  Medications:     Current Facility-Administered Medications   Medication Dose Route Frequency    cefTRIAXone (ROCEPHIN) 2 g in 0.9% sodium chloride (MBP/ADV) 50 mL  2 g IntraVENous Q24H    hydrALAZINE (APRESOLINE) tablet 25 mg  25 mg Oral TID    enoxaparin (LOVENOX) injection 40 mg  40 mg SubCUTAneous Q24H    HYDROmorphone (DILAUDID) tablet 4 mg  4 mg Oral Q4H PRN    0.9% sodium chloride infusion  75 mL/hr IntraVENous CONTINUOUS    gentamicin in Saline (Iso-osm) (GARAMYCIN) 80 mg/100 mL IVPB premix 80 mg  80 mg IntraVENous Q12H    sodium chloride (NS) flush 5-10 mL  5-10 mL IntraVENous Q8H    sodium chloride (NS) flush 5-10 mL  5-10 mL IntraVENous PRN    traMADol (ULTRAM) tablet 100 mg  100 mg Oral Q6H PRN    HYDROmorphone (DILAUDID) tablet 2 mg  2 mg Oral Q4H PRN    HYDROmorphone (PF) (DILAUDID) injection 1 mg  1 mg IntraVENous Q3H PRN    diphenhydrAMINE (BENADRYL) injection 25 mg  25 mg IntraVENous Q4H PRN    acetaminophen (TYLENOL) tablet 1,000 mg  1,000 mg Oral Q6H PRN    ondansetron (ZOFRAN) injection 6 mg  6 mg IntraVENous Q6H PRN    insulin lispro (HUMALOG) injection   SubCUTAneous AC&HS    glucose chewable tablet 16 g  4 Tab Oral PRN    dextrose (D50W) injection syrg 12.5-25 g  12.5-25 g IntraVENous PRN    glucagon (GLUCAGEN) injection 1 mg  1 mg IntraMUSCular PRN    amLODIPine (NORVASC) tablet 10 mg  10 mg Oral DAILY    finasteride (PROSCAR) tablet 5 mg  5 mg Oral DAILY    losartan (COZAAR) tablet 100 mg  100 mg Oral DAILY    nebivolol (BYSTOLIC) tablet 10 mg  10 mg Oral DAILY        Lab Data Reviewed: (see below)  Lab Review:     No results for input(s): WBC, HGB, HCT, PLT, HGBEXT, HCTEXT, PLTEXT, HGBEXT, HCTEXT, PLTEXT in the last 72 hours. Recent Labs      03/21/17   0414   BUN  16   CREA  1.21     Lab Results   Component Value Date/Time    Glucose (POC) 142 03/21/2017 04:23 PM    Glucose (POC) 117 03/21/2017 10:53 AM    Glucose (POC) 128 03/21/2017 07:06 AM    Glucose (POC) 196 03/20/2017 10:03 PM    Glucose (POC) 113 03/20/2017 04:17 PM     No results for input(s): PH, PCO2, PO2, HCO3, FIO2 in the last 72 hours. No results for input(s): INR in the last 72 hours.     No lab exists for component: INREXT, INREXT  All Micro Results     Procedure Component Value Units Date/Time    CULTURE, BODY FLUID Ashley Bolls STAIN [[id number]] Collected:  03/15/17 1800    Order Status:  Completed Specimen:  Miscellaneous Fluid Updated:  03/21/17 0921     Special Requests: NO SPECIAL REQUESTS        GRAM STAIN NO ORGANISMS SEEN        Culture result: NO GROWTH 4 DAYS       CULTURE, BLOOD, PAIRED [[id number]] Collected:  03/19/17 2001    Order Status:  Completed Specimen:  Blood Updated:  03/21/17 0858     Special Requests: NO SPECIAL REQUESTS        Culture result: NO GROWTH 2 DAYS       CULTURE, BLOOD [[id number]] Collected:  03/19/17 2017    Order Status:  Canceled Specimen:  Blood from Blood Updated:  03/19/17 2042    CULTURE, BLOOD, PERIPHERAL [[id number]]  (Abnormal) Collected:  03/17/17 0912    Order Status:  Completed Specimen:  Blood Updated:  03/19/17 1329 Special Requests: NO SPECIAL REQUESTS        Culture result:       ALPHA STREPTOCOCCUS, NOT S. PNEUMONIAE  GROWING IN 1 OF 2 BOTTLES DRAWN  ( R AC SITE)  PLEASE REFER TO PREVIOUS CULTURE [id number] FOR SENSITIVITIES   (A)            PRELIMINARY REPORT OF  GRAM POSITIVE COCCI  IN CHAINS  GROWING IN 1 OF 2 BOTTLES DRAWN  CALLED TO AND READ BACK BY  MS NE NARANJO ([address]) ON 3/18/17 AT 2342. HH   (A)              REMAINING BOTTLE(S) HAS/HAVE NO GROWTH SO FAR    CULTURE, BLOOD, PERIPHERAL [[id number]]  (Abnormal) Collected:  03/17/17 0900    Order Status:  Completed Specimen:  Blood Updated:  03/19/17 1328     Special Requests: NO SPECIAL REQUESTS        Culture result:       ALPHA STREPTOCOCCUS, NOT S. PNEUMONIAE  GROWING IN 1 OF 2 BOTTLES DRAWN  ( L AC SITE)  PLEASE REFER TO PREVIOUS CULTURE [id number] SENSITIVITIES   (A)            PRELIMINARY REPORT OF  GRAM POSITIVE COCCI  IN CHAINS  GROWING IN 1 OF 2 BOTTLES DRAWN  CALLED TO AND READ BACK BY  MS NE NARANJO ([address]) ON 3/18/17 AT 2040. HH    (A)              REMAINING BOTTLE(S) HAS/HAVE NO GROWTH SO FAR    CULTURE, BLOOD [[id number]]  (Abnormal) Collected:  03/15/17 1708    Order Status:  Completed Specimen:  Blood from Blood Updated:  03/19/17 1002     Special Requests: NO SPECIAL REQUESTS        Culture result:       ALPHA STREPTOCOCCUS, NOT S. PNEUMONIAE  (TWO DIFFERENT COLONY TYPES/STRAINS)  GROWING IN BOTH BOTTLES DRAWN  (SITE = RAC)  (PLEASE REFER TO [facility] [id number], ALSO COLLECTED 3/15/17, FOR SENSITIVITIES)   (A)    CULTURE, BLOOD [[id number]]  (Abnormal)  (Susceptibility) Collected:  03/15/17 1739    Order Status:  Completed Specimen:  Blood from Blood Updated:  03/19/17 1000     Special Requests: NO SPECIAL REQUESTS        Culture result:       ALPHA STREPTOCOCCUS  (NOT S. PNEUMONIAE)  GROWING IN BOTH BOTTLES DRAWN  (SITE = LAC)  (SENSITIVITIES PERFORMED BY E-TEST)   (A)            ALPHA STREPTOCOCCUS  (NOT S.  PENUMONIAE) (2ND COLONY TYPE/STRAIN)  ALSO GROWING IN BOTH BOTTLES DRAWN (SITE = L AC)  (SENSITIVITIES PERFORMED BY E-TEST)   (A)    CULTURE, URINE [[id number]] Collected:  03/16/17 0338    Order Status:  Completed Specimen:  Urine Updated:  03/17/17 1005     Special Requests: --        NO SPECIAL REQUESTS  Reflexed from [id number]       [place] Count --        31841  COLONIES/mL       Culture result: MIXED SKIN NANNETTE ISOLATED             I have reviewed notes of prior 24hr. Other pertinent lab:       Total time spent with patient: 25 min                  Care Plan discussed with: Patient, Family, Nursing Staff and >50% of time spent in counseling and coordination of care    Discussed:  Care Plan    Prophylaxis:  SCD's    Disposition:  Home w/Family           ___________________________________________________    Attending Physician: Clint Bird DO

## 2017-03-22 LAB
BUN SERPL-MCNC: 14 MG/DL (ref 6–20)
CREAT SERPL-MCNC: 1.24 MG/DL (ref 0.7–1.3)
GLUCOSE BLD STRIP.AUTO-MCNC: 109 MG/DL (ref 65–100)
GLUCOSE BLD STRIP.AUTO-MCNC: 115 MG/DL (ref 65–100)
GLUCOSE BLD STRIP.AUTO-MCNC: 150 MG/DL (ref 65–100)
GLUCOSE BLD STRIP.AUTO-MCNC: 159 MG/DL (ref 65–100)
SERVICE CMNT-IMP: ABNORMAL

## 2017-03-22 PROCEDURE — 36415 COLL VENOUS BLD VENIPUNCTURE: CPT | Performed by: ORTHOPAEDIC SURGERY

## 2017-03-22 PROCEDURE — 74011250637 HC RX REV CODE- 250/637: Performed by: PHYSICIAN ASSISTANT

## 2017-03-22 PROCEDURE — 74011250636 HC RX REV CODE- 250/636: Performed by: INTERNAL MEDICINE

## 2017-03-22 PROCEDURE — 74011250636 HC RX REV CODE- 250/636: Performed by: PHYSICIAN ASSISTANT

## 2017-03-22 PROCEDURE — 74011250637 HC RX REV CODE- 250/637: Performed by: INTERNAL MEDICINE

## 2017-03-22 PROCEDURE — 65270000029 HC RM PRIVATE

## 2017-03-22 PROCEDURE — 82962 GLUCOSE BLOOD TEST: CPT

## 2017-03-22 PROCEDURE — 74011000258 HC RX REV CODE- 258: Performed by: INTERNAL MEDICINE

## 2017-03-22 PROCEDURE — 82565 ASSAY OF CREATININE: CPT | Performed by: ORTHOPAEDIC SURGERY

## 2017-03-22 PROCEDURE — 74011250636 HC RX REV CODE- 250/636: Performed by: ORTHOPAEDIC SURGERY

## 2017-03-22 PROCEDURE — 84520 ASSAY OF UREA NITROGEN: CPT | Performed by: ORTHOPAEDIC SURGERY

## 2017-03-22 RX ADMIN — NEBIVOLOL HYDROCHLORIDE 10 MG: 5 TABLET ORAL at 09:54

## 2017-03-22 RX ADMIN — INSULIN LISPRO 2 UNITS: 100 INJECTION, SOLUTION INTRAVENOUS; SUBCUTANEOUS at 16:42

## 2017-03-22 RX ADMIN — LOSARTAN POTASSIUM 100 MG: 50 TABLET, FILM COATED ORAL at 09:54

## 2017-03-22 RX ADMIN — HYDRALAZINE HYDROCHLORIDE 25 MG: 25 TABLET, FILM COATED ORAL at 09:54

## 2017-03-22 RX ADMIN — AMLODIPINE BESYLATE 10 MG: 5 TABLET ORAL at 09:53

## 2017-03-22 RX ADMIN — GENTAMICIN SULFATE 80 MG: 80 INJECTION, SOLUTION INTRAVENOUS at 21:27

## 2017-03-22 RX ADMIN — FINASTERIDE 5 MG: 5 TABLET, FILM COATED ORAL at 09:54

## 2017-03-22 RX ADMIN — GENTAMICIN SULFATE 80 MG: 80 INJECTION, SOLUTION INTRAVENOUS at 11:28

## 2017-03-22 RX ADMIN — HYDRALAZINE HYDROCHLORIDE 25 MG: 25 TABLET, FILM COATED ORAL at 16:21

## 2017-03-22 RX ADMIN — Medication 10 ML: at 21:28

## 2017-03-22 RX ADMIN — HYDRALAZINE HYDROCHLORIDE 25 MG: 25 TABLET, FILM COATED ORAL at 21:28

## 2017-03-22 RX ADMIN — HYDROMORPHONE HYDROCHLORIDE 1 MG: 1 INJECTION, SOLUTION INTRAMUSCULAR; INTRAVENOUS; SUBCUTANEOUS at 21:34

## 2017-03-22 RX ADMIN — INSULIN LISPRO 2 UNITS: 100 INJECTION, SOLUTION INTRAVENOUS; SUBCUTANEOUS at 09:54

## 2017-03-22 RX ADMIN — HYDROMORPHONE HYDROCHLORIDE 2 MG: 2 TABLET ORAL at 10:02

## 2017-03-22 RX ADMIN — Medication 10 ML: at 16:22

## 2017-03-22 RX ADMIN — ENOXAPARIN SODIUM 40 MG: 40 INJECTION SUBCUTANEOUS at 12:53

## 2017-03-22 RX ADMIN — CEFTRIAXONE 2 G: 2 INJECTION, POWDER, FOR SOLUTION INTRAMUSCULAR; INTRAVENOUS at 10:02

## 2017-03-22 NOTE — PROGRESS NOTES
[facility] Infectious Diseases Outpatient  IV Antibiotic Orders    1. Diagnosis:  Alpha strep bacteremia  2. Routine PICC/ Lillian/ Portacath Care including PRN cath-flow/activase to declot   3. Antibiotic:  Ceftriaxone 2 gm IV daily through 4/29/17                           Gentamicin 80 mg IV q 12 through 3/29/17  4. Last labs  Lab Results   Component Value Date/Time    WBC 5.9 03/18/2017 05:06 AM    Hemoglobin (POC) 13.6 07/26/2016 07:24 AM    HGB 8.7 03/18/2017 05:06 AM    Hematocrit (POC) 40 07/26/2016 07:24 AM    HCT 28.7 03/18/2017 05:06 AM    PLATELET 930 80/35/3933 05:06 AM    MCV 88.0 03/18/2017 05:06 AM     Lab Results   Component Value Date/Time    Sodium 141 03/18/2017 05:06 AM    Potassium 3.6 03/18/2017 05:06 AM    Chloride 107 03/18/2017 05:06 AM    CO2 27 03/18/2017 05:06 AM    Anion gap 7 03/18/2017 05:06 AM    Glucose 129 03/18/2017 05:06 AM    BUN 14 03/22/2017 05:27 AM    Creatinine 1.24 03/22/2017 05:27 AM    BUN/Creatinine ratio 15 03/18/2017 05:06 AM    GFR est AA >60 03/22/2017 05:27 AM    GFR est non-AA 59 03/22/2017 05:27 AM    Calcium 8.2 03/18/2017 05:06 AM    Bilirubin, total 0.5 03/15/2017 05:08 PM    AST (SGOT) 14 03/15/2017 05:08 PM    Alk. phosphatase 67 03/15/2017 05:08 PM    Protein, total 7.7 03/15/2017 05:08 PM    Albumin 2.7 03/15/2017 05:08 PM    Globulin 5.0 03/15/2017 05:08 PM    A-G Ratio 0.5 03/15/2017 05:08 PM    ALT (SGPT) 12 03/15/2017 05:08 PM       Last Vanc trough:     5. ___ Weekly Labs:      __x__ Bi Weekly Labs:   __________     Start date:  _____    Biweekly labs on gentamicin through 3/29/17. Once gentamicin completed can do once weekly labs     _x__CBC/diff/platelets   _x__AST/ALT/Alk phos/   ___CPK   _x__BUN/CRT   ___ESR   _x__CRP   _x__Gentamicin level  synergy dosing per pharmacy   ___Trough Vancomycin level    ___Goal 15-20 ___Goal 10-15    6. Fax Final lab results and critical values to Jason @[phone]  7.   Call urgent lab results to [phone]    8. Allergies: Allergies   Allergen Reactions    Cephalexin Other (comments)     unknown    Oxycodone Other (comments)     Pt does not desire to take; causes altered mental status and constipation    Sulfa (Sulfonamide Antibiotics) Hives    Tamsulosin Other (comments)     Vision loss     9. Pharmacy: [facility]          Home Health Nursing:  10. Pharmacy Consult for Vancomycin/Aminoglycoside Dosing  11. First Dose protocol as needed.    12. Please pull PIC line at end of therapy or send to IR for [facility] removal     Home Health: Call Angio at [address] [facility] to schedule Lillian Removal :  P:  [phone]    Fax: [phone] or [address], DO

## 2017-03-22 NOTE — PROGRESS NOTES
Ortho Spine Daily Progress Note    Date of Surgery:  * No surgery found *      Patient: Rushie Leventhal   YOB: 1950  Age: 73 y.o. SUBJECTIVE:   Lumbar Discitis/MVE - S/P aspiration by IR    The patient is without c/o at this time. The patient's pain is adequately-controlled. He states that his back pain is gradually improving. He denies CP, SOB, N/V/D, dizziness, abdominal pain, HA. OBJECTIVE:     Vital Signs:    Temp (24hrs), Av.6 °F (37 °C), Min:98.4 °F (36.9 °C), Max:98.7 °F (37.1 °C)    Patient Vitals for the past 8 hrs:   BP Temp Pulse Resp SpO2   17 0726 167/77 98.6 °F (37 °C) 67 20 97 %           Physical Exam:  General: A&Ox3, NAD. Respiratory: Respirations are unlabored. Abdomen: S/NT  Aspiration site: C,D and I  Musculoskeletal: Calves are S/NT, Dorsi/plantar flexion/EHL 5/5, great toe pink, warm. Neurological:  Ever LE's NVI    Laboratory Values:             Recent Labs      17   0527   CREA  1.24     Lab Results   Component Value Date/Time    Sodium 141 2017 05:06 AM    Potassium 3.6 2017 05:06 AM    Chloride 107 2017 05:06 AM    CO2 27 2017 05:06 AM    Glucose 129 2017 05:06 AM    BUN 14 2017 05:27 AM    Creatinine 1.24 2017 05:27 AM    Calcium 8.2 2017 05:06 AM       PLAN:     Lumbar Discitis Mobilize and continue with PT BID until discharged. Hemodynamics Stable. Wound Continue dressing changes PRN. Post Operative Pain Pain Control: Adequate, continue current regimen. DVT Prophylaxis Continue with SCD'S, Ankle Pump Exercises, Mobilize. Discharge Disposition Discharge plan: Continue under ID and cardiology guidance. Spine surgery not indicated at this time. Discharge per ID and cardiology.        Signed By: Kris Hawley PA-C  2017 8:06 AM

## 2017-03-22 NOTE — INTERDISCIPLINARY ROUNDS
[address]    Patient Information    Name: Eh Castellanos  Age: 73 y.o. Admission Date: 3/15/2017  Surgery/Procedure:   Attending Provider: Zunilda Thao MD  Surgeon: Nehemias Astudillo   Problem List: Principal Problem:    Discitis (3/15/2017)    Active Problems:    HTN (hypertension) ()      Dyslipidemia ()      Mitral regurgitation ()      Overview: mod-severe on echo 1/5/12      MARIN (obstructive sleep apnea) (9/10/2013)      Overview: Hasn't used CPAP in 10 years.  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 ([facility] [address] (2/3/2017)      DM (diabetes mellitus) ([facility] (3/15/2017)      During rounds the following quality care indicators and evidence based practices were addressed :                            Pain Assessment  Pain Intensity 1: 4 (03/22/17 1002)  Pain Location 1: Back  Pain Intervention(s) 1: Medication (see MAR)  Patient Stated Pain Goal: 1    Pain meds: Dilaudid  Antibiotics completed: Ceftriaxone, Gentamycin  Anticoagulation:  Lovenox  Valdez: N   Goals For Today:  pain control    RRAT Score:      Readmit Risk Tool  Support Systems: Spouse/Significant Other/Partner  Relationship with Primary Physician Group: Seen at least one time within the past 6 months    Discharge Management/Planning:    Readmit Risk Assessment Information:   High Risk            28       Total Score        3 Relationship with PCP    2 Patient Living Status    3 Patient Length of Stay > 5    4 More than 1 Admission in calendar year    5 Patient Insurance is Medicare, Medicaid or Self Pay    11 Charlson Comorbidity Score         Readmit Risk Tool  Support Systems: Spouse/Significant Other/Partner  Relationship with Primary Physician Group: Seen at least one time within the past 6 months    [address]:  Anticipated Date of Discharge: TBD    Interdisciplinary team rounds were held with the following team members: Nurse Practitioner, Case Management, Nursing, Pharmacy, and Rehab. See clinical pathway and/or care plan for interventions and desired outcomes.

## 2017-03-22 NOTE — ROUTINE PROCESS
Bedside and Verbal shift change report given to [address] [place] [address] (oncoming nurse) by Julia Arceo RN (offgoing nurse). Report included the following information SBAR, Kardex and Recent Results.

## 2017-03-22 NOTE — PROGRESS NOTES
VAT called the floor and spoke to Adelitasuraj [place], [place] to acknowledge receipt of order for PICC line. Pt seen this evening by Marlee Juan MD and antibiotics ordered per RN. VAT plan is to place line on 3/23/17 in lieu of endocarditis and goal of 72 hours with negative cultures.

## 2017-03-22 NOTE — PROGRESS NOTES
Chart reviewed. Discussed with nursing and CM. Patient getting PICC and Outpt Abx by ID. We will sign off for now.  Please contact with further questions

## 2017-03-22 NOTE — PROGRESS NOTES
Spoke with Matthew Parra (cardiac surgery) and Wesley Gamez (ID) regarding plan of care. Yoon Tyson completing 6 weeks of iv antibx to \"sterilize\" discitis before proceeding with mitral valve surgery unless clinical deteriorates (embolism or heart failure). Can be discharged anytime cardiac wise. I will arrange f/u in my office. Timing of cath is flexible - if he will be here for additional days we can schedule cath inpt, otherwise can do this as outpatient.

## 2017-03-22 NOTE — CARDIO/PULMONARY
Cardiac Rehab:  3/22/2017 @ 1620:  Received cardiac rehab consult for MV endocarditis education. Met with 72 y/o male patient, admitted 3/15/17 with discitis of L spine. Cardiac consult on 3 17/17 diagnosed with probable infective endocarditis of MV per Dr. Joselyn Yepez. Cardiac hx includes HTN , murmur,  MRP with mod-severe MR.  LONNIE done 3/21/17 showed MV endocarditis with vegetation, severe MR. Met with pt lying in bed on 4th floor, M/S unit. Wife is present in room and received permission to discuss health issues with wife present. Explained educational role of Cardiac Rehab RN. Cardiac Meds:    ACE/ARB: losartan  BB: nebivolol    Prior to admission cardiac meds: Amlodipine  Discussed pt's understanding of current health issues, treatment and plan of care. Pt reported initially he was admitted for discitis. He has severe back pain and pain meds were not helping . He recently had epidurals to assist in relieving pain which did not work. He was admitted for f/u and now aware of MV endocarditis. He reported hx of MR but \"not this bad. \"  Discussed endocarditis, change on MV based on LONNIE, need for MVR and pre-op left and right cardiac cath. Also discussed need for 6 to 8-weeks abx therapy before any surgery could be done. Pt reported he has been told he will have PICC line inserted tomorrow and  d/c home on IV abx with HH. Gave/reviewed handouts on [employer] for Healthier Heart/low salt diet, endocarditis, left/right cardiac cath, MVR pre-op     Smoking history: Former smoker, quit 30 years ago. Medication education:  Provided copy of med rec. Reviewed meds. Pt/wife are aware of his meds and reported they will not have any problems with obtaining meds. New meds :  Hydralazine. Discussed purpose and side effects. Pt has BP cuff at home. Discussed imporance of maintaining appropriate BP to not over work the heart.   Attached info on new med to AVS.   Pt/wife verbalized understanding of information provided and all question answered.

## 2017-03-22 NOTE — PROGRESS NOTES
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 Ondontogenic etiology? Repeat BC's NGSF. Narrowed abx to ceftriaxone/gent. Needs 6 weeks IV ceftriaxine through 17 and 2 weeks gentamicin through 3/29/17. IV abx orders in chart    MVE. LONNIE 3/21 reveals  > 10 mm vegetation, anterior leaflet, with no abscess and severe MR. Needs semielective MVR. Plan to complete 6 weeks IV abx prior to surgery ias long as patient remains stable    L3-L4 discitis. S/p IR aspiration 3/16/17 - cultures NGSF. Abx as above    History cephalexin allergy. Patient has no recollection of any allergic reaction to abx other than to sulfa. Currently tolerating ceftriaxone           Subjective:   Patient reports pain controlled, no F/C. Objective:     Vitals:   Visit Vitals    /70 (BP 1 Location: Right arm, BP Patient Position: At rest)    Pulse 67    Temp 98 °F (36.7 °C)    Resp 18    Ht 5' 7\" (1.702 m)    Wt 102 kg (224 lb 13.9 oz)    SpO2 97%    BMI 35.22 kg/m2     Temp (24hrs), Av.4 °F (36.9 °C), Min:98 °F (36.7 °C), Max:98.6 °F (37 °C)    Recent Labs      17   0527  17   0414   BUN  14  16   CREA  1.24  1.21       Exam:    Gen: WDWN, NAD  HEENT: moist mucous membranes  Resp: no distress  Card:no peripheral edema. Murmur  Skin: no rash  Neuro: A&O x3  Psych: normal affect       Cultures:     Lab Results   Component Value Date/Time    Culture result: NO GROWTH 3 DAYS 2017 08:01 PM    Culture result:  2017 09:12 AM     ALPHA STREPTOCOCCUS, NOT S.  PNEUMONIAE  GROWING IN 1 OF 2 BOTTLES DRAWN  ( R AC SITE)  PLEASE REFER TO PREVIOUS CULTURE [id number] FOR SENSITIVITIES      Culture result:  2017 09:12 AM PRELIMINARY REPORT OF  GRAM POSITIVE COCCI  IN CHAINS  GROWING IN 1 OF 2 BOTTLES DRAWN  CALLED TO AND READ BACK BY  MS NE NARANJO ([address]) ON 3/18/17 AT 2342.  [facility]      Culture result: REMAINING BOTTLE(S) HAS/HAVE NO GROWTH SO FAR 03/17/2017 09:12 AM       Radiology: reviewed       Medications:        Current Facility-Administered Medications   Medication Dose Route Frequency Last Dose    cefTRIAXone (ROCEPHIN) 2 g in 0.9% sodium chloride (MBP/ADV) 50 mL  2 g IntraVENous Q24H 2 g at 03/22/17 1002    hydrALAZINE (APRESOLINE) tablet 25 mg  25 mg Oral TID 25 mg at 03/22/17 1621    enoxaparin (LOVENOX) injection 40 mg  40 mg SubCUTAneous Q24H 40 mg at 03/22/17 1253    HYDROmorphone (DILAUDID) tablet 4 mg  4 mg Oral Q4H PRN 4 mg at 03/19/17 1505    0.9% sodium chloride infusion  75 mL/hr IntraVENous CONTINUOUS 75 mL/hr at 03/21/17 1902    gentamicin in Saline (Iso-osm) (GARAMYCIN) 80 mg/100 mL IVPB premix 80 mg  80 mg IntraVENous Q12H 80 mg at 03/22/17 1128    sodium chloride (NS) flush 5-10 mL  5-10 mL IntraVENous Q8H 10 mL at 03/22/17 1622    sodium chloride (NS) flush 5-10 mL  5-10 mL IntraVENous PRN      traMADol (ULTRAM) tablet 100 mg  100 mg Oral Q6H  mg at 03/15/17 2003    HYDROmorphone (DILAUDID) tablet 2 mg  2 mg Oral Q4H PRN 2 mg at 03/22/17 1002    HYDROmorphone (PF) (DILAUDID) injection 1 mg  1 mg IntraVENous Q3H PRN 1 mg at 03/21/17 2014    diphenhydrAMINE (BENADRYL) injection 25 mg  25 mg IntraVENous Q4H PRN      acetaminophen (TYLENOL) tablet 1,000 mg  1,000 mg Oral Q6H PRN 1,000 mg at 03/17/17 0147    ondansetron (ZOFRAN) injection 6 mg  6 mg IntraVENous Q6H PRN      insulin lispro (HUMALOG) injection   SubCUTAneous AC&HS Stopped at 03/22/17 1130    glucose chewable tablet 16 g  4 Tab Oral PRN      dextrose (D50W) injection syrg 12.5-25 g  12.5-25 g IntraVENous PRN      glucagon (GLUCAGEN) injection 1 mg  1 mg IntraMUSCular PRN      amLODIPine (NORVASC) tablet 10 mg  10 mg Oral DAILY 10 mg at 03/22/17 0953    finasteride (PROSCAR) tablet 5 mg  5 mg Oral DAILY 5 mg at 03/22/17 0954    losartan (COZAAR) tablet 100 mg  100 mg Oral DAILY 100 mg at 03/22/17 0954    nebivolol (BYSTOLIC) tablet 10 mg  10 mg Oral DAILY 10 mg at 03/22/17 [address], DO    Signed By: March 22, 2017 March 22, 2017

## 2017-03-23 LAB
BACTERIA SPEC CULT: ABNORMAL
BUN SERPL-MCNC: 19 MG/DL (ref 6–20)
CREAT SERPL-MCNC: 1.4 MG/DL (ref 0.7–1.3)
GLUCOSE BLD STRIP.AUTO-MCNC: 119 MG/DL (ref 65–100)
GLUCOSE BLD STRIP.AUTO-MCNC: 141 MG/DL (ref 65–100)
GLUCOSE BLD STRIP.AUTO-MCNC: 182 MG/DL (ref 65–100)
SERVICE CMNT-IMP: ABNORMAL

## 2017-03-23 PROCEDURE — 74011250637 HC RX REV CODE- 250/637: Performed by: INTERNAL MEDICINE

## 2017-03-23 PROCEDURE — 84520 ASSAY OF UREA NITROGEN: CPT | Performed by: ORTHOPAEDIC SURGERY

## 2017-03-23 PROCEDURE — 74011000258 HC RX REV CODE- 258: Performed by: INTERNAL MEDICINE

## 2017-03-23 PROCEDURE — 74011250636 HC RX REV CODE- 250/636: Performed by: INTERNAL MEDICINE

## 2017-03-23 PROCEDURE — 82962 GLUCOSE BLOOD TEST: CPT

## 2017-03-23 PROCEDURE — 65270000029 HC RM PRIVATE

## 2017-03-23 PROCEDURE — 74011250637 HC RX REV CODE- 250/637: Performed by: PHYSICIAN ASSISTANT

## 2017-03-23 PROCEDURE — 80170 ASSAY OF GENTAMICIN: CPT | Performed by: INTERNAL MEDICINE

## 2017-03-23 PROCEDURE — 82565 ASSAY OF CREATININE: CPT | Performed by: ORTHOPAEDIC SURGERY

## 2017-03-23 PROCEDURE — 74011250636 HC RX REV CODE- 250/636: Performed by: ORTHOPAEDIC SURGERY

## 2017-03-23 PROCEDURE — 74011250636 HC RX REV CODE- 250/636: Performed by: PHYSICIAN ASSISTANT

## 2017-03-23 PROCEDURE — 36415 COLL VENOUS BLD VENIPUNCTURE: CPT | Performed by: ORTHOPAEDIC SURGERY

## 2017-03-23 RX ADMIN — TRAMADOL HYDROCHLORIDE 100 MG: 50 TABLET, FILM COATED ORAL at 22:05

## 2017-03-23 RX ADMIN — HYDRALAZINE HYDROCHLORIDE 25 MG: 25 TABLET, FILM COATED ORAL at 16:09

## 2017-03-23 RX ADMIN — NEBIVOLOL HYDROCHLORIDE 10 MG: 5 TABLET ORAL at 09:18

## 2017-03-23 RX ADMIN — LOSARTAN POTASSIUM 100 MG: 50 TABLET, FILM COATED ORAL at 09:19

## 2017-03-23 RX ADMIN — Medication 10 ML: at 22:07

## 2017-03-23 RX ADMIN — AMLODIPINE BESYLATE 10 MG: 5 TABLET ORAL at 05:00

## 2017-03-23 RX ADMIN — GENTAMICIN SULFATE 80 MG: 80 INJECTION, SOLUTION INTRAVENOUS at 23:33

## 2017-03-23 RX ADMIN — ONDANSETRON 6 MG: 2 INJECTION INTRAMUSCULAR; INTRAVENOUS at 16:09

## 2017-03-23 RX ADMIN — SODIUM CHLORIDE 75 ML/HR: 900 INJECTION, SOLUTION INTRAVENOUS at 22:05

## 2017-03-23 RX ADMIN — TRAMADOL HYDROCHLORIDE 100 MG: 50 TABLET, FILM COATED ORAL at 06:16

## 2017-03-23 RX ADMIN — HYDRALAZINE HYDROCHLORIDE 25 MG: 25 TABLET, FILM COATED ORAL at 22:05

## 2017-03-23 RX ADMIN — FINASTERIDE 5 MG: 5 TABLET, FILM COATED ORAL at 09:18

## 2017-03-23 RX ADMIN — CEFTRIAXONE 2 G: 2 INJECTION, POWDER, FOR SOLUTION INTRAMUSCULAR; INTRAVENOUS at 12:24

## 2017-03-23 RX ADMIN — INSULIN LISPRO 2 UNITS: 100 INJECTION, SOLUTION INTRAVENOUS; SUBCUTANEOUS at 12:39

## 2017-03-23 RX ADMIN — Medication 10 ML: at 16:10

## 2017-03-23 RX ADMIN — ENOXAPARIN SODIUM 40 MG: 40 INJECTION SUBCUTANEOUS at 16:09

## 2017-03-23 RX ADMIN — HYDROMORPHONE HYDROCHLORIDE 4 MG: 2 TABLET ORAL at 12:24

## 2017-03-23 RX ADMIN — GENTAMICIN SULFATE 80 MG: 80 INJECTION, SOLUTION INTRAVENOUS at 09:18

## 2017-03-23 RX ADMIN — HYDRALAZINE HYDROCHLORIDE 25 MG: 25 TABLET, FILM COATED ORAL at 09:19

## 2017-03-23 NOTE — INTERDISCIPLINARY ROUNDS
[address]    Patient Information    Name: Pelon Griffin  Age: 73 y.o. Admission Date: 3/15/2017  Surgery/Procedure:   Attending Provider: Marylin Harada, MD  Surgeon: Jah Arevalo   Problem List: Principal Problem:    Discitis (3/15/2017)    Active Problems:    HTN (hypertension) ()      Dyslipidemia ()      Mitral regurgitation ()      Overview: mod-severe on echo 1/5/12      MARIN (obstructive sleep apnea) (9/10/2013)      Overview: Hasn't used CPAP in 10 years.  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 ([facility] [address] (2/3/2017)      DM (diabetes mellitus) ([facility] (3/15/2017)      During rounds the following quality care indicators and evidence based practices were addressed :       Pain Assessment  Pain Intensity 1: 5 (03/23/17 0801)  Pain Location 1: Back  Pain Intervention(s) 1: Medication (see MAR)  Patient Stated Pain Goal: [address]:     Pain meds: dilaudid, tramadol  Antibiotics: ceftriaxone, gentamycin  Anticoagulation:  lovenox    SCDs: in place  Valdez: N   Bowels: +BM    Goals For Today: Progress with PT, pain control    RRAT Score:      Readmit Risk Tool  Support Systems: Spouse/Significant Other/Partner  Relationship with Primary Physician Group: Seen at least one time within the past 6 months    Discharge Management/Planning:    Readmit Risk Assessment Information:   High Risk            28       Total Score        3 Relationship with PCP    2 Patient Living Status    3 Patient Length of Stay > 5    4 More than 1 Admission in calendar year    5 Patient Insurance is Medicare, Medicaid or Self Pay    11 Charlson Comorbidity Score         Readmit Risk Tool  Support Systems: Spouse/Significant Other/Partner  Relationship with Primary Physician Group: Seen at least one time within the past 6 months    Anticipated Date of Discharge: tomorrow    Interdisciplinary team rounds were held with the following team members: Nurse Practitioner, Case Management, Nursing, Pharmacy, and Rehab. See clinical pathway and/or care plan for interventions and desired outcomes.

## 2017-03-23 NOTE — PROGRESS NOTES
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 Ondontogenic etiology? Repeat BC's NGSF. Narrowed abx to ceftriaxone/gent. Needs 8 weeks IV ceftriaxine through 17 and 2 weeks gentamicin through 3/29/17. IV abx orders in chart. May need to stop gent due to elevated creatinine. Will repeat BMP in AM.  If creatinine elevated will stop gent. Recommend keeping patient in hospital through tomorrow to follow renal function    REENA? Creatinine up today. Follow    MVE. LONNIE 3/21 reveals  > 10 mm vegetation, anterior leaflet, with no abscess and severe MR. Needs semielective MVR. Plan to complete 8 weeks IV abx prior to surgery ias long as patient remains stable    L3-L4 discitis. S/p IR aspiration 3/16/17 - cultures NGSF. Abx as above    History cephalexin allergy. Patient has no recollection of any allergic reaction to abx other than to sulfa. Currently tolerating ceftriaxone           Subjective:   Patient reports pain controlled, no F/C. Objective:     Vitals:   Visit Vitals    /78 (BP 1 Location: Right arm, BP Patient Position: At rest)    Pulse 60    Temp 98.1 °F (36.7 °C)    Resp 18    Ht 5' 7\" (1.702 m)    Wt 102 kg (224 lb 13.9 oz)    SpO2 96%    BMI 35.22 kg/m2     Temp (24hrs), Av.1 °F (36.7 °C), Min:97.9 °F (36.6 °C), Max:98.4 °F (36.9 °C)    Recent Labs      17   0628  17   0527  17   0414   BUN  19  14  16   CREA  1.40*  1.24  1.21       Exam:    Gen: WDWN, NAD  HEENT: moist mucous membranes  Resp: no distress  Card:no peripheral edema.   Murmur  Skin: no rash  Neuro: A&O x3  Psych: normal affect       Cultures:     Lab Results   Component Value Date/Time    Culture result: NO GROWTH 3 DAYS 03/19/2017 08:01 PM    Culture result:  03/17/2017 09:12 AM     ALPHA STREPTOCOCCUS, NOT S. PNEUMONIAE  GROWING IN 1 OF 2 BOTTLES DRAWN  ( R AC SITE)  PLEASE REFER TO PREVIOUS CULTURE [id number] FOR SENSITIVITIES      Culture result:  03/17/2017 09:12 AM     PRELIMINARY REPORT OF  GRAM POSITIVE COCCI  IN CHAINS  GROWING IN 1 OF 2 BOTTLES DRAWN  CALLED TO AND READ BACK BY  MS NE NARANJO ([address]) ON 3/18/17 AT 2342.  [facility]      Culture result: REMAINING BOTTLE(S) HAS/HAVE NO GROWTH SO FAR 03/17/2017 09:12 AM       Radiology: reviewed       Medications:        Current Facility-Administered Medications   Medication Dose Route Frequency Last Dose    cefTRIAXone (ROCEPHIN) 2 g in 0.9% sodium chloride (MBP/ADV) 50 mL  2 g IntraVENous Q24H 2 g at 03/22/17 1002    hydrALAZINE (APRESOLINE) tablet 25 mg  25 mg Oral TID 25 mg at 03/23/17 0919    enoxaparin (LOVENOX) injection 40 mg  40 mg SubCUTAneous Q24H 40 mg at 03/22/17 1253    HYDROmorphone (DILAUDID) tablet 4 mg  4 mg Oral Q4H PRN 4 mg at 03/19/17 1505    0.9% sodium chloride infusion  75 mL/hr IntraVENous CONTINUOUS 75 mL/hr at 03/21/17 1902    gentamicin in Saline (Iso-osm) (GARAMYCIN) 80 mg/100 mL IVPB premix 80 mg  80 mg IntraVENous Q12H 80 mg at 03/23/17 0918    sodium chloride (NS) flush 5-10 mL  5-10 mL IntraVENous Q8H Stopped at 03/23/17 0600    sodium chloride (NS) flush 5-10 mL  5-10 mL IntraVENous PRN      traMADol (ULTRAM) tablet 100 mg  100 mg Oral Q6H  mg at 03/23/17 0616    HYDROmorphone (DILAUDID) tablet 2 mg  2 mg Oral Q4H PRN 2 mg at 03/22/17 1002    HYDROmorphone (PF) (DILAUDID) injection 1 mg  1 mg IntraVENous Q3H PRN 1 mg at 03/22/17 2134    diphenhydrAMINE (BENADRYL) injection 25 mg  25 mg IntraVENous Q4H PRN      acetaminophen (TYLENOL) tablet 1,000 mg  1,000 mg Oral Q6H PRN 1,000 mg at 03/17/17 0147    ondansetron (ZOFRAN) injection 6 mg  6 mg IntraVENous Q6H PRN      insulin lispro (HUMALOG) injection SubCUTAneous AC&HS Stopped at 03/22/17 2200    glucose chewable tablet 16 g  4 Tab Oral PRN      dextrose (D50W) injection syrg 12.5-25 g  12.5-25 g IntraVENous PRN      glucagon (GLUCAGEN) injection 1 mg  1 mg IntraMUSCular PRN      amLODIPine (NORVASC) tablet 10 mg  10 mg Oral DAILY 10 mg at 03/23/17 0500    finasteride (PROSCAR) tablet 5 mg  5 mg Oral DAILY 5 mg at 03/23/17 0918    losartan (COZAAR) tablet 100 mg  100 mg Oral DAILY 100 mg at 03/23/17 0919    nebivolol (BYSTOLIC) tablet 10 mg  10 mg Oral DAILY 10 mg at 03/23/17 6811       Albert Jaime DO    Signed By: March 23, 2017 March 23, 2017

## 2017-03-23 NOTE — PROGRESS NOTES
3/23/2017 3:56 PM Updates sent to [address] and [facility]. Notified both agencies tentative discharge on 3/24. CM will follow up.  MARILU Palacios

## 2017-03-23 NOTE — PROGRESS NOTES
Spiritual Care Assessment/Progress Notes    Savannah Verduzco [id number]  [id number]    1950  73 y.o.  male    Patient Telephone Number: There is no home phone number on file. Congregational Affiliation: Shinto   Language: Azeri   Extended Emergency Contact Information  Primary Emergency Contact: [address]  Mobile Phone: [phone]  Relation: Spouse   Patient Active Problem List    Diagnosis Date Noted    Discitis 03/15/2017    DM (diabetes mellitus) ([facility] 03/15/2017    CKD stage 3 due to type 1 diabetes mellitus ([facility] 02/03/2017    BPH (benign prostatic hyperplasia) 02/02/2017    Bladder stones 01/26/2017    BPH (benign prostatic hypertrophy) with urinary obstruction 01/26/2017    MARIN (obstructive sleep apnea) 09/10/2013    Mitral regurgitation     Abnormal EKG 12/20/2011    HTN (hypertension)     Dyslipidemia     Murmur         Date: 3/23/2017       Level of Congregational/Spiritual Activity:  [x]         Involved in susy tradition/spiritual practice    []         Not involved in susy tradition/spiritual practice  [x]         Spiritually oriented    []         Claims no spiritual orientation    []         seeking spiritual identity  []         Feels alienated from Confucianist practice/tradition  []         Feels angry about Confucianist practice/tradition  [x]         Spirituality/Confucianist tradition is a resource for coping at this time.   []         Not able to assess due to medical condition    Services Provided Today:  []         crisis intervention    []         reading Scriptures  [x]         spiritual assessment    [x]         prayer  [x]         empathic listening/emotional support  []         rites and rituals (cite in comments)  [x]         life review     []         Confucianist support  []         theological development   []         advocacy  []         ethical dialog     [x]         blessing  []         bereavement support    []         support to family  [] anticipatory grief support   []         help with AMD  []         spiritual guidance    []         meditation      Spiritual Care Needs  []         Emotional Support  [x]         Spiritual/Buddhism Care  []         Loss/Adjustment  []         Advocacy/Referral                /Ethics  []         No needs expressed at               this time  []         Other: (note in               comments)  [address]  []         Follow up visits with               pt/family  []         Provide materials  []         Schedule sacraments  []         Contact Community               Clergy  [x]         Follow up as needed  []         Other: (note in               comments)     Comments:  is visiting for an initial spiritual assessment with pt in room 414. Pt, wife and cousin were present at the time. Pt discussed some of his relevant medical concerns. He notes he has been coping well and tries to keep a positive attitude while in the hospital. He and his family attend [employer] [address] in [place], [place]. They've attended here most of their lives. Pt notes he identifies as [employer] and his susy is very important to him. He trust in God has encouraged him during his hospitalization and allowed him to cope well.  offered spiritual support through active listening, affirmation of pt's experience, encouragement and prayer. Pt and family expressed thanks for  support and prayer.      [address] Lauren Davison M.Div, M.S, Alfie Roach3 available at [address] [place] [address]([phone])

## 2017-03-23 NOTE — ADVANCED PRACTICE NURSE
DC planning underway- plans for DC tomorrow if renal fn OK   Future Appointments  Date Time Provider Kenyetta Mahan   4/28/2017 2:40 PM Theodore Johnson MD [facility] SCHED     We will sign off, please call if needed

## 2017-03-23 NOTE — PROGRESS NOTES
Ortho Spine Daily Progress Note    Date of Surgery:  * No surgery found *      Patient: Memo Kay   YOB: 1950  Age: 73 y.o. SUBJECTIVE:   S/P aspiration lumbar disc for discitis L3-L4/ MV endocarditis    The patient states that he feels about the same as yesterday. Pain = 2/10 whle in bed, 7/10 when up with PT. The patient's post operative pain is adequately-controlled. Marleny Riser He denies CP, SOB, N/V/D, dizziness, abdominal pain, HA. +flatus, +BM. OBJECTIVE:     Vital Signs:    Temp (24hrs), Av.1 °F (36.7 °C), Min:97.9 °F (36.6 °C), Max:98.4 °F (36.9 °C)    Patient Vitals for the past 8 hrs:   BP Temp Pulse Resp SpO2   17 0725 144/68 97.9 °F (36.6 °C) 64 20 96 %   17 0010 145/59 98.1 °F (36.7 °C) (!) 58 16 98 %           Physical Exam:  General: A&Ox3, NAD. Respiratory: Respirations are unlabored. Abdomen: S/NT  Aspiration site: C,D and I   Musculoskeletal: Calves are S/NT, Dorsi/plantar flexion/EHL intact, Ever DP 1+  Neurological:  Ever LE's NVI    Laboratory Values:             Recent Labs      17   0628   CREA  1.40*     Lab Results   Component Value Date/Time    Sodium 141 2017 05:06 AM    Potassium 3.6 2017 05:06 AM    Chloride 107 2017 05:06 AM    CO2 27 2017 05:06 AM    Glucose 129 2017 05:06 AM    BUN 19 2017 06:28 AM    Creatinine 1.40 2017 06:28 AM    Calcium 8.2 2017 05:06 AM       PLAN:     S/P aspiration lumbar disc  Mobilize and continue with PT BID until discharged. Hemodynamics Hgb Stable. Wound Continue dressing changes PRN. Post Operative Pain Pain Control: Adequate, continue current regimen. DVT Prophylaxis Continue with SCD'S, Ankle Pump Exercises, Mobilize. Discharge Disposition Discharge plan:  Plan on PICC line placement today then discharge home with [facility]. Abx orders per ID. Will need follow up with Dr Kelly Edgar and Dr Ceci Galdamez.  Follow up with Dr Gladis Bermudez on an as needed basis should discitis fail to resolve then surgery may be indicated.         Signed By: Mariana Rodarte PA-C  March 23, 2017 7:56 AM

## 2017-03-24 ENCOUNTER — APPOINTMENT (OUTPATIENT)
Dept: INTERVENTIONAL RADIOLOGY/VASCULAR | Age: 67
DRG: 477 | End: 2017-03-24
Attending: NURSE PRACTITIONER
Payer: COMMERCIAL

## 2017-03-24 VITALS
HEART RATE: 62 BPM | OXYGEN SATURATION: 95 % | RESPIRATION RATE: 17 BRPM | HEIGHT: 67 IN | TEMPERATURE: 98.2 F | BODY MASS INDEX: 35.29 KG/M2 | WEIGHT: 224.87 LBS | SYSTOLIC BLOOD PRESSURE: 131 MMHG | DIASTOLIC BLOOD PRESSURE: 64 MMHG

## 2017-03-24 LAB
ANION GAP BLD CALC-SCNC: 8 MMOL/L (ref 5–15)
BACTERIA SPEC CULT: NORMAL
BUN SERPL-MCNC: 21 MG/DL (ref 6–20)
BUN/CREAT SERPL: 15 (ref 12–20)
CALCIUM SERPL-MCNC: 8.3 MG/DL (ref 8.5–10.1)
CHLORIDE SERPL-SCNC: 103 MMOL/L (ref 97–108)
CO2 SERPL-SCNC: 28 MMOL/L (ref 21–32)
CREAT SERPL-MCNC: 1.39 MG/DL (ref 0.7–1.3)
DATE LAST DOSE: ABNORMAL
DATE LAST DOSE: NORMAL
GENTAMICIN PEAK SERPL-MCNC: 3.6 UG/ML (ref 5–10)
GENTAMICIN RAND SERPL-MCNC: 1.1 UG/ML (ref 1–2)
GLUCOSE BLD STRIP.AUTO-MCNC: 123 MG/DL (ref 65–100)
GLUCOSE BLD STRIP.AUTO-MCNC: 154 MG/DL (ref 65–100)
GLUCOSE SERPL-MCNC: 131 MG/DL (ref 65–100)
POTASSIUM SERPL-SCNC: 3.3 MMOL/L (ref 3.5–5.1)
REPORTED DOSE,DOSE: ABNORMAL UNITS
REPORTED DOSE,DOSE: NORMAL UNITS
REPORTED DOSE/TIME,TMG: 2200
REPORTED DOSE/TIME,TMG: 900
SERVICE CMNT-IMP: ABNORMAL
SERVICE CMNT-IMP: ABNORMAL
SERVICE CMNT-IMP: NORMAL
SODIUM SERPL-SCNC: 139 MMOL/L (ref 136–145)

## 2017-03-24 PROCEDURE — 74011250636 HC RX REV CODE- 250/636: Performed by: INTERNAL MEDICINE

## 2017-03-24 PROCEDURE — C1769 GUIDE WIRE: HCPCS

## 2017-03-24 PROCEDURE — 77030018786 HC NDL GD F/USND BARD -B

## 2017-03-24 PROCEDURE — 77030018719 HC DRSG PTCH ANTIMIC J&J -A

## 2017-03-24 PROCEDURE — 74011250636 HC RX REV CODE- 250/636: Performed by: ORTHOPAEDIC SURGERY

## 2017-03-24 PROCEDURE — 76000 FLUOROSCOPY <1 HR PHYS/QHP: CPT

## 2017-03-24 PROCEDURE — 74011000258 HC RX REV CODE- 258: Performed by: INTERNAL MEDICINE

## 2017-03-24 PROCEDURE — C1751 CATH, INF, PER/CENT/MIDLINE: HCPCS

## 2017-03-24 PROCEDURE — 74011250637 HC RX REV CODE- 250/637: Performed by: INTERNAL MEDICINE

## 2017-03-24 PROCEDURE — 82962 GLUCOSE BLOOD TEST: CPT

## 2017-03-24 PROCEDURE — 36415 COLL VENOUS BLD VENIPUNCTURE: CPT | Performed by: INTERNAL MEDICINE

## 2017-03-24 PROCEDURE — 80048 BASIC METABOLIC PNL TOTAL CA: CPT | Performed by: INTERNAL MEDICINE

## 2017-03-24 PROCEDURE — 02HV33Z INSERTION OF INFUSION DEVICE INTO SUPERIOR VENA CAVA, PERCUTANEOUS APPROACH: ICD-10-PCS | Performed by: RADIOLOGY

## 2017-03-24 PROCEDURE — 80170 ASSAY OF GENTAMICIN: CPT | Performed by: INTERNAL MEDICINE

## 2017-03-24 RX ORDER — LIDOCAINE HYDROCHLORIDE 10 MG/ML
20 INJECTION INFILTRATION; PERINEURAL
Status: DISCONTINUED | OUTPATIENT
Start: 2017-03-24 | End: 2017-03-24 | Stop reason: HOSPADM

## 2017-03-24 RX ORDER — HYDROMORPHONE HYDROCHLORIDE 2 MG/1
2 TABLET ORAL
Qty: 90 TAB | Refills: 0 | Status: SHIPPED | OUTPATIENT
Start: 2017-03-24 | End: 2017-07-02

## 2017-03-24 RX ADMIN — HYDRALAZINE HYDROCHLORIDE 25 MG: 25 TABLET, FILM COATED ORAL at 10:05

## 2017-03-24 RX ADMIN — SODIUM CHLORIDE 75 ML/HR: 900 INJECTION, SOLUTION INTRAVENOUS at 13:14

## 2017-03-24 RX ADMIN — AMLODIPINE BESYLATE 10 MG: 5 TABLET ORAL at 10:05

## 2017-03-24 RX ADMIN — HYDRALAZINE HYDROCHLORIDE 25 MG: 25 TABLET, FILM COATED ORAL at 17:09

## 2017-03-24 RX ADMIN — Medication 10 ML: at 13:18

## 2017-03-24 RX ADMIN — LOSARTAN POTASSIUM 100 MG: 50 TABLET, FILM COATED ORAL at 10:05

## 2017-03-24 RX ADMIN — CEFTRIAXONE 2 G: 2 INJECTION, POWDER, FOR SOLUTION INTRAMUSCULAR; INTRAVENOUS at 12:05

## 2017-03-24 RX ADMIN — ENOXAPARIN SODIUM 40 MG: 40 INJECTION SUBCUTANEOUS at 13:13

## 2017-03-24 RX ADMIN — GENTAMICIN SULFATE 80 MG: 80 INJECTION, SOLUTION INTRAVENOUS at 10:08

## 2017-03-24 RX ADMIN — INSULIN LISPRO 2 UNITS: 100 INJECTION, SOLUTION INTRAVENOUS; SUBCUTANEOUS at 13:13

## 2017-03-24 RX ADMIN — FINASTERIDE 5 MG: 5 TABLET, FILM COATED ORAL at 10:05

## 2017-03-24 RX ADMIN — NEBIVOLOL HYDROCHLORIDE 10 MG: 5 TABLET ORAL at 10:05

## 2017-03-24 NOTE — PROGRESS NOTES
3/24/2017 10:40 AM Pt to discharge home today with [facility] [place] through [address] and iv abx through [address] [place] [address]. Pt's discharge clinicals have been sent to [address] via [address]. Spoke with Shaylee at [address] who reported pt will be seen by nursing and PT on 3/27. Spoke with pt and pt's wife regarding [facility]. [facility] will teach pt iv abx today. Pt and pt's wife were understanding pt will be his daily dose of iv abx Sat&Sun and HH will follow up on Monday. Pt and pt's wife were agreeable to this. Spoke with Alexa Lu at [address] [place] [address] who will come to teach pt iv abx before 12PM today. CM will follow up.  MARILU Lara

## 2017-03-24 NOTE — PROGRESS NOTES
[facility] Dosing Services: Antimicrobial Stewardship Daily Doc    Consult for antibiotic dosing of Gentamicin by Dr. Nancy Quiles  Pharmacist reviewed antibiotic appropriateness for 73year old male for indication of Discitis/Osteomyelitis/Bacteremia (Gentamicin synergy for enterococcus discitis/bacteremia)  Day of Therapy: 9    Gentamicin synergy dosing:  Current maintenance dose: 80 (mg) every 12 hours (frequency).    Goal peak = 3-5 mcg/ml  Goal trough = 0.5-1.5 mcg/ml (Less than 1)    Last trough level 1.1 mcg/ml , peak 3.6 mcg/ml - corrected 4.1 mcg/ml    Plan :Slight rise in creatinine - decrease dose 70 mg every 12 hours   Kinetics predicts trough < 1 and peak 3.5    Pharmacist  Neymar Correia                             Contact:[phone]

## 2017-03-24 NOTE — PROGRESS NOTES
Ortho Spine Daily Progress Note    Date of Surgery:  * No surgery found *      Patient: Ave Garcia   YOB: 1950  Age: 73 y.o. SUBJECTIVE:   S/P aspiration lumbar disc for discitis    The patient is without c/o at this time. The patient's post operative pain is well-controlled. He denies CP, SOB, N/V/D, dizziness, abdominal pain, HA. OBJECTIVE:     Vital Signs:    Temp (24hrs), Av.8 °F (36.6 °C), Min:97.6 °F (36.4 °C), Max:98.1 °F (36.7 °C)    Patient Vitals for the past 8 hrs:   BP Temp Pulse Resp SpO2   17 0720 138/66 97.7 °F (36.5 °C) 60 16 97 %   17 0434 115/59 97.7 °F (36.5 °C) (!) 56 18 95 %   17 0043 123/58 97.6 °F (36.4 °C) (!) 54 18 94 %           Physical Exam:  General: A&Ox3, NAD. Respiratory: Respirations are unlabored. Abdomen: S/NT  Aspiration site: C,D and I   Musculoskeletal: Calves are S/NT, Dorsi/plantar flexion/EHL 5/5, Ever DP 1+  Neurological:  Ever LE's NVI    Laboratory Values:             Recent Labs      17   0123   CREA  1.39*   GLU  131*     Lab Results   Component Value Date/Time    Sodium 139 2017 01:23 AM    Potassium 3.3 2017 01:23 AM    Chloride 103 2017 01:23 AM    CO2 28 2017 01:23 AM    Glucose 131 2017 01:23 AM    BUN 21 2017 01:23 AM    Creatinine 1.39 2017 01:23 AM    Calcium 8.3 2017 01:23 AM       PLAN:     S/P Lumbar disc aspiration      Hemodynamics Stable. Wound Continue dressing changes PRN. Pain Control Adequate, continue current regimen. DVT Prophylaxis Continue with SCD'S, Ankle Pump Exercises, Mobilize. Discharge Disposition Discharge plan: BUN/Cr remain mildly elevated. Abx plan per ID. Home with [facility] per Dr Rory Busby. F/U with [facility] for MVE on outpatient basis. follow up with our office on an outpatient basis. Patient seen and evaluated by Dr Lamont Little who agrees with the findings and treatment plan as outlined above.   Signed By: Aden Cuevas AUTUMN James  March 24, 2017 8:24 AM

## 2017-03-24 NOTE — ROUTINE PROCESS
TRANSFER - IN REPORT:    Verbal report received from Aida Diaz on Kelly Colindres  being received from PICC Team(unit) for ordered procedure      Report consisted of patients Situation, Background, Assessment and   Recommendations(SBAR). Information from the following report(s) Procedure Summary and MAR was reviewed with the receiving nurse. Opportunity for questions and clarification was provided. Assessment completed upon patients arrival to unit and care assumed.

## 2017-03-24 NOTE — DISCHARGE SUMMARY
[address] [place] [address]. [place],  [address] [facility]    DISCHARGE SUMMARY     Patient: Erlin Lance                             Medical Record Number: [id number]                : 1950  Age: 73 y.o. Admit Date: 3/15/2017  Discharge Date: 3/24/2017  Admission Diagnosis: discitis  Discitis of lumbar region  Discharge Diagnosis: Lumbar discitis/mitral valve endocarditis  Procedures: Aspiration of lumbar disc  Surgeon: Selina Clinton MD  Anesthesia: local  Complications: None       Hospital Course:  Erlin Lance was admitted the same day of surgery and underwent aspiration of lumbar disc and tolerated the procedure well. He was transferred  to the Joint Replacement Unit at [facility].  On postoperative day #1, the dressing was clean and dry, he was neurovascularly intact. The patient was afebrile and vital signs were stable. Calves were soft and non-tender bilaterally. On postoperative day  # 2, the patient was tolerating a regular diet and making satisfactory progress with physical therapy. The patient was found to have an infection of his lumbar disc and will need to have IV antibiotics for a period of 8 weeks. The patient has a PICC line placed on postoperative day # 8. Dr Chen Nelson will guide treatment for his infected disc. The patient underwent LONNIE and was found to have mitral valve endocarditis. He will need to have MV replacement/repair on an outpatient basis. Dr Rere Chapman will guide treatment for endocarditis on an outpatient basis. Hemoglobin and INR prior to discharge were     Lab Results   Component Value Date/Time    HGB 8.7 2017 05:06 AM    INR 1.2 03/15/2017 05:08 PM       Erlin Lance made satisfactory progress with physical therapy and was discharged to Home in stable condition on postoperative day 9.   He was provided with routine postoperative instructions and advised to follow up in my office in 3 weeks following discharge from the hospital.  He was prescribed IV antibiotics to be administered via PICC and pain medication for post-operative analgesia. Discharge Medications:  Current Discharge Medication List      START taking these medications    Details   cefTRIAXone 2 gram 2 g, ADDaptor 1 Device IVPB 2 g by IntraVENous route every twenty-four (24) hours. Qty: 60 Dose, Refills: 0      HYDROmorphone (DILAUDID) 2 mg tablet Take 1 Tab by mouth every four (4) hours as needed. Max Daily Amount: 12 mg.  Qty: 90 Tab, Refills: 0         CONTINUE these medications which have NOT CHANGED    Details   nebivolol (BYSTOLIC) 10 mg tablet Take 10 mg by mouth daily. amLODIPine (NORVASC) 10 mg tablet Take 10 mg by mouth daily. ergocalciferol (VITAMIN D2) 50,000 unit capsule Take 50,000 Units by mouth every Wednesday. Every Wednesday      insulin detemir (LEVEMIR) 100 unit/mL injection 30 Units by SubCUTAneous route nightly. insulin lispro (HUMALOG) 100 unit/mL injection 20 Units by SubCUTAneous route Before breakfast, lunch, and dinner. finasteride (PROSCAR) 5 mg tablet Take 5 mg by mouth daily. losartan (COZAAR) 100 mg tablet TAKE 1 TABLET BY MOUTH DAILY  Qty: 30 Tab, Refills: 0    Comments: Patient will need to be seen prior to additional refills. ascorbic acid (VITAMIN C) 500 mg tablet Take 500 mg by mouth daily.              Signed by: ORLANDO Somers  3/24/2017

## 2017-03-24 NOTE — ROUTINE PROCESS
TRANSFER - OUT REPORT:    Verbal report given to Sandhya Clark for 414(name) on Pelon Ovens  being transferred to 414(unit) for routine progression of care      Report consisted of patients Situation, Background, Assessment and   Recommendations(SBAR). Information from the following report(s) Procedure Summary was reviewed with the receiving nurse. Lines:   PICC Double Lumen 50/76/41 Right;Basilic (Active)   Central Line Being Utilized Yes 3/24/2017  4:28 PM   Criteria for Appropriate Use Other (comment) 3/24/2017  4:28 PM   Site Assessment Clean, dry, & intact 3/24/2017  4:28 PM   Phlebitis Assessment 0 3/24/2017  4:28 PM   Infiltration Assessment 0 3/24/2017  4:28 PM   Dressing Status Clean, dry, & intact; New 3/24/2017  4:28 PM   Dressing Type Disk with Chlorhexadine gluconate (CHG); Transparent 3/24/2017  4:28 PM       Peripheral IV 03/21/17 Right Forearm (Active)   Site Assessment Clean, dry, & intact 3/23/2017  9:40 PM   Phlebitis Assessment 0 3/23/2017  9:40 PM   Infiltration Assessment 0 3/23/2017  9:40 PM   Dressing Status Clean, dry, & intact 3/23/2017  9:40 PM   Dressing Type Transparent 3/23/2017  9:40 PM   Hub Color/Line Status Pink;Flushed; Infusing 3/23/2017  9:40 PM   Alcohol Cap Used Yes 3/23/2017  3:32 PM        Opportunity for questions and clarification was provided.       Patient transported with:   Registered Nurse

## 2017-03-24 NOTE — INTERDISCIPLINARY ROUNDS
[address]    Patient Information    Name: Eh Castellanos  Age: 73 y.o. Admission Date: 3/15/2017  Surgery/Procedure:   Attending Provider: Zunilda Thao MD  Surgeon: Nehemias Astudillo   Problem List: Principal Problem:    Discitis (3/15/2017)    Active Problems:    HTN (hypertension) ()      Dyslipidemia ()      Mitral regurgitation ()      Overview: mod-severe on echo 1/5/12      MARIN (obstructive sleep apnea) (9/10/2013)      Overview: Hasn't used CPAP in 10 years.  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 ([facility] (2/3/2017)      DM (diabetes mellitus) ([facility] (3/15/2017)      During rounds the following quality care indicators and evidence based practices were addressed :                            Pain Assessment  Pain Intensity 1: 5 (03/24/17 1003)  Pain Location 1: Back  Pain Intervention(s) 1: Medication (see MAR)  Patient Stated Pain Goal: 1    Pain meds: Dilaudid (hasn't had any since yesterday), Tramadol  Antibiotics completed: Rocephin, Gent  Anticoagulation:  Lovenox  Valdez: N   Goals For Today: pain control    RRAT Score:      Readmit Risk Tool  Support Systems: Spouse/Significant Other/Partner  Relationship with Primary Physician Group: Seen at least one time within the past 6 months    Discharge Management/Planning:    Readmit Risk Assessment Information:   High Risk            28       Total Score        3 Relationship with PCP    2 Patient Living Status    3 Patient Length of Stay > 5    4 More than 1 Admission in calendar year    5 Patient Insurance is Medicare, Medicaid or Self Pay    11 Charlson Comorbidity Score         Readmit Risk Tool  Support Systems: Spouse/Significant Other/Partner  Relationship with Primary Physician Group: Seen at least one time within the past 6 months    [address]:  Anticipated Date of Discharge: Discharge after Dr. Cody Leong provides Himanshu Beyer. Interdisciplinary team rounds were held with the following team members: Nurse Practitioner, Case Management, Nursing, Pharmacy, and Rehab. See clinical pathway and/or care plan for interventions and desired outcomes.

## 2017-03-24 NOTE — DISCHARGE INSTRUCTIONS
[address] [facility] HARI Esposito. Main Office: [phone]    Lumbar Surgery Discharge Instructions    Activities   You are going home a well person, be as active as possible. Your only exercise should be walking. Start with short frequent walks and increase your walking distance each day. Start with walking twice a day for 5 minutes and increase your distance each day 2-3 minutes until you reach 25 minutes twice a day. Limit the amount of time you sit to 20-30 minute intervals. Sitting for prolonged periods of time will be uncomfortable for you following your surgery.  No bending, lifting (of 5lbs or more), twisting, or straining.  Do not drive until your doctor states you may do so. However, you may ride in a car for short distances to doctors appointments.  If you are required to wear a brace, you should wear it at all times when you are out of bed. You may remove it when sleeping unless your physician advises you against it.  When you are in the bed, you may lay on your back or on either side. Do not lie on your stomach.  You may resume sexual relations 6 weeks after surgery.  Continue to use your incentive spirometer for deep breathing exercises. Driving   You may not drive or return to work until instructed by your physician. However, you may ride in the car for doctors appointments ONLY. Brace   If you have a back brace, you should wear your brace at all times when you are out of bed. Do not wear the brace while in bed or showering.  Remember to always wear a cotton t-shirt underneath your brace.  Do not bend or twist when your brace is off. Diet   You may resume your regular home diet as tolerated. If your throat is sore, you should eat soft foods for the first couple of days.  Be sure to drink plenty of fluids; it is important to keep yourself hydrated.  Avoid alcoholic beverages and ABSOLUTELY NO tobacco products.  Tobacco products will interfere with your healing. If you continue to use tobacco, you may end up needing another surgery in the future. Showering   You may shower in approximately 4 days after your surgery if your incision is not draining.  Leave the dressing on during your shower but avoid getting the dressing wet.  Reminder- your brace can be removed while showering. Remember to not bend or twist while your brace is off.  NO not use tub baths, swimming pools or Jacuzzis. Caring for your incision   Leave the clear plastic dressing on x 7 days after surgery. At that point, you may remove the dressing and keep the incision open to air.  You will probably have steri-strips on your incision (small, white pieces of tape). Do not pull the steri-strips; they will fall off on their own after several days. If you have sutures or staples, they will be removed when you see your physician.  Do not rub or apply any lotions or ointments to your incision site. Medications   Check with your physician before taking any anti-inflammatory medications. (Advil, Aleve, Ibuprofen, Aspirin)   Take your pain medication as directed   Do NOT take additional Tylenol if your prescribed pain medication has acetaminophen in it (Endocet/Percocet, Lortab, Norco)   It is important to have regular bowel movements. Pain medications can be constipating. Stool softeners, warm prune juice, increasing your water intake, and increasing fiber in your diet can help in preventing constipation.  Do NOT take laxatives if at all possible except in severe situations. It can result in a vicious cycle of constipation and diarrhea. Follow Up   Once you are home, call your physicians office to schedule an appointment for your four week postoperative visit.  You will need to follow up with Dr Sheri Rust regarding your antibiotics. Please contact his office to schedule your follow up appointment.     You will need to follow up with Dr Beata Bills regarding your heart. Please contact his office to schedule your follow up appointment. Notify your physician if you develop any of the following conditions:   Fever above 101 degrees for 24 hours.  Nausea or vomiting.  Severe headache.  Inability to urinate.  Loss of bowel or bladder function:   Inability to urinate   Loss of bowel or bladder function (sudden onset of incontinence)   Changes in sensation in your extremities (numbness, tingling, loss of color).  Severe pain or pain not relieved by medications.  Redness, swelling, or drainage from your incision.  Persistent pain in the chest.    Pain in the calf of either leg.  Increased weakness (if this is greater than before your surgery).

## 2017-04-14 ENCOUNTER — HOSPITAL ENCOUNTER (OUTPATIENT)
Dept: MRI IMAGING | Age: 67
Discharge: HOME | End: 2017-04-14
Attending: ORTHOPAEDIC SURGERY
Payer: COMMERCIAL

## 2017-04-14 VITALS — BODY MASS INDEX: 34.06 KG/M2 | WEIGHT: 217 LBS | HEIGHT: 67 IN

## 2017-04-14 DIAGNOSIS — M46.46 LUMBAR DISCITIS: ICD-10-CM

## 2017-04-14 DIAGNOSIS — M43.16 SPONDYLOLISTHESIS OF LUMBAR REGION: ICD-10-CM

## 2017-04-14 DIAGNOSIS — M48.061 LUMBAR SPINAL STENOSIS: ICD-10-CM

## 2017-04-14 PROCEDURE — 74011250636 HC RX REV CODE- 250/636: Performed by: RADIOLOGY

## 2017-04-14 PROCEDURE — A9585 GADOBUTROL INJECTION: HCPCS | Performed by: RADIOLOGY

## 2017-04-14 PROCEDURE — 72158 MRI LUMBAR SPINE W/O & W/DYE: CPT

## 2017-04-14 RX ADMIN — GADOBUTROL 9 ML: 604.72 INJECTION INTRAVENOUS at 18:29

## 2017-04-28 ENCOUNTER — DOCUMENTATION ONLY (OUTPATIENT)
Dept: CARDIOLOGY CLINIC | Age: 67
End: 2017-04-28

## 2017-04-28 ENCOUNTER — OFFICE VISIT (OUTPATIENT)
Dept: CARDIOLOGY CLINIC | Age: 67
End: 2017-04-28

## 2017-04-28 VITALS
BODY MASS INDEX: 34.78 KG/M2 | DIASTOLIC BLOOD PRESSURE: 74 MMHG | SYSTOLIC BLOOD PRESSURE: 132 MMHG | HEART RATE: 68 BPM | RESPIRATION RATE: 20 BRPM | HEIGHT: 67 IN | WEIGHT: 221.6 LBS | OXYGEN SATURATION: 98 %

## 2017-04-28 DIAGNOSIS — I33.0 CHRONIC BACTERIAL ENDOCARDITIS: Primary | ICD-10-CM

## 2017-04-28 DIAGNOSIS — I10 ESSENTIAL HYPERTENSION: ICD-10-CM

## 2017-04-28 DIAGNOSIS — E78.5 DYSLIPIDEMIA: ICD-10-CM

## 2017-04-28 DIAGNOSIS — M46.46 DISCITIS OF LUMBAR REGION: ICD-10-CM

## 2017-04-28 DIAGNOSIS — G47.33 OSA (OBSTRUCTIVE SLEEP APNEA): ICD-10-CM

## 2017-04-28 DIAGNOSIS — I34.0 NON-RHEUMATIC MITRAL REGURGITATION: ICD-10-CM

## 2017-04-28 DIAGNOSIS — N18.30 CKD STAGE 3 DUE TO TYPE 1 DIABETES MELLITUS (HCC): Chronic | ICD-10-CM

## 2017-04-28 DIAGNOSIS — E11.9 TYPE 2 DIABETES MELLITUS WITHOUT COMPLICATION, UNSPECIFIED LONG TERM INSULIN USE STATUS: ICD-10-CM

## 2017-04-28 DIAGNOSIS — E10.22 CKD STAGE 3 DUE TO TYPE 1 DIABETES MELLITUS (HCC): Chronic | ICD-10-CM

## 2017-04-28 NOTE — PROGRESS NOTES
Visit Vitals    /74 (BP 1 Location: Left arm, BP Patient Position: Sitting)    Pulse 68    Resp 20    Ht 5' 7\" (1.702 m)    Wt 221 lb 9.6 oz (100.5 kg)    SpO2 98%    BMI 34.71 kg/m2

## 2017-04-28 NOTE — PROGRESS NOTES
Your Cardiac Cath is scheduled for May 16th, 2017 at 10:00am at [facility]. Please arrive 1 hour early at 9:00 on the second floor at outpatient registration. Have nothing to eat or drink after midnight. Take all medications as prescribed. Please have labs drawn TODAY. You will need to have a  with you.  Please bring an overnight bag with you the day of your procedure in case you are asked to remain at the hospital.

## 2017-04-28 NOTE — MR AVS SNAPSHOT
Visit Information Date & Time Provider Department Dept. Phone Encounter #  
 4/28/2017  2:40 PM Blaire Tracy MD [facility] [phone] [id number] Upcoming Health Maintenance Date Due Hepatitis C Screening 1950 FOOT EXAM Q1 10/22/1961 MICROALBUMIN Q1 10/22/1961 EYE EXAM RETINAL OR DILATED Q1 10/22/1961 DTaP/Tdap/Td series (1 - Tdap) 10/22/1972 FOBT Q 1 YEAR AGE 50-75 10/22/2001 ZOSTER VACCINE AGE 60> 10/22/2011 HEMOGLOBIN A1C Q6M 2/20/2015 LIPID PANEL Q1 4/3/2015 INFLUENZA AGE 9 TO ADULT 8/1/2016 GLAUCOMA SCREENING Q2Y 10/22/2016 Pneumococcal 65+ Low/Medium Risk (1 of 2 - PCV13) 10/22/2016 MEDICARE YEARLY EXAM 10/22/2016 Allergies as of 4/28/2017  Review Complete On: 4/28/2017 By: Gabriella Bryant LPN Severity Noted Reaction Type Reactions Cephalexin  01/15/2017    Other (comments)  
 unknown Oxycodone  04/03/2014    Other (comments) Pt does not desire to take; causes altered mental status and constipation Sulfa (Sulfonamide Antibiotics)  12/20/2011    Hives Tamsulosin  01/15/2017    Other (comments) Vision loss Current Immunizations  Never Reviewed No immunizations on file. Not reviewed this visit You Were Diagnosed With   
  
 Codes Comments Mitral valve insufficiency, unspecified etiology    -  Primary ICD-10-CM: I34.0 ICD-9-CM: 424.0 Essential hypertension     ICD-10-CM: I10 
ICD-9-CM: 401.9 Dyslipidemia     ICD-10-CM: E78.5 ICD-9-CM: 272.4 Murmur     ICD-10-CM: R01.1 ICD-9-CM: 785.2 CKD stage 3 due to type 1 diabetes mellitus (HCC)     ICD-10-CM: E10.22, N18.3 ICD-9-CM: 250.41, 585.3 Abnormal EKG     ICD-10-CM: R94.31 
ICD-9-CM: 794.31 Discitis of lumbar region     ICD-10-CM: M46.46 
ICD-9-CM: 722.93 Type 2 diabetes mellitus without complication, unspecified long term insulin use status     ICD-10-CM: E11.9 ICD-9-CM: 250.00 MARIN (obstructive sleep apnea)     ICD-10-CM: G47.33 
ICD-9-CM: 327.23 Vitals BP Pulse Resp Height(growth percentile) Weight(growth percentile) SpO2  
 132/74 (BP 1 Location: Left arm, BP Patient Position: Sitting) 68 20 5' 7\" (1.702 m) 221 lb 9.6 oz (100.5 kg) 98% BMI Smoking Status 34.71 kg/m2 Former Smoker Vitals History BMI and BSA Data Body Mass Index Body Surface Area 34.71 kg/m 2 2.18 m 2 [facility] Pharmacy Name Phone [facility] [address]  [facility] [phone] Your Updated Medication List  
  
   
This list is accurate as of: 4/28/17  3:23 PM.  Always use your most recent med list.  
  
  
  
  
 BYSTOLIC 10 mg tablet Generic drug:  nebivolol Take 10 mg by mouth daily. cefTRIAXone 2 gram 2 g, ADDaptor 1 Device IVPB  
2 g by IntraVENous route every twenty-four (24) hours. HumaLOG 100 unit/mL injection Generic drug:  insulin lispro 20 Units by SubCUTAneous route Before breakfast, lunch, and dinner. HYDROmorphone 2 mg tablet Commonly known as:  DILAUDID Take 1 Tab by mouth every four (4) hours as needed. Max Daily Amount: 12 mg. LEVEMIR 100 unit/mL injection Generic drug:  insulin detemir 30 Units by SubCUTAneous route nightly. losartan 100 mg tablet Commonly known as:  COZAAR  
TAKE 1 TABLET BY MOUTH DAILY  
  
 NORVASC 10 mg tablet Generic drug:  amLODIPine Take 10 mg by mouth daily. VITAMIN C 500 mg tablet Generic drug:  ascorbic acid (vitamin C) Take 500 mg by mouth daily. VITAMIN D2 50,000 unit capsule Generic drug:  ergocalciferol Take 50,000 Units by mouth every Wednesday. Every Wednesday We Performed the Following CBC W/O DIFF [88914 CPT(R)] CRP, HIGH SENSITIVITY [45444 CPT(R)] METABOLIC PANEL, BASIC [19782 CPT(R)] Introducing [facility]! [facility] introduce portal paciente MyChart . Ahora se puede acceder a partes de savage expediente médico, enviar por correo electrónico la oficina de savage médico y solicitar renovaciones de medicamentos en línea. En savage navegador de Internet , Hunter Hunger a [email or web address] Manju clic en el usuario por Sai Orozco? Guerline Nate clsukumar aquí en la sesión Livier Mark. Verá la página de registro [place]. Ingrese savage código de [employer] saroj y apple aparece a continuación. Usted no tendrá que [employer] código después de ashli completado el proceso de registro . Si usted no se inscribe antes de la fecha de caducidad , debe solicitar un nuevo código. · MyChart Código de acceso : [id number] Expires: 7/27/2017  3:23 PM 
 
Ingresa los últimos cuatro dígitos de savage Número de Seguro Social ( xxxx ) y fecha de nacimiento ( dd / mm / aaaa ) apple se indica y manju clic en Enviar. Usted será llevado a la siguiente página de registro . Crear un ID MyChart . Esta será savage ID de inicio de sesión de MyChart y no puede ser [place] , por lo que pensar en kristina que es Clydell Hilts y fácil de recordar . Crear kristina contraseña MyChart . Usted puede cambiar savage contraseña en cualquier momento . Ingrese savage Password Reset de preguntas y Mcdaniel . Pemberton se puede utilizar en un momento posterior si usted olvida savage contraseña. Introduzca savage dirección de correo electrónico . Val Body recibirá kristina notificación por correo electrónico cuando la nueva información está disponible en MyChart . Nikkinoblanca page en Registrarse. Denette Margarito eva y descargar porciones de savage expediente médico. 
Manju kaylee en el enlace de descarga del menú Resumen para descargar kristina copia portátil de savage información médica . Si tiene Eddie Beckman , por favor visite la sección de preguntas frecuentes del sitio web MyChart . Recuerde, MyChart NO es que se utilizará para las necesidades urgentes. Para emergencias médicas , llame al 911 . Ahora disponible en savage iPhone y Android ! Por favor proporcione tayo resumen de la documentación de cuidado a savage próximo proveedor. Your primary care clinician is listed as Jah Valerio. If you have any questions after today's visit, please call [phone].

## 2017-04-28 NOTE — LETTER
4/28/2017 3:02 PM 
 
Mr. Rufus Claire [address] [address] Mr. Nurys Stock, Your Cardiac Cath is scheduled for May 16th, 2017 at 10:00am at [facility]. Please arrive 1 hour early at 9:00 on the second floor at outpatient registration. Have nothing to eat or drink after midnight. Take all medications as prescribed. Please have labs drawn TODAY. You will need to have a  with you. Please bring an overnight bag with you the day of your procedure in case you are asked to remain at the hospital. 
 
 
Sincerely, Aaliyah Hernandez MD

## 2017-04-29 LAB
BUN SERPL-MCNC: 20 MG/DL (ref 8–27)
BUN/CREAT SERPL: 13 (ref 10–24)
CALCIUM SERPL-MCNC: 9.5 MG/DL (ref 8.6–10.2)
CHLORIDE SERPL-SCNC: 101 MMOL/L (ref 96–106)
CO2 SERPL-SCNC: 27 MMOL/L (ref 18–29)
CREAT SERPL-MCNC: 1.52 MG/DL (ref 0.76–1.27)
CRP SERPL HS-MCNC: 15.68 MG/L (ref 0–3)
ERYTHROCYTE [DISTWIDTH] IN BLOOD BY AUTOMATED COUNT: 19.1 % (ref 12.3–15.4)
GLUCOSE SERPL-MCNC: 57 MG/DL (ref 65–99)
HCT VFR BLD AUTO: 34.1 % (ref 37.5–51)
HGB BLD-MCNC: 11.3 G/DL (ref 12.6–17.7)
INTERPRETATION: NORMAL
Lab: NORMAL
MCH RBC QN AUTO: 29 PG (ref 26.6–33)
MCHC RBC AUTO-ENTMCNC: 33.1 G/DL (ref 31.5–35.7)
MCV RBC AUTO: 87 FL (ref 79–97)
PLATELET # BLD AUTO: 273 X10E3/UL (ref 150–379)
POTASSIUM SERPL-SCNC: 3.5 MMOL/L (ref 3.5–5.2)
RBC # BLD AUTO: 3.9 X10E6/UL (ref 4.14–5.8)
SODIUM SERPL-SCNC: 146 MMOL/L (ref 134–144)
WBC # BLD AUTO: 9.2 X10E3/UL (ref 3.4–10.8)

## 2017-05-04 ENCOUNTER — CLINICAL SUPPORT (OUTPATIENT)
Dept: CARDIOLOGY CLINIC | Age: 67
End: 2017-05-04

## 2017-05-04 DIAGNOSIS — I34.0 NON-RHEUMATIC MITRAL REGURGITATION: Primary | ICD-10-CM

## 2017-05-05 ENCOUNTER — TELEPHONE (OUTPATIENT)
Dept: CARDIOLOGY CLINIC | Age: 67
End: 2017-05-05

## 2017-05-05 NOTE — TELEPHONE ENCOUNTER
Merlin Mckee, NP  Yoselin León LPN                     Pre-cath labs.  Note mild increase in creatinine since hospital discharge.  I've discussed with Dr. Chloe Chavez. Please advise him to hold Losartan for 48 hours pre-cath and focus on hydration the days leading up to the procedure. Patient notified. He voices understanding.

## 2017-05-09 ENCOUNTER — TELEPHONE (OUTPATIENT)
Dept: CARDIOLOGY CLINIC | Age: 67
End: 2017-05-09

## 2017-05-09 NOTE — TELEPHONE ENCOUNTER
Cardiac testing  ECHO 2-013: MVP mod-severe MR   ECHO 3/15/2017: EF 65-70%, mild LAE, mild LVH, mod-severe LAE, mod MR,   spherical, echogenic, fixed mass, 14 mm x 8 mm)- anterior leaflet, on the atrial aspect. LONNIE 2012: mod-severe MR   LONNIE 3/21/2017    Echo 5/4/17 - EF 70%. No WMA. Wall thickness upper limits of normal. Severe LAE. 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 Definite, medium sized spherical mobile mass, measuring 11 x 7 mm on the tip of the anterior leaflet on the atrial aspect. Notified Mr. Nuno Pair of Echo results; note echo density on mitral valve is unchanged. He confirms that he is scheduled for left heart catheterization next Tuesday 5/16/17. He voices understanding of all instructions that have been given to him thus far. I have encouraged him to call with any questions or concerns prior to that time.

## 2017-05-10 RX ORDER — SODIUM CHLORIDE 0.9 % (FLUSH) 0.9 %
5-10 SYRINGE (ML) INJECTION EVERY 8 HOURS
Status: CANCELLED | OUTPATIENT
Start: 2017-05-16

## 2017-05-10 RX ORDER — SODIUM CHLORIDE 0.9 % (FLUSH) 0.9 %
5-10 SYRINGE (ML) INJECTION AS NEEDED
Status: CANCELLED | OUTPATIENT
Start: 2017-05-16

## 2017-05-15 NOTE — PROGRESS NOTES
12:11 PM Pt's. Chart reviewed possibly including viewing of labs, test results, imaging results and history and physical for possible cath/angio/EP procedure.

## 2017-05-16 ENCOUNTER — HOSPITAL ENCOUNTER (OUTPATIENT)
Dept: CARDIAC CATH/INVASIVE PROCEDURES | Age: 67
Discharge: HOME | End: 2017-05-16
Attending: SPECIALIST | Admitting: SPECIALIST
Payer: COMMERCIAL

## 2017-05-16 VITALS
DIASTOLIC BLOOD PRESSURE: 72 MMHG | BODY MASS INDEX: 36.06 KG/M2 | HEIGHT: 67 IN | OXYGEN SATURATION: 100 % | RESPIRATION RATE: 24 BRPM | WEIGHT: 229.72 LBS | HEART RATE: 67 BPM | SYSTOLIC BLOOD PRESSURE: 148 MMHG

## 2017-05-16 DIAGNOSIS — E11.9 TYPE 2 DIABETES MELLITUS WITHOUT COMPLICATION, WITH LONG-TERM CURRENT USE OF INSULIN (HCC): ICD-10-CM

## 2017-05-16 DIAGNOSIS — N18.30 CKD STAGE 3 DUE TO TYPE 1 DIABETES MELLITUS (HCC): Chronic | ICD-10-CM

## 2017-05-16 DIAGNOSIS — E10.22 CKD STAGE 3 DUE TO TYPE 1 DIABETES MELLITUS (HCC): Chronic | ICD-10-CM

## 2017-05-16 DIAGNOSIS — G47.33 OSA (OBSTRUCTIVE SLEEP APNEA): ICD-10-CM

## 2017-05-16 DIAGNOSIS — Z79.4 TYPE 2 DIABETES MELLITUS WITHOUT COMPLICATION, WITH LONG-TERM CURRENT USE OF INSULIN (HCC): ICD-10-CM

## 2017-05-16 DIAGNOSIS — E78.5 DYSLIPIDEMIA: ICD-10-CM

## 2017-05-16 DIAGNOSIS — I34.0 NON-RHEUMATIC MITRAL REGURGITATION: ICD-10-CM

## 2017-05-16 DIAGNOSIS — I10 ESSENTIAL HYPERTENSION: Primary | ICD-10-CM

## 2017-05-16 LAB
GLUCOSE BLD STRIP.AUTO-MCNC: 107 MG/DL (ref 65–100)
GLUCOSE BLD STRIP.AUTO-MCNC: 115 MG/DL (ref 65–100)
GLUCOSE BLD STRIP.AUTO-MCNC: 59 MG/DL (ref 65–100)
GLUCOSE BLD STRIP.AUTO-MCNC: 59 MG/DL (ref 65–100)
SERVICE CMNT-IMP: ABNORMAL

## 2017-05-16 PROCEDURE — 74011000258 HC RX REV CODE- 258: Performed by: SPECIALIST

## 2017-05-16 PROCEDURE — 77030029065 HC DRSG HEMO QCLOT ZMED -B

## 2017-05-16 PROCEDURE — 93460 R&L HRT ART/VENTRICLE ANGIO: CPT

## 2017-05-16 PROCEDURE — 77030004532 HC CATH ANGI DX IMP BSC -A

## 2017-05-16 PROCEDURE — C1894 INTRO/SHEATH, NON-LASER: HCPCS

## 2017-05-16 PROCEDURE — 74011636320 HC RX REV CODE- 636/320: Performed by: SPECIALIST

## 2017-05-16 PROCEDURE — C1751 CATH, INF, PER/CENT/MIDLINE: HCPCS

## 2017-05-16 PROCEDURE — 82962 GLUCOSE BLOOD TEST: CPT

## 2017-05-16 PROCEDURE — C1760 CLOSURE DEV, VASC: HCPCS

## 2017-05-16 PROCEDURE — 74011000250 HC RX REV CODE- 250: Performed by: SPECIALIST

## 2017-05-16 PROCEDURE — 74011250636 HC RX REV CODE- 250/636: Performed by: SPECIALIST

## 2017-05-16 RX ORDER — DEXTROSE 50 % IN WATER (D50W) INTRAVENOUS SYRINGE
25 ONCE
Status: COMPLETED | OUTPATIENT
Start: 2017-05-16 | End: 2017-05-16

## 2017-05-16 RX ORDER — SODIUM CHLORIDE 0.9 % (FLUSH) 0.9 %
5-10 SYRINGE (ML) INJECTION EVERY 8 HOURS
Status: DISCONTINUED | OUTPATIENT
Start: 2017-05-16 | End: 2017-05-16 | Stop reason: HOSPADM

## 2017-05-16 RX ORDER — SODIUM CHLORIDE 9 MG/ML
150 INJECTION, SOLUTION INTRAVENOUS CONTINUOUS
Status: DISCONTINUED | OUTPATIENT
Start: 2017-05-16 | End: 2017-05-16 | Stop reason: HOSPADM

## 2017-05-16 RX ORDER — LIDOCAINE HYDROCHLORIDE 10 MG/ML
10-30 INJECTION INFILTRATION; PERINEURAL
Status: DISCONTINUED | OUTPATIENT
Start: 2017-05-16 | End: 2017-05-16 | Stop reason: HOSPADM

## 2017-05-16 RX ORDER — DEXTROSE MONOHYDRATE AND SODIUM CHLORIDE 5; .45 G/100ML; G/100ML
75 INJECTION, SOLUTION INTRAVENOUS CONTINUOUS
Status: DISCONTINUED | OUTPATIENT
Start: 2017-05-16 | End: 2017-05-16 | Stop reason: HOSPADM

## 2017-05-16 RX ORDER — SODIUM CHLORIDE 0.9 % (FLUSH) 0.9 %
5-10 SYRINGE (ML) INJECTION AS NEEDED
Status: DISCONTINUED | OUTPATIENT
Start: 2017-05-16 | End: 2017-05-16 | Stop reason: HOSPADM

## 2017-05-16 RX ORDER — MIDAZOLAM HYDROCHLORIDE 1 MG/ML
.5-1 INJECTION, SOLUTION INTRAMUSCULAR; INTRAVENOUS
Status: DISCONTINUED | OUTPATIENT
Start: 2017-05-16 | End: 2017-05-16 | Stop reason: HOSPADM

## 2017-05-16 RX ORDER — HEPARIN SODIUM 200 [USP'U]/100ML
500 INJECTION, SOLUTION INTRAVENOUS ONCE
Status: COMPLETED | OUTPATIENT
Start: 2017-05-16 | End: 2017-05-16

## 2017-05-16 RX ORDER — FENTANYL CITRATE 50 UG/ML
25-200 INJECTION, SOLUTION INTRAMUSCULAR; INTRAVENOUS
Status: DISCONTINUED | OUTPATIENT
Start: 2017-05-16 | End: 2017-05-16 | Stop reason: HOSPADM

## 2017-05-16 RX ADMIN — DEXTROSE MONOHYDRATE 25 G: 25 INJECTION, SOLUTION INTRAVENOUS at 11:13

## 2017-05-16 RX ADMIN — DEXTROSE MONOHYDRATE AND SODIUM CHLORIDE 75 ML/HR: 5; .45 INJECTION, SOLUTION INTRAVENOUS at 09:57

## 2017-05-16 RX ADMIN — MIDAZOLAM HYDROCHLORIDE 2 MG: 1 INJECTION, SOLUTION INTRAMUSCULAR; INTRAVENOUS at 10:23

## 2017-05-16 RX ADMIN — HEPARIN SODIUM 1000 UNITS: 200 INJECTION, SOLUTION INTRAVENOUS at 10:06

## 2017-05-16 RX ADMIN — FENTANYL CITRATE 25 MCG: 50 INJECTION, SOLUTION INTRAMUSCULAR; INTRAVENOUS at 10:23

## 2017-05-16 RX ADMIN — FENTANYL CITRATE 50 MCG: 50 INJECTION, SOLUTION INTRAMUSCULAR; INTRAVENOUS at 10:08

## 2017-05-16 RX ADMIN — MIDAZOLAM HYDROCHLORIDE 1 MG: 1 INJECTION, SOLUTION INTRAMUSCULAR; INTRAVENOUS at 10:28

## 2017-05-16 RX ADMIN — MIDAZOLAM HYDROCHLORIDE 2 MG: 1 INJECTION, SOLUTION INTRAMUSCULAR; INTRAVENOUS at 10:17

## 2017-05-16 RX ADMIN — LIDOCAINE HYDROCHLORIDE 20 ML: 10 INJECTION, SOLUTION INFILTRATION; PERINEURAL at 10:17

## 2017-05-16 RX ADMIN — FENTANYL CITRATE 25 MCG: 50 INJECTION, SOLUTION INTRAMUSCULAR; INTRAVENOUS at 10:28

## 2017-05-16 RX ADMIN — IOPAMIDOL 120 ML: 755 INJECTION, SOLUTION INTRAVENOUS at 10:40

## 2017-05-16 RX ADMIN — DEXTROSE MONOHYDRATE 25 G: 25 INJECTION, SOLUTION INTRAVENOUS at 09:46

## 2017-05-16 NOTE — IP AVS SNAPSHOT
Rubina Hong 
 
 
 [address] [place] [address] 
[phone] Patient: Akhil Pearson MRN: [id number] JUO:[birth year] You are allergic to the following Allergen Reactions Cephalexin Other (comments)  
 unknown Oxycodone Other (comments) Pt does not desire to take; causes altered mental status and constipation Sulfa (Sulfonamide Antibiotics) Hives Tamsulosin Other (comments) Vision loss Recent Documentation Height Weight BMI Smoking Status 1.702 m 104.2 kg 35.98 kg/m2 Former Smoker Emergency Contacts Name Discharge Info Relation Home Work Mobile DakotaEster DISCHARGE CAREGIVER [3] Spouse [3]   [phone] MATEOEster  Spouse [3] [phone] About your hospitalization You were admitted on:  May 16, 2017 You last received care in the:  [facility] PACU You were discharged on:  May 16, 2017 Unit phone number:  [phone] Why you were hospitalized Your primary diagnosis was:  Not on File Providers Seen During Your Hospitalizations Provider Role Specialty Primary office phone Thony Ley MD Attending Provider Cardiology [phone] Your Primary Care Physician (PCP) Primary Care Physician Office Phone Office Fax Sherice Graham [phone] Follow-up Information Follow up With Details Comments Contact Info Greyson Hernandez MD   [address] [facility] [address] [facility] 
[phone] Current Discharge Medication List  
  
CONTINUE these medications which have NOT CHANGED Dose & Instructions Dispensing Information Comments Morning Noon Evening Bedtime BYSTOLIC 10 mg tablet Generic drug:  nebivolol Your last dose was: Your next dose is:    
   
   
 Dose:  10 mg Take 10 mg by mouth daily. Refills:  0 cefTRIAXone 2 gram 2 g, ADDaptor 1 Device IVPB Your last dose was: Your next dose is:    
   
   
 Dose:  2 g  
2 g by IntraVENous route every twenty-four (24) hours. Quantity:  60 Dose Refills:  0 HumaLOG 100 unit/mL injection Generic drug:  insulin lispro Your last dose was: Your next dose is:    
   
   
 Dose:  20 Units 20 Units by SubCUTAneous route Before breakfast, lunch, and dinner. Refills:  0 HYDROmorphone 2 mg tablet Commonly known as:  DILAUDID Your last dose was: Your next dose is:    
   
   
 Dose:  2 mg Take 1 Tab by mouth every four (4) hours as needed. Max Daily Amount: 12 mg. Quantity:  90 Tab Refills:  0 LEVEMIR 100 unit/mL injection Generic drug:  insulin detemir Your last dose was: Your next dose is:    
   
   
 Dose:  30 Units 30 Units by SubCUTAneous route nightly. Refills:  0  
     
   
   
   
  
 losartan 100 mg tablet Commonly known as:  COZAAR Your last dose was: Your next dose is: TAKE 1 TABLET BY MOUTH DAILY Quantity:  30 Tab Refills:  0 Patient will need to be seen prior to additional refills. NORVASC 10 mg tablet Generic drug:  amLODIPine Your last dose was: Your next dose is:    
   
   
 Dose:  10 mg Take 10 mg by mouth daily. Refills:  0  
     
   
   
   
  
 VITAMIN C 500 mg tablet Generic drug:  ascorbic acid (vitamin C) Your last dose was: Your next dose is:    
   
   
 Dose:  500 mg Take 500 mg by mouth daily. Refills:  0  
     
   
   
   
  
 VITAMIN D2 50,000 unit capsule Generic drug:  ergocalciferol Your last dose was: Your next dose is:    
   
   
 Dose:  94346 Units Take 50,000 Units by mouth every Wednesday. Every Wednesday Refills:  0 Discharge Instructions Patient Discharge Instructions Sada Rai / [id number] : 1950 Admitted 2017 Discharged: 2017 Take Home Medications Resume home medications Get bloodwork this Friday or next Monday to check on your kidneys · It is important that you take the medication exactly as they are prescribed. · Keep your medication in the bottles provided by the pharmacist and keep a list of the medication names, dosages, and times to be taken in your wallet. · Do not take other medications without consulting your doctor. BRING ALL OF YOUR MEDICINES TO YOUR OFFICE VISIT with Drs. Will arrange cardiac surgical evaluation with [facility] Dr Tariq Benz for mitral valve repair/replacement and single vessel bypass Cardiac Catheterization  Discharge Instructions ? Do not drive, operate any machinery, or sign any legal documents for 24 hours after your procedure. You must have someone to drive you home. ? You may take a shower 24 hours after your cardiac catheterization. Be sure to get the dressing wet and then remove it; gently wash the area with warm soapy water. Pat dry and leave open to air. To help prevent infections, be sure to keep the cath site clean and dry. No lotions, creams, powders, ointments, etc. in the cath site for approximately 1 week. ? Do not take a tub bath, get in a hot tub or swimming pool for approximately 5 days or until the cath site is completely healed. ? No strenuous activity or heavy lifting over 10 lbs. for 7 days. ? Drink plenty of fluids for 24-48 hours after your cath to flush the contrast dye from your kidneys. No alcoholic beverages for 24 hours. You may resume your previous diet (low fat, low cholesterol) after your cath. ? After your cath, some bruising or discomfort is common during the healing process.   Tylenol, 1-2 tablets every 6 hours as needed, is recommended if you experience any discomfort. If you experience any signs or symptoms of infection such as fever, chills, or poorly healing incision, persistent tenderness or swelling in the groin, redness and/or warmth to the touch, numbness, significant tingling or pain at the groin site or affected extremity, rash, drainage from the cath site, or if the leg feels tight or swollen, call your physician right away. ? If bleeding at the cath site occurs, take a clean gauze pad and apply direct pressure to the groin just above the puncture site. Call 911 immediately, and continue to apply direct pressure until an ambulance gets to your location. ? You may return to work  2  days after your cardiac cath if no groin bleeding. Information obtained by : 
I understand that if any problems occur once I am at home I am to contact my physician. I understand and acknowledge receipt of the instructions indicated above. R.N.'s Signature                                                                  Date/Time Patient or Representative Signature                                                          Date/Time Jailene Harris MD 
 
Discharge Orders Procedure Order Date Status Priority Quantity Spec Type Associated Dx METABOLIC PANEL, BASIC 24/85/42 1054 Normal Routine 1 Serum Essential hypertension [[id number]] Schedule Instructions:  Obtain Friday or Monday to check on kidneys Introducing [facility]! [facility] introduce portal paciente MyChart . Ahora se puede acceder a partes de savage expediente médico, enviar por correo electrónico la oficina de savage médico y solicitar renovaciones de medicamentos en línea. En savage navegador de Internet , Isabella Boyd a [email or web address] Manju clic en el usuario por Henrietta Mcardle? Javier Shields clic aquí en la sesión Lisa [facility]. Verá la página de registro [place]. Ingrese savage código de [employer] saroj y apple aparece a continuación. Usted no tendrá que [employer] código después de ashli completado el proceso de registro . Si usted no se inscribe antes de la fecha de caducidad , debe solicitar un nuevo código. · MyChart Código de acceso : [id number] Expires: 7/27/2017  3:23 PM 
 
Ingresa los últimos cuatro dígitos de savage Número de Seguro Social ( xxxx ) y fecha de nacimiento ( dd / mm / aaaa ) apple se indica y manju clic en Enviar. Usted será llevado a la siguiente página de registro . Crear un ID MyChart . Esta será savage ID de inicio de sesión de MyChart y no puede ser [place] , por lo que pensar en kristina que es Jacqueline Cheryl y fácil de recordar . Crear kristina contraseña MyChart . Usted puede cambiar savage contraseña en cualquier momento . Ingrese savage Password Reset de preguntas y Mcdaniel . [place] se puede utilizar en un momento posterior si usted olvida savage contraseña. Introduzca savage dirección de correo electrónico . [employer] recibirá kristina notificación por correo electrónico cuando la nueva información está disponible en MyChart . Hemal Samuel clic en Registrarse. [employer] eva y descargar porciones de savage expediente médico. 
Manju clic en el enlace de descarga del menú Resumen para descargar kristina copia portátil de savage información médica . Si tiene [employer] , por favor visite la sección de preguntas frecuentes del sitio web MyChart . Recuerde, MyChart NO es que se utilizará para las necesidades urgentes. Para emergencias médicas , llame al 911 . Ahora disponible en savage iPhone y Android ! General Information Please provide this summary of care documentation to your next provider.  
  
  
    
    
 Patient Signature: ____________________________________________________________ Date:  ____________________________________________________________  
  
Reynaldo Tanika Provider Signature:  ____________________________________________________________ Date:  ____________________________________________________________

## 2017-05-16 NOTE — PROGRESS NOTES
5031 Received patient from waiting area. Armband and allergies verbally confirmed with patient. Procedure explained and consents signed. 0372 pt took 20 units of humalog this am accucheck at 7 am 144, took levemir insulin last evening  9:58 AM TRANSFER - OUT REPORT:    Verbal report given to SHERMAN Ferguson) on Ania Madrid  being transferred to cath lab(unit) for routine progression of care       Report consisted of patients Situation, Background, Assessment and   Recommendations(SBAR). Information from the following report(s) Procedure Summary was reviewed with the receiving nurse. Lines:   PICC Double Lumen 05/94/45 Right;Basilic (Active)       Peripheral IV 03/21/17 Right Forearm (Active)        Opportunity for questions and clarification was provided. Patient transported with:   Registered Nurse    1122 glucose 107 after i/2 amp d50 and iv fluids at 75/hr gave juice, post cath glucose low 59 pt not symptomatic sinus rhythm 71 room air iv fluids via right arm picc line  1215 reviewed discharge instructions with pt and wife pt eating lunch right groin site soft dry in tact    1250 ambulated to restroom using walker tolerated well right groin site dry in tact soft   1305                 Cardiac Catheterization/Angiography Discharge Instructions    *Check the puncture site frequently for swelling or bleeding. If you see any bleeding, lie down and apply pressure over the area with a clean town or washcloth. Call 911 immediately and continue to hold pressure until their arrival. Notify your doctor for any redness, swelling, drainage or oozing from the puncture site. Notify your doctor for any fever or chills. *If the leg  with the puncture becomes cold, numb or painful, call Dr Muna Beal  [phone]    *Activity should be limited for the next 48 hours. Climb stairs as little as possible and avoid any stooping, bending or strenuous activity for 48 hours.  No heavy lifting (anything over 10 pounds) for three days. *Do not drive for 24 hours. *You may resume your usual diet. Drink more fluids than usual.    *Have a responsible person drive you home and stay with you for at least 24 hours after your heart catheterization/angiography. *You may remove the bandage from your Right Groin in 24 hours. You may shower in 24 hours. No tub baths, hot tubs or swimming for one week. Do not place any lotions, creams, powders, ointments over the puncture site for one week. You may place a clean band-aid over the puncture site each day for 5 days. Change this daily. Copy of printed discharge instructions given to patient and               .wife Patient escorted via wheelchair to entrance. wife driving. Patient discharged into care of wife.

## 2017-05-16 NOTE — PROCEDURES
Cardiac Catheterization    Date of Procedure: 5/16/2017   Preoperative Diagnosis: severe MR, endocarditis  Postoperative Diagnosis: see below    Procedure: Right and left heart cath, LV angiography, coronary angiography via right femoral approach  Cardiologist: Shaneka Osullivan MD  Anesthesia: local + IV sedation  Estimated Blood Loss: Minimal  Specimens removed: None  Findings: PA 45/20, PCW 23 (V wave 45), RV 44/4 RA 8 LVEDP 25, A wave to 35, no AV gradient, EF 75%, severe MR, LM normal, LAD mid 85% at bifurcating D1 (small vessels), LCX mild plaque, RCA mild plaque. See full cath note.   Complications: none    Severe MR  Vigorous LVEF  1v CAD (LAD)  Mild -moderate PA HTN    Elective MV repair/replacement, LIMA-LAD (vs hybrid PCI)

## 2017-05-16 NOTE — IP AVS SNAPSHOT
Current Discharge Medication List  
  
CONTINUE these medications which have NOT CHANGED Dose & Instructions Dispensing Information Comments Morning Noon Evening Bedtime BYSTOLIC 10 mg tablet Generic drug:  nebivolol Your last dose was: Your next dose is:    
   
   
 Dose:  10 mg Take 10 mg by mouth daily. Refills:  0  
     
   
   
   
  
 cefTRIAXone 2 gram 2 g, ADDaptor 1 Device IVPB Your last dose was: Your next dose is:    
   
   
 Dose:  2 g  
2 g by IntraVENous route every twenty-four (24) hours. Quantity:  60 Dose Refills:  0 HumaLOG 100 unit/mL injection Generic drug:  insulin lispro Your last dose was: Your next dose is:    
   
   
 Dose:  20 Units 20 Units by SubCUTAneous route Before breakfast, lunch, and dinner. Refills:  0 HYDROmorphone 2 mg tablet Commonly known as:  DILAUDID Your last dose was: Your next dose is:    
   
   
 Dose:  2 mg Take 1 Tab by mouth every four (4) hours as needed. Max Daily Amount: 12 mg. Quantity:  90 Tab Refills:  0 LEVEMIR 100 unit/mL injection Generic drug:  insulin detemir Your last dose was: Your next dose is:    
   
   
 Dose:  30 Units 30 Units by SubCUTAneous route nightly. Refills:  0  
     
   
   
   
  
 losartan 100 mg tablet Commonly known as:  COZAAR Your last dose was: Your next dose is: TAKE 1 TABLET BY MOUTH DAILY Quantity:  30 Tab Refills:  0 Patient will need to be seen prior to additional refills. NORVASC 10 mg tablet Generic drug:  amLODIPine Your last dose was: Your next dose is:    
   
   
 Dose:  10 mg Take 10 mg by mouth daily. Refills:  0  
     
   
   
   
  
 VITAMIN C 500 mg tablet Generic drug:  ascorbic acid (vitamin C) Your last dose was: Your next dose is:    
   
   
 Dose:  500 mg Take 500 mg by mouth daily. Refills:  0  
     
   
   
   
  
 VITAMIN D2 50,000 unit capsule Generic drug:  ergocalciferol Your last dose was: Your next dose is:    
   
   
 Dose:  55639 Units Take 50,000 Units by mouth every Wednesday. Every Wednesday Refills:  0

## 2017-05-16 NOTE — H&P
Date of Surgery Update:  Erika Cunningham was seen and examined. History and physical has been reviewed. The patient has been examined. There have been no significant clinical changes since the completion of the originally dated History and Physical.    Signed By: Merlyn Rutledge MD     May 16, 2017 9:35 AM           Echo reviewed - EF 65%, severe MR, stable appearance of anterior leaflet vegetation    Erika Cunningham is a 73 y.o. male is here today for follow up after 3/15/17-3/24/17 hositalization for lumbar diskitis/mitral valve endocarditis.      Assessment       Encounter Diagnoses   Name Primary? 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 He has almost completed 6 weeks of IV antibiotics. We will schedule a cardiac cath in the next few week along with repeat TTE, then refer to cardiac surgery for MVR.      Recent lumbar MRI demonstrated improved findings. He will be seeing Dr. Haritha Munoz from ID in the next few weeks.      Will check CRP along with usual pre cath labs. Will obtain ECHO next week.      Right and left heart cath near future  Right femoral approach  ASA 3  Airway 2        Subjective:  See discharge summary and my notes from hospitalization. He leads a sedentary lifestyle due to significant low back pain. He ambulates with a walker some. He had an MRI 2 weeks go that shows improvement of his infection. He has 2 more weeks of his current antibiotic course. No recurrent fevers.  He had an appointment with Dr. Adamaris Fuentes on 5/17/17. He will be followed by Dr. Cali Almazan for his back. Patient denies any exertional chest pain, dyspnea, palpitations, syncope, orthopnea, edema or paroxysmal nocturnal dyspnea.        Cardiac testing  ECHO 2-013: MVP mod-severe MR   ECHO 3/15/2017: EF 65-70%, mild LAE, mild LVH, mod-severe LAE, mod MR,   spherical, echogenic, fixed mass, 14 mm x 8 mm)- anterior leaflet, on the atrial aspect.     LONNIE 2012: mod-severe MR   LONNIE 3/21/2017     Echo 5/4/17 - EF 70%. No WMA. Wall thickness upper limits of normal. Severe LAE. 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 Definite, medium sized spherical mobile mass, measuring 11 x 7 mm on the tip of the anterior leaflet on the atrial aspect. - 8x11 mm semimobile echogenic mass (some central clearing) attached to atrial surface of anterior mitral leaflet, prolapsing into LV. Distal tip of anterior leaflet prolapses. Linear strands, semimobile attached to atrial surface of posterior leaflet. No annular abscess. Eccentric posterior jet of MR, clearly severe.  Severe LAE, LVH, EF 70%, aortic/pulmonic/tricuspid valves normal. Aorta nl          Past Medical History:   Diagnosis Date    Abnormal EKG       he says it has been abnormal for years    Arthritis      Chronic pain       lower lumbar    Diabetes ([address]       type II    Dyslipidemia      Enlarged prostate Dr. Marylen Marcus    HTN (hypertension)      Hypercholesterolemia      Kidney stone      Mitral regurgitation       mod-severe on echo 1/5/12    Murmur      Obesity      Other ill-defined conditions       BPH    Sleep apnea       stopped using CPAP many years ago               Allergies   Allergen Reactions    Cephalexin Other (comments)       unknown    Oxycodone Other (comments)       Pt does not desire to take; causes altered mental status and constipation    Sulfa (Sulfonamide Antibiotics) Hives    Tamsulosin Other (comments)       Vision loss            Review of Systems  Constitutional: Negative for fever, chills, malaise/fatigue and diaphoresis. Respiratory: Negative for cough, hemoptysis, sputum production, shortness of breath and wheezing. Cardiovascular: Negative for chest pain, palpitations, orthopnea, claudication, leg swelling and PND. Gastrointestinal: Negative for heartburn, nausea, vomiting, blood in stool and melena. Genitourinary: Negative for dysuria and flank pain. Musculoskeletal: Negative for joint pain. Positive for back pain. Skin: Negative for rash. Neurological: Negative for focal weakness, seizures, loss of consciousness, weakness and headaches. Endo/Heme/Allergies: Does not bruise/bleed easily. Psychiatric/Behavioral: Negative for memory loss. The patient does not have insomnia.        Physical Exam          Visit Vitals    /74 (BP 1 Location: Left arm, BP Patient Position: Sitting)    Pulse 68    Resp 20    Ht 5' 7\" (1.702 m)    Wt 221 lb 9.6 oz (100.5 kg)    SpO2 98%    BMI 34.71 kg/m2          Wt Readings from Last 3 Encounters:   04/28/17 221 lb 9.6 oz (100.5 kg)   04/14/17 217 lb (98.4 kg)   03/21/17 224 lb 13.9 oz (102 kg)      General - well developed well nourished  Neck - JVP normal, thyroid nl  Cardiac - normal S1, S2, no rubs or gallops. No clicks. Apical grade 3 systolic murmur.   Vascular - carotids without bruits, radials, femorals and pedal pulses equal bilateral  Lungs - clear to auscultation bilaterals, no rales, wheezing or rhonchi  Abd - soft nontender, no HSM, no abd bruits  Extremities - no edema  Skin - no rash  Neuro - nonfocal  Psych - normal mood and affect        Cardiographics        Written by Sharon Dean, as dictated by Colton Quiroz MD.   Colton Quiroz MD

## 2017-05-17 ENCOUNTER — DOCUMENTATION ONLY (OUTPATIENT)
Dept: CARDIOLOGY CLINIC | Age: 67
End: 2017-05-17

## 2017-05-17 DIAGNOSIS — I25.10 CORONARY ARTERY DISEASE INVOLVING NATIVE CORONARY ARTERY OF NATIVE HEART WITHOUT ANGINA PECTORIS: ICD-10-CM

## 2017-05-17 DIAGNOSIS — I05.9 ENDOCARDITIS OF MITRAL VALVE: Primary | ICD-10-CM

## 2017-05-23 LAB
BUN SERPL-MCNC: 20 MG/DL (ref 8–27)
BUN/CREAT SERPL: 14 (ref 10–24)
CALCIUM SERPL-MCNC: 9.3 MG/DL (ref 8.6–10.2)
CHLORIDE SERPL-SCNC: 102 MMOL/L (ref 96–106)
CO2 SERPL-SCNC: 26 MMOL/L (ref 18–29)
CREAT SERPL-MCNC: 1.41 MG/DL (ref 0.76–1.27)
GLUCOSE SERPL-MCNC: 144 MG/DL (ref 65–99)
INTERPRETATION: NORMAL
POTASSIUM SERPL-SCNC: 3.7 MMOL/L (ref 3.5–5.2)
SODIUM SERPL-SCNC: 146 MMOL/L (ref 134–144)

## 2017-05-24 ENCOUNTER — TELEPHONE (OUTPATIENT)
Dept: CARDIOLOGY CLINIC | Age: 67
End: 2017-05-24

## 2017-05-24 NOTE — TELEPHONE ENCOUNTER
Eder Frazier, LUIS Hester, CARLOS                     Please notify Mr. Michelle Barrera that his post cath labs are stable.  No change in renal function.  Continue current medication and follow up with new patient referral with Dr. Sonja Carranza office. Patient notified. He voices understanding.

## 2017-06-05 ENCOUNTER — OFFICE VISIT (OUTPATIENT)
Dept: CARDIOTHORACIC SURGERY | Age: 67
End: 2017-06-05

## 2017-06-05 VITALS
SYSTOLIC BLOOD PRESSURE: 134 MMHG | WEIGHT: 235.5 LBS | HEART RATE: 58 BPM | RESPIRATION RATE: 16 BRPM | BODY MASS INDEX: 36.96 KG/M2 | OXYGEN SATURATION: 97 % | DIASTOLIC BLOOD PRESSURE: 78 MMHG | TEMPERATURE: 97.9 F | HEIGHT: 67 IN

## 2017-06-05 DIAGNOSIS — I34.0 NON-RHEUMATIC MITRAL REGURGITATION: Primary | ICD-10-CM

## 2017-06-05 RX ORDER — LEVOFLOXACIN 500 MG/1
TABLET, FILM COATED ORAL DAILY
COMMUNITY
End: 2017-06-27

## 2017-06-05 NOTE — PROGRESS NOTES
CSS Consult Note     Subjective:      Sada Rai is a 73 y.o. male who was referred for cardiac evaluation of mitral valve endocarditis with subsequent mitral regurgitation. Pt's PMH includes HTN, DM, hyperlipidemia, BPH, arthritis, MARIN (no longer uses CPAP). 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 Pt has completed 8 weeks of IV antibiotics and now on 1 month of PO levaquin, will be done 6/5/17. Per cardiologist, pt has been asymptomatic. Repeat lumbar MRI in late April 2017, demonstrated improved findings. Pt see's Dr. Cecily Rojas from ID again this week. Pt now admits to some LE edema in last 6 weeks, and SOB at rest & w/ exertion. Pt uses cane, has sedentary lifestyle d/t chronic back pain. Uses PO dilaudid PRN for back pain. Pt was told to not lift anything, will eventually need back fusion. Now wearing support vest on abd/back about 2.5 weeks. Pt is former tobacco smoker, quit in 2013 (about 1/4 PPD for 20 years). Only social ETOH use. No drug use.  + Heart disease in Father and Hypertension in mother. Lives in [facility], about 75 miles away from [address] [facility]. Has support system. Cardiac Testing    Cardiac catheterization 5/2017:  HEMODYNAMICS:  --  Hemodynamic assessment demonstrates moderately to severely elevated  LVEDP and moderately to markedly elevated pulmonary capillary wedge  pressure. -- Mahsa Se is mild to moderate pulmonary hypertension, due to  mitral valve disease. --  CARDIAC STRUCTURES:  --  Global left ventricular function was hypercontractile. EF calculated  by contrast ventriculography was 75 %. --  No significant aortic valve gradient. --  The mitral valve exhibited severe regurgitation. --  CORONARY CIRCULATION:  --  Mid LAD: There was a discrete 85 % stenosis at the origin of D1.     IMPRESSIONS:  There is significant single vessel coronary artery disease. Left ventricular function is normal.    TTE 5/4/17:  SUMMARY:  Procedure information: Limited study to evaluate mitral valve. Left ventricle: The ventricle was mildly dilated. Systolic function was  vigorous. Ejection fraction was estimated to be 70 %. No obvious wall  motion abnormalities identified in the views obtained. Wall thickness was  at the upper limits of normal.    Left atrium: The atrium was severely dilated. Mitral valve: There was a prolapse involving the anterior leaflet. There  was severe regurgitation. There was a definite, medium-sized, spherical,  mobile mass, measuring 11 mm x 7 mm on the tip of the anterior leaflet, on  the atrial aspect. COMPARISONS:  There has been no significant change. Comparison was made with the  previous study of 17-Mar-2017. LONNIE 3/17:  SUMMARY:  Left ventricle: Systolic function was vigorous. Ejection fraction was  estimated to be 65 %. There were no regional wall motion abnormalities. Left atrium: The atrium was moderately dilated. Atrial septum: Doppler and contrast studies were performed. There was no  right-to-left shunt, in the baseline state. Mitral valve: There was mild diffuse thickening. There was severe  regurgitation. There was a definite, medium-sized, multilobulated,  echogenic, mobile vegetation, measuring 12 mm x 8 mm on the tip of the  anterior leaflet, on the atrial aspect but another lobe of the mass  appears to prolapse into the LV There was a possible, small, frond-like,  loosely organized vegetation on the base of the posterior leaflet, on the  atrial aspect. Aortic valve: No obvious mass, vegetation or thrombus noted. MRI Spine 4/14/17: There is slightly diminished fluid signal and edema within the L3-4 and L5-S1  disc space extending to the endplates more so at P0-7. This is consistent with  improving/resolving discitis at both levels.  Postinfectious changes of the facet  joints is slightly more pronounced. No epidural or paraspinal fluid collection. Past Medical History:   Diagnosis Date    Abnormal EKG     he says it has been abnormal for years    Arthritis     Chronic pain     lower lumbar    Diabetes ([address]     type II    Dyslipidemia     Enlarged prostate Dr. Paris Vazquez    HTN (hypertension)     Hypercholesterolemia     Kidney stone     Mitral regurgitation     mod-severe on echo 1/5/12    Murmur     Obesity     Other ill-defined conditions     BPH    Sleep apnea     stopped using CPAP many years ago     Past Surgical History:   Procedure Laterality Date    ECHO 2D ADULT  1/4/12    mild-mod LVH, LVEF 60%, probably grade 1 diastolic dysfunction, mod LAE, mod-severe MR (eccentroic ) - had severe HTN during study (173/108),     ECHO LIMITED  1/20/12    mod-severe MR    HX COLONOSCOPY  2013    small polyps removed; repeat in 5 years; Dr. Natalio Del Real  9/2014    RIGHT    HX LITHOTRIPSY  2011    HX OTHER SURGICAL      LONNIE 1/31/12 - LVEF 60%, mod-severe MR (post directed), mild-mod LAE, mild atheroma aorta    HX OTHER SURGICAL      Echo 9/5/13 - mild LVH, LVEF 60 % to 65 %. Mod LAE. Mod MR (post directed)     HX OTHER SURGICAL      Echo 9/5/13 - mild LVH, LVEF 60 % to 65 %. Mod LAE.  Mod MR (post directed)     HX OTHER SURGICAL  02/2017    Bladder stones removed, and prostate \"trimmed\" down     STRESS TEST CARDIOLITE  1/4/12    walked 4:30, stopping for severe FISH, no ischemic EKG changes (inferolateral TWI at start), normal perfusion (diaphragmatic attenuation), LVEF 48%, hypertensive respone to exercise      Social History   Substance Use Topics    Smoking status: Former Smoker     Packs/day: 0.25     Years: 20.00     Quit date: 9/9/2013    Smokeless tobacco: Never Used    Alcohol use No      Family History   Problem Relation Age of Onset    Coronary Artery Disease Father     Heart Disease Father     Stroke Father     Cancer Father      prostate    Hypertension Mother     Malignant Hyperthermia Neg Hx     Pseudocholinesterase Deficiency Neg Hx     Delayed Awakening Neg Hx     Post-op Nausea/Vomiting Neg Hx     Emergence Delirium Neg Hx     Post-op Cognitive Dysfunction Neg Hx     Other Neg Hx      Prior to Admission medications    Medication Sig Start Date End Date Taking? Authorizing Provider   levoFLOXacin (LEVAQUIN) 500 mg tablet Take  by mouth daily. Yes Historical Provider   HYDROmorphone (DILAUDID) 2 mg tablet Take 1 Tab by mouth every four (4) hours as needed. Max Daily Amount: 12 mg. 3/24/17  Yes ORLANDO Mcmullen   nebivolol (BYSTOLIC) 10 mg tablet Take 10 mg by mouth daily. Yes Historical Provider   amLODIPine (NORVASC) 10 mg tablet Take 10 mg by mouth daily. Yes Historical Provider   ergocalciferol (VITAMIN D2) 50,000 unit capsule Take 50,000 Units by mouth every Wednesday. Every Wednesday   Yes Riri Pardo MD   insulin detemir (LEVEMIR) 100 unit/mL injection 30 Units by SubCUTAneous route nightly. Yes Riri Pardo MD   insulin lispro (HUMALOG) 100 unit/mL injection 20 Units by SubCUTAneous route Before breakfast, lunch, and dinner. Yes Riri Pardo MD   losartan (COZAAR) 100 mg tablet TAKE 1 TABLET BY MOUTH DAILY 6/20/14  Yes Nasir England MD   ascorbic acid (VITAMIN C) 500 mg tablet Take 500 mg by mouth daily. Yes Historical Provider       Allergies   Allergen Reactions    Cephalexin Other (comments)     unknown    Oxycodone Other (comments)     Pt does not desire to take; causes altered mental status and constipation    Sulfa (Sulfonamide Antibiotics) Hives    Tamsulosin Other (comments)     Vision loss       Review of Systems:   Consititutional: Denies fever or chills. Eyes:  Denies vision problems(cataracts). + glasses for reading   ENT:  Denies hearing or swallowing difficulty. CV: Denies CP, claudication, ++ HTN.   Resp: Denies productive cough.  + Dyspnea  : Denies dialysis or kidney problems. +bladder stones, BH  GI: Denies ulcers, esophageal strictures, liver problems. M/S: ++ c/o Back pain, arthritis, LE weakness,   Skin: Denies varicose veins, + Bilat pitting LE edema. Neuro: Denies strokes, or TIAs. Psych: Denies anxiety or depression. Endocrine: Denies thyroid problems or ++ diabetes. Heme/Lymphatic: Denies easy bruising or lymphedema. Objective:     VS:    Visit Vitals    /78 (BP 1 Location: Left arm, BP Patient Position: Sitting)    Pulse (!) 58    Temp 97.9 °F (36.6 °C) (Oral)    Resp 16    Ht 5' 7\" (1.702 m)    Wt 235 lb 8 oz (106.8 kg)    SpO2 97%    BMI 36.88 kg/m2         Physical Exam:    General appearance: alert, cooperative, no distress  Head: normocephalic, without obvious abnormality; atraumatic  Eyes: conjunctivae/corneas clear; EOM's intact. Nose: nares normal; no drainage. Neck: no carotid bruit and no JVD  Lungs: clear to auscultation bilaterally  Heart: regular rate and rhythm; grade 3/6 systolic murmur  Abdomen: soft, non-tender; bowel sounds normal  Extremities: moves all extremities; no weakness. Skin: Skin color normal; No varicose veins. ++ 2-3 ++ pitting LE edema. Neurologic: Grossly normal        Plan:   1. Severe MR w/ MV endocarditis:  On PO levaquin. Needs MVR. 2. CAD:  Single vessel disease, not on asa, or statin. On BB. 3. HTN:  On BB, ACE, norvasc. 4. DM:  On levimier, SSI.   5. Lumbar diskitis:  On PO levaquin. Most recent lumbar MRI shows improvement. PRN PO dilaudid.        Signed By: Iwona Carty NP     June 5, 2017

## 2017-06-05 NOTE — PROGRESS NOTES
Pt seen and examined with Jeremias Ozuna NP  History and documentation reviewed  Cath and echo images reviewed  He has, when questioned some symx of fatigue progressive over the last several months  He has significant CAD of the LAD and severe MR  Discussed plans for valve repair if possible and bio valve replacement if needed with cabg  He agrees and is aware of risks and anticipated post operative course

## 2017-06-19 ENCOUNTER — HOSPITAL ENCOUNTER (OUTPATIENT)
Dept: PULMONOLOGY | Age: 67
Discharge: HOME | End: 2017-06-19
Attending: NURSE PRACTITIONER
Payer: MEDICARE

## 2017-06-19 ENCOUNTER — HOSPITAL ENCOUNTER (OUTPATIENT)
Dept: PREADMISSION TESTING | Age: 67
Discharge: HOME | End: 2017-06-19
Attending: NURSE PRACTITIONER
Payer: MEDICARE

## 2017-06-19 ENCOUNTER — HOSPITAL ENCOUNTER (OUTPATIENT)
Dept: GENERAL RADIOLOGY | Age: 67
Discharge: HOME | End: 2017-06-19
Attending: NURSE PRACTITIONER
Payer: MEDICARE

## 2017-06-19 ENCOUNTER — HOSPITAL ENCOUNTER (OUTPATIENT)
Dept: VASCULAR SURGERY | Age: 67
Discharge: HOME | End: 2017-06-19
Attending: NURSE PRACTITIONER
Payer: MEDICARE

## 2017-06-19 DIAGNOSIS — I34.0 NON-RHEUMATIC MITRAL REGURGITATION: ICD-10-CM

## 2017-06-19 PROBLEM — I25.10 CAD (CORONARY ARTERY DISEASE): Status: ACTIVE | Noted: 2017-06-19

## 2017-06-19 LAB
ALBUMIN SERPL BCP-MCNC: 3.7 G/DL (ref 3.5–5)
ALBUMIN/GLOB SERPL: 0.9 {RATIO} (ref 1.1–2.2)
ALP SERPL-CCNC: 91 U/L (ref 45–117)
ALT SERPL-CCNC: 14 U/L (ref 12–78)
ANION GAP BLD CALC-SCNC: 6 MMOL/L (ref 5–15)
APPEARANCE UR: ABNORMAL
APTT PPP: 27.8 SEC (ref 22.1–32.5)
ARTERIAL PATENCY WRIST A: YES
AST SERPL W P-5'-P-CCNC: 12 U/L (ref 15–37)
ATRIAL RATE: 66 BPM
BACTERIA URNS QL MICRO: NEGATIVE /HPF
BASE EXCESS BLD CALC-SCNC: 1 MMOL/L
BASOPHILS # BLD AUTO: 0 K/UL (ref 0–0.1)
BASOPHILS # BLD: 0 % (ref 0–1)
BDY SITE: ABNORMAL
BILIRUB SERPL-MCNC: 0.4 MG/DL (ref 0.2–1)
BILIRUB UR QL: NEGATIVE
BUN SERPL-MCNC: 34 MG/DL (ref 6–20)
BUN/CREAT SERPL: 24 (ref 12–20)
CALCIUM SERPL-MCNC: 8.5 MG/DL (ref 8.5–10.1)
CALCULATED P AXIS, ECG09: 48 DEGREES
CALCULATED R AXIS, ECG10: -7 DEGREES
CALCULATED T AXIS, ECG11: -22 DEGREES
CHLORIDE SERPL-SCNC: 109 MMOL/L (ref 97–108)
CO2 SERPL-SCNC: 29 MMOL/L (ref 21–32)
COLOR UR: ABNORMAL
CREAT SERPL-MCNC: 1.42 MG/DL (ref 0.7–1.3)
DIAGNOSIS, 93000: NORMAL
EOSINOPHIL # BLD: 0.2 K/UL (ref 0–0.4)
EOSINOPHIL NFR BLD: 3 % (ref 0–7)
EPITH CASTS URNS QL MICRO: ABNORMAL /LPF
ERYTHROCYTE [DISTWIDTH] IN BLOOD BY AUTOMATED COUNT: 15.1 % (ref 11.5–14.5)
EST. AVERAGE GLUCOSE BLD GHB EST-MCNC: 100 MG/DL
GAS FLOW.O2 O2 DELIVERY SYS: ABNORMAL L/MIN
GLOBULIN SER CALC-MCNC: 3.9 G/DL (ref 2–4)
GLUCOSE SERPL-MCNC: 144 MG/DL (ref 65–100)
GLUCOSE UR STRIP.AUTO-MCNC: NEGATIVE MG/DL
HBA1C MFR BLD: 5.1 % (ref 4.2–6.3)
HCO3 BLD-SCNC: 26.1 MMOL/L (ref 22–26)
HCT VFR BLD AUTO: 35.9 % (ref 36.6–50.3)
HGB BLD-MCNC: 12.1 G/DL (ref 12.1–17)
HGB UR QL STRIP: NEGATIVE
HYALINE CASTS URNS QL MICRO: ABNORMAL /LPF (ref 0–5)
INR PPP: 1 (ref 0.9–1.1)
KETONES UR QL STRIP.AUTO: NEGATIVE MG/DL
LEUKOCYTE ESTERASE UR QL STRIP.AUTO: NEGATIVE
LYMPHOCYTES # BLD AUTO: 30 % (ref 12–49)
LYMPHOCYTES # BLD: 1.9 K/UL (ref 0.8–3.5)
MAGNESIUM SERPL-MCNC: 2.2 MG/DL (ref 1.6–2.4)
MCH RBC QN AUTO: 30.1 PG (ref 26–34)
MCHC RBC AUTO-ENTMCNC: 33.7 G/DL (ref 30–36.5)
MCV RBC AUTO: 89.3 FL (ref 80–99)
MONOCYTES # BLD: 0.4 K/UL (ref 0–1)
MONOCYTES NFR BLD AUTO: 7 % (ref 5–13)
NEUTS SEG # BLD: 3.9 K/UL (ref 1.8–8)
NEUTS SEG NFR BLD AUTO: 60 % (ref 32–75)
NITRITE UR QL STRIP.AUTO: NEGATIVE
O2/TOTAL GAS SETTING VFR VENT: 21 %
P-R INTERVAL, ECG05: 180 MS
PCO2 BLD: 41.3 MMHG (ref 35–45)
PH BLD: 7.41 [PH] (ref 7.35–7.45)
PH UR STRIP: 5.5 [PH] (ref 5–8)
PLATELET # BLD AUTO: 188 K/UL (ref 150–400)
PO2 BLD: 81 MMHG (ref 80–100)
POTASSIUM SERPL-SCNC: 3.4 MMOL/L (ref 3.5–5.1)
PROT SERPL-MCNC: 7.6 G/DL (ref 6.4–8.2)
PROT UR STRIP-MCNC: 30 MG/DL
PROTHROMBIN TIME: 10.4 SEC (ref 9–11.1)
Q-T INTERVAL, ECG07: 412 MS
QRS DURATION, ECG06: 92 MS
QTC CALCULATION (BEZET), ECG08: 431 MS
RBC # BLD AUTO: 4.02 M/UL (ref 4.1–5.7)
RBC #/AREA URNS HPF: ABNORMAL /HPF (ref 0–5)
SAO2 % BLD: 96 % (ref 92–97)
SODIUM SERPL-SCNC: 144 MMOL/L (ref 136–145)
SP GR UR REFRACTOMETRY: 1.02 (ref 1–1.03)
SPECIMEN TYPE: ABNORMAL
THERAPEUTIC RANGE,PTTT: NORMAL SECS (ref 58–77)
TSH SERPL DL<=0.05 MIU/L-ACNC: 2.17 UIU/ML (ref 0.36–3.74)
UA: UC IF INDICATED,UAUC: ABNORMAL
UROBILINOGEN UR QL STRIP.AUTO: 0.2 EU/DL (ref 0.2–1)
VENTRICULAR RATE, ECG03: 66 BPM
WBC # BLD AUTO: 6.5 K/UL (ref 4.1–11.1)
WBC URNS QL MICRO: ABNORMAL /HPF (ref 0–4)

## 2017-06-19 PROCEDURE — 93880 EXTRACRANIAL BILAT STUDY: CPT

## 2017-06-19 PROCEDURE — 81001 URINALYSIS AUTO W/SCOPE: CPT | Performed by: NURSE PRACTITIONER

## 2017-06-19 PROCEDURE — 85610 PROTHROMBIN TIME: CPT | Performed by: NURSE PRACTITIONER

## 2017-06-19 PROCEDURE — 93005 ELECTROCARDIOGRAM TRACING: CPT

## 2017-06-19 PROCEDURE — 82803 BLOOD GASES ANY COMBINATION: CPT

## 2017-06-19 PROCEDURE — 94010 BREATHING CAPACITY TEST: CPT

## 2017-06-19 PROCEDURE — 84443 ASSAY THYROID STIM HORMONE: CPT | Performed by: NURSE PRACTITIONER

## 2017-06-19 PROCEDURE — 85025 COMPLETE CBC W/AUTO DIFF WBC: CPT | Performed by: NURSE PRACTITIONER

## 2017-06-19 PROCEDURE — 86900 BLOOD TYPING SEROLOGIC ABO: CPT | Performed by: NURSE PRACTITIONER

## 2017-06-19 PROCEDURE — 71020 XR CHEST PA LAT: CPT

## 2017-06-19 PROCEDURE — 36600 WITHDRAWAL OF ARTERIAL BLOOD: CPT

## 2017-06-19 PROCEDURE — 83735 ASSAY OF MAGNESIUM: CPT | Performed by: NURSE PRACTITIONER

## 2017-06-19 PROCEDURE — 86923 COMPATIBILITY TEST ELECTRIC: CPT | Performed by: NURSE PRACTITIONER

## 2017-06-19 PROCEDURE — 80053 COMPREHEN METABOLIC PANEL: CPT | Performed by: NURSE PRACTITIONER

## 2017-06-19 PROCEDURE — 36415 COLL VENOUS BLD VENIPUNCTURE: CPT | Performed by: NURSE PRACTITIONER

## 2017-06-19 PROCEDURE — 85730 THROMBOPLASTIN TIME PARTIAL: CPT | Performed by: NURSE PRACTITIONER

## 2017-06-19 PROCEDURE — 83036 HEMOGLOBIN GLYCOSYLATED A1C: CPT | Performed by: NURSE PRACTITIONER

## 2017-06-19 RX ORDER — MUPIROCIN 20 MG/G
OINTMENT TOPICAL 2 TIMES DAILY
Qty: 22 G | Refills: 0 | Status: SHIPPED | OUTPATIENT
Start: 2017-06-26 | End: 2017-06-19

## 2017-06-19 RX ORDER — AMIODARONE HYDROCHLORIDE 200 MG/1
200 TABLET ORAL 2 TIMES DAILY
Qty: 10 TAB | Refills: 0 | Status: SHIPPED | OUTPATIENT
Start: 2017-06-23 | End: 2017-07-02

## 2017-06-19 RX ORDER — CHLORHEXIDINE GLUCONATE 1.2 MG/ML
15 RINSE ORAL 2 TIMES DAILY
Qty: 473 ML | Refills: 0 | Status: SHIPPED | OUTPATIENT
Start: 2017-06-26 | End: 2017-07-02

## 2017-06-19 RX ORDER — CHLORHEXIDINE GLUCONATE 1.2 MG/ML
15 RINSE ORAL 2 TIMES DAILY
Qty: 60 ML | Refills: 0 | Status: SHIPPED | OUTPATIENT
Start: 2017-06-26 | End: 2017-06-19

## 2017-06-19 RX ORDER — AMIODARONE HYDROCHLORIDE 200 MG/1
200 TABLET ORAL 2 TIMES DAILY
Qty: 10 TAB | Refills: 0 | Status: SHIPPED | OUTPATIENT
Start: 2017-06-19 | End: 2017-06-19

## 2017-06-19 RX ORDER — MUPIROCIN 20 MG/G
OINTMENT TOPICAL 2 TIMES DAILY
Qty: 22 G | Refills: 0 | Status: SHIPPED | OUTPATIENT
Start: 2017-06-26 | End: 2017-07-02

## 2017-06-19 NOTE — PROCEDURES
[address]  *** FINAL REPORT ***    Name: Lida Montano  MRN: [id number]    Outpatient  : 22 Oct 1950  HIS Order #: [id number]  [id number] [facility] Visit #: [id number]  Date: 2017    TYPE OF TEST: Cerebrovascular Duplex    REASON FOR TEST  Pre op    Right Carotid:-             Proximal               Mid                 Distal  cm/s  Systolic  Diastolic  Systolic  Diastolic  Systolic  Diastolic  CCA:     12.6      10.0                            48.0       6.0  Bulb:  ECA:    120.0       0.0  ICA:     56.0      13.0                            48.0      15.0  ICA/CCA:  1.2       2.2    ICA Stenosis:    Right Vertebral:-  Finding: Antegrade  Sys:       35.0  Jocelyne:       11.0    Right Subclavian:    Left Carotid:-            Proximal                Mid                 Distal  cm/s  Systolic  Diastolic  Systolic  Diastolic  Systolic  Diastolic  CCA:    336.5      17.0                            85.0      12.0  Bulb:  ECA:     95.0       0.0  ICA:     70.0      11.0                            55.0      12.0  ICA/CCA:  0.8       0.9    ICA Stenosis:    Left Vertebral:-  Finding:  Sys:       63.0  Jocelyne:       16.0    Left Subclavian:    INTERPRETATION/FINDINGS  PROCEDURE:  Color duplex ultrasound imaging of extracranial  cerebrovascular arteries. FINDINGS:       Right:   Internal carotid velocity is within normal limits. There is narrowing of the internal carotid flow channel on color  Doppler imaging and non-calcific plaque on B-mode imaging, consistent  with less than 50 percent stenosis. The common and external carotid  arteries are patent and without evidence of hemodynamically  significant stenosis. Left:   Internal carotid velocity is within normal limits. There   is narrowing of the internal carotid flow channel on color Doppler  imaging and non-calcific plaque on B-mode imaging, consistent with  less than 50 percent stenosis.   The common and external carotid  arteries are patent and without evidence of hemodynamically  significant stenosis. IMPRESSION:  Consistent with less than 50% stenosis of the right  internal carotid and less than 50% stenosis of the left internal  carotid. Vertebrals are patent with antegrade flow. ADDITIONAL COMMENTS    I have personally reviewed the data relevant to the interpretation of  this  study. TECHNOLOGIST: Kb Kelly  Signed: 06/19/2017 02:08 PM    PHYSICIAN: Shasta Wilson MD  Signed: 06/20/2017 06:58 AM

## 2017-06-19 NOTE — H&P
CSS   History and Physical    Subjective:      Yousuf Li is a 73 y.o. male who was referred for cardiac evaluation of mitral valve endocarditis with subsequent mitral regurgitation. Pt's PMH includes HTN, DM, hyperlipidemia, BPH, arthritis, MARIN (no longer uses CPAP). Pt was admitted to [facility] on 3/15/17-3/24/17 w/ severe mitral regurgitation in the setting of myxomatous pathology complicated by endocarditis w/ concomitant lumbar diskitis. Pt has completed 8 weeks of IV antibiotics and now on 1 month of PO levaquin, now completed on 6/5/17. Per cardiologist, pt has been asymptomatic. Repeat lumbar MRI in late April 2017, demonstrated improved findings. Pt followed up with Dr. Evelin Funez recently without issue, is due for blood cultures and CRP on 6/20/17. Pt now admits to some LE edema in last 6 weeks, and SOB at rest & w/ exertion.      Pt uses cane, has sedentary lifestyle d/t chronic back pain. Uses PO dilaudid PRN for back pain. Pt was told to not lift anything, will eventually need back fusion. Now wearing support vest on abd/back about 2.5 weeks. Pt is former tobacco smoker, quit in 2013 (about 1/4 PPD for 20 years). Only social ETOH use. No drug use. + Heart disease in Father and Hypertension in mother. Lives in [facility], about [place] miles away from [place]. Has support system.      Cardiac Testing     Cardiac catheterization 5/2017: HEMODYNAMICS:  --  Hemodynamic assessment demonstrates moderately to severely elevated  LVEDP and moderately to markedly elevated pulmonary capillary wedge  pressure. -- Herrera Varela is mild to moderate pulmonary hypertension, due to  mitral valve disease. --  CARDIAC STRUCTURES:  --  Global left ventricular function was hypercontractile. EF calculated  by contrast ventriculography was 75 %. --  No significant aortic valve gradient. --  The mitral valve exhibited severe regurgitation.     --  CORONARY CIRCULATION:  --  Mid LAD: There was a discrete 85 % stenosis at the origin of D1. IMPRESSIONS:  There is significant single vessel coronary artery disease. Left ventricular function is normal.     TTE 5/4/17: SUMMARY:  Procedure information: Limited study to evaluate mitral valve. Left ventricle: The ventricle was mildly dilated. Systolic function was  vigorous. Ejection fraction was estimated to be 70 %. No obvious wall  motion abnormalities identified in the views obtained. Wall thickness was  at the upper limits of normal.    Left atrium: The atrium was severely dilated. Mitral valve: There was a prolapse involving the anterior leaflet. There  was severe regurgitation. There was a definite, medium-sized, spherical,  mobile mass, measuring 11 mm x 7 mm on the tip of the anterior leaflet, on  the atrial aspect. COMPARISONS:  There has been no significant change. Comparison was made with the  previous study of 17-Mar-2017.     LONNIE 3/17: SUMMARY:  Left ventricle: Systolic function was vigorous. Ejection fraction was  estimated to be 65 %. There were no regional wall motion abnormalities. Left atrium: The atrium was moderately dilated. Atrial septum: Doppler and contrast studies were performed. There was no  right-to-left shunt, in the baseline state. Mitral valve: There was mild diffuse thickening. There was severe  regurgitation. There was a definite, medium-sized, multilobulated,  echogenic, mobile vegetation, measuring 12 mm x 8 mm on the tip of the  anterior leaflet, on the atrial aspect but another lobe of the mass  appears to prolapse into the LV There was a possible, small, frond-like,  loosely organized vegetation on the base of the posterior leaflet, on the  atrial aspect. Aortic valve: No obvious mass, vegetation or thrombus noted.     MRI Spine 4/14/17: There is slightly diminished fluid signal and edema within the L3-4 and L5-S1  disc space extending to the endplates more so at C8-1.  This is consistent with  improving/resolving discitis at both levels. Postinfectious changes of the facet  joints is slightly more pronounced. No epidural or paraspinal fluid collection. Past Medical History:   Diagnosis Date    Abnormal EKG     he says it has been abnormal for years    Arthritis     Chronic pain     lower lumbar    Diabetes ([address]     type II    Dyslipidemia     Enlarged prostate Dr. Crystal Velasco    HTN (hypertension)     Hypercholesterolemia     Kidney stone     Mitral regurgitation     mod-severe on echo 1/5/12    Murmur     Obesity     Other ill-defined conditions     BPH    Sleep apnea     stopped using CPAP many years ago     Past Surgical History:   Procedure Laterality Date    ECHO 2D ADULT  1/4/12    mild-mod LVH, LVEF 60%, probably grade 1 diastolic dysfunction, mod LAE, mod-severe MR (eccentroic ) - had severe HTN during study (173/108),     ECHO LIMITED  1/20/12    mod-severe MR    HX COLONOSCOPY  2013    small polyps removed; repeat in 5 years; Dr. Ewelina Kenny  9/2014    RIGHT    HX LITHOTRIPSY  2011    HX OTHER SURGICAL      LONNIE 1/31/12 - LVEF 60%, mod-severe MR (post directed), mild-mod LAE, mild atheroma aorta    HX OTHER SURGICAL      Echo 9/5/13 - mild LVH, LVEF 60 % to 65 %. Mod LAE. Mod MR (post directed)     HX OTHER SURGICAL      Echo 9/5/13 - mild LVH, LVEF 60 % to 65 %. Mod LAE.  Mod MR (post directed)     HX OTHER SURGICAL  02/2017    Bladder stones removed, and prostate \"trimmed\" down     STRESS TEST CARDIOLITE  1/4/12    walked 4:30, stopping for severe FISH, no ischemic EKG changes (inferolateral TWI at start), normal perfusion (diaphragmatic attenuation), LVEF 48%, hypertensive respone to exercise      Social History   Substance Use Topics    Smoking status: Former Smoker     Packs/day: 0.25     Years: 20.00     Quit date: 9/9/2013    Smokeless tobacco: Never Used    Alcohol use No      Family History   Problem Relation Age of Onset    Coronary Artery Disease Father    [facility] Heart Disease Father     Stroke Father     Cancer Father      prostate    Hypertension Mother     Malignant Hyperthermia Neg Hx     Pseudocholinesterase Deficiency Neg Hx     Delayed Awakening Neg Hx     Post-op Nausea/Vomiting Neg Hx     Emergence Delirium Neg Hx     Post-op Cognitive Dysfunction Neg Hx     Other Neg Hx      Prior to Admission medications    Medication Sig Start Date End Date Taking? Authorizing Provider   mupirocin (BACTROBAN) 2 % ointment by Both Nostrils route two (2) times a day for 2 days. 6/26/17 6/28/17  Dolly Cardona NP   chlorhexidine (PERIDEX) 0.12 % solution 15 mL by Swish and Spit route two (2) times a day for 2 days. 6/26/17 6/28/17  Dolly Cardona NP   amiodarone (CORDARONE) 200 mg tablet Take 1 Tab by mouth two (2) times a day for 5 days. 6/23/17 6/28/17  Dolly Cardona NP   levoFLOXacin (LEVAQUIN) 500 mg tablet Take  by mouth daily. Historical Provider   HYDROmorphone (DILAUDID) 2 mg tablet Take 1 Tab by mouth every four (4) hours as needed. Max Daily Amount: 12 mg. 3/24/17   ORLANDO Mcmullen   nebivolol (BYSTOLIC) 10 mg tablet Take 10 mg by mouth daily. Historical Provider   amLODIPine (NORVASC) 10 mg tablet Take 10 mg by mouth daily. Historical Provider   ergocalciferol (VITAMIN D2) 50,000 unit capsule Take 50,000 Units by mouth every Wednesday. Every Wednesday    Riri Pardo MD   insulin detemir (LEVEMIR) 100 unit/mL injection 30 Units by SubCUTAneous route nightly. Riri Pardo MD   insulin lispro (HUMALOG) 100 unit/mL injection 20 Units by SubCUTAneous route Before breakfast, lunch, and dinner. Riri Pardo MD   losartan (COZAAR) 100 mg tablet TAKE 1 TABLET BY MOUTH DAILY 6/20/14   Stefan Cox MD   ascorbic acid (VITAMIN C) 500 mg tablet Take 500 mg by mouth daily.     Historical Provider       Allergies   Allergen Reactions    Cephalexin Other (comments)     unknown    Oxycodone Other (comments)     Pt does not desire to take; causes altered mental status and constipation    Sulfa (Sulfonamide Antibiotics) Hives    Tamsulosin Other (comments)     Vision loss       Review of Systems:   Consititutional: Denies fever or chills. Eyes: Denies vision problems(cataracts). + glasses for reading   ENT: Denies hearing or swallowing difficulty. CV: Denies CP, claudication, ++ HTN. Resp: Denies productive cough. + Dyspnea  : Denies dialysis or kidney problems. +bladder stones, BH  GI: Denies ulcers, esophageal strictures, liver problems. M/S: ++ c/o Back pain, arthritis, LE weakness,   Skin: Denies varicose veins, + Bilat pitting LE edema. Neuro: Denies strokes, or TIAs. Psych: Denies anxiety or depression. Endocrine: Denies thyroid problems or ++ diabetes. Heme/Lymphatic: Denies easy bruising or lymphedema.        Objective:     VS:  HR:  63  Resp: 16  BP:  142/72  Temp:  98 deg oral F  02 sat: 98%  RA     Physical Exam:    General appearance: alert, cooperative, no distress  Head: normocephalic, without obvious abnormality; atraumatic  Eyes: conjunctivae/corneas clear; EOM's intact. Nose: nares normal; no drainage. Neck: no carotid bruit and no JVD  Lungs: clear to auscultation bilaterally  Heart: regular rate and rhythm; grade 3/6 systolic murmur  Abdomen: soft, non-tender; bowel sounds normal  Extremities: moves all extremities; no weakness. Skin: Skin color normal; No varicose veins. ++ 2-3 ++ pitting LE edema. Neurologic: Grossly normal     Labs:   Recent Labs      06/19/17   1254   WBC  6.5   HGB  12.1   HCT  35.9*   PLT  188   NA  144   K  3.4*   BUN  34*   CREA  1.42*   GLU  144*   INR  1.0       Diagnostics:   PA and lateral: No acute process. Carotid doppler:   Consistent with less than 50% stenosis of the right  internal carotid and less than 50% stenosis of the left internal  carotid. Vertebrals are patent with antegrade flow.     PFTS-FEV1: 2.44 --86% predicted     EKG:  NSR 60  Assessment:     Active Problems: Mitral regurgitation ()      Overview: mod-severe on echo 1/5/12      CAD (coronary artery disease) (6/19/2017)        Plan:   The risk and benefit of surgery were reviewed with patient and family and all questions answered and the patient wishes to proceed. Risk include infection, bleeding, stroke, heart attack, irregular heart rhythm, kidney failure and death. The patient was given instructions and prescriptions for bactroban, peridex and amiodarone. The patient was instructed to take last dose of Losartan on 6/25/17. Surgery is scheduled for 6/28/17. STS Risk Calculator V2.81 - Discussed by surgeon with patient. Procedure: MV Repair + CAB    Risk of Mortality: 1.953%  Morbidity or Mortality: 18.905%  Long Length of Stay: 9.118%  Short Length of Stay: 31.257%  Permanent Stroke: 1.44%  Prolonged Ventilation: 11.175%  DSW Infection: 0.816%  Renal Failure: 7.193%  Reoperation: 7.044%    Procedure: MV Replacement + CAB    Risk of Mortality: 2.546%  Morbidity or Mortality: 26.612%  Long Length of Stay: 13.38%  Short Length of Stay: 20.18%  Permanent Stroke: 1.855%  Prolonged Ventilation: 16.939%  DSW Infection: 0.891%  Renal Failure: 9.971%  Reoperation: 9.644%    1. Severe MR w/ MV endocarditis: On PO levaquin. Needs MV repair/replacement. 2. CAD: Single vessel disease, not on asa, or statin. On BB. 3. HTN: On BB, ACE, norvasc. 4. DM: On levimier, SSI.   5. Lumbar diskitis: Completed levaquin. Most recent lumbar MRI shows improvement. PRN PO dilaudid.        Signed By: Griselda Ramesh NP     June 19, 2017

## 2017-06-27 ENCOUNTER — TELEPHONE (OUTPATIENT)
Dept: CASE MANAGEMENT | Age: 67
End: 2017-06-27

## 2017-06-27 RX ORDER — ALBUMIN HUMAN 50 G/1000ML
25 SOLUTION INTRAVENOUS ONCE
Status: DISCONTINUED | OUTPATIENT
Start: 2017-06-28 | End: 2017-06-28 | Stop reason: HOSPADM

## 2017-06-27 RX ORDER — MAGNESIUM SULFATE HEPTAHYDRATE 40 MG/ML
2 INJECTION, SOLUTION INTRAVENOUS ONCE
Status: DISCONTINUED | OUTPATIENT
Start: 2017-06-28 | End: 2017-06-28 | Stop reason: HOSPADM

## 2017-06-27 RX ORDER — DIPHENHYDRAMINE HYDROCHLORIDE 50 MG/ML
12.5 INJECTION, SOLUTION INTRAMUSCULAR; INTRAVENOUS AS NEEDED
Status: CANCELLED | OUTPATIENT
Start: 2017-06-27 | End: 2017-06-27

## 2017-06-27 RX ORDER — PROTAMINE SULFATE 10 MG/ML
500 INJECTION, SOLUTION INTRAVENOUS ONCE
Status: DISCONTINUED | OUTPATIENT
Start: 2017-06-28 | End: 2017-06-28 | Stop reason: HOSPADM

## 2017-06-27 RX ORDER — HEPARIN SOD,PORCINE/0.9 % NACL 30K/1000ML
50-1000 INTRAVENOUS SOLUTION INTRAVENOUS AS NEEDED
Status: DISCONTINUED | OUTPATIENT
Start: 2017-06-28 | End: 2017-06-28 | Stop reason: HOSPADM

## 2017-06-27 RX ORDER — SODIUM CHLORIDE 0.9 % (FLUSH) 0.9 %
5-10 SYRINGE (ML) INJECTION AS NEEDED
Status: CANCELLED | OUTPATIENT
Start: 2017-06-27

## 2017-06-27 RX ORDER — MORPHINE SULFATE 10 MG/ML
2 INJECTION, SOLUTION INTRAMUSCULAR; INTRAVENOUS
Status: CANCELLED | OUTPATIENT
Start: 2017-06-27

## 2017-06-27 RX ORDER — MIDAZOLAM HYDROCHLORIDE 1 MG/ML
0.5 INJECTION, SOLUTION INTRAMUSCULAR; INTRAVENOUS
Status: CANCELLED | OUTPATIENT
Start: 2017-06-27

## 2017-06-27 RX ORDER — POTASSIUM CHLORIDE 29.8 MG/ML
20 INJECTION INTRAVENOUS ONCE
Status: DISCONTINUED | OUTPATIENT
Start: 2017-06-28 | End: 2017-06-28 | Stop reason: HOSPADM

## 2017-06-27 RX ORDER — FENTANYL CITRATE 50 UG/ML
25 INJECTION, SOLUTION INTRAMUSCULAR; INTRAVENOUS
Status: CANCELLED | OUTPATIENT
Start: 2017-06-27

## 2017-06-27 RX ORDER — SODIUM CHLORIDE, SODIUM LACTATE, POTASSIUM CHLORIDE, CALCIUM CHLORIDE 600; 310; 30; 20 MG/100ML; MG/100ML; MG/100ML; MG/100ML
125 INJECTION, SOLUTION INTRAVENOUS CONTINUOUS
Status: CANCELLED | OUTPATIENT
Start: 2017-06-27

## 2017-06-27 RX ORDER — ONDANSETRON 2 MG/ML
4 INJECTION INTRAMUSCULAR; INTRAVENOUS AS NEEDED
Status: CANCELLED | OUTPATIENT
Start: 2017-06-27

## 2017-06-27 RX ORDER — OXYCODONE HYDROCHLORIDE 5 MG/1
5 TABLET ORAL AS NEEDED
Status: CANCELLED | OUTPATIENT
Start: 2017-06-27

## 2017-06-27 RX ORDER — NITROGLYCERIN 20 MG/100ML
16.5 INJECTION INTRAVENOUS CONTINUOUS
Status: DISCONTINUED | OUTPATIENT
Start: 2017-06-28 | End: 2017-06-29

## 2017-06-27 RX ORDER — HYDROMORPHONE HYDROCHLORIDE 1 MG/ML
0.2 INJECTION, SOLUTION INTRAMUSCULAR; INTRAVENOUS; SUBCUTANEOUS
Status: CANCELLED | OUTPATIENT
Start: 2017-06-27

## 2017-06-27 RX ORDER — DOBUTAMINE HYDROCHLORIDE 200 MG/100ML
2.5-1 INJECTION INTRAVENOUS
Status: DISCONTINUED | OUTPATIENT
Start: 2017-06-28 | End: 2017-06-29

## 2017-06-27 RX ORDER — DOPAMINE HYDROCHLORIDE 320 MG/100ML
5-20 INJECTION, SOLUTION INTRAVENOUS
Status: DISCONTINUED | OUTPATIENT
Start: 2017-06-28 | End: 2017-06-28

## 2017-06-27 NOTE — TELEPHONE ENCOUNTER
Cardiac Surgery Care Coordinator-  Called Norma Mayers to review plan of care and day of surgery expectations. Encouraged Norma Mayers to verbalize and offered emotional support. Norma Mayers is without questions or concerns at this time.   Pedrito Anna, RN

## 2017-06-27 NOTE — PROGRESS NOTES
TYPE AND CROSS  17. PLEASE DRAW A REPEAT TYPE AND CROSS IN PREOP HOLDING PRIOR TO SURGERY. ORDER IS SIGNED AND HELD.

## 2017-06-28 ENCOUNTER — ANESTHESIA EVENT (OUTPATIENT)
Dept: CARDIOTHORACIC SURGERY | Age: 67
DRG: 219 | End: 2017-06-28
Payer: MEDICARE

## 2017-06-28 ENCOUNTER — HOSPITAL ENCOUNTER (INPATIENT)
Age: 67
LOS: 4 days | Discharge: HOME HEALTH | DRG: 219 | End: 2017-07-02
Attending: THORACIC SURGERY (CARDIOTHORACIC VASCULAR SURGERY) | Admitting: THORACIC SURGERY (CARDIOTHORACIC VASCULAR SURGERY)
Payer: MEDICARE

## 2017-06-28 ENCOUNTER — ANESTHESIA (OUTPATIENT)
Dept: CARDIOTHORACIC SURGERY | Age: 67
DRG: 219 | End: 2017-06-28
Payer: MEDICARE

## 2017-06-28 ENCOUNTER — APPOINTMENT (OUTPATIENT)
Dept: GENERAL RADIOLOGY | Age: 67
DRG: 219 | End: 2017-06-28
Attending: NURSE PRACTITIONER
Payer: MEDICARE

## 2017-06-28 PROBLEM — Z95.1 S/P CABG X 1: Status: ACTIVE | Noted: 2017-06-28

## 2017-06-28 PROBLEM — I34.0 NON-RHEUMATIC MITRAL REGURGITATION: Status: ACTIVE | Noted: 2017-06-28

## 2017-06-28 PROBLEM — Z98.890 S/P MVR (MITRAL VALVE REPAIR): Status: ACTIVE | Noted: 2017-06-28

## 2017-06-28 LAB
ADMINISTERED INITIALS, ADMINIT: NORMAL
ALBUMIN SERPL BCP-MCNC: 3 G/DL (ref 3.5–5)
ALBUMIN/GLOB SERPL: 1.1 {RATIO} (ref 1.1–2.2)
ALP SERPL-CCNC: 58 U/L (ref 45–117)
ALT SERPL-CCNC: 12 U/L (ref 12–78)
ANION GAP BLD CALC-SCNC: 11 MMOL/L (ref 5–15)
APTT PPP: 26.7 SEC (ref 22.1–32.5)
ARTERIAL PATENCY WRIST A: ABNORMAL
AST SERPL W P-5'-P-CCNC: 39 U/L (ref 15–37)
ATRIAL RATE: 59 BPM
BASE DEFICIT BLD-SCNC: 4 MMOL/L
BASE DEFICIT BLD-SCNC: 5 MMOL/L
BASE DEFICIT BLD-SCNC: 7 MMOL/L
BASOPHILS # BLD AUTO: 0 K/UL (ref 0–0.1)
BASOPHILS # BLD: 0 % (ref 0–1)
BDY SITE: ABNORMAL
BILIRUB SERPL-MCNC: 0.6 MG/DL (ref 0.2–1)
BUN SERPL-MCNC: 24 MG/DL (ref 6–20)
BUN/CREAT SERPL: 14 (ref 12–20)
CALCIUM SERPL-MCNC: 7.3 MG/DL (ref 8.5–10.1)
CALCULATED P AXIS, ECG09: -2 DEGREES
CALCULATED R AXIS, ECG10: -26 DEGREES
CALCULATED T AXIS, ECG11: -12 DEGREES
CHLORIDE SERPL-SCNC: 112 MMOL/L (ref 97–108)
CO2 SERPL-SCNC: 22 MMOL/L (ref 21–32)
CREAT SERPL-MCNC: 1.72 MG/DL (ref 0.7–1.3)
D50 ADMINISTERED, D50ADM: 0 ML
D50 ORDER, D50ORD: 0 ML
DIAGNOSIS, 93000: NORMAL
DIFFERENTIAL METHOD BLD: ABNORMAL
EOSINOPHIL # BLD: 0 K/UL (ref 0–0.4)
EOSINOPHIL NFR BLD: 0 % (ref 0–7)
ERYTHROCYTE [DISTWIDTH] IN BLOOD BY AUTOMATED COUNT: 15.1 % (ref 11.5–14.5)
GAS FLOW.O2 O2 DELIVERY SYS: ABNORMAL L/MIN
GAS FLOW.O2 SETTING OXYMISER: 12 BPM
GAS FLOW.O2 SETTING OXYMISER: 16 BPM
GLOBULIN SER CALC-MCNC: 2.8 G/DL (ref 2–4)
GLSCOM COMMENTS: NORMAL
GLUCOSE BLD STRIP.AUTO-MCNC: 110 MG/DL (ref 65–100)
GLUCOSE BLD STRIP.AUTO-MCNC: 112 MG/DL (ref 65–100)
GLUCOSE BLD STRIP.AUTO-MCNC: 113 MG/DL (ref 65–100)
GLUCOSE BLD STRIP.AUTO-MCNC: 114 MG/DL (ref 65–100)
GLUCOSE BLD STRIP.AUTO-MCNC: 115 MG/DL (ref 65–100)
GLUCOSE BLD STRIP.AUTO-MCNC: 118 MG/DL (ref 65–100)
GLUCOSE BLD STRIP.AUTO-MCNC: 128 MG/DL (ref 65–100)
GLUCOSE SERPL-MCNC: 128 MG/DL (ref 65–100)
GLUCOSE, GLC: 112 MG/DL
GLUCOSE, GLC: 113 MG/DL
GLUCOSE, GLC: 114 MG/DL
GLUCOSE, GLC: 115 MG/DL
GLUCOSE, GLC: 118 MG/DL
GLUCOSE, GLC: 128 MG/DL
GLUCOSE, GLC: 134 MG/DL
GLUCOSE, GLC: 144 MG/DL
GLUCOSE, GLC: 147 MG/DL
GLUCOSE, GLC: 148 MG/DL
GLUCOSE, GLC: 157 MG/DL
HCO3 BLD-SCNC: 18.8 MMOL/L (ref 22–26)
HCO3 BLD-SCNC: 21 MMOL/L (ref 22–26)
HCO3 BLD-SCNC: 21.5 MMOL/L (ref 22–26)
HCT VFR BLD AUTO: 34 % (ref 36.6–50.3)
HCT VFR BLD AUTO: 35.6 % (ref 36.6–50.3)
HGB BLD-MCNC: 11.4 G/DL (ref 12.1–17)
HGB BLD-MCNC: 11.8 G/DL (ref 12.1–17)
HIGH TARGET, HITG: 130 MG/DL
HIGH TARGET, HITG: 140 MG/DL
INR PPP: 1.1 (ref 0.9–1.1)
INSULIN ADMINSTERED, INSADM: 2.6 UNITS/HOUR
INSULIN ADMINSTERED, INSADM: 3.1 UNITS/HOUR
INSULIN ADMINSTERED, INSADM: 3.2 UNITS/HOUR
INSULIN ADMINSTERED, INSADM: 3.2 UNITS/HOUR
INSULIN ADMINSTERED, INSADM: 3.3 UNITS/HOUR
INSULIN ADMINSTERED, INSADM: 3.5 UNITS/HOUR
INSULIN ADMINSTERED, INSADM: 3.9 UNITS/HOUR
INSULIN ADMINSTERED, INSADM: 4.1 UNITS/HOUR
INSULIN ADMINSTERED, INSADM: 4.4 UNITS/HOUR
INSULIN ADMINSTERED, INSADM: 4.4 UNITS/HOUR
INSULIN ADMINSTERED, INSADM: 5 UNITS/HOUR
INSULIN ORDER, INSORD: 2.6 UNITS/HOUR
INSULIN ORDER, INSORD: 3.1 UNITS/HOUR
INSULIN ORDER, INSORD: 3.2 UNITS/HOUR
INSULIN ORDER, INSORD: 3.2 UNITS/HOUR
INSULIN ORDER, INSORD: 3.3 UNITS/HOUR
INSULIN ORDER, INSORD: 3.5 UNITS/HOUR
INSULIN ORDER, INSORD: 3.9 UNITS/HOUR
INSULIN ORDER, INSORD: 4.1 UNITS/HOUR
INSULIN ORDER, INSORD: 4.4 UNITS/HOUR
INSULIN ORDER, INSORD: 4.4 UNITS/HOUR
INSULIN ORDER, INSORD: 5 UNITS/HOUR
LOW TARGET, LOT: 100 MG/DL
LOW TARGET, LOT: 95 MG/DL
LYMPHOCYTES # BLD AUTO: 11 % (ref 12–49)
LYMPHOCYTES # BLD: 2.8 K/UL (ref 0.8–3.5)
MAGNESIUM SERPL-MCNC: 2.3 MG/DL (ref 1.6–2.4)
MAGNESIUM SERPL-MCNC: 2.4 MG/DL (ref 1.6–2.4)
MCH RBC QN AUTO: 30.4 PG (ref 26–34)
MCHC RBC AUTO-ENTMCNC: 33.5 G/DL (ref 30–36.5)
MCV RBC AUTO: 90.7 FL (ref 80–99)
MINUTES UNTIL NEXT BG, NBG: 120 MIN
MINUTES UNTIL NEXT BG, NBG: 60 MIN
MONOCYTES # BLD: 0.5 K/UL (ref 0–1)
MONOCYTES NFR BLD AUTO: 2 % (ref 5–13)
MULTIPLIER, MUL: 0.03
MULTIPLIER, MUL: 0.04
MULTIPLIER, MUL: 0.05
MULTIPLIER, MUL: 0.06
NEUTS BAND NFR BLD MANUAL: 3 % (ref 0–6)
NEUTS SEG # BLD: 21.7 K/UL (ref 1.8–8)
NEUTS SEG NFR BLD AUTO: 84 % (ref 32–75)
O2/TOTAL GAS SETTING VFR VENT: 50 %
O2/TOTAL GAS SETTING VFR VENT: 70 %
O2/TOTAL GAS SETTING VFR VENT: 80 %
ORDER INITIALS, ORDINIT: NORMAL
P-R INTERVAL, ECG05: 178 MS
PCO2 BLD: 35.3 MMHG (ref 35–45)
PCO2 BLD: 35.4 MMHG (ref 35–45)
PCO2 BLD: 47.3 MMHG (ref 35–45)
PEEP RESPIRATORY: 6 CMH2O
PH BLD: 7.27 [PH] (ref 7.35–7.45)
PH BLD: 7.33 [PH] (ref 7.35–7.45)
PH BLD: 7.38 [PH] (ref 7.35–7.45)
PLATELET # BLD AUTO: 196 K/UL (ref 150–400)
PO2 BLD: 105 MMHG (ref 80–100)
PO2 BLD: 115 MMHG (ref 80–100)
PO2 BLD: 81 MMHG (ref 80–100)
POTASSIUM SERPL-SCNC: 3.3 MMOL/L (ref 3.5–5.1)
POTASSIUM SERPL-SCNC: 4.9 MMOL/L (ref 3.5–5.1)
PRESSURE SUPPORT SETTING VENT: 5 CMH2O
PROT SERPL-MCNC: 5.8 G/DL (ref 6.4–8.2)
PROTHROMBIN TIME: 11.7 SEC (ref 9–11.1)
Q-T INTERVAL, ECG07: 560 MS
QRS DURATION, ECG06: 84 MS
QTC CALCULATION (BEZET), ECG08: 554 MS
RBC # BLD AUTO: 3.75 M/UL (ref 4.1–5.7)
RBC MORPH BLD: ABNORMAL
RBC MORPH BLD: ABNORMAL
SAO2 % BLD: 95 % (ref 92–97)
SAO2 % BLD: 98 % (ref 92–97)
SAO2 % BLD: 98 % (ref 92–97)
SERVICE CMNT-IMP: ABNORMAL
SODIUM SERPL-SCNC: 145 MMOL/L (ref 136–145)
SPECIMEN TYPE: ABNORMAL
THERAPEUTIC RANGE,PTTT: NORMAL SECS (ref 58–77)
TOTAL RESP. RATE, ITRR: 13
TOTAL RESP. RATE, ITRR: 16
TOTAL RESP. RATE, ITRR: 23
VENTILATION MODE VENT: ABNORMAL
VENTRICULAR RATE, ECG03: 59 BPM
VT SETTING VENT: 700 ML
VT SETTING VENT: 700 ML
WBC # BLD AUTO: 25 K/UL (ref 4.1–11.1)

## 2017-06-28 PROCEDURE — 74011250637 HC RX REV CODE- 250/637: Performed by: THORACIC SURGERY (CARDIOTHORACIC VASCULAR SURGERY)

## 2017-06-28 PROCEDURE — 77030002524 HC INSTR CLMP FGRTY EDWD -B: Performed by: THORACIC SURGERY (CARDIOTHORACIC VASCULAR SURGERY)

## 2017-06-28 PROCEDURE — 76060000044 HC ANESTHESIA 6.5 TO 7 HR: Performed by: THORACIC SURGERY (CARDIOTHORACIC VASCULAR SURGERY)

## 2017-06-28 PROCEDURE — 02UG0JZ SUPPLEMENT MITRAL VALVE WITH SYNTHETIC SUBSTITUTE, OPEN APPROACH: ICD-10-PCS | Performed by: THORACIC SURGERY (CARDIOTHORACIC VASCULAR SURGERY)

## 2017-06-28 PROCEDURE — 77030019702 HC WRP THER MENM -C: Performed by: THORACIC SURGERY (CARDIOTHORACIC VASCULAR SURGERY)

## 2017-06-28 PROCEDURE — 77030008771 HC TU NG SALEM SUMP -A: Performed by: ANESTHESIOLOGY

## 2017-06-28 PROCEDURE — 84132 ASSAY OF SERUM POTASSIUM: CPT | Performed by: NURSE PRACTITIONER

## 2017-06-28 PROCEDURE — 77030011235 HC DRN PERCRD SUMP MEDT -B: Performed by: THORACIC SURGERY (CARDIOTHORACIC VASCULAR SURGERY)

## 2017-06-28 PROCEDURE — 77030020268 HC MISC GENERAL SUPPLY: Performed by: THORACIC SURGERY (CARDIOTHORACIC VASCULAR SURGERY)

## 2017-06-28 PROCEDURE — 83735 ASSAY OF MAGNESIUM: CPT | Performed by: NURSE PRACTITIONER

## 2017-06-28 PROCEDURE — 77030020256 HC SOL INJ NACL 0.9%  500ML: Performed by: THORACIC SURGERY (CARDIOTHORACIC VASCULAR SURGERY)

## 2017-06-28 PROCEDURE — 82803 BLOOD GASES ANY COMBINATION: CPT

## 2017-06-28 PROCEDURE — 77030002973 HC SUT PLEDG CV SFT OVL TELE -B: Performed by: THORACIC SURGERY (CARDIOTHORACIC VASCULAR SURGERY)

## 2017-06-28 PROCEDURE — 87075 CULTR BACTERIA EXCEPT BLOOD: CPT | Performed by: THORACIC SURGERY (CARDIOTHORACIC VASCULAR SURGERY)

## 2017-06-28 PROCEDURE — 77030018836 HC SOL IRR NACL ICUM -A: Performed by: THORACIC SURGERY (CARDIOTHORACIC VASCULAR SURGERY)

## 2017-06-28 PROCEDURE — 77030026438 HC STYL ET INTUB CARD -A: Performed by: ANESTHESIOLOGY

## 2017-06-28 PROCEDURE — 77030006247 HC LD PCMKR MYOCRD BPLR TEMP MEDT -B: Performed by: THORACIC SURGERY (CARDIOTHORACIC VASCULAR SURGERY)

## 2017-06-28 PROCEDURE — 74011000258 HC RX REV CODE- 258: Performed by: NURSE PRACTITIONER

## 2017-06-28 PROCEDURE — 77030005537 HC CATH URETH BARD -A: Performed by: THORACIC SURGERY (CARDIOTHORACIC VASCULAR SURGERY)

## 2017-06-28 PROCEDURE — 77030012390 HC DRN CHST BTL GTNG -B: Performed by: THORACIC SURGERY (CARDIOTHORACIC VASCULAR SURGERY)

## 2017-06-28 PROCEDURE — 74011636637 HC RX REV CODE- 636/637

## 2017-06-28 PROCEDURE — 74011000250 HC RX REV CODE- 250

## 2017-06-28 PROCEDURE — 77030018835 HC SOL IRR LR ICUM -A: Performed by: THORACIC SURGERY (CARDIOTHORACIC VASCULAR SURGERY)

## 2017-06-28 PROCEDURE — 85018 HEMOGLOBIN: CPT | Performed by: NURSE PRACTITIONER

## 2017-06-28 PROCEDURE — 77030011255 HC DSG AQUACEL AG BMS -A: Performed by: THORACIC SURGERY (CARDIOTHORACIC VASCULAR SURGERY)

## 2017-06-28 PROCEDURE — 77030003020 HC SUT TICRN COVD -A: Performed by: THORACIC SURGERY (CARDIOTHORACIC VASCULAR SURGERY)

## 2017-06-28 PROCEDURE — 77030020263 HC SOL INJ SOD CL0.9% LFCR 1000ML: Performed by: THORACIC SURGERY (CARDIOTHORACIC VASCULAR SURGERY)

## 2017-06-28 PROCEDURE — C1751 CATH, INF, PER/CENT/MIDLINE: HCPCS

## 2017-06-28 PROCEDURE — 77030008684 HC TU ET CUF COVD -B: Performed by: ANESTHESIOLOGY

## 2017-06-28 PROCEDURE — 77030032490 HC SLV COMPR SCD KNE COVD -B

## 2017-06-28 PROCEDURE — 77030010605 HC BLWR MR MAL MEDT -B: Performed by: THORACIC SURGERY (CARDIOTHORACIC VASCULAR SURGERY)

## 2017-06-28 PROCEDURE — 77030019579 HC CBL PACE DISP REMG -B: Performed by: THORACIC SURGERY (CARDIOTHORACIC VASCULAR SURGERY)

## 2017-06-28 PROCEDURE — P9047 ALBUMIN (HUMAN), 25%, 50ML: HCPCS

## 2017-06-28 PROCEDURE — 74011250636 HC RX REV CODE- 250/636: Performed by: ANESTHESIOLOGY

## 2017-06-28 PROCEDURE — 74011000258 HC RX REV CODE- 258

## 2017-06-28 PROCEDURE — 80053 COMPREHEN METABOLIC PANEL: CPT | Performed by: NURSE PRACTITIONER

## 2017-06-28 PROCEDURE — 77030034848: Performed by: THORACIC SURGERY (CARDIOTHORACIC VASCULAR SURGERY)

## 2017-06-28 PROCEDURE — 77030010821: Performed by: THORACIC SURGERY (CARDIOTHORACIC VASCULAR SURGERY)

## 2017-06-28 PROCEDURE — 74011000272 HC RX REV CODE- 272: Performed by: THORACIC SURGERY (CARDIOTHORACIC VASCULAR SURGERY)

## 2017-06-28 PROCEDURE — 77030006994: Performed by: THORACIC SURGERY (CARDIOTHORACIC VASCULAR SURGERY)

## 2017-06-28 PROCEDURE — 77030002987 HC SUT PROL J&J -B: Performed by: THORACIC SURGERY (CARDIOTHORACIC VASCULAR SURGERY)

## 2017-06-28 PROCEDURE — 74011250636 HC RX REV CODE- 250/636: Performed by: THORACIC SURGERY (CARDIOTHORACIC VASCULAR SURGERY)

## 2017-06-28 PROCEDURE — 74011250637 HC RX REV CODE- 250/637: Performed by: NURSE PRACTITIONER

## 2017-06-28 PROCEDURE — 85610 PROTHROMBIN TIME: CPT | Performed by: NURSE PRACTITIONER

## 2017-06-28 PROCEDURE — 85025 COMPLETE CBC W/AUTO DIFF WBC: CPT | Performed by: NURSE PRACTITIONER

## 2017-06-28 PROCEDURE — 94002 VENT MGMT INPAT INIT DAY: CPT

## 2017-06-28 PROCEDURE — 77030006689 HC BLD OPHTH BVR BD -A: Performed by: THORACIC SURGERY (CARDIOTHORACIC VASCULAR SURGERY)

## 2017-06-28 PROCEDURE — 65610000003 HC RM ICU SURGICAL

## 2017-06-28 PROCEDURE — 77030020061 HC IV BLD WRMR ADMIN SET 3M -B: Performed by: ANESTHESIOLOGY

## 2017-06-28 PROCEDURE — 74011250636 HC RX REV CODE- 250/636: Performed by: NURSE PRACTITIONER

## 2017-06-28 PROCEDURE — 77030013798 HC KT TRNSDUC PRSSR EDWD -B: Performed by: THORACIC SURGERY (CARDIOTHORACIC VASCULAR SURGERY)

## 2017-06-28 PROCEDURE — 77030018315 HC SEAL FBRN TISSL10ML BAXT -F: Performed by: THORACIC SURGERY (CARDIOTHORACIC VASCULAR SURGERY)

## 2017-06-28 PROCEDURE — 87205 SMEAR GRAM STAIN: CPT | Performed by: THORACIC SURGERY (CARDIOTHORACIC VASCULAR SURGERY)

## 2017-06-28 PROCEDURE — 77030018846 HC SOL IRR STRL H20 ICUM -A: Performed by: THORACIC SURGERY (CARDIOTHORACIC VASCULAR SURGERY)

## 2017-06-28 PROCEDURE — 77030002986 HC SUT PROL J&J -A: Performed by: THORACIC SURGERY (CARDIOTHORACIC VASCULAR SURGERY)

## 2017-06-28 PROCEDURE — 77030010796: Performed by: THORACIC SURGERY (CARDIOTHORACIC VASCULAR SURGERY)

## 2017-06-28 PROCEDURE — 02100Z9 BYPASS CORONARY ARTERY, ONE ARTERY FROM LEFT INTERNAL MAMMARY, OPEN APPROACH: ICD-10-PCS | Performed by: THORACIC SURGERY (CARDIOTHORACIC VASCULAR SURGERY)

## 2017-06-28 PROCEDURE — 77030010389 HC WRE ATR PACE AEMC -B: Performed by: THORACIC SURGERY (CARDIOTHORACIC VASCULAR SURGERY)

## 2017-06-28 PROCEDURE — 77030010797: Performed by: THORACIC SURGERY (CARDIOTHORACIC VASCULAR SURGERY)

## 2017-06-28 PROCEDURE — 88305 TISSUE EXAM BY PATHOLOGIST: CPT | Performed by: THORACIC SURGERY (CARDIOTHORACIC VASCULAR SURGERY)

## 2017-06-28 PROCEDURE — 77030003010 HC SUT SURG STL J&J -B: Performed by: THORACIC SURGERY (CARDIOTHORACIC VASCULAR SURGERY)

## 2017-06-28 PROCEDURE — 77030014491 HC PLEDG PTFE BARD -A: Performed by: THORACIC SURGERY (CARDIOTHORACIC VASCULAR SURGERY)

## 2017-06-28 PROCEDURE — 77030018729 HC ELECTRD DEFIB PAD CARD -B: Performed by: THORACIC SURGERY (CARDIOTHORACIC VASCULAR SURGERY)

## 2017-06-28 PROCEDURE — 74011250636 HC RX REV CODE- 250/636

## 2017-06-28 PROCEDURE — B24BZZ4 ULTRASONOGRAPHY OF HEART WITH AORTA, TRANSESOPHAGEAL: ICD-10-PCS | Performed by: ANESTHESIOLOGY

## 2017-06-28 PROCEDURE — 77030002913 HC SUT ETHBND J&J -B: Performed by: THORACIC SURGERY (CARDIOTHORACIC VASCULAR SURGERY)

## 2017-06-28 PROCEDURE — 5A1221Z PERFORMANCE OF CARDIAC OUTPUT, CONTINUOUS: ICD-10-PCS | Performed by: THORACIC SURGERY (CARDIOTHORACIC VASCULAR SURGERY)

## 2017-06-28 PROCEDURE — 74011000250 HC RX REV CODE- 250: Performed by: NURSE PRACTITIONER

## 2017-06-28 PROCEDURE — 77030011640 HC PAD GRND REM COVD -A: Performed by: THORACIC SURGERY (CARDIOTHORACIC VASCULAR SURGERY)

## 2017-06-28 PROCEDURE — 77030020820 HC BND ANUPLST MTRL MEDT -H: Performed by: THORACIC SURGERY (CARDIOTHORACIC VASCULAR SURGERY)

## 2017-06-28 PROCEDURE — 85730 THROMBOPLASTIN TIME PARTIAL: CPT | Performed by: NURSE PRACTITIONER

## 2017-06-28 PROCEDURE — 36415 COLL VENOUS BLD VENIPUNCTURE: CPT | Performed by: NURSE PRACTITIONER

## 2017-06-28 PROCEDURE — 77030034936 HC DEV MIN COR-KNOT KT LSIS -F: Performed by: THORACIC SURGERY (CARDIOTHORACIC VASCULAR SURGERY)

## 2017-06-28 PROCEDURE — 77030006920 HC BLD STRNL SAW STRY -B: Performed by: THORACIC SURGERY (CARDIOTHORACIC VASCULAR SURGERY)

## 2017-06-28 PROCEDURE — 02QG0ZZ REPAIR MITRAL VALVE, OPEN APPROACH: ICD-10-PCS | Performed by: THORACIC SURGERY (CARDIOTHORACIC VASCULAR SURGERY)

## 2017-06-28 PROCEDURE — 71010 XR CHEST PORT: CPT

## 2017-06-28 PROCEDURE — 77030002933 HC SUT MCRYL J&J -A: Performed by: THORACIC SURGERY (CARDIOTHORACIC VASCULAR SURGERY)

## 2017-06-28 PROCEDURE — 77010033678 HC OXYGEN DAILY

## 2017-06-28 PROCEDURE — 74011000250 HC RX REV CODE- 250: Performed by: THORACIC SURGERY (CARDIOTHORACIC VASCULAR SURGERY)

## 2017-06-28 PROCEDURE — 82962 GLUCOSE BLOOD TEST: CPT

## 2017-06-28 PROCEDURE — 93325 DOPPLER ECHO COLOR FLOW MAPG: CPT

## 2017-06-28 PROCEDURE — 76010000119 HC CV SURG 6.5 TO 7 HR: Performed by: THORACIC SURGERY (CARDIOTHORACIC VASCULAR SURGERY)

## 2017-06-28 PROCEDURE — 77030012407 HC DRN WND BARD -B: Performed by: THORACIC SURGERY (CARDIOTHORACIC VASCULAR SURGERY)

## 2017-06-28 PROCEDURE — 77030010823: Performed by: THORACIC SURGERY (CARDIOTHORACIC VASCULAR SURGERY)

## 2017-06-28 PROCEDURE — 93005 ELECTROCARDIOGRAM TRACING: CPT

## 2017-06-28 DEVICE — BAND MODEL 638 W HOLDER 32MM --: Type: IMPLANTABLE DEVICE | Site: MITRAL VALVE | Status: FUNCTIONAL

## 2017-06-28 DEVICE — TISSEEL VHSD 10 ML KT 1504516] BAXTER BIOSURGERY]: Type: IMPLANTABLE DEVICE | Site: MITRAL VALVE | Status: FUNCTIONAL

## 2017-06-28 RX ORDER — SODIUM CHLORIDE 0.9 % (FLUSH) 0.9 %
20 SYRINGE (ML) INJECTION AS NEEDED
Status: DISCONTINUED | OUTPATIENT
Start: 2017-06-28 | End: 2017-06-29

## 2017-06-28 RX ORDER — LANOLIN ALCOHOL/MO/W.PET/CERES
400 CREAM (GRAM) TOPICAL EVERY 12 HOURS
Status: DISCONTINUED | OUTPATIENT
Start: 2017-06-29 | End: 2017-06-29

## 2017-06-28 RX ORDER — ACETAMINOPHEN 325 MG/1
650 TABLET ORAL EVERY 4 HOURS
Status: DISPENSED | OUTPATIENT
Start: 2017-06-28 | End: 2017-06-30

## 2017-06-28 RX ORDER — SODIUM CHLORIDE 0.9 % (FLUSH) 0.9 %
5-10 SYRINGE (ML) INJECTION AS NEEDED
Status: DISCONTINUED | OUTPATIENT
Start: 2017-06-28 | End: 2017-06-29

## 2017-06-28 RX ORDER — PAPAVERINE HYDROCHLORIDE 30 MG/ML
10 INJECTION INTRAMUSCULAR; INTRAVENOUS ONCE
Status: COMPLETED | OUTPATIENT
Start: 2017-06-28 | End: 2017-06-28

## 2017-06-28 RX ORDER — FUROSEMIDE 10 MG/ML
INJECTION INTRAMUSCULAR; INTRAVENOUS AS NEEDED
Status: DISCONTINUED | OUTPATIENT
Start: 2017-06-28 | End: 2017-06-28 | Stop reason: HOSPADM

## 2017-06-28 RX ORDER — SUFENTANIL CITRATE 50 UG/ML
INJECTION EPIDURAL; INTRAVENOUS AS NEEDED
Status: DISCONTINUED | OUTPATIENT
Start: 2017-06-28 | End: 2017-06-28 | Stop reason: HOSPADM

## 2017-06-28 RX ORDER — SODIUM CHLORIDE 9 MG/ML
25 INJECTION, SOLUTION INTRAVENOUS CONTINUOUS
Status: DISCONTINUED | OUTPATIENT
Start: 2017-06-28 | End: 2017-06-28 | Stop reason: HOSPADM

## 2017-06-28 RX ORDER — HEPARIN SODIUM 1000 [USP'U]/ML
INJECTION, SOLUTION INTRAVENOUS; SUBCUTANEOUS AS NEEDED
Status: DISCONTINUED | OUTPATIENT
Start: 2017-06-28 | End: 2017-06-28 | Stop reason: HOSPADM

## 2017-06-28 RX ORDER — INSULIN LISPRO 100 [IU]/ML
INJECTION, SOLUTION INTRAVENOUS; SUBCUTANEOUS
Status: DISCONTINUED | OUTPATIENT
Start: 2017-06-28 | End: 2017-07-02 | Stop reason: HOSPADM

## 2017-06-28 RX ORDER — MORPHINE SULFATE 2 MG/ML
2 INJECTION, SOLUTION INTRAMUSCULAR; INTRAVENOUS
Status: DISCONTINUED | OUTPATIENT
Start: 2017-06-28 | End: 2017-06-29

## 2017-06-28 RX ORDER — VANCOMYCIN 2 GRAM/500 ML IN 0.9 % SODIUM CHLORIDE INTRAVENOUS
2 ONCE
Status: COMPLETED | OUTPATIENT
Start: 2017-06-28 | End: 2017-06-28

## 2017-06-28 RX ORDER — LIDOCAINE HYDROCHLORIDE 10 MG/ML
0.1 INJECTION, SOLUTION EPIDURAL; INFILTRATION; INTRACAUDAL; PERINEURAL AS NEEDED
Status: DISCONTINUED | OUTPATIENT
Start: 2017-06-28 | End: 2017-06-28 | Stop reason: HOSPADM

## 2017-06-28 RX ORDER — MIDAZOLAM HYDROCHLORIDE 1 MG/ML
1 INJECTION, SOLUTION INTRAMUSCULAR; INTRAVENOUS AS NEEDED
Status: DISCONTINUED | OUTPATIENT
Start: 2017-06-28 | End: 2017-06-28 | Stop reason: HOSPADM

## 2017-06-28 RX ORDER — SODIUM BICARBONATE 1 MEQ/ML
SYRINGE (ML) INTRAVENOUS
Status: COMPLETED
Start: 2017-06-28 | End: 2017-06-28

## 2017-06-28 RX ORDER — SUFENTANIL CITRATE 50 UG/ML
INJECTION EPIDURAL; INTRAVENOUS
Status: DISCONTINUED | OUTPATIENT
Start: 2017-06-28 | End: 2017-06-28 | Stop reason: HOSPADM

## 2017-06-28 RX ORDER — SODIUM CHLORIDE, SODIUM LACTATE, POTASSIUM CHLORIDE, CALCIUM CHLORIDE 600; 310; 30; 20 MG/100ML; MG/100ML; MG/100ML; MG/100ML
INJECTION, SOLUTION INTRAVENOUS
Status: DISCONTINUED | OUTPATIENT
Start: 2017-06-28 | End: 2017-06-28 | Stop reason: HOSPADM

## 2017-06-28 RX ORDER — SODIUM CHLORIDE 0.9 % (FLUSH) 0.9 %
10 SYRINGE (ML) INJECTION EVERY 8 HOURS
Status: DISCONTINUED | OUTPATIENT
Start: 2017-06-28 | End: 2017-06-29

## 2017-06-28 RX ORDER — BACITRACIN 500 UNIT/G
1 PACKET (EA) TOPICAL AS NEEDED
Status: DISCONTINUED | OUTPATIENT
Start: 2017-06-28 | End: 2017-07-02 | Stop reason: HOSPADM

## 2017-06-28 RX ORDER — AMOXICILLIN 250 MG
1 CAPSULE ORAL 2 TIMES DAILY
Status: DISCONTINUED | OUTPATIENT
Start: 2017-06-29 | End: 2017-07-02 | Stop reason: HOSPADM

## 2017-06-28 RX ORDER — HEPARIN SODIUM 1000 [USP'U]/ML
2000 INJECTION, SOLUTION INTRAVENOUS; SUBCUTANEOUS ONCE
Status: COMPLETED | OUTPATIENT
Start: 2017-06-28 | End: 2017-06-28

## 2017-06-28 RX ORDER — FAMOTIDINE 20 MG/1
20 TABLET, FILM COATED ORAL EVERY 12 HOURS
Status: DISCONTINUED | OUTPATIENT
Start: 2017-06-29 | End: 2017-06-30

## 2017-06-28 RX ORDER — SODIUM CHLORIDE 0.9 % (FLUSH) 0.9 %
10 SYRINGE (ML) INJECTION EVERY 24 HOURS
Status: DISCONTINUED | OUTPATIENT
Start: 2017-06-28 | End: 2017-06-29

## 2017-06-28 RX ORDER — ALBUMIN HUMAN 50 G/1000ML
12.5 SOLUTION INTRAVENOUS
Status: DISCONTINUED | OUTPATIENT
Start: 2017-06-28 | End: 2017-06-29

## 2017-06-28 RX ORDER — INSULIN LISPRO 100 [IU]/ML
INJECTION, SOLUTION INTRAVENOUS; SUBCUTANEOUS
Status: DISCONTINUED | OUTPATIENT
Start: 2017-06-28 | End: 2017-06-30

## 2017-06-28 RX ORDER — SODIUM CHLORIDE 0.9 % (FLUSH) 0.9 %
5-10 SYRINGE (ML) INJECTION EVERY 8 HOURS
Status: DISCONTINUED | OUTPATIENT
Start: 2017-06-28 | End: 2017-06-28 | Stop reason: HOSPADM

## 2017-06-28 RX ORDER — SODIUM CHLORIDE 450 MG/100ML
10 INJECTION, SOLUTION INTRAVENOUS CONTINUOUS
Status: DISCONTINUED | OUTPATIENT
Start: 2017-06-28 | End: 2017-06-29

## 2017-06-28 RX ORDER — SUCCINYLCHOLINE CHLORIDE 20 MG/ML
INJECTION INTRAMUSCULAR; INTRAVENOUS AS NEEDED
Status: DISCONTINUED | OUTPATIENT
Start: 2017-06-28 | End: 2017-06-28 | Stop reason: HOSPADM

## 2017-06-28 RX ORDER — MAGNESIUM SULFATE 1 G/100ML
1 INJECTION INTRAVENOUS AS NEEDED
Status: DISCONTINUED | OUTPATIENT
Start: 2017-06-28 | End: 2017-06-29

## 2017-06-28 RX ORDER — SODIUM CHLORIDE 9 MG/ML
9 INJECTION, SOLUTION INTRAVENOUS CONTINUOUS
Status: DISCONTINUED | OUTPATIENT
Start: 2017-06-28 | End: 2017-06-29

## 2017-06-28 RX ORDER — VANCOMYCIN/0.9 % SOD CHLORIDE 1.5G/250ML
1500 PLASTIC BAG, INJECTION (ML) INTRAVENOUS
Status: DISCONTINUED | OUTPATIENT
Start: 2017-06-29 | End: 2017-06-29 | Stop reason: DRUGHIGH

## 2017-06-28 RX ORDER — POTASSIUM CHLORIDE 29.8 MG/ML
20 INJECTION INTRAVENOUS
Status: DISCONTINUED | OUTPATIENT
Start: 2017-06-28 | End: 2017-06-29

## 2017-06-28 RX ORDER — ONDANSETRON 2 MG/ML
4 INJECTION INTRAMUSCULAR; INTRAVENOUS
Status: DISCONTINUED | OUTPATIENT
Start: 2017-06-28 | End: 2017-07-02 | Stop reason: HOSPADM

## 2017-06-28 RX ORDER — INSULIN GLARGINE 100 [IU]/ML
1-50 INJECTION, SOLUTION SUBCUTANEOUS
Status: COMPLETED | OUTPATIENT
Start: 2017-06-28 | End: 2017-06-30

## 2017-06-28 RX ORDER — ACETAMINOPHEN 325 MG/1
650 TABLET ORAL ONCE
Status: DISCONTINUED | OUTPATIENT
Start: 2017-06-28 | End: 2017-06-28

## 2017-06-28 RX ORDER — SODIUM CHLORIDE 9 MG/ML
INJECTION, SOLUTION INTRAVENOUS
Status: DISCONTINUED | OUTPATIENT
Start: 2017-06-28 | End: 2017-06-28 | Stop reason: HOSPADM

## 2017-06-28 RX ORDER — SODIUM CHLORIDE 0.9 % (FLUSH) 0.9 %
10 SYRINGE (ML) INJECTION AS NEEDED
Status: DISCONTINUED | OUTPATIENT
Start: 2017-06-28 | End: 2017-07-02 | Stop reason: HOSPADM

## 2017-06-28 RX ORDER — GLYCOPYRROLATE 0.2 MG/ML
INJECTION INTRAMUSCULAR; INTRAVENOUS AS NEEDED
Status: DISCONTINUED | OUTPATIENT
Start: 2017-06-28 | End: 2017-06-28 | Stop reason: HOSPADM

## 2017-06-28 RX ORDER — PROTAMINE SULFATE 10 MG/ML
INJECTION, SOLUTION INTRAVENOUS AS NEEDED
Status: DISCONTINUED | OUTPATIENT
Start: 2017-06-28 | End: 2017-06-28 | Stop reason: HOSPADM

## 2017-06-28 RX ORDER — MORPHINE SULFATE 4 MG/ML
4 INJECTION, SOLUTION INTRAMUSCULAR; INTRAVENOUS
Status: DISCONTINUED | OUTPATIENT
Start: 2017-06-28 | End: 2017-06-29

## 2017-06-28 RX ORDER — DOBUTAMINE HYDROCHLORIDE 200 MG/100ML
INJECTION INTRAVENOUS
Status: DISCONTINUED | OUTPATIENT
Start: 2017-06-28 | End: 2017-06-28 | Stop reason: HOSPADM

## 2017-06-28 RX ORDER — GUAIFENESIN 100 MG/5ML
81 LIQUID (ML) ORAL DAILY
Status: DISCONTINUED | OUTPATIENT
Start: 2017-06-29 | End: 2017-07-02 | Stop reason: HOSPADM

## 2017-06-28 RX ORDER — MIDAZOLAM HYDROCHLORIDE 1 MG/ML
INJECTION, SOLUTION INTRAMUSCULAR; INTRAVENOUS AS NEEDED
Status: DISCONTINUED | OUTPATIENT
Start: 2017-06-28 | End: 2017-06-28 | Stop reason: HOSPADM

## 2017-06-28 RX ORDER — VECURONIUM BROMIDE FOR INJECTION 1 MG/ML
INJECTION, POWDER, LYOPHILIZED, FOR SOLUTION INTRAVENOUS AS NEEDED
Status: DISCONTINUED | OUTPATIENT
Start: 2017-06-28 | End: 2017-06-28 | Stop reason: HOSPADM

## 2017-06-28 RX ORDER — MIDAZOLAM HYDROCHLORIDE 1 MG/ML
1 INJECTION, SOLUTION INTRAMUSCULAR; INTRAVENOUS
Status: DISCONTINUED | OUTPATIENT
Start: 2017-06-28 | End: 2017-06-29

## 2017-06-28 RX ORDER — HYDROCODONE BITARTRATE AND ACETAMINOPHEN 5; 325 MG/1; MG/1
1 TABLET ORAL
Status: DISCONTINUED | OUTPATIENT
Start: 2017-06-28 | End: 2017-07-02 | Stop reason: HOSPADM

## 2017-06-28 RX ORDER — DEXTROSE 50 % IN WATER (D50W) INTRAVENOUS SYRINGE
12.5-25 AS NEEDED
Status: DISCONTINUED | OUTPATIENT
Start: 2017-06-28 | End: 2017-06-28 | Stop reason: ALTCHOICE

## 2017-06-28 RX ORDER — MAGNESIUM SULFATE 100 %
4 CRYSTALS MISCELLANEOUS AS NEEDED
Status: DISCONTINUED | OUTPATIENT
Start: 2017-06-28 | End: 2017-07-02 | Stop reason: HOSPADM

## 2017-06-28 RX ORDER — SODIUM CHLORIDE 0.9 % (FLUSH) 0.9 %
20 SYRINGE (ML) INJECTION EVERY 24 HOURS
Status: DISCONTINUED | OUTPATIENT
Start: 2017-06-28 | End: 2017-06-29

## 2017-06-28 RX ORDER — FACIAL-BODY WIPES
10 EACH TOPICAL DAILY PRN
Status: DISCONTINUED | OUTPATIENT
Start: 2017-06-28 | End: 2017-07-02 | Stop reason: HOSPADM

## 2017-06-28 RX ORDER — FENTANYL CITRATE 50 UG/ML
50 INJECTION, SOLUTION INTRAMUSCULAR; INTRAVENOUS AS NEEDED
Status: DISCONTINUED | OUTPATIENT
Start: 2017-06-28 | End: 2017-06-28 | Stop reason: HOSPADM

## 2017-06-28 RX ORDER — SODIUM BICARBONATE 84 MG/ML
50 INJECTION, SOLUTION INTRAVENOUS
Status: COMPLETED | OUTPATIENT
Start: 2017-06-28 | End: 2017-06-28

## 2017-06-28 RX ORDER — ESMOLOL HYDROCHLORIDE 10 MG/ML
INJECTION INTRAVENOUS AS NEEDED
Status: DISCONTINUED | OUTPATIENT
Start: 2017-06-28 | End: 2017-06-28 | Stop reason: HOSPADM

## 2017-06-28 RX ORDER — MUPIROCIN 20 MG/G
OINTMENT TOPICAL 2 TIMES DAILY
Status: DISCONTINUED | OUTPATIENT
Start: 2017-06-28 | End: 2017-07-02 | Stop reason: HOSPADM

## 2017-06-28 RX ORDER — NALOXONE HYDROCHLORIDE 0.4 MG/ML
0.4 INJECTION, SOLUTION INTRAMUSCULAR; INTRAVENOUS; SUBCUTANEOUS AS NEEDED
Status: DISCONTINUED | OUTPATIENT
Start: 2017-06-28 | End: 2017-06-29

## 2017-06-28 RX ORDER — CHLORHEXIDINE GLUCONATE 1.2 MG/ML
10 RINSE ORAL 2 TIMES DAILY
Status: DISCONTINUED | OUTPATIENT
Start: 2017-06-28 | End: 2017-07-02 | Stop reason: HOSPADM

## 2017-06-28 RX ORDER — SODIUM CHLORIDE 0.9 % (FLUSH) 0.9 %
5-10 SYRINGE (ML) INJECTION AS NEEDED
Status: DISCONTINUED | OUTPATIENT
Start: 2017-06-28 | End: 2017-06-28 | Stop reason: HOSPADM

## 2017-06-28 RX ORDER — NEOSTIGMINE METHYLSULFATE 1 MG/ML
INJECTION INTRAVENOUS AS NEEDED
Status: DISCONTINUED | OUTPATIENT
Start: 2017-06-28 | End: 2017-06-28 | Stop reason: HOSPADM

## 2017-06-28 RX ORDER — ALBUTEROL SULFATE 0.83 MG/ML
2.5 SOLUTION RESPIRATORY (INHALATION)
Status: DISCONTINUED | OUTPATIENT
Start: 2017-06-28 | End: 2017-07-02 | Stop reason: HOSPADM

## 2017-06-28 RX ORDER — SODIUM CHLORIDE, SODIUM LACTATE, POTASSIUM CHLORIDE, CALCIUM CHLORIDE 600; 310; 30; 20 MG/100ML; MG/100ML; MG/100ML; MG/100ML
25 INJECTION, SOLUTION INTRAVENOUS CONTINUOUS
Status: DISCONTINUED | OUTPATIENT
Start: 2017-06-28 | End: 2017-06-28 | Stop reason: HOSPADM

## 2017-06-28 RX ORDER — SODIUM CHLORIDE 0.9 % (FLUSH) 0.9 %
5-10 SYRINGE (ML) INJECTION EVERY 8 HOURS
Status: DISCONTINUED | OUTPATIENT
Start: 2017-06-28 | End: 2017-06-29

## 2017-06-28 RX ORDER — AMIODARONE HYDROCHLORIDE 200 MG/1
400 TABLET ORAL EVERY 12 HOURS
Status: DISCONTINUED | OUTPATIENT
Start: 2017-06-29 | End: 2017-06-29

## 2017-06-28 RX ADMIN — SODIUM CHLORIDE, SODIUM LACTATE, POTASSIUM CHLORIDE, AND CALCIUM CHLORIDE 25 ML/HR: 600; 310; 30; 20 INJECTION, SOLUTION INTRAVENOUS at 06:39

## 2017-06-28 RX ADMIN — VECURONIUM BROMIDE FOR INJECTION 1 MG: 1 INJECTION, POWDER, LYOPHILIZED, FOR SOLUTION INTRAVENOUS at 07:51

## 2017-06-28 RX ADMIN — Medication 10 ML: at 21:05

## 2017-06-28 RX ADMIN — Medication 10 ML: at 21:07

## 2017-06-28 RX ADMIN — MIDAZOLAM HYDROCHLORIDE 5 MG: 1 INJECTION, SOLUTION INTRAMUSCULAR; INTRAVENOUS at 07:00

## 2017-06-28 RX ADMIN — SUFENTANIL CITRATE 0.2 MCG/KG/HR: 50 INJECTION EPIDURAL; INTRAVENOUS at 07:47

## 2017-06-28 RX ADMIN — SODIUM CHLORIDE, SODIUM LACTATE, POTASSIUM CHLORIDE, CALCIUM CHLORIDE: 600; 310; 30; 20 INJECTION, SOLUTION INTRAVENOUS at 07:38

## 2017-06-28 RX ADMIN — Medication 10 ML: at 15:39

## 2017-06-28 RX ADMIN — FAMOTIDINE 20 MG: 10 INJECTION, SOLUTION INTRAVENOUS at 21:05

## 2017-06-28 RX ADMIN — FENTANYL CITRATE 50 MCG: 50 INJECTION, SOLUTION INTRAMUSCULAR; INTRAVENOUS at 07:01

## 2017-06-28 RX ADMIN — Medication 10 ML: at 15:11

## 2017-06-28 RX ADMIN — Medication 10 ML: at 15:10

## 2017-06-28 RX ADMIN — VECURONIUM BROMIDE FOR INJECTION 9 MG: 1 INJECTION, POWDER, LYOPHILIZED, FOR SOLUTION INTRAVENOUS at 08:00

## 2017-06-28 RX ADMIN — SUCCINYLCHOLINE CHLORIDE 160 MG: 20 INJECTION INTRAMUSCULAR; INTRAVENOUS at 07:52

## 2017-06-28 RX ADMIN — SODIUM CHLORIDE 10 ML/HR: 4.5 INJECTION, SOLUTION INTRAVENOUS at 14:56

## 2017-06-28 RX ADMIN — PHENYLEPHRINE HYDROCHLORIDE 40 MCG/MIN: 10 INJECTION INTRAVENOUS at 13:17

## 2017-06-28 RX ADMIN — SUFENTANIL CITRATE 10 MCG: 50 INJECTION EPIDURAL; INTRAVENOUS at 08:34

## 2017-06-28 RX ADMIN — SUFENTANIL CITRATE 10 MCG: 50 INJECTION EPIDURAL; INTRAVENOUS at 08:36

## 2017-06-28 RX ADMIN — Medication 2 MG: at 21:05

## 2017-06-28 RX ADMIN — GLYCOPYRROLATE 0.4 MG: 0.2 INJECTION INTRAMUSCULAR; INTRAVENOUS at 14:42

## 2017-06-28 RX ADMIN — NEOSTIGMINE METHYLSULFATE 2 MG: 1 INJECTION INTRAVENOUS at 14:42

## 2017-06-28 RX ADMIN — POTASSIUM CHLORIDE 20 MEQ: 400 INJECTION, SOLUTION INTRAVENOUS at 16:07

## 2017-06-28 RX ADMIN — HEPARIN SODIUM 45000 UNITS: 1000 INJECTION, SOLUTION INTRAVENOUS; SUBCUTANEOUS at 09:04

## 2017-06-28 RX ADMIN — DOBUTAMINE HYDROCHLORIDE 3 MCG/KG/MIN: 200 INJECTION INTRAVENOUS at 12:31

## 2017-06-28 RX ADMIN — EPINEPHRINE 1 MCG/MIN: 1 INJECTION PARENTERAL at 16:23

## 2017-06-28 RX ADMIN — FUROSEMIDE 40 MG: 10 INJECTION INTRAMUSCULAR; INTRAVENOUS at 12:33

## 2017-06-28 RX ADMIN — FAMOTIDINE 20 MG: 10 INJECTION, SOLUTION INTRAVENOUS at 15:40

## 2017-06-28 RX ADMIN — SODIUM BICARBONATE 50 MEQ: 84 INJECTION INTRAVENOUS at 19:06

## 2017-06-28 RX ADMIN — ESMOLOL HYDROCHLORIDE 10 MG: 10 INJECTION INTRAVENOUS at 08:00

## 2017-06-28 RX ADMIN — PROTAMINE SULFATE 350 MG: 10 INJECTION, SOLUTION INTRAVENOUS at 13:05

## 2017-06-28 RX ADMIN — SUFENTANIL CITRATE 30 MCG: 50 INJECTION EPIDURAL; INTRAVENOUS at 07:51

## 2017-06-28 RX ADMIN — MIDAZOLAM HYDROCHLORIDE 2 MG: 1 INJECTION, SOLUTION INTRAMUSCULAR; INTRAVENOUS at 07:51

## 2017-06-28 RX ADMIN — Medication 20 ML: at 15:11

## 2017-06-28 RX ADMIN — VANCOMYCIN HYDROCHLORIDE 2 G: 10 INJECTION, POWDER, LYOPHILIZED, FOR SOLUTION INTRAVENOUS at 08:00

## 2017-06-28 RX ADMIN — SODIUM CHLORIDE: 9 INJECTION, SOLUTION INTRAVENOUS at 07:38

## 2017-06-28 RX ADMIN — POTASSIUM CHLORIDE 20 MEQ: 400 INJECTION, SOLUTION INTRAVENOUS at 17:10

## 2017-06-28 RX ADMIN — MUPIROCIN: 20 OINTMENT TOPICAL at 19:18

## 2017-06-28 RX ADMIN — Medication 2 MG: at 18:45

## 2017-06-28 RX ADMIN — Medication 50 MEQ: at 19:00

## 2017-06-28 NOTE — IP AVS SNAPSHOT
[address] 
[phone] Patient: Chikis Wright MRN: [id number]:[id number] You are allergic to the following Allergen Reactions Cephalexin Other (comments)  
 unknown Oxycodone Other (comments) Pt does not desire to take; causes altered mental status and constipation Sulfa (Sulfonamide Antibiotics) Hives Tamsulosin Other (comments) Vision loss Recent Documentation Height Weight BMI Smoking Status 1.702 m 111.8 kg 38.6 kg/m2 Former Smoker Emergency Contacts Name Discharge Info Relation Home Work Mobile DuncanEster DISCHARGE CAREGIVER [3] Spouse [3]   [phone] MATEOCiciEster  Spouse [3] [phone] About your hospitalization You were admitted on:  June 28, 2017 You last received care in the:  [facility] 4 CV SERVICES UNIT You were discharged on:  July 2, 2017 Unit phone number:  [phone] Why you were hospitalized Your primary diagnosis was:  Not on File Your diagnoses also included:  Mitral Regurgitation, Cad (Coronary Artery Disease), Non-Rheumatic Mitral Regurgitation, S/P Mvr (Mitral Valve Repair), S/P Cabg X 1 Providers Seen During Your Hospitalizations Provider Role Specialty Primary office phone Emily West MD Attending Provider Cardiothoracic Surgery [phone] Your Primary Care Physician (PCP) Primary Care Physician Office Phone Office Fax Orlandolorenza Dalal [phone] Follow-up Information Follow up With Details Comments Contact Info On 7/3/2017 If you have not heard from the agency by 11:00am please contact them. Home Recovery Home Aide Phone: [phone] Ga Mcgee MD   [facility] [place] [address] [address] [facility] 
[phone] Your Appointments  Thursday July 06, 2017 11:30 AM EDT  
POST OP with Emily West MD  
 [facility] ([address] [place] [address]) [address] [place] [address] [place] [address] [phone]  
[phone] Monday July 31, 2017  1:00 PM EDT  
POST OP with Arielle Arita MD  
[facility] ([address] [place] [address]) [address] [place] [address] [place] [address] [phone]  
[phone] Current Discharge Medication List  
  
START taking these medications Dose & Instructions Dispensing Information Comments Morning Noon Evening Bedtime  
 aspirin 81 mg chewable tablet Your last dose was: Your next dose is:    
   
   
 Dose:  81 mg Take 1 Tab by mouth daily. Quantity:  365 Tab Refills:  0  
     
   
   
   
  
 atorvastatin 20 mg tablet Commonly known as:  LIPITOR Your last dose was: Your next dose is:    
   
   
 Dose:  20 mg Take 1 Tab by mouth nightly. Quantity:  30 Tab Refills:  1  
     
   
   
   
  
 bumetanide 1 mg tablet Commonly known as:  Castro Dilling Your last dose was: Your next dose is:    
   
   
 Dose:  1 mg Take 1 Tab by mouth daily. Quantity:  14 Tab Refills:  0  
     
   
   
   
  
 clopidogrel 75 mg Tab Commonly known as:  PLAVIX Your last dose was: Your next dose is:    
   
   
 Dose:  75 mg Take 1 Tab by mouth daily. Needs x 2 months only. Quantity:  30 Tab Refills:  1 HYDROcodone-acetaminophen 5-325 mg per tablet Commonly known as:  Janas Hortense Your last dose was: Your next dose is:    
   
   
 Dose:  1 Tab Take 1 Tab by mouth every six (6) hours as needed. Max Daily Amount: 4 Tabs. Quantity:  40 Tab Refills:  0  
     
   
   
   
  
 potassium chloride SR 20 mEq tablet Commonly known as:  K-TAB Your last dose was: Your next dose is:    
   
   
 Dose:  40 mEq Take 2 Tabs by mouth daily. Quantity:  28 Tab Refills:  0 senna-docusate 8.6-50 mg per tablet Commonly known as:  Jyl Orf Your last dose was: Your next dose is:    
   
   
 Dose:  1 Tab Take 1 Tab by mouth two (2) times a day. Quantity:  30 Tab Refills:  0 CONTINUE these medications which have CHANGED Dose & Instructions Dispensing Information Comments Morning Noon Evening Bedtime  
 amiodarone 200 mg tablet Commonly known as:  CORDARONE What changed:   
- how much to take 
- how to take this - when to take this 
- additional instructions Your last dose was: Your next dose is: Take 400 mg by mouth twice a day x 2 weeks, then decrease to 400 mg by mouth once daily x 2 weeks, then stop med. Quantity:  84 Tab Refills:  0  
     
   
   
   
  
 insulin lispro 100 unit/mL injection Commonly known as:  HumaLOG What changed:  additional instructions Your last dose was: Your next dose is:    
   
   
 Dose:  20 Units 20 Units by SubCUTAneous route Before breakfast, lunch, and dinner. Start with 10 units SQ before breakfast, lunch, dinner, --- advance as diet improves. Quantity:  1 Vial  
Refills:  1 CONTINUE these medications which have NOT CHANGED Dose & Instructions Dispensing Information Comments Morning Noon Evening Bedtime BYSTOLIC 10 mg tablet Generic drug:  nebivolol Your last dose was: Your next dose is:    
   
   
 Dose:  10 mg Take 10 mg by mouth daily. Refills:  0 LEVEMIR 100 unit/mL injection Generic drug:  insulin detemir Your last dose was: Your next dose is:    
   
   
 Dose:  30 Units 30 Units by SubCUTAneous route nightly. Refills:  0 NORVASC 10 mg tablet Generic drug:  amLODIPine Your last dose was: Your next dose is:    
   
   
 Dose:  10 mg Take 10 mg by mouth daily. Refills:  0 VITAMIN C 500 mg tablet Generic drug:  ascorbic acid (vitamin C) Your last dose was: Your next dose is:    
   
   
 Dose:  500 mg Take 500 mg by mouth daily. Refills:  0  
     
   
   
   
  
 VITAMIN D2 50,000 unit capsule Generic drug:  ergocalciferol Your last dose was: Your next dose is:    
   
   
 Dose:  [address] Units Take 50,000 Units by mouth every Wednesday. Every Wednesday Refills:  0 STOP taking these medications   
 chlorhexidine 0.12 % solution Commonly known as:  PERIDEX HYDROmorphone 2 mg tablet Commonly known as:  DILAUDID  
   
  
 losartan 100 mg tablet Commonly known as:  COZAAR  
   
  
 mupirocin 2 % ointment Commonly known as:  [facility] Where to Get Your Medications These medications were sent to [facility] [facility] [place], [address] [id number] Hours:  24-hours Phone:  [phone]  
  amiodarone 200 mg tablet  
 aspirin 81 mg chewable tablet  
 atorvastatin 20 mg tablet  
 bumetanide 1 mg tablet  
 clopidogrel 75 mg Tab  
 potassium chloride SR 20 mEq tablet  
 senna-docusate 8.6-50 mg per tablet Information on where to get these meds will be given to you by the nurse or doctor. ! Ask your nurse or doctor about these medications HYDROcodone-acetaminophen 5-325 mg per tablet  
 insulin lispro 100 unit/mL injection Discharge Instructions Cardiac Surgery Specialist 
 
[address] [place] [address], [address] [facility] Office- [phone]  Fax- [phone]       Office- [phone]  Fax- [phone] 
_____________________________________________________________ Dr. Tennille Joyner [facility]      AUTUMN Kerr CSA, PA-C, PA-C Name:Alec Phillip Surgery & Date: Procedure(s): MITRAL VALVE REPAIR, CORONARY ARTERY BYPASS GRAFTING X 1 WITH IBRAHIM, ECC, LONNIE AND EPIAORTIC U/S BY DR Abdiaziz Sorto Discharge Date: 7/2/17 MEDICATIONS: 
Please refer to your After Visit Summary for your medication list. If you do not have a prescription for a new medication, you may purchase the medication over the counter. DO NOT TAKE ANY MEDICATIONS THAT ARE NOT ON THIS LIST INSTRUCTIONS: 
NO SMOKING OR TOBACCO PRODUCTS Follow all the instructions in your discharge book You may shower. Wash all incisions twice daily with mild soap and water. No lotions, ointments or powder. Call the office immediately for any redness, swelling, or drainage from your incision. Take your temperature daily and call for a temperature of 101 degrees or higher or for any symptoms that make you think you have and infection. Weigh yourself each morning. Call if you gain more than 5 pounds in 48 hours. Use the incentive spirometer 6-8 times a day-10 breaths each time. Use a pillow or your bear to splint your breastbone when coughing or sneezing. If you feel your breast bone clicking or popping, notify the office immediately. Walk several hundred feet several times daily. DIET Eat an American Heart Association diet. If you are having trouble with your appetite, eat what you can. Try eating small, frequent meals throughout the day. ACTIVITY 
NO DRIVINGyou will be evaluated to drive at your follow up visit. Increase your activity by walking several times a day. Stay out of bed most of the day. When sitting, keep your legs elevated. You may ride in a car, but you must get out every hour and walk around. If you ride in a car with an airbag that can not be switched off, put the seat ALL the way back or ride in the back seat. NO LIFTING MORE THAN 5 POUND FOR 1 MONTH, THEN YOU CAN INCREASE TO NO MORE THAN 10 LBS FOR THE 2nd MONTH AND NO MORE THAN 15 LBS FOR THE 3rd MONTH.  YOU WILL NO LONGER HAVE ANY LIFTING RESTRICTIONS AFTER 3 MONTHS. FOLLOW UP 
1. Your first follow up appointment will be on 7-6-17 at 1130am. Please go to [facility] prior to appt for labs. Our office is located in [address] [place] [address] on floor G-5. Your second follow up appointment will be in four weeks, on 7-31-17 at 1pm. Please call our office at [phone] if you are unable to make either one of these appointments. 2. You will be receiving a call before your 5 day appointment to begin cardiac rehab. They are located in the [address] [place] [address] on [facility]. Their phone number is [phone]. Please call if you have not been contacted 2-3 weeks after discharge from the hospital. 
3. We will make an appointment with your cardiologist at your last appointment. 4. Consult you primary care physician regarding your influenza &  
pneumovax vaccines. 5.   Please bring all medications with you to your appointment. Signature:___________________________________________________ Discharge Orders None [facility] Announcement We are excited to announce that we are making your provider's discharge notes available to you in [employer]. You will see these notes when they are completed and signed by the physician that discharged you from your recent hospital stay. If you have any questions or concerns about any information you see in [employer], please call the Health Information Department where you were seen or reach out to your Primary Care Provider for more information about your plan of care. Introducing [facility]!    
 Aracely Woods introduces [employer] patient portal. Now you can access parts of your medical record, email your doctor's office, and request medication refills online. 1. In your internet browser, go to [email or web address] 2. Click on the First Time User? Click Here link in the Sign In box. You will see the New Member Sign Up page. 3. Enter your [employer] Access Code exactly as it appears below. You will not need to use this code after youve completed the sign-up process. If you do not sign up before the expiration date, you must request a new code. · [employer] Access Code: [email or web address] Expires: 7/27/2017  3:23 PM 
 
4. Enter the last four digits of your Social Security Number (xxxx) and Date of Birth (mm/dd/yyyy) as indicated and click Submit. You will be taken to the next sign-up page. 5. Create a [employer] ID. This will be your [employer] login ID and cannot be changed, so think of one that is secure and easy to remember. 6. Create a [employer] password. You can change your password at any time. 7. Enter your Password Reset Question and Answer. This can be used at a later time if you forget your password. 8. Enter your e-mail address. You will receive e-mail notification when new information is available in [address] 9. Click Sign Up. You can now view and download portions of your medical record. 10. Click the Download Summary menu link to download a portable copy of your medical information. If you have questions, please visit the Frequently Asked Questions section of the [employer] website. Remember, [employer] is NOT to be used for urgent needs. For medical emergencies, dial 911. Now available from your iPhone and Android! General Information Please provide this summary of care documentation to your next provider. Patient Signature:  ____________________________________________________________ Date:  ____________________________________________________________  
  
Dedrick Acuna  Provider Signature: ____________________________________________________________ Date:  ____________________________________________________________

## 2017-06-28 NOTE — IP AVS SNAPSHOT
Current Discharge Medication List  
  
START taking these medications Dose & Instructions Dispensing Information Comments Morning Noon Evening Bedtime  
 aspirin 81 mg chewable tablet Your last dose was: Your next dose is:    
   
   
 Dose:  81 mg Take 1 Tab by mouth daily. Quantity:  365 Tab Refills:  0  
     
   
   
   
  
 atorvastatin 20 mg tablet Commonly known as:  LIPITOR Your last dose was: Your next dose is:    
   
   
 Dose:  20 mg Take 1 Tab by mouth nightly. Quantity:  30 Tab Refills:  1  
     
   
   
   
  
 bumetanide 1 mg tablet Commonly known as:  Pammelissa Emery Your last dose was: Your next dose is:    
   
   
 Dose:  1 mg Take 1 Tab by mouth daily. Quantity:  14 Tab Refills:  0  
     
   
   
   
  
 clopidogrel 75 mg Tab Commonly known as:  PLAVIX Your last dose was: Your next dose is:    
   
   
 Dose:  75 mg Take 1 Tab by mouth daily. Needs x 2 months only. Quantity:  30 Tab Refills:  1 HYDROcodone-acetaminophen 5-325 mg per tablet Commonly known as:  Denise Angeles Your last dose was: Your next dose is:    
   
   
 Dose:  1 Tab Take 1 Tab by mouth every six (6) hours as needed. Max Daily Amount: 4 Tabs. Quantity:  40 Tab Refills:  0  
     
   
   
   
  
 potassium chloride SR 20 mEq tablet Commonly known as:  K-TAB Your last dose was: Your next dose is:    
   
   
 Dose:  40 mEq Take 2 Tabs by mouth daily. Quantity:  28 Tab Refills:  0  
     
   
   
   
  
 senna-docusate 8.6-50 mg per tablet Commonly known as:  Caroline Ruelas Your last dose was: Your next dose is:    
   
   
 Dose:  1 Tab Take 1 Tab by mouth two (2) times a day. Quantity:  30 Tab Refills:  0 CONTINUE these medications which have CHANGED Dose & Instructions Dispensing Information Comments Morning Noon Evening Bedtime  
 amiodarone 200 mg tablet Commonly known as:  CORDARONE What changed:   
- how much to take 
- how to take this - when to take this 
- additional instructions Your last dose was: Your next dose is: Take 400 mg by mouth twice a day x 2 weeks, then decrease to 400 mg by mouth once daily x 2 weeks, then stop med. Quantity:  84 Tab Refills:  0  
     
   
   
   
  
 insulin lispro 100 unit/mL injection Commonly known as:  HumaLOG What changed:  additional instructions Your last dose was: Your next dose is:    
   
   
 Dose:  20 Units 20 Units by SubCUTAneous route Before breakfast, lunch, and dinner. Start with 10 units SQ before breakfast, lunch, dinner, --- advance as diet improves. Quantity:  1 Vial  
Refills:  1 CONTINUE these medications which have NOT CHANGED Dose & Instructions Dispensing Information Comments Morning Noon Evening Bedtime BYSTOLIC 10 mg tablet Generic drug:  nebivolol Your last dose was: Your next dose is:    
   
   
 Dose:  10 mg Take 10 mg by mouth daily. Refills:  0 LEVEMIR 100 unit/mL injection Generic drug:  insulin detemir Your last dose was: Your next dose is:    
   
   
 Dose:  30 Units 30 Units by SubCUTAneous route nightly. Refills:  0 NORVASC 10 mg tablet Generic drug:  amLODIPine Your last dose was: Your next dose is:    
   
   
 Dose:  10 mg Take 10 mg by mouth daily. Refills:  0  
     
   
   
   
  
 VITAMIN C 500 mg tablet Generic drug:  ascorbic acid (vitamin C) Your last dose was: Your next dose is:    
   
   
 Dose:  500 mg Take 500 mg by mouth daily. Refills:  0  
     
   
   
   
  
 VITAMIN D2 50,000 unit capsule Generic drug:  ergocalciferol Your last dose was: Your next dose is: Dose:  [address]471 Units Take 50,000 Units by mouth every Wednesday. Every Wednesday Refills:  0 STOP taking these medications   
 chlorhexidine 0.12 % solution Commonly known as:  PERIDEX HYDROmorphone 2 mg tablet Commonly known as:  DILAUDID  
   
  
 losartan 100 mg tablet Commonly known as:  COZAAR  
   
  
 mupirocin 2 % ointment Commonly known as:  [facility] to Get Your Medications [facility] [address] [facility] [address] [address] Hours:  24-hours Phone:  [phone]  
  amiodarone 200 mg tablet  
 aspirin 81 mg chewable tablet  
 atorvastatin 20 mg tablet  
 bumetanide 1 mg tablet  
 clopidogrel 75 mg Tab  
 potassium chloride SR 20 mEq tablet  
 senna-docusate 8.6-50 mg per tablet Information on where to get these meds will be given to you by the nurse or doctor. ! Ask your nurse or doctor about these medications HYDROcodone-acetaminophen 5-325 mg per tablet  
 insulin lispro 100 unit/mL injection

## 2017-06-28 NOTE — H&P
Date of Surgery Update:  Annabella Kaur was seen and examined. History and physical has been reviewed. The patient has been examined.  There have been no significant clinical changes since the completion of the originally dated History and Physical.    Signed By: ORLANDO Griggs     June 28, 2017 7:17 AM

## 2017-06-28 NOTE — CARDIO/PULMONARY
Cardiac Wellness: Visited to provide Chikis Wright with Valve/Cabg education folder, but he is currently in the OR. Education folder was given to [employer]. Will continue to follow to educate and enroll in Cardiac Wellness at discharge. Donn Thao RN

## 2017-06-28 NOTE — ANESTHESIA PROCEDURE NOTES
Central Line and PA catheter Placement    Start time: 6/28/2017 7:02 AM  End time: 6/28/2017 7:16 AM  Performed by: Nemiah Lefort  Authorized by: Nemiah Lefort     Indications: vascular access, central pressure monitoring and need for vasopressors  Preanesthetic Checklist: patient identified, risks and benefits discussed, anesthesia consent, patient being monitored and timeout performed      Pre-procedure: All elements of maximal sterile barrier technique followed?  Yes    Maximal barrier precautions followed, 2% Chlorhexidine for cutaneous antisepsis, Hand hygiene performed prior to catheter insertion and Ultrasound guidance    Sterile Ultrasound Technique followed?: Yes          Procedure:   Prep:  Chlorhexidine  Location:  Internal jugular  Orientation:  Right  Patient position:  Trendelenburg  Catheter type:  Double lumen  Catheter size:  9 Fr  Catheter length:  16 cm  Number of attempts:  1  Successful placement: Yes      Assessment:   Post-procedure:  Catheter secured and sterile dressing applied  Assessment:  Blood return through all ports  Insertion:  Uncomplicated  Patient tolerance:  Patient tolerated the procedure well with no immediate complications  8fr CCO

## 2017-06-28 NOTE — ANESTHESIA POSTPROCEDURE EVALUATION
Post-Anesthesia Evaluation and Assessment    Patient: Beryle Gram MRN: [id number]  SSN: [id number]    YOB: 1950  Age: 73 y.o. Sex: male       Cardiovascular Function/Vital Signs  Visit Vitals    /54    Pulse (!) 57    Temp 36.4 °C (97.6 °F)    Resp (!) 0    Ht 5' 7\" (1.702 m)    Wt 109.3 kg (241 lb)    SpO2 100%    BMI 37.75 kg/m2       Patient is status post general anesthesia for Procedure(s):  MITRAL VALVE REPAIR, CORONARY ARTERY BYPASS GRAFTING X 1 WITH IBRAHIM, ECC, LONNIE AND EPIAORTIC U/S BY DR Belen Simms. Nausea/Vomiting: None    Postoperative hydration reviewed and adequate. Pain:  Pain Scale 1: Numeric (0 - 10) (06/28/17 0612)  Pain Intensity 1: 0 (06/28/17 0612)   Managed    Neurological Status:   Neuro (WDL): Within Defined Limits (06/28/17 0626)   At baseline    Mental Status and Level of Consciousness: Arousable    Pulmonary Status:   O2 Device: Room air (06/28/17 0612)   Adequate oxygenation and airway patent    Complications related to anesthesia: None    Post-anesthesia assessment completed.  No concerns    Signed By: Jon Goldsmith MD     June 28, 2017

## 2017-06-28 NOTE — BRIEF OP NOTE
BRIEF OP NOTE  Pre-Op Diagnosis: MITRAL REGURG, CAD    Post-Op Diagnosis: MITRAL REGURG, CAD      Procedure:   MITRAL VALVE REPAIR with 27 MM annuloplasty band, CORONARY ARTERY BYPASS GRAFTING X 1 WITH IBRAHIM to LAD    Surgeon: Samuel Ross MD    Assistant(s): Rafael Arechiga PA-C    Anesthesia: General     Infusions: Amiodarone, precedex, insulin, eduar, epi,     Estimated Blood Loss: 1400ml    Cell Saver: 900ml    Specimens:   ID Type Source Tests Collected by Time Destination   1 : mitral valve vegetation Fresh Mitral Valve  Meghan Rivers MD 2017 1049 Pathology   1 : mitral valve vegetation Other Mitral Valve AEROBIC BACTERIAL CULTURE Meghan Rivers MD 2017 1050 Microbiology       Drains and pacing wires: 2 atrial wires, 1 bipolar ventricular wire, 3 man drains    Complications: None    Findings: MR, CAD    Implants:   Implant Name Type Inv.  Item Serial No.  Lot No. LRB No. Used Action   BAND MODEL 638 [address] KABA 32MM --  - [id number]   BAND MODEL 638 [address] KABA 32MM --  [id number] [employer] NA N/A 1 Implanted

## 2017-06-28 NOTE — PROGRESS NOTES
Cardiac Surgery Care Coordinator- Met with the family of Izzy Rodney, introduced role of the Cardiac Surgery Co- Nurse. Reviewed plan of care and day of surgery expectations. Provided family with a contact number and an update from OR. Encouraged family to verbalize and emotional support given. Will continue to update throughout the day. 1140-Met with family of Izzy Rodney, provided family with update. Family without questions or concerns at this time. Will continue to follow for educational and emotional needs. 1405- Met with Luke Mohs family and Dr. Megan Blake. Update given, Encouraged family to verbalize and offered emotional support. Reinforced Surgical waiting room instructions, family to wait in the main surgical waiting room until contacted by the nursing staff.  Michael Fitzgerald RN

## 2017-06-28 NOTE — ANESTHESIA PREPROCEDURE EVALUATION
Anesthetic History   No history of anesthetic complications            Review of Systems / Medical History  Patient summary reviewed, nursing notes reviewed and pertinent labs reviewed    Pulmonary        Sleep apnea: No treatment           Neuro/Psych   Within defined limits           Cardiovascular    Hypertension  Valvular problems/murmurs: mitral insufficiency        CAD         GI/Hepatic/Renal         Renal disease: stones       Endo/Other    Diabetes    Obesity and arthritis     Other Findings              Physical Exam    Airway  Mallampati: II  TM Distance: > 6 cm  Neck ROM: normal range of motion   Mouth opening: Normal     Cardiovascular  Regular rate and rhythm,  S1 and S2 normal,  no murmur, click, rub, or gallop             Dental  No notable dental hx       Pulmonary  Breath sounds clear to auscultation               Abdominal  GI exam deferred       Other Findings            Anesthetic Plan    ASA: 4  Anesthesia type: general    Monitoring Plan: Arterial line, BIS, CVP, [place] and LONNIE    Post procedure ventilation   Induction: Intravenous  Anesthetic plan and risks discussed with: Patient

## 2017-06-28 NOTE — PERIOP NOTES
TRANSFER - OUT REPORT:    Verbal report given to Chioma(name) on Mirian Byrd  being transferred to CVICU(unit) for routine post - op       Report consisted of patients Situation, Background, Assessment and   Recommendations(SBAR). Information from the following report(s) OR Summary was reviewed with the receiving nurse. Opportunity for questions and clarification was provided. Patient transported by unit bed with EKG monitor and defibrillator, O2 by ambu bag, ET tube, with oximeter.

## 2017-06-28 NOTE — ANESTHESIA PROCEDURE NOTES
Arterial Line Placement    Start time: 6/28/2017 7:17 AM  End time: 6/28/2017 7:25 AM  Performed by: Yumiko Cardoza  Authorized by: Yumiko Cardoza     Pre-Procedure  Indications:  Arterial pressure monitoring and blood sampling  Preanesthetic Checklist: patient identified, risks and benefits discussed, anesthesia consent, patient being monitored, timeout performed and patient being monitored      Procedure:   Prep:  Chlorhexidine  Seldinger Technique?: Yes    Orientation:  Left  Location:  Radial artery  Catheter size:  20 G  Number of attempts:  1  Cont Cardiac Output Sensor: No      Assessment:   Post-procedure:  Line secured and sterile dressing applied  Patient Tolerance:  Patient tolerated the procedure well with no immediate complications

## 2017-06-28 NOTE — ANESTHESIA PROCEDURE NOTES
LONNIE        Procedure Details: probe placement, image aquisition & interpretation        Procedure Note    Performed by: Olive Foote  Authorized by: Olive Foote

## 2017-06-29 ENCOUNTER — APPOINTMENT (OUTPATIENT)
Dept: GENERAL RADIOLOGY | Age: 67
DRG: 219 | End: 2017-06-29
Attending: NURSE PRACTITIONER
Payer: MEDICARE

## 2017-06-29 LAB
ABO + RH BLD: NORMAL
ADMINISTERED INITIALS, ADMINIT: NORMAL
ALBUMIN SERPL BCP-MCNC: 3 G/DL (ref 3.5–5)
ALBUMIN/GLOB SERPL: 1 {RATIO} (ref 1.1–2.2)
ALP SERPL-CCNC: 56 U/L (ref 45–117)
ALT SERPL-CCNC: 13 U/L (ref 12–78)
ANION GAP BLD CALC-SCNC: 11 MMOL/L (ref 5–15)
AST SERPL W P-5'-P-CCNC: 62 U/L (ref 15–37)
BILIRUB SERPL-MCNC: 0.5 MG/DL (ref 0.2–1)
BLD PROD TYP BPU: NORMAL
BLOOD GROUP ANTIBODIES SERPL: NORMAL
BPU ID: NORMAL
BUN SERPL-MCNC: 34 MG/DL (ref 6–20)
BUN/CREAT SERPL: 18 (ref 12–20)
CALCIUM SERPL-MCNC: 8.5 MG/DL (ref 8.5–10.1)
CHLORIDE SERPL-SCNC: 112 MMOL/L (ref 97–108)
CO2 SERPL-SCNC: 22 MMOL/L (ref 21–32)
CREAT SERPL-MCNC: 1.89 MG/DL (ref 0.7–1.3)
CROSSMATCH RESULT,%XM: NORMAL
D50 ADMINISTERED, D50ADM: 0 ML
D50 ORDER, D50ORD: 0 ML
ERYTHROCYTE [DISTWIDTH] IN BLOOD BY AUTOMATED COUNT: 15.2 % (ref 11.5–14.5)
GLOBULIN SER CALC-MCNC: 3 G/DL (ref 2–4)
GLSCOM COMMENTS: NORMAL
GLUCOSE BLD STRIP.AUTO-MCNC: 100 MG/DL (ref 65–100)
GLUCOSE BLD STRIP.AUTO-MCNC: 101 MG/DL (ref 65–100)
GLUCOSE BLD STRIP.AUTO-MCNC: 105 MG/DL (ref 65–100)
GLUCOSE BLD STRIP.AUTO-MCNC: 110 MG/DL (ref 65–100)
GLUCOSE BLD STRIP.AUTO-MCNC: 110 MG/DL (ref 65–100)
GLUCOSE BLD STRIP.AUTO-MCNC: 116 MG/DL (ref 65–100)
GLUCOSE BLD STRIP.AUTO-MCNC: 119 MG/DL (ref 65–100)
GLUCOSE BLD STRIP.AUTO-MCNC: 120 MG/DL (ref 65–100)
GLUCOSE BLD STRIP.AUTO-MCNC: 131 MG/DL (ref 65–100)
GLUCOSE BLD STRIP.AUTO-MCNC: 134 MG/DL (ref 65–100)
GLUCOSE BLD STRIP.AUTO-MCNC: 138 MG/DL (ref 65–100)
GLUCOSE BLD STRIP.AUTO-MCNC: 148 MG/DL (ref 65–100)
GLUCOSE BLD STRIP.AUTO-MCNC: 148 MG/DL (ref 65–100)
GLUCOSE BLD STRIP.AUTO-MCNC: 151 MG/DL (ref 65–100)
GLUCOSE BLD STRIP.AUTO-MCNC: 170 MG/DL (ref 65–100)
GLUCOSE BLD STRIP.AUTO-MCNC: 209 MG/DL (ref 65–100)
GLUCOSE BLD STRIP.AUTO-MCNC: 94 MG/DL (ref 65–100)
GLUCOSE BLD STRIP.AUTO-MCNC: 94 MG/DL (ref 65–100)
GLUCOSE BLD STRIP.AUTO-MCNC: 99 MG/DL (ref 65–100)
GLUCOSE SERPL-MCNC: 114 MG/DL (ref 65–100)
GLUCOSE, GLC: 100 MG/DL
GLUCOSE, GLC: 101 MG/DL
GLUCOSE, GLC: 105 MG/DL
GLUCOSE, GLC: 110 MG/DL
GLUCOSE, GLC: 110 MG/DL
GLUCOSE, GLC: 116 MG/DL
GLUCOSE, GLC: 119 MG/DL
GLUCOSE, GLC: 120 MG/DL
GLUCOSE, GLC: 131 MG/DL
GLUCOSE, GLC: 134 MG/DL
GLUCOSE, GLC: 138 MG/DL
GLUCOSE, GLC: 148 MG/DL
GLUCOSE, GLC: 148 MG/DL
GLUCOSE, GLC: 151 MG/DL
GLUCOSE, GLC: 170 MG/DL
GLUCOSE, GLC: 209 MG/DL
GLUCOSE, GLC: 94 MG/DL
GLUCOSE, GLC: 94 MG/DL
GLUCOSE, GLC: 99 MG/DL
HCT VFR BLD AUTO: 30.6 % (ref 36.6–50.3)
HGB BLD-MCNC: 10.2 G/DL (ref 12.1–17)
HIGH TARGET, HITG: 130 MG/DL
INSULIN ADMINSTERED, INSADM: 1.6 UNITS/HOUR
INSULIN ADMINSTERED, INSADM: 10.1 UNITS/HOUR
INSULIN ADMINSTERED, INSADM: 2.7 UNITS/HOUR
INSULIN ADMINSTERED, INSADM: 2.7 UNITS/HOUR
INSULIN ADMINSTERED, INSADM: 3 UNITS/HOUR
INSULIN ADMINSTERED, INSADM: 3.5 UNITS/HOUR
INSULIN ADMINSTERED, INSADM: 3.6 UNITS/HOUR
INSULIN ADMINSTERED, INSADM: 3.6 UNITS/HOUR
INSULIN ADMINSTERED, INSADM: 4.2 UNITS/HOUR
INSULIN ADMINSTERED, INSADM: 4.3 UNITS/HOUR
INSULIN ADMINSTERED, INSADM: 4.4 UNITS/HOUR
INSULIN ADMINSTERED, INSADM: 5.1 UNITS/HOUR
INSULIN ADMINSTERED, INSADM: 6 UNITS/HOUR
INSULIN ADMINSTERED, INSADM: 6.5 UNITS/HOUR
INSULIN ADMINSTERED, INSADM: 7.6 UNITS/HOUR
INSULIN ADMINSTERED, INSADM: 7.7 UNITS/HOUR
INSULIN ADMINSTERED, INSADM: 8 UNITS/HOUR
INSULIN ADMINSTERED, INSADM: 8.6 UNITS/HOUR
INSULIN ADMINSTERED, INSADM: 8.6 UNITS/HOUR
INSULIN ORDER, INSORD: 1.6 UNITS/HOUR
INSULIN ORDER, INSORD: 10.1 UNITS/HOUR
INSULIN ORDER, INSORD: 2.7 UNITS/HOUR
INSULIN ORDER, INSORD: 2.7 UNITS/HOUR
INSULIN ORDER, INSORD: 3 UNITS/HOUR
INSULIN ORDER, INSORD: 3.5 UNITS/HOUR
INSULIN ORDER, INSORD: 3.6 UNITS/HOUR
INSULIN ORDER, INSORD: 3.6 UNITS/HOUR
INSULIN ORDER, INSORD: 4.2 UNITS/HOUR
INSULIN ORDER, INSORD: 4.3 UNITS/HOUR
INSULIN ORDER, INSORD: 4.4 UNITS/HOUR
INSULIN ORDER, INSORD: 5.1 UNITS/HOUR
INSULIN ORDER, INSORD: 6 UNITS/HOUR
INSULIN ORDER, INSORD: 6.5 UNITS/HOUR
INSULIN ORDER, INSORD: 7.6 UNITS/HOUR
INSULIN ORDER, INSORD: 7.7 UNITS/HOUR
INSULIN ORDER, INSORD: 8 UNITS/HOUR
INSULIN ORDER, INSORD: 8.6 UNITS/HOUR
INSULIN ORDER, INSORD: 8.6 UNITS/HOUR
LOW TARGET, LOT: 95 MG/DL
MAGNESIUM SERPL-MCNC: 2.4 MG/DL (ref 1.6–2.4)
MCH RBC QN AUTO: 30.4 PG (ref 26–34)
MCHC RBC AUTO-ENTMCNC: 33.3 G/DL (ref 30–36.5)
MCV RBC AUTO: 91.3 FL (ref 80–99)
MINUTES UNTIL NEXT BG, NBG: 120 MIN
MINUTES UNTIL NEXT BG, NBG: 60 MIN
MULTIPLIER, MUL: 0.05
MULTIPLIER, MUL: 0.06
MULTIPLIER, MUL: 0.07
MULTIPLIER, MUL: 0.08
MULTIPLIER, MUL: 0.08
MULTIPLIER, MUL: 0.09
MULTIPLIER, MUL: 0.1
MULTIPLIER, MUL: 0.1
MULTIPLIER, MUL: 0.11
ORDER INITIALS, ORDINIT: NORMAL
PLATELET # BLD AUTO: 136 K/UL (ref 150–400)
POTASSIUM SERPL-SCNC: 4.5 MMOL/L (ref 3.5–5.1)
PROT SERPL-MCNC: 6 G/DL (ref 6.4–8.2)
RBC # BLD AUTO: 3.35 M/UL (ref 4.1–5.7)
SERVICE CMNT-IMP: ABNORMAL
SERVICE CMNT-IMP: NORMAL
SODIUM SERPL-SCNC: 145 MMOL/L (ref 136–145)
SPECIMEN EXP DATE BLD: NORMAL
STATUS OF UNIT,%ST: NORMAL
UNIT DIVISION, %UDIV: 0
WBC # BLD AUTO: 12.2 K/UL (ref 4.1–11.1)

## 2017-06-29 PROCEDURE — G8978 MOBILITY CURRENT STATUS: HCPCS

## 2017-06-29 PROCEDURE — 65660000000 HC RM CCU STEPDOWN

## 2017-06-29 PROCEDURE — 74011000258 HC RX REV CODE- 258: Performed by: THORACIC SURGERY (CARDIOTHORACIC VASCULAR SURGERY)

## 2017-06-29 PROCEDURE — 83735 ASSAY OF MAGNESIUM: CPT | Performed by: NURSE PRACTITIONER

## 2017-06-29 PROCEDURE — 74011250637 HC RX REV CODE- 250/637: Performed by: NURSE PRACTITIONER

## 2017-06-29 PROCEDURE — 71010 XR CHEST PORT: CPT

## 2017-06-29 PROCEDURE — 74011250636 HC RX REV CODE- 250/636: Performed by: NURSE PRACTITIONER

## 2017-06-29 PROCEDURE — 97161 PT EVAL LOW COMPLEX 20 MIN: CPT

## 2017-06-29 PROCEDURE — 97530 THERAPEUTIC ACTIVITIES: CPT

## 2017-06-29 PROCEDURE — 77010033678 HC OXYGEN DAILY

## 2017-06-29 PROCEDURE — 97116 GAIT TRAINING THERAPY: CPT

## 2017-06-29 PROCEDURE — 80053 COMPREHEN METABOLIC PANEL: CPT | Performed by: NURSE PRACTITIONER

## 2017-06-29 PROCEDURE — 74011636637 HC RX REV CODE- 636/637: Performed by: THORACIC SURGERY (CARDIOTHORACIC VASCULAR SURGERY)

## 2017-06-29 PROCEDURE — 97165 OT EVAL LOW COMPLEX 30 MIN: CPT

## 2017-06-29 PROCEDURE — 82962 GLUCOSE BLOOD TEST: CPT

## 2017-06-29 PROCEDURE — 74011250637 HC RX REV CODE- 250/637: Performed by: PHYSICIAN ASSISTANT

## 2017-06-29 PROCEDURE — G8979 MOBILITY GOAL STATUS: HCPCS

## 2017-06-29 PROCEDURE — 85027 COMPLETE CBC AUTOMATED: CPT | Performed by: NURSE PRACTITIONER

## 2017-06-29 PROCEDURE — 36415 COLL VENOUS BLD VENIPUNCTURE: CPT | Performed by: NURSE PRACTITIONER

## 2017-06-29 RX ORDER — AMIODARONE HYDROCHLORIDE 200 MG/1
200 TABLET ORAL EVERY 12 HOURS
Status: DISCONTINUED | OUTPATIENT
Start: 2017-06-29 | End: 2017-07-01

## 2017-06-29 RX ORDER — ZOLPIDEM TARTRATE 5 MG/1
5 TABLET ORAL
Status: DISCONTINUED | OUTPATIENT
Start: 2017-06-29 | End: 2017-07-02 | Stop reason: HOSPADM

## 2017-06-29 RX ORDER — SODIUM CHLORIDE 0.9 % (FLUSH) 0.9 %
5-10 SYRINGE (ML) INJECTION AS NEEDED
Status: DISCONTINUED | OUTPATIENT
Start: 2017-06-29 | End: 2017-07-02 | Stop reason: HOSPADM

## 2017-06-29 RX ORDER — LANOLIN ALCOHOL/MO/W.PET/CERES
1 CREAM (GRAM) TOPICAL
Status: DISCONTINUED | OUTPATIENT
Start: 2017-06-30 | End: 2017-07-02 | Stop reason: HOSPADM

## 2017-06-29 RX ORDER — NEBIVOLOL 2.5 MG/1
2.5 TABLET ORAL DAILY
Status: DISCONTINUED | OUTPATIENT
Start: 2017-06-29 | End: 2017-07-02 | Stop reason: HOSPADM

## 2017-06-29 RX ORDER — ATORVASTATIN CALCIUM 20 MG/1
20 TABLET, FILM COATED ORAL
Status: DISCONTINUED | OUTPATIENT
Start: 2017-06-29 | End: 2017-07-02 | Stop reason: HOSPADM

## 2017-06-29 RX ORDER — SODIUM CHLORIDE 0.9 % (FLUSH) 0.9 %
5-10 SYRINGE (ML) INJECTION EVERY 8 HOURS
Status: DISCONTINUED | OUTPATIENT
Start: 2017-06-29 | End: 2017-07-02 | Stop reason: HOSPADM

## 2017-06-29 RX ORDER — VANCOMYCIN HYDROCHLORIDE
1250 ONCE
Status: COMPLETED | OUTPATIENT
Start: 2017-06-30 | End: 2017-06-30

## 2017-06-29 RX ADMIN — Medication 400 MG: at 08:17

## 2017-06-29 RX ADMIN — ACETAMINOPHEN 650 MG: 325 TABLET ORAL at 16:05

## 2017-06-29 RX ADMIN — HYDROCODONE BITARTRATE AND ACETAMINOPHEN 1 TABLET: 5; 325 TABLET ORAL at 21:10

## 2017-06-29 RX ADMIN — DOCUSATE SODIUM AND SENNOSIDES 1 TABLET: 8.6; 5 TABLET, FILM COATED ORAL at 08:18

## 2017-06-29 RX ADMIN — Medication 10 ML: at 06:20

## 2017-06-29 RX ADMIN — HYDROCODONE BITARTRATE AND ACETAMINOPHEN 1 TABLET: 5; 325 TABLET ORAL at 03:57

## 2017-06-29 RX ADMIN — FAMOTIDINE 20 MG: 20 TABLET ORAL at 21:09

## 2017-06-29 RX ADMIN — ACETAMINOPHEN 650 MG: 325 TABLET ORAL at 02:09

## 2017-06-29 RX ADMIN — SODIUM CHLORIDE 10.1 UNITS/HR: 900 INJECTION, SOLUTION INTRAVENOUS at 10:33

## 2017-06-29 RX ADMIN — MUPIROCIN: 20 OINTMENT TOPICAL at 18:10

## 2017-06-29 RX ADMIN — SODIUM CHLORIDE 6.5 UNITS/HR: 900 INJECTION, SOLUTION INTRAVENOUS at 12:40

## 2017-06-29 RX ADMIN — ACETAMINOPHEN 650 MG: 325 TABLET ORAL at 11:16

## 2017-06-29 RX ADMIN — HYDROCODONE BITARTRATE AND ACETAMINOPHEN 1 TABLET: 5; 325 TABLET ORAL at 00:07

## 2017-06-29 RX ADMIN — Medication 4 MG: at 01:40

## 2017-06-29 RX ADMIN — CHLORHEXIDINE GLUCONATE 10 ML: 1.2 RINSE ORAL at 18:14

## 2017-06-29 RX ADMIN — AMIODARONE HYDROCHLORIDE 400 MG: 200 TABLET ORAL at 08:17

## 2017-06-29 RX ADMIN — FAMOTIDINE 20 MG: 20 TABLET ORAL at 08:17

## 2017-06-29 RX ADMIN — VANCOMYCIN HYDROCHLORIDE 1500 MG: 10 INJECTION, POWDER, LYOPHILIZED, FOR SOLUTION INTRAVENOUS at 02:08

## 2017-06-29 RX ADMIN — DOCUSATE SODIUM AND SENNOSIDES 1 TABLET: 8.6; 5 TABLET, FILM COATED ORAL at 18:08

## 2017-06-29 RX ADMIN — ASPIRIN 81 MG 81 MG: 81 TABLET ORAL at 08:17

## 2017-06-29 RX ADMIN — Medication 2 MG: at 03:57

## 2017-06-29 RX ADMIN — NEBIVOLOL HYDROCHLORIDE 2.5 MG: 2.5 TABLET ORAL at 09:10

## 2017-06-29 RX ADMIN — ACETAMINOPHEN 650 MG: 325 TABLET ORAL at 08:17

## 2017-06-29 RX ADMIN — ATORVASTATIN CALCIUM 20 MG: 20 TABLET, FILM COATED ORAL at 21:09

## 2017-06-29 RX ADMIN — Medication 10 ML: at 14:00

## 2017-06-29 RX ADMIN — MUPIROCIN: 20 OINTMENT TOPICAL at 08:19

## 2017-06-29 RX ADMIN — Medication 10 ML: at 21:09

## 2017-06-29 RX ADMIN — ACETAMINOPHEN 650 MG: 325 TABLET ORAL at 20:00

## 2017-06-29 NOTE — PROGRESS NOTES
Problem: Falls - Risk of  Goal: *Knowledge of fall prevention  Outcome: Progressing Towards Goal  Pt uses call bell for assistance    Problem: CABG: Day of Surgery (Initiate SCIP measures for post-op care)  Goal: *Hemodynamically stable (eg: Cardiac output/index; pulmonary arterial pressures; mixed venous oxygen saturation within set parameters)  Outcome: Progressing Towards Goal  Weaning of drips per protocol  Goal: *Lungs clear or at baseline  Outcome: Progressing Towards Goal  Pt on 4 liters NC  Goal: *Orients easily following extubation  Outcome: Progressing Towards Goal  Pt AOx4  Goal: *Optimal pain control at patients stated goal  Outcome: Progressing Towards Goal  Pt using Morphine and Norco for pain

## 2017-06-29 NOTE — PROGRESS NOTES
Problem: Self Care Deficits Care Plan (Adult)  Goal: *Acute Goals and Plan of Care (Insert Text)  Occupational Therapy Goals  Initiated 6/29/2017  1. Patient will perform ADLs standing 5 mins without fatigue or LOB with modified independence within 7 day(s). 2. Patient will perform lower body ADLs with AE modified independence within 7 day(s). 3. Patient will perform bathing upper body to knees with modified independence within 7 day(s). 4. Patient will perform toilet transfers with modified independence within 7 day(s). 5. Patient will perform all aspects of toileting with modified independence within 7 day(s). 6. Patient will participate in cardiac/sternal upper extremity therapeutic exercise/activities to increase independence with ADLs with modified independence for 5 minutes within 7 day(s). OCCUPATIONAL THERAPY EVALUATION  Patient: Chalino Funez (18 y.o. male)  Date: 6/29/2017  Primary Diagnosis: MITRAL REGURG, CAD  CAD (coronary artery disease)  Non-rheumatic mitral regurgitation  Procedure(s) (LRB):  MITRAL VALVE REPAIR, CORONARY ARTERY BYPASS GRAFTING X 1 WITH IBRAHIM, ECC, LONNIE AND EPIAORTIC U/S BY DR Jaime Foley (N/A) 1 Day Post-Op   Precautions:   Sternal      ASSESSMENT :  Based on the objective data described below, the patient presents with setup to min A upper body ADLs, total A lower body ADLs. ADLs limited by strength, LE ROM, sternal precautions, standing tolerance, acute L volar thumb - digits #3 numb and recent / pre-op back pain. Patient is highly motivated to be independent, to discharge home with family A PRN. Recommend with nursing patient to complete as able in order to maintain strength, endurance and independence: ADLs with supervision/setup, OOB to chair 3x/day and mobilizing to the bathroom for toileting with 1 assist. Thank you for your assistance. Patient will benefit from skilled intervention to address the above impairments.   Patients rehabilitation potential is considered to be Good  Factors which may influence rehabilitation potential include:   [X]             None noted  [ ]             Mental ability/status  [ ]             Medical condition  [ ]             Home/family situation and support systems  [ ]             Safety awareness  [ ]             Pain tolerance/management  [ ]             Other:        PLAN :  Recommendations and Planned Interventions:  [X]               Self Care Training                  [X]        Therapeutic Activities  [X]               Functional Mobility Training    [ ]        Cognitive Retraining  [X]               Therapeutic Exercises           [X]        Endurance Activities  [X]               Balance Training                   [ ]        Neuromuscular Re-Education  [ ]               Visual/Perceptual Training     [X]   Home Safety Training  [X]               Patient Education                 [X]        Family Training/Education  [ ]               Other (comment):     Frequency/Duration: Patient will be followed by occupational therapy 5 times a week to address goals. Discharge Recommendations: None  Further Equipment Recommendations for Discharge: none noted        SUBJECTIVE:   Patient stated I have my wife to help if I need it.       OBJECTIVE DATA SUMMARY:   HISTORY:   Past Medical History:   Diagnosis Date    Abnormal EKG       he says it has been abnormal for years    Arthritis      CAD (coronary artery disease)      Chronic pain       lower lumbar    Diabetes ([facility] [address]       type II    Dyslipidemia      Enlarged prostate Dr. Sohail Harris    HTN (hypertension)      Hypercholesterolemia      Kidney stone      Mitral regurgitation       mod-severe on echo 1/5/12    Murmur      Obesity      Other ill-defined conditions       BPH    Sleep apnea       stopped using CPAP many years ago     Past Surgical History:   Procedure Laterality Date    ECHO 2D ADULT   1/4/12     mild-mod LVH, LVEF 60%, probably grade 1 diastolic dysfunction, mod LAE, mod-severe MR (eccentroic ) - had severe HTN during study (173/108),     ECHO LIMITED   1/20/12     mod-severe MR    HX COLONOSCOPY   2013     small polyps removed; repeat in 5 years; Dr. Laura Cornell   9/2014     RIGHT    HX LITHOTRIPSY   2011    HX OTHER SURGICAL         LONNIE 1/31/12 - LVEF 60%, mod-severe MR (post directed), mild-mod LAE, mild atheroma aorta    HX OTHER SURGICAL         Echo 9/5/13 - mild LVH, LVEF 60 % to 65 %. Mod LAE. Mod MR (post directed)     HX OTHER SURGICAL         Echo 9/5/13 - mild LVH, LVEF 60 % to 65 %. Mod LAE. Mod MR (post directed)     HX OTHER SURGICAL   02/2017     Bladder stones removed, and prostate \"trimmed\" down     STRESS TEST CARDIOLITE   1/4/12     walked 4:30, stopping for severe FISH, no ischemic EKG changes (inferolateral TWI at start), normal perfusion (diaphragmatic attenuation), LVEF 48%, hypertensive respone to exercise        Prior Level of Function/Home Situation: independent with upper body ADLs, max A knees-feet 2* back injury in which has been wearing back brace last 2 weeks and possible sx in the future. Wife independent and can A PRN. Retired HVAC worker. Expanded or extensive additional review of patient history:      Home Situation  Home Environment: Private residence  # Steps to Enter: 4  One/Two Story Residence: Two story  # of Interior Steps: 15  Interior Rails: Left  Lift Chair Available: No  Living Alone: No  Support Systems: Spouse/Significant Other/Partner  Patient Expects to be Discharged to[de-identified] Private residence  Current DME Used/Available at Home: Cane, straight, Glucometer, Blood pressure cuff, Grab bars, Raised toilet seat  Tub or Shower Type: Tub/Shower combination  [X]  Right hand dominant             [ ]  Left hand dominant     EXAMINATION OF PERFORMANCE DEFICITS:  Cognitive/Behavioral Status:  Neurologic State: Alert; Appropriate for age  Orientation Level: Oriented X4  Cognition: Appropriate decision making; Appropriate for age attention/concentration; Appropriate safety awareness; Follows commands  Perception: Appears intact  Perseveration: No perseveration noted  Safety/Judgement: Awareness of environment;Good awareness of safety precautions (recalled 3/3 sternal precautions)     Skin: intact sternal bandage, R CIV bandage s/p removal, chest tube and R PIV intact     Edema: slightly increased B Les     Hearing: Auditory  Auditory Impairment: None  Hearing Aids/Status: Does not own     Vision/Perceptual:                           Acuity: Impaired near vision    Corrective Lenses: Reading glasses     Range of Motion:     AROM: Within functional limits                          Strength:  +3/5 during ADLs, not formally tested 2* precautions  Strength: Within functional limits                 Coordination:  Coordination: Within functional limits  Fine Motor Skills-Upper: Left Impaired;Right Intact    Gross Motor Skills-Upper: Left Intact; Right Intact     Tone & Sensation:     Tone: Normal  Sensation: Impaired (L digits volar thumb - digits 3 numb)                       Balance:  Sitting: Intact; Without support     Functional Mobility and Transfers for ADLs:  Bed Mobility:        Transfers: Toilet Transfer : Total assistance  Tub Transfer:  Total assistance     ADL Assessment:  Feeding: Supervision setup containers 2* L hand numbness     Oral Facial Hygiene/Grooming: Supervision increased time 2* L hand numb     Bathing: Maximum assistance infer for endurance and ROM, back A     Upper Body Dressing: Minimum assistance infer from ROM , will need A to pull down back      Lower Body Dressing: Maximum assistance poor tailor sitting and functional reach 2* back pain     Toileting: Maximum assistance poor reach at this time                 ADL Intervention and task modifications:          Patient instructed no asymmetrical reaching over head to ensure B UEs when shoulders >90*, prevent deep bending and valsalva maneuvers. Instruction if continued pain at home with shoulder IR for BM hygiene can use wet wipes and toilet tongs PRN. May have to adjust home setup to increase ease with items closer to waist height, don clothing tailor sitting and don all clothing while sitting prior to standing. Instruction on upper body dressing techniques of over head, then arms through to decrease pain and unilateral shoulder flexion >90*. Unable to teach AE at this time 2* sternal pain and restrictions. Increase activity tolerance for home by pacing self with increasing the arm exercises, sitting duration, frequency OOB, walking, standing, and ADLs. Instructed and indicated understanding of s/s of too much activity, how to respond to s/s safely. Handout provided. Cognitive Retraining  Safety/Judgement: Awareness of environment;Good awareness of safety precautions (recalled 3/3 sternal precautions)     Therapeutic Exercise:  Patient instructed on the benefits and demonstrated cardiac exercises while sitting in chair with supervision PLB techniques. Instructed and indicated understanding on how to progress reps, sets against gravity, working up to 5 lbs, standing and so on based on surgeon clearance for more weight in prep for basic and instrumental ADL. Can use household items for weights.       CARDIAC   EXERCISE    Sets    Reps    Active  Active Assist    Passive  Self ROM    Comments    Shoulder flexion  1  5   [X]                            [ ]                             [ ]                             [ ]                                 Shoulder abduction  1  5  [X]                             [ ]                             [ ]                             [ ]                                 Scapular elevation  1  5  [X]                             [ ]                              [ ]                             [ ] Scapular retraction  1  5  [X]                             [ ]                             [ ]                             [ ]                                 Trunk rotation  1  5  [X]                             [ ]                             [ ]                             [ ]                                 Trunk sidebending  1  5  [X]                             [ ]                              [ ]                             [ ]                                             Pain:  Pain Scale 1: Numeric (0 - 10)  Pain Intensity 1: 2  Pain Location 1: Incisional  Pain Orientation 1: Anterior  Pain Description 1: Aching  Pain Intervention(s) 1: Medication (see MAR)  Activity Tolerance:   VSS 2L NC  Please refer to the flowsheet for vital signs taken during this treatment. After treatment:   [X] Patient left in no apparent distress sitting up in chair  [ ] Patient left in no apparent distress in bed  [X] Call bell left within reach  [X] Nursing notified  [X] Caregiver present  [ ] Bed alarm activated      COMMUNICATION/EDUCATION:   The patients plan of care was discussed with: Physical Therapist and Registered Nurse.  [X] Home safety education was provided and the patient/caregiver indicated understanding. [X] Patient/family have participated as able in goal setting and plan of care. [X] Patient/family agree to work toward stated goals and plan of care. [ ] Patient understands intent and goals of therapy, but is neutral about his/her participation. [ ] Patient is unable to participate in goal setting and plan of care. This patients plan of care is appropriate for delegation to [facility].      Thank you for this referral.  Nish Jovel  Time Calculation: 22 mins

## 2017-06-29 NOTE — PROGRESS NOTES
1100: Bedside and Verbal shift change report given to Lisa Salcido RN (oncoming nurse) by Mukund King (offgoing nurse). Report included the following information SBAR, Kardex, OR Summary, Procedure Summary, Intake/Output, MAR and Recent Results. 1148: Pt had 5 beat run of v tach. Pt asymptomatic. Spoke with Delmis Christianson and reviewed electrolytes from this morning. No new orders received. Will continue to monitor. 1320: Pt ambulated to bathroom with PCT and voided \"a small amount of bloody urine\" however he flushed it prior to me being able to assess it. I reinforced the importance of using the urinal so that we are able to account for strict I&Os patient expressed understanding and compliance. 1930: Bedside and Verbal shift change report given to [address] (oncoming nurse) by Lisa Salcido RN (offgoing nurse). Report included the following information SBAR, Kardex, Procedure Summary, Intake/Output, MAR and Recent Results.

## 2017-06-29 NOTE — PROGRESS NOTES
Patient with a PMH for HTN, DM, hyperlipidemia, BPH, arthritis, MARIN. Admitted with severe mitral regurgitation for MITRAL VALVE REPAIR, CORONARY ARTERY BYPASS GRAFTING X 1. Care manager met with patient and his wife Shiela Wilkins ([phone]) to explain role and discuss transitions of care. Patient was independent prior to admission, uses a cane and has used [facility] in the past and would like to use them again. Patient's wife Shiela Wilkins and daughter Jamie DE PAZ([phone]) share MPOA. Referral made to [facility] through [facility]. Care management will continue to follow for potential discharge planning needs. Prasanth Dacosta RN,CRM  Care Management Interventions  PCP Verified by CM:  Yes (Dr Susie Jarvis)  Transition of Care Consult (CM Consult): Home Health (Cardiopulmonary assessment)  [address]: No  Reason Outside [facility] Out of service area  [address] [place]: No  Discharge Durable Medical Equipment: No  Physical Therapy Consult: Yes  Occupational Therapy Consult: Yes  Speech Therapy Consult: No  Current Support Network: Lives with Spouse (wife Shiela Wilkins [phone])  Confirm Follow Up Transport: Family (wife will transport)  Plan discussed with Pt/Family/Caregiver: Yes  Freedom of Choice Offered: Yes  Discharge Location  Discharge Placement: Home with home health

## 2017-06-29 NOTE — OP NOTES
[address] [facility] [place] [address]   OP NOTE       Name:  Davian Wright   MR#:  [id number]   :  1950   Account #:  [de-identified]    Surgery Date:  2017   Date of Adm:  2017       PREOPERATIVE DIAGNOSES   1. 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 2. Chronic systolic congestive heart failure. 3. Coronary artery disease. POSTOPERATIVE DIAGNOSES   1. 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 2. Chronic systolic congestive heart failure. 3. Coronary artery disease. PROCEDURE   1. Complex mitral valve repair (excision of old scarred vegetation,   sliding leaflet plasty and placement of 27 mm annuloplasty band. 2. Single vessel coronary artery bypass grafting using the left internal   mammary to the left anterior descending. SURGEON:  Hafsa Lim MD    ASSISTANT:  ORLANDO Cline    ANESTHESIA:  General.    SPECIMENS:  Valve, mitral valve tissue. ESTIMATED BLOOD LOSS: 900 mL. INDICATIONS FOR PROCEDURE:   This is a 71-year-old male with a   history of discitis who subsequently developed endocarditis and mitral   regurgitation. He was admitted for elective valve repair and   replacement. Concomitantly, he had a significant disease of his left   anterior descending coronary artery. FINDINGS AT THE TIME OF THE PROCEDURE:  There was a focal   area of tissue destruction associated with scar tissue consistent with   previous endocarditis in the area of A1 and T3. There was also   significant anular dilatation. DESCRIPTION OF PROCEDURE:   The patient was taken to the   operating room and following induction of endotracheal anesthesia the   anterior chest, abdomen and both lower extremities were prepped and   draped in a sterile manner. The transesophageal echocardiographic   report reviewed and a surgical time-out completed. A median sternotomy was made.   The pericardium incised and   suspended. The left internal mammary artery harvested from the chest   wall. Purse-string sutures were placed in the ascending aorta, superior   and inferior cava for bicaval cannulation. The patient was cannulated   and a retrograde coronary sinus cannula was placed. A transeptal   approach was used to approach the mitral valve. A self-retaining   retractor placed and the pathology visualized. Trigonal sutures along   the posterior annulus were placed, initially. The pathology identified as   above. A sliding leaflet plasty was performed of the segment of A2   and P3 with single interrupted Prolene sutures. A 32 annuloplasty   band was chosen and secured in place with Cor-Knots. The above-  mentioned internal thoracic artery was anastomosed to the left anterior   descending coronary artery. The patient was rewarmed. The atriotomy   incisions closed. Temporary atrial ventricular pacemaking leads were   placed. A vent was placed in the anterior wall of the ascending aorta. Following a warm dose of cardioplegia the aorta cross-clamp was   released. The patient was weaned from bypass, protamine administered. A left   pleural and 2 mediastinal drainage tubes placed. Hemostasis assured   and the sternum reapproximated with interrupted stainless steel wire   and running Vicryl suture. MARK R. Valentino Cluster, MD MB / LYNDSEY   D:  06/28/2017   16:58   T:  06/29/2017   11:53   Job #:  [id number]

## 2017-06-29 NOTE — PROGRESS NOTES
2000- Bedside and Verbal shift change report given to [address] (oncoming nurse) by Topher Lynn (offgoing nurse). Report included the following information SBAR, Procedure Summary, Intake/Output, Recent Results and Cardiac Rhythm Sinus Rhythm, Sinus Daniel Corea. 2110- Epinephrine weaned off  2145- Neosynephrine weaned off.  8651- Patients vitals have been very stable since 2200. Meet criterial for removing lines. D/C'd Arterial line. 12- D/C'd MAC and Maggy Jimenes. 0800- Bedside and Verbal shift change report given to [address] (oncoming nurse) by Brandi Solares RN (offgoing nurse). Report included the following information SBAR, Procedure Summary, Intake/Output, Recent Results and Cardiac Rhythm Sinus Rhythm, Sinus Daniel Corea.

## 2017-06-29 NOTE — DIABETES MGMT
[facility] Cardiac Surgery Progress Note    Recommendations/ Comments:  Pt arrived to CVICU at 1432 on 6/28/17. Consider continuing insulin gtt for at least 48hrs post-op and eating 50% solid foods then,  1) transition off gtt per [employer] Protocol   2) continue accu-checks and humalog correctional insulin ac & hs   3) ADA/AHA diet as diet advanced  4) patient can be started on Lantus 30 units (takes Levemir at home) and Humalog 10 units TID ac - takes 20 units at home  Insulin gtt should not be stopped until after 1432 on 6/30/17 to complete 48hr post-op time frame. Insulin drip is running @ 2.7 units/hr and patient has requried 41.6 units in the last 6 hours. Chart reviewed on Daria Hunter. Patient is 73 y.o. male s/p Cardiac Surgery. POD 1. Positive history of diabetes on intensive insulin injection program(Levemir 30 units and Humalog 20 units TID ac) at home. A1c:   Lab Results   Component Value Date/Time    Hemoglobin A1c 5.1 06/19/2017 12:54 PM         Recent Glucose Results: Lab Results   Component Value Date/Time     (H) 06/29/2017 04:06 AM    GLUCPOC 94 06/29/2017 02:48 PM    GLUCPOC 148 (H) 06/29/2017 01:42 PM    GLUCPOC 134 (H) 06/29/2017 12:39 PM        Lab Results   Component Value Date/Time    Creatinine 1.89 06/29/2017 04:06 AM     Estimated Creatinine Clearance: 46 mL/min (based on Cr of 1.89). Active Orders   Diet    DIET CARDIAC Regular        PO intake: Patient Vitals for the past 72 hrs:   % Diet Eaten   06/29/17 1346 90 %   06/29/17 0900 100 %       Currently on insulin gtt running @ 2.7 units/hr, blood glucose @ 1448 = 94 mg/dL    Will continue to follow as needed. Thank you.     Ravi Escalante, MS, RN, CDE

## 2017-06-29 NOTE — PROGRESS NOTES
1930: Bedside shift change report given to [address] (oncoming nurse) by Sona Villa RN (offgoing nurse). Report included the following information SBAR.     2129: Amublated to bathroom, voided 150cc nakul urine in urinal. One small clot present and some sediment. Pt returned to bed. Will continue to monitor. 0500: Pt ambulated to bathroom, bathed and linen changed. Returned to chair. 0630: Pt requested to get back into bed. Practiced incentive while sitting up with RN present. Encouraged increased volume and reinforced the importance of IS. Returned to bed. 0730: Bedside shift change report given to [address] (oncoming nurse) by [address] (offgoing nurse). Report included the following information SBAR.

## 2017-06-29 NOTE — PROGRESS NOTES
Problem: Mobility Impaired (Adult and Pediatric)  Goal: *Acute Goals and Plan of Care (Insert Text)  Physical Therapy Goals  Initiated 6/29/2017  1. Patient will move from supine to sit and sit to supine , scoot up and down and roll side to side in bed with independence within 5 days. 2.  Patient will perform sit to/from stand with independence within 5 days. 3.  Patient will ambulate 250 feet with least restrictive assistive device and modified independence within 5 days. 4.  Patient will ascend/descend 4 stairs with one handrail with supervision/set-up within 5 days. 5.  Patient will perform cardiac exercises per protocol with independence within 5 days. 6.  Patient will verbally and functionally recall 3/3 sternal precautions within 5 days. Comments:   PHYSICAL THERAPY EVALUATION  Patient: Joaquin Vernon (25 y.o. male)  Date: 6/29/2017  Primary Diagnosis: MITRAL REGURG, CAD  CAD (coronary artery disease)  Non-rheumatic mitral regurgitation  Procedure(s) (LRB):  MITRAL VALVE REPAIR, CORONARY ARTERY BYPASS GRAFTING X 1 WITH IBRAHIM, ECC, LONNIE AND EPIAORTIC U/S BY DR Darleen Garcia (N/A) 1 Day Post-Op   Precautions:   Sternal      ASSESSMENT :  Based on the objective data described below, the patient presents with minimal complaints of post op pain, decreased cardiopulmonary tolerance (2L SpO2 via NC), and mild gait and dynamic balance deficits limiting his ability to complete mobility tasks as per PLOF. Received pt up in bedside chair ready to transfer to cardiac step down unit per RN. Reviewed and pt independently recalled 3/3 sternal precautions with excellent ability maintaining with minimal verbal cueing throughout session. Progressive gait training initiated with light one hand held support (UE in maximal adduction, uses a SPC at baseline) for approx 150ft without significant LOB or path deviation noted.  Pt and spouse reports that at baseline he wears a quick draw lumbar brace and is anticipating eventual surgical intervention. Spouse will bring brace in for therapist to ensure proper fit and non interference with sternal incision prior to continued use. Provided education on spinal precautions to reduce risk of exacerbating pain. At baseline, pt is indep with ADLs and community level mobility using a SPC. At this time, anticipating discharge home with family assist as needed and HHPT. Patient will benefit from skilled intervention to address the above impairments. Patients rehabilitation potential is considered to be Good  Factors which may influence rehabilitation potential include:   [X]         None noted  [ ]         Mental ability/status  [ ]         Medical condition  [ ]         Home/family situation and support systems  [ ]         Safety awareness  [ ]         Pain tolerance/management  [ ]         Other:        PLAN :  Recommendations and Planned Interventions:  [X]           Bed Mobility Training             [X]    Neuromuscular Re-Education  [X]           Transfer Training                   [ ]    Orthotic/Prosthetic Training  [X]           Gait Training                         [ ]    Modalities  [X]           Therapeutic Exercises           [ ]    Edema Management/Control  [X]           Therapeutic Activities            [X]    Patient and Family Training/Education  [ ]           Other (comment):     Frequency/Duration: Patient will be followed by physical therapy  daily to address goals. Discharge Recommendations: Home Health  Further Equipment Recommendations for Discharge: to be determined       SUBJECTIVE:   Patient stated I feel good.       OBJECTIVE DATA SUMMARY:   HISTORY:    Past Medical History:   Diagnosis Date    Abnormal EKG       he says it has been abnormal for years    Arthritis      CAD (coronary artery disease)      Chronic pain       lower lumbar    Diabetes ([address]       type II    Dyslipidemia      Enlarged prostate Dr. Blaise Davies    HTN (hypertension)  Hypercholesterolemia      Kidney stone      Mitral regurgitation       mod-severe on echo 1/5/12    Murmur      Obesity      Other ill-defined conditions       BPH    Sleep apnea       stopped using CPAP many years ago     Past Surgical History:   Procedure Laterality Date    ECHO 2D ADULT   1/4/12     mild-mod LVH, LVEF 60%, probably grade 1 diastolic dysfunction, mod LAE, mod-severe MR (eccentroic ) - had severe HTN during study (173/108),     ECHO LIMITED   1/20/12     mod-severe MR    HX COLONOSCOPY   2013     small polyps removed; repeat in 5 years; Dr. Piotr Felder   9/2014     RIGHT    HX LITHOTRIPSY   2011    HX OTHER SURGICAL         LONNIE 1/31/12 - LVEF 60%, mod-severe MR (post directed), mild-mod LAE, mild atheroma aorta    HX OTHER SURGICAL         Echo 9/5/13 - mild LVH, LVEF 60 % to 65 %. Mod LAE. Mod MR (post directed)     HX OTHER SURGICAL         Echo 9/5/13 - mild LVH, LVEF 60 % to 65 %. Mod LAE. Mod MR (post directed)     HX OTHER SURGICAL   02/2017     Bladder stones removed, and prostate \"trimmed\" down     STRESS TEST CARDIOLITE   1/4/12     walked 4:30, stopping for severe FISH, no ischemic EKG changes (inferolateral TWI at start), normal perfusion (diaphragmatic attenuation), LVEF 48%, hypertensive respone to exercise     Prior Level of Function/Home Situation: Lives with spouse in a 2 story house (stays on 1st level), 4 steps to enter. Indep with ADLs and mobility using a SPC. Typically wears a lumbar brace and reports incidences of R LE \"giving way\" but no falls. Personal factors and/or comorbidities impacting plan of care:  Arthritis, CAD, lumbar spine pain     Home Situation  Home Environment: Private residence  # Steps to Enter: 4  Rails to Enter: Yes  Hand Rails : Right  One/Two Story Residence: Two story, live on 1st floor  # of Interior Steps: [id number] Floor: Left  Lift Chair Available: No  Living Alone: No  Support Systems: Spouse/Significant Other/Partner  Patient Expects to be Discharged to[de-identified] Private residence  Current DME Used/Available at Home: [employer], straight  Tub or Shower Type: Tub/Shower combination     EXAMINATION/PRESENTATION/DECISION MAKING:   Critical Behavior:  Neurologic State: Alert, Appropriate for age  Orientation Level: Oriented X4  Cognition: Appropriate decision making, Appropriate safety awareness, Follows commands  Safety/Judgement: Good awareness of safety precautions  Hearing: Auditory  Auditory Impairment: None  Hearing Aids/Status: Does not own  Skin:    Edema:   Range Of Motion:  AROM: Within functional limits     Strength:    Strength: Within functional limits      Tone & Sensation:   Tone: Normal   Sensation: Intact (B LEs per pt report)     Coordination:  Coordination: Grossly decreased, non-functional  Vision:   Acuity: Impaired near vision  Corrective Lenses: Reading glasses  Functional Mobility:  Bed Mobility:  Rolling:  (Received pt up in chair)  Transfers:  Sit to Stand: Contact guard assistance;Stand-by asssistance;Assist x1  Stand to Sit: Contact guard assistance;Assist x1  Balance:   Sitting: Intact; Without support  Standing: Impaired  Standing - Static: Good  Standing - Dynamic : Fair  Ambulation/Gait Training:  Distance (ft): 150 Feet (ft)  Assistive Device:  (light hand held support through 1UE)  Ambulation - Level of Assistance: Contact guard assistance;Assist x1  Gait Description (WDL): Exceptions to WDL  Gait Abnormalities: Altered arm swing  Speed/Shara: Fluctuations                 Stairs: Will need stair training prior to discharge.       Therapeutic Exercises:      Functional Measure:  Tinetti test:      Sitting Balance: 1  Arises: 2  Attempts to Rise: 2  Immediate Standing Balance: 2  Standing Balance: 1  Nudged: 1  Eyes Closed: 1  Turn 360 Degrees - Continuous/Discontinuous: 0  Turn 360 Degrees - Steady/Unsteady: 0  Sitting Down: 1  Balance Score: 11  Indication of Gait: 1  R Step Length/Height: 1  L Step Length/Height: 1  R Foot Clearance: 1  L Foot Clearance: 1  Step Symmetry: 1  Step Continuity: 1  Path: 1  Trunk: 0  Walking Time: 1  Gait Score: 9  Total Score: 20         Tinetti Test and G-code impairment scale:  Percentage of Impairment CH     0%    CI     1-19% CJ     20-39% CK     40-59% CL     60-79% CM     80-99% CN      100%   Tinetti  Score 0-28 28 23-27 17-22 12-16 6-11 1-5 0          Tinetti Tool Score Risk of Falls  <19 = High Fall Risk  19-24 = Moderate Fall Risk  25-28 = Low Fall Risk  Tinetti ME. Performance-Oriented Assessment of Mobility Problems in Elderly Patients. Hess 66; [id number]. (Scoring Description: PT Bulletin Feb. 10, 1993)     Older adults: Estephanie Cooper et al, 2009; n = 1000 [facility] elderly evaluated with ABC, KEYSHA, ADL, and IADL)  · Mean KEYSHA score for males aged 69-68 years = 26.21(3.40)  · Mean KEYSHA score for females age 69-68 years = 25.16(4.30)  · Mean KEYSHA score for males over 80 years = 23.29(6.02)  · Mean KEYSHA score for females over 80 years = 17.20(8.32)            G codes: In compliance with CMSs Claims Based Outcome Reporting, the following G-code set was chosen for this patient based on their primary functional limitation being treated: The outcome measure chosen to determine the severity of the functional limitation was the Tinetti with a score of 20/28 which was correlated with the impairment scale.       · Mobility - Walking and Moving Around:               - CURRENT STATUS:    CJ - 20%-39% impaired, limited or restricted               - GOAL STATUS:           CI - 1%-19% impaired, limited or restricted               - D/C STATUS:                       ---------------To be determined---------------      Physical Therapy Evaluation Charge Determination   History Examination Presentation Decision-Making   MEDIUM  Complexity : 1-2 comorbidities / personal factors will impact the outcome/ POC  MEDIUM Complexity : 3 Standardized tests and measures addressing body structure, function, activity limitation and / or participation in recreation  LOW Complexity : Stable, uncomplicated  MEDIUM Complexity : FOTO score of 26-74      Based on the above components, the patient evaluation is determined to be of the following complexity level: LOW      Pain:  Pain Scale 1: Numeric (0 - 10)  Pain Intensity 1: 2  Pain Location 1: Incisional  Pain Orientation 1: Anterior  Pain Description 1: Aching  Pain Intervention(s) 1: Medication (see MAR)  Activity Tolerance:      Please refer to the flowsheet for vital signs taken during this treatment. After treatment:   [X]         Patient left in no apparent distress sitting up in chair  [ ]         Patient left in no apparent distress in bed  [X]         Call bell left within reach  [X]         Nursing notified  [X]         Family present  [ ]         Bed alarm activated      COMMUNICATION/EDUCATION:   The patients plan of care was discussed with: Physical Therapist and Registered Nurse.  [X]         Fall prevention education was provided and the patient/caregiver indicated understanding. [X]         Patient/family have participated as able in goal setting and plan of care. [ ]         Patient/family agree to work toward stated goals and plan of care. [ ]         Patient understands intent and goals of therapy, but is neutral about his/her participation. [ ]         Patient is unable to participate in goal setting and plan of care.      Thank you for this referral.  Price Li, PT, DPT   Time Calculation: 19 mins

## 2017-06-29 NOTE — PROGRESS NOTES
Problem: Falls - Risk of  Goal: *Absence of falls  Outcome: Progressing Towards Goal  Pt remained free from falls today  Goal: *Knowledge of fall prevention  Outcome: Progressing Towards Goal  Pt aware of all fall prevention methods in place and acts accordingly. Problem: Pressure Injury - Risk of  Goal: *Prevention of pressure ulcer  Outcome: Progressing Towards Goal  Pt aware of pressure reducing methods in place and acts in a way demonstrating knowledge and compliance. Problem: CABG: Post-Op Day 1  Goal: *Hemodynamically stable without vasoactive medications  Outcome: Progressing Towards Goal  Pt stable. Goal: *Lungs clear or at baseline  Outcome: Progressing Towards Goal  Lungs diminished in bases.    Goal: *Optimal pain control at patients stated goal  Outcome: Progressing Towards Goal  Pt's pain is well controlled      Goal: *Demonstrates progressive activity  Outcome: Progressing Towards Goal  Pt is progressing well  Goal: *Tolerating diet  Outcome: Progressing Towards Goal  Pt tolerating diet well      Goal: *Level of consciousness returns to baseline  Outcome: Progressing Towards Goal  Pt A and O times 4

## 2017-06-29 NOTE — PROCEDURES
[address], [facility]   LONNIE       Name:  Candace Shipley   MR#:  [id number]   :  1950   Account #:  [de-identified]    Date of Procedure:  2017   Date of Adm:  2017       ATTENDING SURGEON: Dick Antunez MD    ATTENDING ANESTHESIOLOGIST: Roverto Broussard DO    PROCEDURE PERFORMED   1. Diagnostic intraoperative LONNIE. 2. Epiaortic ultrasound. SURGICAL PROCEDURE   1. Coronary artery bypass grafting. 2. Mitral valve repair. MODALITIES PERFORMED   1. 2D LONNIE. 2. 3D LONNIE. 3. Color flow Doppler. 4. Pulsed-waved Doppler. 5. Continuous wave Doppler. DESCRIPTION OF PROCEDURE: The patient is a 50-year-old male   with a history of coronary artery disease and severe mitral   regurgitation, who presents today for the above procedure. After   induction of general anesthesia, an X7-2, multiplane LONNIE probe was   inserted without difficulty. AORTA   ASCENDING AORTA: The ascending aorta is normal in size with no   dissection. There is intimal thickening. AORTIC ARCH: The aortic arch is normal in size with no dissection. There is intimal thickening. DESCENDING AORTA: The descending aorta is normal in size with   no dissection. There is intimal thickening. VALVES   AORTIC VALVE: The aortic annulus is normal with no stenosis and no   regurgitation. The leaflets are normal with normal leaflet motion. MITRAL VALVE: The mitral annulus is dilated measuring 38 mm with   no stenosis. There is 4+ mitral insufficiency. The leaflets are normal in   appearance, but on the anterior leaflet, there is a 1 cm x 0.5 cm mass   and the regurgitant jet is directed posteriorly with coanda effect. TRICUSPID VALVE: The tricuspid annulus is normal with no stenosis. There is 1+ tricuspid insufficiency. The leaflets are normal with normal   leaflet motion.       ATRIA   RIGHT ATRIUM: The right atrium is normal in size with no smoke,   thrombus or tumor. Catheters are visualized in the right atrium. LEFT ATRIUM: The left atrium is severely dilated measuring 7 cm in   maximal diameter. There is no smoke, thrombus or tumor. There is no   clot in the left atrial appendage. Normal velocities are seen. INTERATRIAL SEPTUM: Normal.     INTRAVENTRICULAR SEPTUM: Normal.     VENTRICLES   RIGHT VENTRICLE: The right ventricle is normal in size with no   hypertrophy or thrombus. The global function of the right ventricle is   normal.   LEFT VENTRICLE: The left ventricle is normal in size with no   hypertrophy or thrombus. The estimated ejection fraction is greater   than 60%. There are no wall motion abnormalities. PERICARDIUM: Normal.     PLEURA: Normal.     EPIAORTIC ULTRASOUND: The epiaortic ultrasound was placed in   the sterile field by Dr. Dawson Contreras and interpreted by myself, Dr. Davey Cornejo. Short and long axis views of proximal, mid and distal   ascending aorta were obtained. These were used to determine the sites of cross-  clamp, cannulation and proximal anastomoses. There were no plaques   visualized. POST BYPASS LONNIE: The post bypass exam was performed with the   patient on 5 mcg/kg/minute of dobutamine. The estimated ejection   fraction is greater than 65%. There are no wall motion abnormalities. There is no dissection in the aorta. The mitral valve has been repaired   with a 32 mm ring. There is trace mitral insufficiency. The mass is no   longer visualized. After initial separation from bypass, the right ventricle   was hypokinetic, but this improved with ionotropic support. All of the findings are unchanged. All findings were discussed in detail   with Dr. Dawson Contreras.          DO MARVIN Garcia / [place].[facility]   D:  06/28/2017   15:14   T:  06/29/2017   17:05   Job #:  [id number]

## 2017-06-29 NOTE — PROGRESS NOTES
Attended IDR in CVICU where this patient's condition and care were discussed.  Claudia Villafana.  [facility]  ([place]  Patient Care Specialist)   Paging Service [phone])

## 2017-06-29 NOTE — CARDIO/PULMONARY
Cardiac Wellness: Valve/CABG surgery education folder at bedside. Met with Smith Tran to review cardiac surgery post discharge instructions and to discuss participation in [address] [place] [address].    Educated using teach back method. Reviewed use of bear for sternal support, daily weights and temperatures, showering restrictions, signs and symptoms of infection at surgery sites, daily walking and arm exercises, and use of incentive spirometer. Smithnando Tran was able to demonstrate proper use of incentive spirometer, achieving 1000 ml. Smoking history assessed. Patient is a former smoker. Encouraged heart healthy, low sodium diet. Placed a  red reminder bracelet and reviewed when to call surgeons office. Discussed Cardiac Wellness Program format and encouraged participation. Smith Tran lives out of the area and the CWP at [facility] is closer but he adamently refuses to attend there. General questions answered. Smith Tran verbalized understanding.      Will continue to follow for educational needs and enrollment in the Cardiac Wellness Mallory Zayas RN

## 2017-06-29 NOTE — PROGRESS NOTES
[facility] ICU Progress Note    Admit Date: 2017  POD:  1 Day Post-Op    Procedure:  Procedure(s):  MITRAL VALVE REPAIR, CORONARY ARTERY BYPASS GRAFTING X 1 WITH IBRAHIM, ECC, LONNIE AND EPIAORTIC U/S BY DR Abdiaziz Sorto        Subjective:   Pt seen with Dr. Star Arita. Feels well. Denies complaint. Objective:   Vitals:  Blood pressure 136/58, pulse 65, temperature 98.9 °F (37.2 °C), resp. rate 20, height 5' 7\" (1.702 m), weight 241 lb 6.5 oz (109.5 kg), SpO2 96 %. Temp (24hrs), Av.5 °F (36.9 °C), Min:97.5 °F (36.4 °C), Max:99.5 °F (37.5 °C)    Hemodynamics:   CO: CO (l/min): 7.7 l/min   CI: CI (l/min/m2): 3.5 l/min/m2   CVP: CVP (mmHg): 15 mmHg (17 040)   SVR: SVR (dyne*sec)/cm5: 1124 (dyne*sec)/cm5 (17)   PAP Systolic: PAP Systolic: 41 (53/27/39 1382)   PAP Diastolic: PAP Diastolic: 17 (92/93/01 3709)   PVR:     SV02: SVO2 (%): 60 % (17 040)   SCV02:      EKG/Rhythm:    50-60s SB,NSR    Extubation Date / Time:   17    CT Output:   190ml    Ventilator:  Ventilator Volumes  Vt Exhaled (Machine Breath) (ml): 716 ml (17 1435)  Ve Observed (l/min): 8.44 l/min (17 1435)    Oxygen Therapy:  Oxygen Therapy  O2 Sat (%): 96 % (17 0700)  Pulse via Oximetry: 66 beats per minute (17 0400)  O2 Device: Nasal cannula (17 0000)  O2 Flow Rate (L/min): 4 l/min (17 0000)  FIO2 (%): 50 % (06/28/17 1830)    CXR:  IMPRESSION:   Satisfactory postoperative appearance.     Admission Weight: Last Weight   Weight: 241 lb 2 oz (109.4 kg) Weight: 241 lb 6.5 oz (109.5 kg)     Intake / Output / Drain:  Current Shift:    Last 24 hrs.:   Intake/Output Summary (Last 24 hours) at 17 0802  Last data filed at 17 0700   Gross per 24 hour   Intake          3095.19 ml   Output             2992 ml   Net           103.19 ml       EXAM:  General:   NAD                                                                                           Lungs:   Clear to auscultation bilaterally. Incision:  No erythema, drainage or swelling. Heart:  Regular rate and rhythm, S1, S2 normal, no murmur, click, rub or gallop. Abdomen:   Soft, non-tender. Bowel sounds normal. No masses,  No organomegaly. Extremities:  No edema. PPP. Neurologic:  Gross motor and sensory apparatus intact. Labs: Recent Labs      17   0617   17   0406   17   1445   WBC   --    --   12.2*   --   25.0*   HGB   --    --   10.2*   < >  11.4*   HCT   --    --   30.6*   < >  34.0*   PLT   --    --   136*   --   196   NA   --    --   145   --   145   K   --    --   4.5   < >  3.3*   BUN   --    --   34*   --   24*   CREA   --    --   1.89*   --   1.72*   GLU   --    --   114*   --   128*   GLUCPOC  120*   < >   --    < >   --    INR   --    --    --    --   1.1    < > = values in this interval not displayed. Assessment:     Active Problems:    Mitral regurgitation ()      Overview: mod-severe on echo 12      CAD (coronary artery disease) (2017)      Non-rheumatic mitral regurgitation (2017)      S/P MVR (mitral valve repair) (2017)      Overview: MITRAL VALVE REPAIR with 27 MM annuloplasty band, CORONARY ARTERY BYPASS       GRAFTING X 1 WITH LIMA to LAD      S/P CABG x 1 (2017)      Overview: MITRAL VALVE REPAIR with 27 MM annuloplasty band, CORONARY ARTERY BYPASS       GRAFTING X 1 WITH IBRAHIM to LAD         Plan/Recommendations/Medical Decision Makin. S/P MVr/CABG: Stable. Off all gtts. Will start ASA, statin, and BB. 2. Atelectasis: IS, activity  3. Hypertension: Start BB   4. REENA: Monitor. Avoid nephrotoxic meds. 5. DM: On insulin gtt. DTC following. 6. Dispo: Transfer to [facility].       Signed By: ORLANDO Zavala

## 2017-06-29 NOTE — PROGRESS NOTES
I have read and agree with [place] RN documentation and care. I have reviewed the MAR and all flow sheets associated with Patient's care today.

## 2017-06-29 NOTE — PROGRESS NOTES
0745: Received bedside report from [facility], assumed care of pt.  0800: Assessment complete, friction rub noted. Chest tube and christine intact. Clarified PO amiodarone order clarified with Jazz PERRY d/t bradycardia over night, instructed not to hold until HR < 60. Jazz PERRY also made aware of low urine output; instructed to continue with christine removal.  0908: Christine removed. 1000: Pt ambulated around nursing station with x2 assist, pt tolerated well. O2 sats 96% on 3L. 1030: TRANSFER - OUT REPORT:    Verbal report given to [address] [place] [address] (name) on Casie Patricia  being transferred to [facility] ) (unit) for routine progression of care     Report consisted of patients Situation, Background, Assessment and   Recommendations(SBAR). Information from the following report(s) SBAR, Kardex, Intake/Output, MAR, Recent Results and Cardiac Rhythm NSR was reviewed with the receiving nurse. Lines:   PICC Double Lumen 78/75/52 Right;Basilic (Active)       Peripheral IV 03/21/17 Right Forearm (Active)       Peripheral IV 06/28/17 Left Antecubital (Active)   Site Assessment Clean, dry, & intact 6/29/2017  8:00 AM   Phlebitis Assessment 0 6/29/2017  8:00 AM   Infiltration Assessment 0 6/29/2017  8:00 AM   Dressing Status Clean, dry, & intact 6/29/2017  8:00 AM   Dressing Type Tape;Transparent 6/29/2017  8:00 AM   Hub Color/Line Status Pink;Capped 6/29/2017  8:00 AM   Action Taken Open ports on tubing capped 6/28/2017  8:00 PM   Alcohol Cap Used Yes 6/29/2017  8:00 AM       Peripheral IV 06/29/17 Right Arm (Active)   Site Assessment Clean, dry, & intact 6/29/2017  8:00 AM   Phlebitis Assessment 0 6/29/2017  8:00 AM   Infiltration Assessment 0 6/29/2017  8:00 AM   Dressing Status Clean, dry, & intact 6/29/2017  8:00 AM   Dressing Type Tape;Transparent 6/29/2017  8:00 AM   Hub Color/Line Status Green; Infusing 6/29/2017  8:00 AM   Alcohol Cap Used Yes 6/29/2017  8:00 AM     Opportunity for questions and clarification was provided. Patient transported with:   Monitor  O2 @ 2 liters  Registered Nurse                Problem: Falls - Risk of  Goal: *Absence of falls  Outcome: Progressing Towards Goal  Free of falls; safety measures in place. Problem: CABG: Post-Op Day 1  Goal: Diagnostic Test/Procedures  Outcome: Progressing Towards Goal  Labs drawn; chest x-ray. Goal: Nutrition/Diet  Outcome: Progressing Towards Goal  Tolerating cardiac diet. Goal: Medications  Outcome: Progressing Towards Goal  Insulin gtt  Goal: Treatments/Interventions/Procedures  Outcome: Progressing Towards Goal  Chest tubes intact.   Goal: *Hemodynamically stable without vasoactive medications  Outcome: Progressing Towards Goal  VSS on 4L NC.

## 2017-06-29 NOTE — PROGRESS NOTES
Pharmacist Note - Vancomycin Dosing    Consult provided for this 73 y.o. male for indication of surgical pppx. Recent Labs      17   0406  17   1445   WBC  12.2*  25.0*   CREA  1.89*  1.72*   BUN  34*  24*     Frequency of BMP: daily  Height: 170.2 cm  Weight: 109.5 kg  Est CrCl: 46 ml/min; UO: 0.4 ml/kg/hr  Temp (24hrs), Av.5 °F (36.9 °C), Min:97.5 °F (36.4 °C), Max:99.5 °F (37.5 °C)    Cultures:   wound - pending    Goal trough = ~ 15    Therapy initiated with a loading dose of 2000 mg IV x 1 followed by a maintenance dose of 1500 mg IV every 18 hours x2 doses started yesterday. Scr up a bit today. Will change to 1250 mg IV q 24 hr x1 dose tomorrow am at 05:00  Last dose not to exceed 48 hours from surgery end time.

## 2017-06-29 NOTE — PROGRESS NOTES
Cardiac Surgery Care Coordinator-  Met with Lou Gonzalez, and his family. Reviewed plan of care and discussed goals for the day. Lou Gonzalez has a good understanding of his plan for the day. Reinforced sternal precautions and encouraged continued use of the incentive spirometer. Lou Gonzalez can pull 1000ml with good effort. Discussed possible discharge date and encouraged Lou Gonzalez to verbalize. Will continue to follow for educational and emotional needs.  Fiordaliza Ny RN

## 2017-06-30 ENCOUNTER — APPOINTMENT (OUTPATIENT)
Dept: ULTRASOUND IMAGING | Age: 67
DRG: 219 | End: 2017-06-30
Attending: INTERNAL MEDICINE
Payer: MEDICARE

## 2017-06-30 ENCOUNTER — APPOINTMENT (OUTPATIENT)
Dept: GENERAL RADIOLOGY | Age: 67
DRG: 219 | End: 2017-06-30
Attending: PHYSICIAN ASSISTANT
Payer: MEDICARE

## 2017-06-30 LAB
ADMINISTERED INITIALS, ADMINIT: NORMAL
ANION GAP BLD CALC-SCNC: 9 MMOL/L (ref 5–15)
APTT PPP: 29.4 SEC (ref 22.1–32.5)
BUN SERPL-MCNC: 45 MG/DL (ref 6–20)
BUN/CREAT SERPL: 18 (ref 12–20)
CALCIUM SERPL-MCNC: 8.1 MG/DL (ref 8.5–10.1)
CHLORIDE SERPL-SCNC: 105 MMOL/L (ref 97–108)
CO2 SERPL-SCNC: 23 MMOL/L (ref 21–32)
CREAT SERPL-MCNC: 2.48 MG/DL (ref 0.7–1.3)
D50 ADMINISTERED, D50ADM: 0 ML
D50 ADMINISTERED, D50ADM: 10 ML
D50 ORDER, D50ORD: 0 ML
D50 ORDER, D50ORD: 10 ML
D50 ORDER, D50ORD: 9 ML
ERYTHROCYTE [DISTWIDTH] IN BLOOD BY AUTOMATED COUNT: 15.4 % (ref 11.5–14.5)
GLSCOM COMMENTS: NORMAL
GLUCOSE BLD STRIP.AUTO-MCNC: 101 MG/DL (ref 65–100)
GLUCOSE BLD STRIP.AUTO-MCNC: 101 MG/DL (ref 65–100)
GLUCOSE BLD STRIP.AUTO-MCNC: 107 MG/DL (ref 65–100)
GLUCOSE BLD STRIP.AUTO-MCNC: 113 MG/DL (ref 65–100)
GLUCOSE BLD STRIP.AUTO-MCNC: 114 MG/DL (ref 65–100)
GLUCOSE BLD STRIP.AUTO-MCNC: 114 MG/DL (ref 65–100)
GLUCOSE BLD STRIP.AUTO-MCNC: 115 MG/DL (ref 65–100)
GLUCOSE BLD STRIP.AUTO-MCNC: 115 MG/DL (ref 65–100)
GLUCOSE BLD STRIP.AUTO-MCNC: 118 MG/DL (ref 65–100)
GLUCOSE BLD STRIP.AUTO-MCNC: 140 MG/DL (ref 65–100)
GLUCOSE BLD STRIP.AUTO-MCNC: 145 MG/DL (ref 65–100)
GLUCOSE BLD STRIP.AUTO-MCNC: 153 MG/DL (ref 65–100)
GLUCOSE BLD STRIP.AUTO-MCNC: 173 MG/DL (ref 65–100)
GLUCOSE BLD STRIP.AUTO-MCNC: 173 MG/DL (ref 65–100)
GLUCOSE BLD STRIP.AUTO-MCNC: 74 MG/DL (ref 65–100)
GLUCOSE BLD STRIP.AUTO-MCNC: 77 MG/DL (ref 65–100)
GLUCOSE BLD STRIP.AUTO-MCNC: 89 MG/DL (ref 65–100)
GLUCOSE BLD STRIP.AUTO-MCNC: 94 MG/DL (ref 65–100)
GLUCOSE SERPL-MCNC: 154 MG/DL (ref 65–100)
GLUCOSE, GLC: 101 MG/DL
GLUCOSE, GLC: 101 MG/DL
GLUCOSE, GLC: 113 MG/DL
GLUCOSE, GLC: 114 MG/DL
GLUCOSE, GLC: 115 MG/DL
GLUCOSE, GLC: 118 MG/DL
GLUCOSE, GLC: 140 MG/DL
GLUCOSE, GLC: 145 MG/DL
GLUCOSE, GLC: 153 MG/DL
GLUCOSE, GLC: 173 MG/DL
GLUCOSE, GLC: 173 MG/DL
GLUCOSE, GLC: 74 MG/DL
GLUCOSE, GLC: 77 MG/DL
GLUCOSE, GLC: 89 MG/DL
GLUCOSE, GLC: 94 MG/DL
HCT VFR BLD AUTO: 29.9 % (ref 36.6–50.3)
HGB BLD-MCNC: 9.7 G/DL (ref 12.1–17)
HIGH TARGET, HITG: 130 MG/DL
INR PPP: 1.1 (ref 0.9–1.1)
INSULIN ADMINSTERED, INSADM: 0 UNITS/HOUR
INSULIN ADMINSTERED, INSADM: 0 UNITS/HOUR
INSULIN ADMINSTERED, INSADM: 1.7 UNITS/HOUR
INSULIN ADMINSTERED, INSADM: 1.8 UNITS/HOUR
INSULIN ADMINSTERED, INSADM: 3.7 UNITS/HOUR
INSULIN ADMINSTERED, INSADM: 3.7 UNITS/HOUR
INSULIN ADMINSTERED, INSADM: 4.2 UNITS/HOUR
INSULIN ADMINSTERED, INSADM: 4.4 UNITS/HOUR
INSULIN ADMINSTERED, INSADM: 4.6 UNITS/HOUR
INSULIN ADMINSTERED, INSADM: 5.6 UNITS/HOUR
INSULIN ADMINSTERED, INSADM: 5.9 UNITS/HOUR
INSULIN ADMINSTERED, INSADM: 6.3 UNITS/HOUR
INSULIN ADMINSTERED, INSADM: 7.7 UNITS/HOUR
INSULIN ADMINSTERED, INSADM: 7.9 UNITS/HOUR
INSULIN ADMINSTERED, INSADM: 9 UNITS/HOUR
INSULIN ORDER, INSORD: 0 UNITS/HOUR
INSULIN ORDER, INSORD: 0 UNITS/HOUR
INSULIN ORDER, INSORD: 1.7 UNITS/HOUR
INSULIN ORDER, INSORD: 1.8 UNITS/HOUR
INSULIN ORDER, INSORD: 3.7 UNITS/HOUR
INSULIN ORDER, INSORD: 3.7 UNITS/HOUR
INSULIN ORDER, INSORD: 4.2 UNITS/HOUR
INSULIN ORDER, INSORD: 4.4 UNITS/HOUR
INSULIN ORDER, INSORD: 4.6 UNITS/HOUR
INSULIN ORDER, INSORD: 5.6 UNITS/HOUR
INSULIN ORDER, INSORD: 5.9 UNITS/HOUR
INSULIN ORDER, INSORD: 6.3 UNITS/HOUR
INSULIN ORDER, INSORD: 7.7 UNITS/HOUR
INSULIN ORDER, INSORD: 7.9 UNITS/HOUR
INSULIN ORDER, INSORD: 9 UNITS/HOUR
LOW TARGET, LOT: 95 MG/DL
MAGNESIUM SERPL-MCNC: 2.4 MG/DL (ref 1.6–2.4)
MCH RBC QN AUTO: 29.8 PG (ref 26–34)
MCHC RBC AUTO-ENTMCNC: 32.4 G/DL (ref 30–36.5)
MCV RBC AUTO: 92 FL (ref 80–99)
MINUTES UNTIL NEXT BG, NBG: 15 MIN
MINUTES UNTIL NEXT BG, NBG: 15 MIN
MINUTES UNTIL NEXT BG, NBG: 60 MIN
MULTIPLIER, MUL: 0.05
MULTIPLIER, MUL: 0.06
MULTIPLIER, MUL: 0.06
MULTIPLIER, MUL: 0.07
MULTIPLIER, MUL: 0.08
MULTIPLIER, MUL: 0.09
MULTIPLIER, MUL: 0.11
MULTIPLIER, MUL: 0.11
ORDER INITIALS, ORDINIT: NORMAL
PLATELET # BLD AUTO: 128 K/UL (ref 150–400)
POTASSIUM SERPL-SCNC: 3.8 MMOL/L (ref 3.5–5.1)
PROTHROMBIN TIME: 10.8 SEC (ref 9–11.1)
RBC # BLD AUTO: 3.25 M/UL (ref 4.1–5.7)
SERVICE CMNT-IMP: ABNORMAL
SERVICE CMNT-IMP: NORMAL
SODIUM SERPL-SCNC: 137 MMOL/L (ref 136–145)
THERAPEUTIC RANGE,PTTT: NORMAL SECS (ref 58–77)
VANCOMYCIN SERPL-MCNC: 22.9 UG/ML
WBC # BLD AUTO: 11.5 K/UL (ref 4.1–11.1)

## 2017-06-30 PROCEDURE — 36415 COLL VENOUS BLD VENIPUNCTURE: CPT | Performed by: PHYSICIAN ASSISTANT

## 2017-06-30 PROCEDURE — 74011250636 HC RX REV CODE- 250/636: Performed by: NURSE PRACTITIONER

## 2017-06-30 PROCEDURE — 76770 US EXAM ABDO BACK WALL COMP: CPT

## 2017-06-30 PROCEDURE — 85610 PROTHROMBIN TIME: CPT | Performed by: PHYSICIAN ASSISTANT

## 2017-06-30 PROCEDURE — 85027 COMPLETE CBC AUTOMATED: CPT | Performed by: PHYSICIAN ASSISTANT

## 2017-06-30 PROCEDURE — 71010 XR CHEST PORT: CPT

## 2017-06-30 PROCEDURE — 74011000250 HC RX REV CODE- 250: Performed by: NURSE PRACTITIONER

## 2017-06-30 PROCEDURE — 80202 ASSAY OF VANCOMYCIN: CPT | Performed by: INTERNAL MEDICINE

## 2017-06-30 PROCEDURE — 77010033678 HC OXYGEN DAILY

## 2017-06-30 PROCEDURE — 83735 ASSAY OF MAGNESIUM: CPT | Performed by: PHYSICIAN ASSISTANT

## 2017-06-30 PROCEDURE — 74011636637 HC RX REV CODE- 636/637: Performed by: NURSE PRACTITIONER

## 2017-06-30 PROCEDURE — 65660000000 HC RM CCU STEPDOWN

## 2017-06-30 PROCEDURE — 85730 THROMBOPLASTIN TIME PARTIAL: CPT | Performed by: PHYSICIAN ASSISTANT

## 2017-06-30 PROCEDURE — 80048 BASIC METABOLIC PNL TOTAL CA: CPT | Performed by: PHYSICIAN ASSISTANT

## 2017-06-30 PROCEDURE — 74011250637 HC RX REV CODE- 250/637: Performed by: PHYSICIAN ASSISTANT

## 2017-06-30 PROCEDURE — 74011250637 HC RX REV CODE- 250/637: Performed by: NURSE PRACTITIONER

## 2017-06-30 PROCEDURE — 74011000250 HC RX REV CODE- 250: Performed by: INTERNAL MEDICINE

## 2017-06-30 PROCEDURE — 82962 GLUCOSE BLOOD TEST: CPT

## 2017-06-30 PROCEDURE — 97535 SELF CARE MNGMENT TRAINING: CPT

## 2017-06-30 RX ORDER — INSULIN GLARGINE 100 [IU]/ML
30 INJECTION, SOLUTION SUBCUTANEOUS
Status: DISCONTINUED | OUTPATIENT
Start: 2017-07-01 | End: 2017-07-02 | Stop reason: HOSPADM

## 2017-06-30 RX ORDER — BUMETANIDE 0.25 MG/ML
1 INJECTION INTRAMUSCULAR; INTRAVENOUS ONCE
Status: COMPLETED | OUTPATIENT
Start: 2017-06-30 | End: 2017-06-30

## 2017-06-30 RX ORDER — FAMOTIDINE 20 MG/1
20 TABLET, FILM COATED ORAL DAILY
Status: DISCONTINUED | OUTPATIENT
Start: 2017-07-01 | End: 2017-07-01

## 2017-06-30 RX ORDER — CLOPIDOGREL BISULFATE 75 MG/1
75 TABLET ORAL DAILY
Status: DISCONTINUED | OUTPATIENT
Start: 2017-06-30 | End: 2017-07-02 | Stop reason: HOSPADM

## 2017-06-30 RX ORDER — AMLODIPINE BESYLATE 5 MG/1
5 TABLET ORAL DAILY
Status: DISCONTINUED | OUTPATIENT
Start: 2017-06-30 | End: 2017-07-02

## 2017-06-30 RX ORDER — INSULIN LISPRO 100 [IU]/ML
10 INJECTION, SOLUTION INTRAVENOUS; SUBCUTANEOUS
Status: DISCONTINUED | OUTPATIENT
Start: 2017-06-30 | End: 2017-07-02 | Stop reason: HOSPADM

## 2017-06-30 RX ADMIN — BUMETANIDE 1 MG: 0.25 INJECTION INTRAMUSCULAR; INTRAVENOUS at 15:01

## 2017-06-30 RX ADMIN — CHLORHEXIDINE GLUCONATE 10 ML: 1.2 RINSE ORAL at 17:31

## 2017-06-30 RX ADMIN — FAMOTIDINE 20 MG: 20 TABLET ORAL at 08:37

## 2017-06-30 RX ADMIN — INSULIN LISPRO 10 UNITS: 100 INJECTION, SOLUTION INTRAVENOUS; SUBCUTANEOUS at 17:29

## 2017-06-30 RX ADMIN — AMIODARONE HYDROCHLORIDE 200 MG: 200 TABLET ORAL at 21:17

## 2017-06-30 RX ADMIN — ASPIRIN 81 MG 81 MG: 81 TABLET ORAL at 08:49

## 2017-06-30 RX ADMIN — AMLODIPINE BESYLATE 5 MG: 5 TABLET ORAL at 10:05

## 2017-06-30 RX ADMIN — Medication 10 ML: at 05:24

## 2017-06-30 RX ADMIN — DOCUSATE SODIUM AND SENNOSIDES 1 TABLET: 8.6; 5 TABLET, FILM COATED ORAL at 17:29

## 2017-06-30 RX ADMIN — INSULIN LISPRO 2 UNITS: 100 INJECTION, SOLUTION INTRAVENOUS; SUBCUTANEOUS at 08:50

## 2017-06-30 RX ADMIN — FERROUS SULFATE TAB 325 MG (65 MG ELEMENTAL FE) 325 MG: 325 (65 FE) TAB at 08:37

## 2017-06-30 RX ADMIN — HYDROCODONE BITARTRATE AND ACETAMINOPHEN 1 TABLET: 5; 325 TABLET ORAL at 18:36

## 2017-06-30 RX ADMIN — DEXTROSE MONOHYDRATE 150 ML: 10 INJECTION, SOLUTION INTRAVENOUS at 02:44

## 2017-06-30 RX ADMIN — NEBIVOLOL HYDROCHLORIDE 2.5 MG: 2.5 TABLET ORAL at 10:04

## 2017-06-30 RX ADMIN — HYDROCODONE BITARTRATE AND ACETAMINOPHEN 1 TABLET: 5; 325 TABLET ORAL at 05:13

## 2017-06-30 RX ADMIN — INSULIN GLARGINE 30 UNITS: 100 INJECTION, SOLUTION SUBCUTANEOUS at 12:35

## 2017-06-30 RX ADMIN — VANCOMYCIN HYDROCHLORIDE 1250 MG: 10 INJECTION, POWDER, LYOPHILIZED, FOR SOLUTION INTRAVENOUS at 05:21

## 2017-06-30 RX ADMIN — HYDROCODONE BITARTRATE AND ACETAMINOPHEN 1 TABLET: 5; 325 TABLET ORAL at 22:47

## 2017-06-30 RX ADMIN — CLOPIDOGREL BISULFATE 75 MG: 75 TABLET ORAL at 10:04

## 2017-06-30 RX ADMIN — CHLORHEXIDINE GLUCONATE 10 ML: 1.2 RINSE ORAL at 08:52

## 2017-06-30 RX ADMIN — Medication 10 ML: at 15:01

## 2017-06-30 RX ADMIN — ATORVASTATIN CALCIUM 20 MG: 20 TABLET, FILM COATED ORAL at 21:17

## 2017-06-30 RX ADMIN — Medication 10 ML: at 21:19

## 2017-06-30 RX ADMIN — MUPIROCIN: 20 OINTMENT TOPICAL at 08:52

## 2017-06-30 RX ADMIN — DOCUSATE SODIUM AND SENNOSIDES 1 TABLET: 8.6; 5 TABLET, FILM COATED ORAL at 08:37

## 2017-06-30 RX ADMIN — MUPIROCIN: 20 OINTMENT TOPICAL at 17:31

## 2017-06-30 RX ADMIN — AMIODARONE HYDROCHLORIDE 200 MG: 200 TABLET ORAL at 08:38

## 2017-06-30 NOTE — PROGRESS NOTES
CM spoke with [facility] [place] with [address] aid ([phone]) and they have received faxed information and have accepted the case. Jeb Merritt RN,CRM

## 2017-06-30 NOTE — DIABETES MGMT
DTC Cardiac Surgery Education Note    Recommendations/ Comments: Patient is now off drip and Lantus and pre meal Humalog have been ordered. Spoke with patient and wife regarding home management. Patient takes insulin as prescribed, monitors his sugars, but sometimes snacks on junk food. He did report occasional hypoglycemia which he treats with fruit or M&Ms. Discussed appropriate treatment of hypoglycemia and rationale. Chart reviewed and initial evaluation complete on [facility]. Patient is 73 y.o. male s/p Cardiac Surgery. POD 2. Positive history of diabetes on intensive insulin injection program(Levemir 30 units and Humalog 20 units TID ac) at home    A1c:   Lab Results   Component Value Date/Time    Hemoglobin A1c 5.1 06/19/2017 12:54 PM        BG monitoring at home 3-4 times per day. Patient reports BG levels at home trend: stable. Assessed and instructed patient on the following:   · risk of sternal wound infection/ delayed healing   · interpretation of lab results, blood sugar goals, complications of diabetes mellitus, hypoglycemia prevention and treatment, insulin adjustments, illness management and nutrition. Provided patient with the following: [x]         Survival skills education materials               [x]         BG guidelines for post-op patients                  [x]         Outpatient DTC contact number                 Recent Glucose Results: Lab Results   Component Value Date/Time     (H) 06/30/2017 03:15 AM    GLUCPOC 115 (H) 06/30/2017 02:21 PM    GLUCPOC 101 (H) 06/30/2017 01:18 PM    GLUCPOC 145 (H) 06/30/2017 12:15 PM        Lab Results   Component Value Date/Time    Creatinine 2.48 06/30/2017 03:15 AM     Estimated Creatinine Clearance: 35.7 mL/min (based on Cr of 2.48). Active Orders   Diet    DIET CARDIAC Regular; No Conc.  Sweets        PO intake: Patient Vitals for the past 72 hrs:   % Diet Eaten   06/30/17 1332 80 %   06/30/17 0842 90 %   06/29/17 1806 50 %   06/29/17 1346 90 %   06/29/17 0900 100 %       Current hospital DM medication: Transitioned off drip with Lantus 30 units and will receive lantus qam, Humalog 10 units TID ac and Humalog for correction    Will continue to follow as needed. Thank you.   Valentino Chapman MS, RN, CDE

## 2017-06-30 NOTE — PROGRESS NOTES
Problem: Self Care Deficits Care Plan (Adult)  Goal: *Acute Goals and Plan of Care (Insert Text)  Occupational Therapy Goals  Initiated 6/29/2017  1. Patient will perform ADLs standing 5 mins without fatigue or LOB with modified independence within 7 day(s). 2. Patient will perform lower body ADLs with AE modified independence within 7 day(s). 3. Patient will perform bathing upper body to knees with modified independence within 7 day(s). 4. Patient will perform toilet transfers with modified independence within 7 day(s). 5. Patient will perform all aspects of toileting with modified independence within 7 day(s). 6. Patient will participate in cardiac/sternal upper extremity therapeutic exercise/activities to increase independence with ADLs with modified independence for 5 minutes within 7 day(s). OCCUPATIONAL THERAPY TREATMENT  Patient: Chantel Dow (45 y.o. male)  Date: 6/30/2017  Diagnosis: MITRAL REGURG, CAD  CAD (coronary artery disease)  Non-rheumatic mitral regurgitation <principal problem not specified>  Procedure(s) (LRB):  MITRAL VALVE REPAIR, CORONARY ARTERY BYPASS GRAFTING X 1 WITH IBRAHIM, ECC, LONNIE AND EPIAORTIC U/S BY DR Ida Marquez (N/A) 2 Days Post-Op  Precautions: Sternal      ASSESSMENT:  Patient received standing in room with nursing and wife present. Patient able to complete sit<>stand transfers with CGA x1 and fair overall balance. Provided demonstration and verbal instruction on bed mobility and car transfer. Reinforced education on sternal precautions and provided education on spinal precautions for pain management as patient is pending surgical intervention following recovery from cardiac surgery. Patient will benefit from continued OT services to maximize his comprehension and safe recovery.   Progression toward goals:  [X]       Improving appropriately and progressing toward goals  [ ]       Improving slowly and progressing toward goals  [ ]       Not making progress toward goals and plan of care will be adjusted       PLAN:  Patient continues to benefit from skilled intervention to address the above impairments. Continue treatment per established plan of care. Discharge Recommendations:  Home Health  Further Equipment Recommendations for Discharge:  TBD       SUBJECTIVE:   Patient stated I'm feeling good with that.       OBJECTIVE DATA SUMMARY:   Cognitive/Behavioral Status:  Neurologic State: Alert  Orientation Level: Oriented X4  Cognition: Appropriate decision making; Appropriate for age attention/concentration; Appropriate safety awareness; Follows commands  Perception: Appears intact  Perseveration: No perseveration noted  Safety/Judgement: Awareness of environment; Insight into deficits;Home safety;Good awareness of safety precautions     Functional Mobility and Transfers for ADLs:  Transfers:  Sit to Stand: Contact guard assistance  Functional Transfers  Toilet Transfer : Contact guard assistance     Balance:  Sitting: Intact  Standing: Impaired  Standing - Static: Good  Standing - Dynamic : Fair     ADL Intervention:     Cognitive Retraining  Safety/Judgement: Awareness of environment; Insight into deficits;Home safety;Good awareness of safety precautions     Therapeutic Exercises:   Patient instructed on the benefits and demonstrated cardiac exercises while seated with supervision.       CARDIAC   EXERCISE    Sets    Reps    Active  Active Assist    Passive  Self ROM    Comments    Shoulder flexion  1  5   [X]                            [ ]                             [ ]                             [ ]                                 Shoulder abduction  1  5  [X]                             [ ]                             [ ]                             [ ]                                 Scapular elevation  1  5  [X]                             [ ]                              [ ]                             [ ]                                 Scapular retraction  1  5  [X] [ ]                             [ ]                             [ ]                                 Trunk rotation  1  5  [X]                             [ ]                             [ ]                             [ ]                                 Trunk sidebending  1  5  [X]                             [ ]                              [ ]                             [ ]                                             Pain:  Pain Scale 1: Numeric (0 - 10)  Pain Intensity 1: 0  Pain Location 1: Chest  Pain Orientation 1: Lateral;Left;Mid  Pain Description 1: Constant;Dull  Pain Intervention(s) 1: Refused  Activity Tolerance:   Good. Please refer to the flowsheet for vital signs taken during this treatment.   After treatment:   [X] Patient left in no apparent distress sitting up in chair  [ ] Patient left in no apparent distress in bed  [X] Call bell left within reach  [X] Nursing notified  [X] Caregiver present  [ ] Bed alarm activated      COMMUNICATION/COLLABORATION:   The patients plan of care was discussed with: Physical Therapist and Registered Nurse     Leti Gandara OT  Time Calculation: 23 mins

## 2017-06-30 NOTE — PROGRESS NOTES
Cardiac Surgery Care Coordinator- Met with Claudia Pillai, his wife and his sister, reviewed plan of care and discussed potential discharge date. Reinforced sternal precautions and encouraged continued use of the incentive spirometer. Reviewed goals for the day and emphasized the importance of increased activity to meet discharge goals. Red reminder bracelet on right wrist, reviewed purpose of the bracelet. Will continue to follow for educational and emotional needs.  Donavon De Los Santos RN

## 2017-06-30 NOTE — PROGRESS NOTES
CSS Progress Note    Admit Date: 2017  POD:  2 Day Post-Op    Procedure:  Procedure(s):  MITRAL VALVE REPAIR, CORONARY ARTERY BYPASS GRAFTING X 1 WITH IBRAHIM, ECC, LONNIE AND EPIAORTIC U/S BY DR Naseem Short        Subjective:   Pt seen with Dr. Love Ferguson. Feels well. Some pain in his left side. Objective:   Vitals:  Blood pressure 154/71, pulse 72, temperature 99.3 °F (37.4 °C), resp. rate 18, height 5' 7\" (1.702 m), weight 249 lb 1.9 oz (113 kg), SpO2 97 %. Temp (24hrs), Av.7 °F (37.1 °C), Min:98 °F (36.7 °C), Max:99.3 °F (37.4 °C)    EKG/Rhythm:    NSR 60s    CT Output:   90ml    Oxygen Therapy:  Oxygen Therapy  O2 Sat (%): 97 % (17 0714)  Pulse via Oximetry: 66 beats per minute (17 0400)  O2 Device: Room air (17 0826)  O2 Flow Rate (L/min): 1 l/min (17 2309)  FIO2 (%): 50 % (17 1830)    CXR:  IMPRESSION:  1. Mild bibasilar atelectasis has decreased    Admission Weight: Last Weight   Weight: 241 lb 2 oz (109.4 kg) Weight: 249 lb 1.9 oz (113 kg)     Intake / Output / Drain:  Current Shift:  0701 -  1900  In: 240 [P.O.:240]  Out: 60 [Drains:60]  Last 24 hrs.:     Intake/Output Summary (Last 24 hours) at 17 0932  Last data filed at 17 0842   Gross per 24 hour   Intake          1272.15 ml   Output              810 ml   Net           462.15 ml       EXAM:  General:   NAD                                                                                           Lungs:   Clear to auscultation bilaterally. Incision:  No erythema, drainage or swelling. Heart:  Regular rate and rhythm, S1, S2 normal, no murmur, click, rub or gallop. Abdomen:   Soft, non-tender. Bowel sounds normal. No masses,  No organomegaly. Extremities:  No edema. PPP. Neurologic:  Gross motor and sensory apparatus intact.      Labs:   Recent Labs      17   0918   17   0315   WBC   --    --   11.5*   HGB   --    --   9.7*   HCT   --    --   29.9*   PLT   --    --   128*   NA --    --   137   K   --    --   3.8   BUN   --    --   45*   CREA   --    --   2.48*   GLU   --    --   154*   GLUCPOC  153*   < >   --    INR   --    --   1.1    < > = values in this interval not displayed. Assessment:     Active Problems:    Mitral regurgitation ()      Overview: mod-severe on echo 12      CAD (coronary artery disease) (2017)      Non-rheumatic mitral regurgitation (2017)      S/P MVR (mitral valve repair) (2017)      Overview: MITRAL VALVE REPAIR with 27 MM annuloplasty band, CORONARY ARTERY BYPASS       GRAFTING X 1 WITH LIMA to LAD      S/P CABG x 1 (2017)      Overview: MITRAL VALVE REPAIR with 27 MM annuloplasty band, CORONARY ARTERY BYPASS       GRAFTING X 1 WITH IBRAHIM to LAD         Plan/Recommendations/Medical Decision Makin. S/P MVr/CABG: Stable. On ASA, statin, and BB. Will start plavix for MVr x 2 months. 2. Atelectasis: IS, activity  3. Hypertension: On BB. Start norvasc. 4. REENA: Monitor. Avoid nephrotoxic meds. Consulting renal today. 5. DM: On insulin gtt. DTC following. 6. Dispo: DC chest tubes. PT/OT.      Signed By: ORLANDO De Oliveira

## 2017-06-30 NOTE — CONSULTS
[facility]   [address] [facility], [address] [place] [address], [facility]  Phone: [phone] NOTE     Patient: Mary Carmen Wilson MRN: [id number]  PCP: Anna Joyce MD   :     1950  Age:   73 y.o. Sex:  male      Referring physician: Ron Davey MD  Reason for consultation: 73 y.o. male with [address], CAD  CAD (coronary artery disease)  Non-rheumatic mitral regurgitation complicated by REENA   Admission Date: 2017  5:25 AM  LOS: 2 days      ASSESSMENT and PLAN :   REENA on CKD :  - 2/2 ATN along expected lines   - IV vanc may have contributed to worsening renal function . Stopped now   - Expect Cr to worsen for next 1-2 days and should improve from there   - Hemodynamic parameters good   - Volume up ---> ordered IV bumex   - Hx of Kidney stones ---> ordered US to exclude  Obstruction - less likely   - to remain off Losartan for now     CKD Stage III :  - baseline Cr 1.4-1.6 mg/dl   - had some Gent toxicity in March   - CKD likely 2/2 DM, long standing HTN     Hx of MV endocarditis w/ Discitis   - S/P MVR     CAD  - s/p CABG X1     Blood Loss anemia / Thrombocytopenia     BPH s/p Prostatectomy     Nephrolithiasis     _____________________________________________________    Thank you asking me to see Mr Loretta Marin   Dr Julio C Jarrell will see him over the weekend      Subjective:   HPI: Mary Carmen Wilson is a 73 y.o.  male who has been admitted to the hospital for MV repair and CABG which was done 2 days ago.  Post op now he has REENA on CKD for which I was asked to see him   He was found to have Discitis of Lumbar spine in March that's led to diagnosis of MV endocarditis   He was found to have Alpha hemolytic strep bacteremia and was treated for 8 weeks with IV cefazolin andf 2 weeks of IV gent   He has worsening renal function when he as on gent   He had CKD prior to that and his Cr was 1.2-1.3 at baseline   His Cr was upto 1.6 when on gent   His pre op Cr was down to 1.4 and on day of surgery was 1.7 mg/dl   He has been well since completing abx with discitis significantly better  He took some ibuprofen for back pain   otherise mostly dilaudid   He had Kidney stone in the past that needed extraction   He had several prostatitis episodes and needed urgent prostatectomy in Feb   In Jan he had several dental procedures   He has long standing HTN and DM for only 10 years     Past Medical Hx:   Past Medical History:   Diagnosis Date    Abnormal EKG     he says it has been abnormal for years    Arthritis     CAD (coronary artery disease)     Chronic pain     lower lumbar    Diabetes ([facility] [address]     type II    Dyslipidemia     Enlarged prostate Dr. Ruth Acevedo    HTN (hypertension)     Hypercholesterolemia     Kidney stone     Mitral regurgitation     mod-severe on echo 1/5/12    Murmur     Obesity     Other ill-defined conditions     BPH    Sleep apnea     stopped using CPAP many years ago        Past Surgical Hx:     Past Surgical History:   Procedure Laterality Date    ECHO 2D ADULT  1/4/12    mild-mod LVH, LVEF 60%, probably grade 1 diastolic dysfunction, mod LAE, mod-severe MR (eccentroic ) - had severe HTN during study (173/108),     ECHO LIMITED  1/20/12    mod-severe MR    HX COLONOSCOPY  2013    small polyps removed; repeat in 5 years; Dr. Tolentino Frame  9/2014    RIGHT    HX LITHOTRIPSY  2011    HX OTHER SURGICAL      LONNIE 1/31/12 - LVEF 60%, mod-severe MR (post directed), mild-mod LAE, mild atheroma aorta    HX OTHER SURGICAL      Echo 9/5/13 - mild LVH, LVEF 60 % to 65 %. Mod LAE. Mod MR (post directed)     HX OTHER SURGICAL      Echo 9/5/13 - mild LVH, LVEF 60 % to 65 %. Mod LAE.  Mod MR (post directed)     HX OTHER SURGICAL  02/2017    Bladder stones removed, and prostate \"trimmed\" down     STRESS TEST CARDIOLITE  1/4/12    walked 4:30, stopping for severe FISH, no ischemic EKG changes (inferolateral TWI at start), normal perfusion (diaphragmatic attenuation), LVEF 48%, hypertensive respone to exercise       Medications:  Prior to Admission medications    Medication Sig Start Date End Date Taking? Authorizing Provider   nebivolol (BYSTOLIC) 10 mg tablet Take 10 mg by mouth daily. Yes Historical Provider   amLODIPine (NORVASC) 10 mg tablet Take 10 mg by mouth daily. Yes Historical Provider   ergocalciferol (VITAMIN D2) 50,000 unit capsule Take 50,000 Units by mouth every Wednesday. Every Wednesday   Yes Riri Pardo MD   insulin lispro (HUMALOG) 100 unit/mL injection 20 Units by SubCUTAneous route Before breakfast, lunch, and dinner. Yes Riri Pardo MD   losartan (COZAAR) 100 mg tablet TAKE 1 TABLET BY MOUTH DAILY 6/20/14  Yes Stefan Cox MD   ascorbic acid (VITAMIN C) 500 mg tablet Take 500 mg by mouth daily. Yes Historical Provider   HYDROmorphone (DILAUDID) 2 mg tablet Take 1 Tab by mouth every four (4) hours as needed. Max Daily Amount: 12 mg. 3/24/17   ORLANDO Mcmullen   insulin detemir (LEVEMIR) 100 unit/mL injection 30 Units by SubCUTAneous route nightly. Riri Pardo MD       Allergies   Allergen Reactions    Cephalexin Other (comments)     unknown    Oxycodone Other (comments)     Pt does not desire to take; causes altered mental status and constipation    Sulfa (Sulfonamide Antibiotics) Hives    Tamsulosin Other (comments)     Vision loss       Social Hx:  reports that he quit smoking about 3 years ago. He has a 5.00 pack-year smoking history. He has never used smokeless tobacco. He reports that he does not drink alcohol or use illicit drugs.      Family History   Problem Relation Age of Onset    Coronary Artery Disease Father     Heart Disease Father     Stroke Father     Cancer Father      prostate    Hypertension Mother     Arthritis-osteo Mother     Malignant Hyperthermia Neg Hx     Pseudocholinesterase Deficiency Neg Hx     Delayed Awakening Neg Hx     Post-op Nausea/Vomiting Neg Hx     Emergence Delirium Neg Hx     Post-op Cognitive Dysfunction Neg Hx     Other Neg Hx     Anesth Problems Neg Hx        Review of Systems:  A twelve point review of system was performed today. Pertinent positives and negatives are mentioned in the HPI. The reminder of the ROS is negative and noncontributory. Objective:    Vitals:    Vitals:    06/30/17 0714 06/30/17 0826 06/30/17 1000 06/30/17 1117   BP: 154/71  149/59 150/61   Pulse: 63 72 65 65   Resp: 18  18 20   Temp: 99.3 °F (37.4 °C)  99.3 °F (37.4 °C) 99.2 °F (37.3 °C)   SpO2: 97%   99%   Weight:       Height:         I&O's:  06/29 0701 - 06/30 0700  In: 1418.1 [P.O.:1070; I.V.:348.1]  Out: 825 [Urine:575; Drains:250]  Visit Vitals    /61 (BP 1 Location: Left arm, BP Patient Position: At rest;Head of bed elevated (Comment degrees))    Pulse 65    Temp 99.2 °F (37.3 °C)    Resp 20    Ht 5' 7\" (1.702 m)    Wt 113 kg (249 lb 1.9 oz)    SpO2 99%    BMI 39.02 kg/m2       Physical Exam:  General:Alert, No distress,   HEENT: pale, anicteric   Neck:Supple,no mass palpable  Lungs : Clears to auscultation Bilaterally, Normal respiratory effort  CVS: RRR, S1 S2 normal, No rub,trace  LE edema  Abdomen: Soft, Non tender, No hepatosplenomegaly, bowel sounds present  Extremities: No cyanosis, No clubbing  Skin: No rash or lesions.   Lymph nodes: No palpable nodes  MS: No joint swelling, erythema, warmth  Neurologic: non focal, AAO x 3  Psych: normal affect    Laboratory Results:    Recent Labs      06/30/17   0315  06/29/17   0406  06/28/17   1900  06/28/17   1445   NA  137  145   --   145   K  3.8  4.5  4.9  3.3*   CL  105  112*   --   112*   CO2  23  22   --   22   GLU  154*  114*   --   128*   BUN  45*  34*   --   24*   CREA  2.48*  1.89*   --   1.72*   CA  8.1*  8.5   --   7.3*   MG  2.4  2.4  2.3  2.4   ALB   --   3.0*   --   3.0*   SGOT   --   62*   --   39*   ALT   --   13   --   12   INR  1.1   --    -- 1.1     Recent Labs      06/30/17   0315  06/29/17   0406  06/28/17   1900  06/28/17   1445   WBC  11.5*  12.2*   --   25.0*   HGB  9.7*  10.2*  11.8*  11.4*   HCT  29.9*  30.6*  35.6*  34.0*   PLT  128*  136*   --   196     Lab Results   Component Value Date/Time    Specimen Description: NARES 08/27/2013 10:00 AM    Specimen Description: CLEAN CATCH 08/27/2013 09:05 AM     Lab Results   Component Value Date/Time    Culture result: NO GROWTH 2 DAYS 06/28/2017 10:50 AM    Culture result: NO GROWTH 2 DAYS 06/28/2017 10:50 AM    Culture result: NO GROWTH 5 DAYS 03/19/2017 08:01 PM     Recent Results (from the past 24 hour(s))   GLUCOSE, POC    Collection Time: 06/29/17  1:42 PM   Result Value Ref Range    Glucose (POC) 148 (H) 65 - 100 mg/dL    Performed by Claudeen Sleight    Collection Time: 06/29/17  1:43 PM   Result Value Ref Range    Glucose 148 mg/dL    Insulin order 8.6 units/hour    Insulin adminstered 8.6 units/hour    Multiplier 0.098     Low target 95 mg/dL    High target 130 mg/dL    D50 order 0.0 ml    D50 administered 0.00 ml    Minutes until next BG 60 min    Order initials all     Administered initials all     GLSCOM Comments     GLUCOSE, POC    Collection Time: 06/29/17  2:48 PM   Result Value Ref Range    Glucose (POC) 94 65 - 100 mg/dL    Performed by Romero Quintanilla    Collection Time: 06/29/17  2:50 PM   Result Value Ref Range    Glucose 94 mg/dL    Insulin order 2.7 units/hour    Insulin adminstered 2.7 units/hour    Multiplier 0.078     Low target 95 mg/dL    High target 130 mg/dL    D50 order 0.0 ml    D50 administered 0.00 ml    Minutes until next BG 60 min    Order initials mm     Administered initials mm     GLSCOM Comments     GLUCOSE, POC    Collection Time: 06/29/17  3:53 PM   Result Value Ref Range    Glucose (POC) 151 (H) 65 - 100 mg/dL    Performed by Claudeen Sleight    Collection Time: 06/29/17  3:54 PM   Result Value Ref Range    Glucose 151 mg/dL    Insulin order 8.0 units/hour    Insulin adminstered 8.0 units/hour    Multiplier 0.088     Low target 95 mg/dL    High target 130 mg/dL    D50 order 0.0 ml    D50 administered 0.00 ml    Minutes until next BG 60 min    Order initials mm     Administered initials mm     GLSCOM Comments     GLUCOSE, POC    Collection Time: 06/29/17  5:00 PM   Result Value Ref Range    Glucose (POC) 101 (H) 65 - 100 mg/dL    Performed by Tahmina Luong    Collection Time: 06/29/17  5:01 PM   Result Value Ref Range    Glucose 101 mg/dL    Insulin order 3.6 units/hour    Insulin adminstered 3.6 units/hour    Multiplier 0.088     Low target 95 mg/dL    High target 130 mg/dL    D50 order 0.0 ml    D50 administered 0.00 ml    Minutes until next BG 60 min    Order initials mm     Administered initials mm     GLSCOM Comments     GLUCOSE, POC    Collection Time: 06/29/17  6:06 PM   Result Value Ref Range    Glucose (POC) 110 (H) 65 - 100 mg/dL    Performed by Roni Morales    Collection Time: 06/29/17  6:07 PM   Result Value Ref Range    Glucose 110 mg/dL    Insulin order 4.4 units/hour    Insulin adminstered 4.4 units/hour    Multiplier 0.088     Low target 95 mg/dL    High target 130 mg/dL    D50 order 0.0 ml    D50 administered 0.00 ml    Minutes until next BG 60 min    Order initials APL     Administered initials APL     GLSCOM Comments     GLUCOSE, POC    Collection Time: 06/29/17  7:09 PM   Result Value Ref Range    Glucose (POC) 138 (H) 65 - 100 mg/dL    Performed by Tahmina Luong    Collection Time: 06/29/17  7:11 PM   Result Value Ref Range    Glucose 138 mg/dL    Insulin order 7.6 units/hour    Insulin adminstered 7.6 units/hour    Multiplier 0.098     Low target 95 mg/dL    High target 130 mg/dL    D50 order 0.0 ml    D50 administered 0.00 ml    Minutes until next BG 60 min    Order initials [address]     Administered initials [facility] Collection Time: 06/29/17  8:10 PM   Result Value Ref Range    Glucose (POC) 131 (H) 65 - 100 mg/dL    Performed by [facility] More    Collection Time: 06/29/17  8:10 PM   Result Value Ref Range    Glucose 131 mg/dL    Insulin order 7.7 units/hour    Insulin adminstered 7.7 units/hour    Multiplier 0.108     Low target 95 mg/dL    High target 130 mg/dL    D50 order 0.0 ml    D50 administered 0.00 ml    Minutes until next BG 60 min    Order initials lbz     Administered initials lbz     GLSCOM Comments     GLUCOSE, POC    Collection Time: 06/29/17  9:07 PM   Result Value Ref Range    Glucose (POC) 116 (H) 65 - 100 mg/dL    Performed by [facility] More    Collection Time: 06/29/17  9:08 PM   Result Value Ref Range    Glucose 116 mg/dL    Insulin order 6.0 units/hour    Insulin adminstered 6.0 units/hour    Multiplier 0.108     Low target 95 mg/dL    High target 130 mg/dL    D50 order 0.0 ml    D50 administered 0.00 ml    Minutes until next BG 60 min    Order initials slt     Administered initials slt     GLSCOM Comments     GLUCOSE, POC    Collection Time: 06/29/17 10:10 PM   Result Value Ref Range    Glucose (POC) 99 65 - 100 mg/dL    Performed by [facility] More    Collection Time: 06/29/17 10:10 PM   Result Value Ref Range    Glucose 99 mg/dL    Insulin order 4.2 units/hour    Insulin adminstered 4.2 units/hour    Multiplier 0.108     Low target 95 mg/dL    High target 130 mg/dL    D50 order 0.0 ml    D50 administered 0.00 ml    Minutes until next BG 60 min    Order initials slt     Administered initials slt     GLSCOM Comments     GLUCOSE, POC    Collection Time: 06/29/17 11:08 PM   Result Value Ref Range    Glucose (POC) 100 65 - 100 mg/dL    Performed by [facility] More    Collection Time: 06/29/17 11:08 PM   Result Value Ref Range    Glucose 100 mg/dL    Insulin order 4.3 units/hour    Insulin adminstered 4.3 units/hour    Multiplier 0.108     Low target 95 mg/dL    High target 130 mg/dL    D50 order 0.0 ml    D50 administered 0.00 ml    Minutes until next BG 60 min    Order initials lbz     Administered initials lbz     GLSCOM Comments     GLUCOSE, POC    Collection Time: 06/30/17 12:05 AM   Result Value Ref Range    Glucose (POC) 115 (H) 65 - 100 mg/dL    Performed by Luis Manuel Hamilton    Collection Time: 06/30/17 12:06 AM   Result Value Ref Range    Glucose 115 mg/dL    Insulin order 5.9 units/hour    Insulin adminstered 5.9 units/hour    Multiplier 0.108     Low target 95 mg/dL    High target 130 mg/dL    D50 order 0.0 ml    D50 administered 0.00 ml    Minutes until next BG 60 min    Order initials slt     Administered initials slt     GLSCOM Comments     GLUCOSE, POC    Collection Time: 06/30/17  1:14 AM   Result Value Ref Range    Glucose (POC) 101 (H) 65 - 100 mg/dL    Performed by Sahara Short    Collection Time: 06/30/17  1:14 AM   Result Value Ref Range    Glucose 101 mg/dL    Insulin order 4.4 units/hour    Insulin adminstered 4.4 units/hour    Multiplier 0.108     Low target 95 mg/dL    High target 130 mg/dL    D50 order 0.0 ml    D50 administered 0.00 ml    Minutes until next BG 60 min    Order initials lb     Administered initials lb     GLSCOM Comments     GLUCOSE, POC    Collection Time: 06/30/17  2:23 AM   Result Value Ref Range    Glucose (POC) 77 65 - 100 mg/dL    Performed by Luis Manuel Hamilton    Collection Time: 06/30/17  2:24 AM   Result Value Ref Range    Glucose 77 mg/dL    Insulin order 0.0 units/hour    Insulin adminstered 0.0 units/hour    Multiplier 0.086     Low target 95 mg/dL    High target 130 mg/dL    D50 order 9.0 ml    D50 administered 0.00 ml    Minutes until next BG 15 min    Order initials lb     Administered initials lb     GLSCOM Comments juice    GLUCOSE, POC    Collection Time: 06/30/17  2:42 AM   Result Value Ref Range    Glucose (POC) 74 65 - 100 mg/dL    Performed by [facility]    Collection Time: 06/30/17  2:43 AM   Result Value Ref Range    Glucose 74 mg/dL    Insulin order 0.0 units/hour    Insulin adminstered 0.0 units/hour    Multiplier 0.069     Low target 95 mg/dL    High target 130 mg/dL    D50 order 10.0 ml    D50 administered 10.00 ml    Minutes until next BG 15 min    Order initials slt     Administered initials slt     GLSCOM Comments     GLUCOSE, POC    Collection Time: 06/30/17  3:11 AM   Result Value Ref Range    Glucose (POC) 114 (H) 65 - 100 mg/dL    Performed by Drake Luu    Collection Time: 06/30/17  3:12 AM   Result Value Ref Range    Glucose 114 mg/dL    Insulin order 3.7 units/hour    Insulin adminstered 3.7 units/hour    Multiplier 0.069     Low target 95 mg/dL    High target 130 mg/dL    D50 order 0.0 ml    D50 administered 0.00 ml    Minutes until next BG 60 min    Order initials lbz     Administered initials lbz     GLSCOM Comments     METABOLIC PANEL, BASIC    Collection Time: 06/30/17  3:15 AM   Result Value Ref Range    Sodium 137 136 - 145 mmol/L    Potassium 3.8 3.5 - 5.1 mmol/L    Chloride 105 97 - 108 mmol/L    CO2 23 21 - 32 mmol/L    Anion gap 9 5 - 15 mmol/L    Glucose 154 (H) 65 - 100 mg/dL    BUN 45 (H) 6 - 20 MG/DL    Creatinine 2.48 (H) 0.70 - 1.30 MG/DL    BUN/Creatinine ratio 18 12 - 20      GFR est AA 32 (L) >60 ml/min/1.73m2    GFR est non-AA 26 (L) >60 ml/min/1.73m2    Calcium 8.1 (L) 8.5 - 10.1 MG/DL   MAGNESIUM    Collection Time: 06/30/17  3:15 AM   Result Value Ref Range    Magnesium 2.4 1.6 - 2.4 mg/dL   CBC W/O DIFF    Collection Time: 06/30/17  3:15 AM   Result Value Ref Range    WBC 11.5 (H) 4.1 - 11.1 K/uL    RBC 3.25 (L) 4.10 - 5.70 M/uL    HGB 9.7 (L) 12.1 - 17.0 g/dL    HCT 29.9 (L) 36.6 - 50.3 %    MCV 92.0 80.0 - 99.0 FL    MCH 29.8 26.0 - 34.0 PG    MCHC 32.4 30.0 - 36.5 g/dL    RDW 15.4 (H) 11.5 - 14.5 %    PLATELET 765 (L) 492 - 400 K/uL   PTT    Collection Time: 06/30/17 3:15 AM   Result Value Ref Range    aPTT 29.4 22.1 - 32.5 sec    aPTT, therapeutic range     58.0 - 77.0 SECS   PROTHROMBIN TIME + INR    Collection Time: 06/30/17  3:15 AM   Result Value Ref Range    INR 1.1 0.9 - 1.1      Prothrombin time 10.8 9.0 - 11.1 sec   GLUCOSE, POC    Collection Time: 06/30/17  4:14 AM   Result Value Ref Range    Glucose (POC) 140 (H) 65 - 100 mg/dL    Performed by Mary Roup    Collection Time: 06/30/17  4:15 AM   Result Value Ref Range    Glucose 140 mg/dL    Insulin order 6.3 units/hour    Insulin adminstered 6.3 units/hour    Multiplier 0.079     Low target 95 mg/dL    High target 130 mg/dL    D50 order 0.0 ml    D50 administered 0.00 ml    Minutes until next BG 60 min    Order initials slt     Administered initials slt     GLSCOM Comments     GLUCOSE, POC    Collection Time: 06/30/17  5:10 AM   Result Value Ref Range    Glucose (POC) 118 (H) 65 - 100 mg/dL    Performed by Mary Roup    Collection Time: 06/30/17  5:11 AM   Result Value Ref Range    Glucose 118 mg/dL    Insulin order 4.6 units/hour    Insulin adminstered 4.6 units/hour    Multiplier 0.079     Low target 95 mg/dL    High target 130 mg/dL    D50 order 0.0 ml    D50 administered 0.00 ml    Minutes until next BG 60 min    Order initials slt     Administered initials slt     GLSCOM Comments     GLUCOSE, POC    Collection Time: 06/30/17  6:12 AM   Result Value Ref Range    Glucose (POC) 113 (H) 65 - 100 mg/dL    Performed by Mary Roup    Collection Time: 06/30/17  6:13 AM   Result Value Ref Range    Glucose 113 mg/dL    Insulin order 4.2 units/hour    Insulin adminstered 4.2 units/hour    Multiplier 0.079     Low target 95 mg/dL    High target 130 mg/dL    D50 order 0.0 ml    D50 administered 0.00 ml    Minutes until next BG 60 min    Order initials slt     Administered initials slt     GLSCOM Comments     GLUCOSE, POC    Collection Time: 06/30/17  7:13 AM Result Value Ref Range    Glucose (POC) 89 65 - 100 mg/dL    Performed by [place]    Collection Time: 06/30/17  7:13 AM   Result Value Ref Range    Glucose 89 mg/dL    Insulin order 1.8 units/hour    Insulin adminstered 1.8 units/hour    Multiplier 0.063     Low target 95 mg/dL    High target 130 mg/dL    D50 order 0.0 ml    D50 administered 0.00 ml    Minutes until next BG 60 min    Order initials slt     Administered initials slt     GLSCOM Comments     GLUCOSE, POC    Collection Time: 06/30/17  8:15 AM   Result Value Ref Range    Glucose (POC) 94 65 - 100 mg/dL    Performed by Indy Teague (S0N)    GLUCOSTABILIZER    Collection Time: 06/30/17  8:16 AM   Result Value Ref Range    Glucose 94 mg/dL    Insulin order 1.7 units/hour    Insulin adminstered 1.7 units/hour    Multiplier 0.050     Low target 95 mg/dL    High target 130 mg/dL    D50 order 0.0 ml    D50 administered 0.00 ml    Minutes until next BG 60 min    Order initials      Administered [facility]     GLSCOM Comments     GLUCOSE, POC    Collection Time: 06/30/17  9:18 AM   Result Value Ref Range    Glucose (POC) 153 (H) 65 - 100 mg/dL    Performed by Indy Teague (S0N)    GLUCOSTABILIZER    Collection Time: 06/30/17  9:20 AM   Result Value Ref Range    Glucose 153 mg/dL    Insulin order 5.6 units/hour    Insulin adminstered 5.6 units/hour    Multiplier 0.060     Low target 95 mg/dL    High target 130 mg/dL    D50 order 0.0 ml    D50 administered 0.00 ml    Minutes until next BG 60 min    Order initials      Administered [facility]     GLSCOM Comments     GLUCOSE, POC    Collection Time: 06/30/17 10:15 AM   Result Value Ref Range    Glucose (POC) 173 (H) 65 - 100 mg/dL    Performed by Indy Teague (S0N)    GLUCOSTABILIZER    Collection Time: 06/30/17 10:17 AM   Result Value Ref Range    Glucose 173 mg/dL    Insulin order 7.9 units/hour    Insulin adminstered 7.9 units/hour    Multiplier 0.070     Low target 95 mg/dL    High target 130 mg/dL    D50 order 0.0 ml    D50 administered 0.00 ml    Minutes until next BG 60 min    Order initials bld     Administered initials bld     GLSCOM Comments     GLUCOSE, POC    Collection Time: 06/30/17 11:11 AM   Result Value Ref Range    Glucose (POC) 173 (H) 65 - 100 mg/dL    Performed by [place]l [place] (S0N)    GLUCOSTABILIZER    Collection Time: 06/30/17 11:13 AM   Result Value Ref Range    Glucose 173 mg/dL    Insulin order 9.0 units/hour    Insulin adminstered 9.0 units/hour    Multiplier 0.080     Low target 95 mg/dL    High target 130 mg/dL    D50 order 0.0 ml    D50 administered 0.00 ml    Minutes until next BG 60 min    Order initials      Administered initials [place] Comments     GLUCOSE, POC    Collection Time: 06/30/17 12:15 PM   Result Value Ref Range    Glucose (POC) 145 (H) 65 - 100 mg/dL    Performed by Chilel [place] (S0N)    GLUCOSTABILIZER    Collection Time: 06/30/17 12:16 PM   Result Value Ref Range    Glucose 145 mg/dL    Insulin order 7.7 units/hour    Insulin adminstered 7.7 units/hour    Multiplier 0.090     Low target 95 mg/dL    High target 130 mg/dL    D50 order 0.0 ml    D50 administered 0.00 ml    Minutes until next BG 60 min    Order initials      Administered initials [place] Comments     GLUCOSE, POC    Collection Time: 06/30/17  1:18 PM   Result Value Ref Range    Glucose (POC) 101 (H) 65 - 100 mg/dL    Performed by Chilel [place] (S0N)    GLUCOSTABILIZER    Collection Time: 06/30/17  1:19 PM   Result Value Ref Range    Glucose 101 mg/dL    Insulin order 3.7 units/hour    Insulin adminstered 3.7 units/hour    Multiplier 0.090     Low target 95 mg/dL    High target 130 mg/dL    D50 order 0.0 ml    D50 administered 0.00 ml    Minutes until next BG 60 min    Order initials      Administered initials [facility]     GLSCOM Comments             Urine dipstick:   Lab Results   Component Value Date/Time    Color YELLOW/STRAW 06/19/2017 12:54 PM    Appearance CLOUDY 06/19/2017 12:54 PM    Specific gravity 1.023 06/19/2017 12:54 PM    Specific gravity 1.021 08/27/2013 09:05 AM    pH (UA) 5.5 06/19/2017 12:54 PM    Protein 30 06/19/2017 12:54 PM    Glucose NEGATIVE  06/19/2017 12:54 PM    Ketone NEGATIVE  06/19/2017 12:54 PM    Bilirubin NEGATIVE  06/19/2017 12:54 PM    Urobilinogen 0.2 06/19/2017 12:54 PM    Nitrites NEGATIVE  06/19/2017 12:54 PM    Leukocyte Esterase NEGATIVE  06/19/2017 12:54 PM    Epithelial cells FEW 06/19/2017 12:54 PM    Bacteria NEGATIVE  06/19/2017 12:54 PM    WBC 0-4 06/19/2017 12:54 PM    RBC 0-5 06/19/2017 12:54 PM     Juanis Downing MD, [facility] Nephrology [facility].   Office :[phone]  Fax: [phone]

## 2017-06-30 NOTE — PROGRESS NOTES
0740: Bedside shift change report given to Talisha Menjivar RN (oncoming nurse) by Zayra Jasso RN (offgoing nurse). Report included the following information SBAR, Kardex, Procedure Summary, Intake/Output, MAR, Accordion, Recent Results, Med Rec Status and Cardiac Rhythm NSR.   1000: Handouts provided for new medications (plavix and norvasc)  1020: Ambulated with patient in the hallway and to bathroom. 1050: ORLANDO Schulz removed chest tubes. Patient resting in bed.   1424: IV insulin drip stopped. BG was 115  1530: Bedside shift change report given to JOSE A INGRAM (oncoming nurse) by Talisha Menjivar RN (offgoing nurse). Report included the following information SBAR, Kardex, Procedure Summary, Intake/Output, MAR, Accordion, Recent Results, Med Rec Status and Cardiac Rhythm NSR. Problem: Falls - Risk of  Goal: *Absence of falls  Outcome: Progressing Towards Goal  Pt's bed is locked, in low position, side rails x3, gripper socks on, and call bell within reach. Pt knows to call for assitance. Problem: Pressure Injury - Risk of  Goal: *Prevention of pressure ulcer  Outcome: Progressing Towards Goal    06/30/17 0826   Wound Prevention and Protection Methods   Orientation of Wound Prevention Posterior   Location of Wound Prevention Sacrum/Coccyx   Dressing Present  No   Read Only, Retired: Wound Treatment (non-mechanical)   Wound Offloading (Prevention Methods) Bed, pressure redistribution/air;Pillows;Repositioning;Turning         Problem: CABG: Post-Op Day 2/Transfer Day  Goal: Activity/Safety  Outcome: Progressing Towards Goal  Pt sat up in chair for about an hour today; however d/t lower back pain he returned to bed before change of shift. Pt is agreeable to get out of bed for lunch and dinner. He also ambulated with nurse to the bathroom. Goal: Consults, if ordered  Outcome: Progressing Towards Goal  Nephrology consult placed today.    Goal: Nutrition/Diet  Outcome: Progressing Towards Goal  Pt ate 95% of his breakfast. He is on a cardiac diet with no concentrated sweets. Goal: Medications  Outcome: Progressing Towards Goal  On vancomycin and insulin drip. Should be able to stop insulin drip this afternoon. Goal: Respiratory  Outcome: Progressing Towards Goal  On RA, needs encouragement for IS.

## 2017-06-30 NOTE — PROGRESS NOTES
1530: Bedside and Verbal shift change report given to NATALY RN (oncoming nurse) by [employer] RN (offgoing nurse). Report included the following information SBAR, Kardex, Intake/Output, MAR, Recent Results, Med Rec Status and Cardiac Rhythm NSR.   1930: Bedside and Verbal shift change report given to Lilia RN and Roya Aiken RN (oncoming nurse) by [employer], RN (offgoing nurse). Report included the following information SBAR, Kardex, Intake/Output, MAR, Recent Results, Med Rec Status and Cardiac Rhythm NSR.

## 2017-07-01 ENCOUNTER — APPOINTMENT (OUTPATIENT)
Dept: GENERAL RADIOLOGY | Age: 67
DRG: 219 | End: 2017-07-01
Attending: PHYSICIAN ASSISTANT
Payer: MEDICARE

## 2017-07-01 LAB
ANION GAP BLD CALC-SCNC: 9 MMOL/L (ref 5–15)
BUN SERPL-MCNC: 33 MG/DL (ref 6–20)
BUN/CREAT SERPL: 22 (ref 12–20)
CALCIUM SERPL-MCNC: 8.3 MG/DL (ref 8.5–10.1)
CHLORIDE SERPL-SCNC: 107 MMOL/L (ref 97–108)
CO2 SERPL-SCNC: 23 MMOL/L (ref 21–32)
CREAT SERPL-MCNC: 1.49 MG/DL (ref 0.7–1.3)
ERYTHROCYTE [DISTWIDTH] IN BLOOD BY AUTOMATED COUNT: 15 % (ref 11.5–14.5)
GLUCOSE BLD STRIP.AUTO-MCNC: 119 MG/DL (ref 65–100)
GLUCOSE BLD STRIP.AUTO-MCNC: 126 MG/DL (ref 65–100)
GLUCOSE BLD STRIP.AUTO-MCNC: 135 MG/DL (ref 65–100)
GLUCOSE BLD STRIP.AUTO-MCNC: 81 MG/DL (ref 65–100)
GLUCOSE SERPL-MCNC: 111 MG/DL (ref 65–100)
HCT VFR BLD AUTO: 29.1 % (ref 36.6–50.3)
HGB BLD-MCNC: 9.6 G/DL (ref 12.1–17)
MAGNESIUM SERPL-MCNC: 2.3 MG/DL (ref 1.6–2.4)
MCH RBC QN AUTO: 30.2 PG (ref 26–34)
MCHC RBC AUTO-ENTMCNC: 33 G/DL (ref 30–36.5)
MCV RBC AUTO: 91.5 FL (ref 80–99)
PLATELET # BLD AUTO: 130 K/UL (ref 150–400)
POTASSIUM SERPL-SCNC: 3.3 MMOL/L (ref 3.5–5.1)
RBC # BLD AUTO: 3.18 M/UL (ref 4.1–5.7)
SERVICE CMNT-IMP: ABNORMAL
SERVICE CMNT-IMP: NORMAL
SODIUM SERPL-SCNC: 139 MMOL/L (ref 136–145)
WBC # BLD AUTO: 9.1 K/UL (ref 4.1–11.1)

## 2017-07-01 PROCEDURE — 97116 GAIT TRAINING THERAPY: CPT

## 2017-07-01 PROCEDURE — 74011000250 HC RX REV CODE- 250: Performed by: INTERNAL MEDICINE

## 2017-07-01 PROCEDURE — 80048 BASIC METABOLIC PNL TOTAL CA: CPT | Performed by: PHYSICIAN ASSISTANT

## 2017-07-01 PROCEDURE — 82962 GLUCOSE BLOOD TEST: CPT

## 2017-07-01 PROCEDURE — 71020 XR CHEST PA LAT: CPT

## 2017-07-01 PROCEDURE — 74011250637 HC RX REV CODE- 250/637: Performed by: NURSE PRACTITIONER

## 2017-07-01 PROCEDURE — 65660000000 HC RM CCU STEPDOWN

## 2017-07-01 PROCEDURE — 36415 COLL VENOUS BLD VENIPUNCTURE: CPT | Performed by: PHYSICIAN ASSISTANT

## 2017-07-01 PROCEDURE — 74011250637 HC RX REV CODE- 250/637: Performed by: PHYSICIAN ASSISTANT

## 2017-07-01 PROCEDURE — 74011250636 HC RX REV CODE- 250/636: Performed by: NURSE PRACTITIONER

## 2017-07-01 PROCEDURE — 83735 ASSAY OF MAGNESIUM: CPT | Performed by: PHYSICIAN ASSISTANT

## 2017-07-01 PROCEDURE — 74011636637 HC RX REV CODE- 636/637: Performed by: NURSE PRACTITIONER

## 2017-07-01 PROCEDURE — 85027 COMPLETE CBC AUTOMATED: CPT | Performed by: PHYSICIAN ASSISTANT

## 2017-07-01 RX ORDER — AMIODARONE HYDROCHLORIDE 200 MG/1
400 TABLET ORAL EVERY 12 HOURS
Status: DISCONTINUED | OUTPATIENT
Start: 2017-07-01 | End: 2017-07-02 | Stop reason: HOSPADM

## 2017-07-01 RX ORDER — POTASSIUM CHLORIDE 750 MG/1
40 TABLET, FILM COATED, EXTENDED RELEASE ORAL 2 TIMES DAILY
Status: COMPLETED | OUTPATIENT
Start: 2017-07-01 | End: 2017-07-01

## 2017-07-01 RX ORDER — POLYETHYLENE GLYCOL 3350 17 G/17G
17 POWDER, FOR SOLUTION ORAL DAILY
Status: DISCONTINUED | OUTPATIENT
Start: 2017-07-01 | End: 2017-07-02 | Stop reason: HOSPADM

## 2017-07-01 RX ORDER — BUMETANIDE 0.25 MG/ML
1 INJECTION INTRAMUSCULAR; INTRAVENOUS ONCE
Status: COMPLETED | OUTPATIENT
Start: 2017-07-01 | End: 2017-07-01

## 2017-07-01 RX ORDER — FAMOTIDINE 20 MG/1
20 TABLET, FILM COATED ORAL 2 TIMES DAILY
Status: DISCONTINUED | OUTPATIENT
Start: 2017-07-01 | End: 2017-07-02 | Stop reason: HOSPADM

## 2017-07-01 RX ORDER — ENOXAPARIN SODIUM 150 MG/ML
111 INJECTION SUBCUTANEOUS EVERY 12 HOURS
Status: DISCONTINUED | OUTPATIENT
Start: 2017-07-01 | End: 2017-07-02

## 2017-07-01 RX ADMIN — Medication 10 ML: at 07:14

## 2017-07-01 RX ADMIN — FERROUS SULFATE TAB 325 MG (65 MG ELEMENTAL FE) 325 MG: 325 (65 FE) TAB at 07:11

## 2017-07-01 RX ADMIN — POTASSIUM CHLORIDE 40 MEQ: 750 TABLET, FILM COATED, EXTENDED RELEASE ORAL at 18:25

## 2017-07-01 RX ADMIN — CHLORHEXIDINE GLUCONATE 10 ML: 1.2 RINSE ORAL at 18:00

## 2017-07-01 RX ADMIN — Medication 10 ML: at 22:18

## 2017-07-01 RX ADMIN — DOCUSATE SODIUM AND SENNOSIDES 1 TABLET: 8.6; 5 TABLET, FILM COATED ORAL at 18:25

## 2017-07-01 RX ADMIN — HYDROCODONE BITARTRATE AND ACETAMINOPHEN 1 TABLET: 5; 325 TABLET ORAL at 19:54

## 2017-07-01 RX ADMIN — ATORVASTATIN CALCIUM 20 MG: 20 TABLET, FILM COATED ORAL at 22:17

## 2017-07-01 RX ADMIN — CHLORHEXIDINE GLUCONATE 10 ML: 1.2 RINSE ORAL at 09:00

## 2017-07-01 RX ADMIN — INSULIN LISPRO 10 UNITS: 100 INJECTION, SOLUTION INTRAVENOUS; SUBCUTANEOUS at 12:49

## 2017-07-01 RX ADMIN — ASPIRIN 81 MG 81 MG: 81 TABLET ORAL at 09:15

## 2017-07-01 RX ADMIN — BUMETANIDE 1 MG: 0.25 INJECTION INTRAMUSCULAR; INTRAVENOUS at 12:49

## 2017-07-01 RX ADMIN — NEBIVOLOL HYDROCHLORIDE 2.5 MG: 2.5 TABLET ORAL at 12:49

## 2017-07-01 RX ADMIN — AMIODARONE HYDROCHLORIDE 200 MG: 200 TABLET ORAL at 09:16

## 2017-07-01 RX ADMIN — FAMOTIDINE 20 MG: 20 TABLET ORAL at 18:25

## 2017-07-01 RX ADMIN — DOCUSATE SODIUM AND SENNOSIDES 1 TABLET: 8.6; 5 TABLET, FILM COATED ORAL at 09:16

## 2017-07-01 RX ADMIN — AMLODIPINE BESYLATE 5 MG: 5 TABLET ORAL at 09:16

## 2017-07-01 RX ADMIN — Medication 10 ML: at 14:00

## 2017-07-01 RX ADMIN — POTASSIUM CHLORIDE 40 MEQ: 750 TABLET, FILM COATED, EXTENDED RELEASE ORAL at 12:49

## 2017-07-01 RX ADMIN — CLOPIDOGREL BISULFATE 75 MG: 75 TABLET ORAL at 09:16

## 2017-07-01 RX ADMIN — ENOXAPARIN SODIUM 111 MG: 120 INJECTION SUBCUTANEOUS at 22:22

## 2017-07-01 RX ADMIN — MUPIROCIN: 20 OINTMENT TOPICAL at 09:00

## 2017-07-01 RX ADMIN — AMIODARONE HYDROCHLORIDE 400 MG: 200 TABLET ORAL at 22:17

## 2017-07-01 RX ADMIN — INSULIN GLARGINE 30 UNITS: 100 INJECTION, SOLUTION SUBCUTANEOUS at 07:27

## 2017-07-01 RX ADMIN — ENOXAPARIN SODIUM 111 MG: 120 INJECTION SUBCUTANEOUS at 12:49

## 2017-07-01 RX ADMIN — HYDROCODONE BITARTRATE AND ACETAMINOPHEN 1 TABLET: 5; 325 TABLET ORAL at 07:11

## 2017-07-01 RX ADMIN — MUPIROCIN: 20 OINTMENT TOPICAL at 18:00

## 2017-07-01 RX ADMIN — INSULIN LISPRO 10 UNITS: 100 INJECTION, SOLUTION INTRAVENOUS; SUBCUTANEOUS at 07:28

## 2017-07-01 RX ADMIN — FAMOTIDINE 20 MG: 20 TABLET ORAL at 09:16

## 2017-07-01 NOTE — PROGRESS NOTES
Pharmacist Note - H2RA Renal Dosing    Medication: famotidine   Current regimen:  20 mg PO every day  Recent Labs      07/01/17   0429  06/30/17   0315  06/29/17   0406   CREA  1.49*  2.48*  1.89*   BUN  33*  45*  34*     Estimated CrCl:  ~55-60 ml/min  Plan: Change to 20 mg PO BID for a CrCl > 50 mL/min, dosing per P&T approved protocol. Russ Butterfield, Pharm. D., BCPS

## 2017-07-01 NOTE — PROGRESS NOTES
1930: Bedside shift change report given to [id number] Prosper Smith RNs (oncoming nurse) by NATALY RN (offgoing nurse). Report included the following information SBAR, Kardex, Procedure Summary, Intake/Output, MAR, Accordion, Recent Results, Med Rec Status and Cardiac Rhythm NSR.     2024: pt converted to A. Fib. HR controlled in 80s. Pt asymptomatic. Will administer PO Amiodarone at 2100 & continue to monitor. 2328: pt remains in A. Fib. HR remains controlled at 88. Pt still asymptomatic. Will continue to monitor. 0730: Bedside shift change report given to Susan Hernandez (oncoming nurse) by 8535 Chrends Luis RN (offgoing nurse). Report included the following information SBAR, Kardex, Procedure Summary, Intake/Output, MAR, Accordion, Recent Results, Med Rec Status and Cardiac Rhythm A Fib.          Problem: Falls - Risk of  Goal: *Absence of falls  Outcome: Progressing Towards Goal  Gripper socks on, side rails x2, video monitoring for safety, pt instructed to call for assistance    Problem: Pressure Injury - Risk of  Goal: *Prevention of pressure ulcer  Outcome: Progressing Towards Goal  Repositioning & turning, skin assessment, blood glucose monitoring, nutrition promotion & documentation    Problem: CABG: Post-Op Day 2/Transfer Day  Goal: Activity/Safety  Outcome: Progressing Towards Goal  Pt instructed to call for assistance, call bell within reach, video monitoring for safety, sternal precautions reinforced

## 2017-07-01 NOTE — PROGRESS NOTES
Problem: Mobility Impaired (Adult and Pediatric)  Goal: *Acute Goals and Plan of Care (Insert Text)  Physical Therapy Goals  Initiated 7/1/2017  1. Patient will move from supine to sit and sit to supine in bed with modified independence within 7 days. 2. Patient will perform sit to/from stand with modified independence within 7 days. 3. Patient will ambulate 300 feet with least restrictive assistive device and modified independence within 7 days. 4. Patient will ascend/descend 4 stairs with bilateral handrail(s) with supervision/set-up within 7 days. 5. Patient will perform cardiac exercises per protocol with supervision/set-up within 7 days. 6. Patient will verbally and functionally recall 3/3 sternal precautions within 7 days. PHYSICAL THERAPY TREATMENT  Patient: Chantel Dow (24 y.o. male)  Date: 7/1/2017  Diagnosis: MITRAL REGURG, CAD  CAD (coronary artery disease)  Non-rheumatic mitral regurgitation <principal problem not specified>  Procedure(s) (LRB):  MITRAL VALVE REPAIR, CORONARY ARTERY BYPASS GRAFTING X 1 WITH IBRAHIM, ECC, LONNIE AND EPIAORTIC U/S BY DR Ida Marquez (N/A) 3 Days Post-Op  Precautions: Sternal      ASSESSMENT:  Pt cleared by nursing and received in the chair, willing to participate with therapy. He reported no pain. Pt needed cuing to recall sternal precautions. He stood with supervision and donned back brace without assistance. Pt ambulated 250 feet with CGA and no AD. He demonstrated decreased escobar but had no LOB. Pt was able to complete stair training for 4 stairs with CGA and cues. Pt returned to the chair and completed cardiac exercises as described below. He is making good progress with therapy and will benefit from continued therapy for gait, strength and bed mobility training. Anticipate that he will be able to return home with his wife's support once medically stable.   Progression toward goals:  [X]      Improving appropriately and progressing toward goals  [ ] Improving slowly and progressing toward goals  [ ]      Not making progress toward goals and plan of care will be adjusted       PLAN:  Patient continues to benefit from skilled intervention to address the above impairments. Continue treatment per established plan of care. Discharge Recommendations:  Home Health  Further Equipment Recommendations for Discharge:  Continued use of quick-draw brace for back       SUBJECTIVE:   Patient stated Jordyn Filiberto I can walk.    The patient stated 1/3 sternal precautions. Reviewed all 3 with patient. OBJECTIVE DATA SUMMARY:   Patient mobilized on continuous portable monitor/telemetry. Critical Behavior:  Neurologic State: Alert  Orientation Level: Oriented X4  Cognition: Appropriate decision making, Appropriate safety awareness, Follows commands  Safety/Judgement: Awareness of environment, Insight into deficits, Home safety, Good awareness of safety precautions  Functional Mobility Training:  Bed Mobility:    Pt received in and returned to chair      Transfers:  Sit to Stand: Stand-by asssistance  Stand to Sit: Stand-by asssistance     Balance:  Sitting: Intact  Standing: Impaired; Without support  Standing - Static: Good  Standing - Dynamic : Fair  Ambulation/Gait Training:  Distance (ft): 250 Feet (ft)  Assistive Device: Gait belt;Brace/Splint  Ambulation - Level of Assistance: Stand-by asssistance  Gait Abnormalities: Decreased step clearance  Base of Support: Widened  Speed/Shara: Slow  Step Length: Right shortened;Left shortened     Stairs:  Number of Stairs Trained: 4  Stairs - Level of Assistance: Contact guard assistance; Additional time  Rail Use: Both  Therapeutic Exercises:   Patient instructed on the benefits and demonstrated cardiac exercises while sitting with minimal assistance and cuing.  Instructed and indicated understanding on how to progress reps and sets against gravity, working up to 5 lbs and so on based on surgeon clearance for more weight in prep for functional activity. Can use household items for weights.      CARDIAC  EXERCISE   Sets   Reps   Active Active Assist   Passive Self ROM   Comments   Shoulder flexion 1 5 [X]                                            [ ]                                            [ ]                                            [ ]                                                Shoulder abduction 1  5 [X]                                            [ ]                                            [ ]                                            [ ]                                                Scapular elevation 1 5 [X]                                            [ ]                                            [ ]                                            [ ]                                                Scapular retraction 1 5 [X]                                            [ ]                                            [ ]                                            [ ]                                                Trunk rotation     [ ]                                            [ ]                                            [ ]                                            [ ]                                            Not performing due to back issues   Trunk sidebending 1 5 [X]                                            [ ]                                            [ ]                                            [ ]                                                      [ ]                                            [ ]                                            [ ]                                            [ ]                                                      Pain:  Pain Scale 1: Numeric (0 - 10)  Pain Intensity 1: 0  Pain Location 1: Incisional  Pain Orientation 1: Anterior  Pain Description 1: Aching  Pain Intervention(s) 1: Medication (see MAR)  Activity Tolerance:   Good- pt ambulated and completed stair training with no report of increased pain or SOB  After treatment:   [X] Patient left in no apparent distress sitting up in chair  [ ] Patient left in no apparent distress in bed  [X] Call bell left within reach  [X] Nursing notified  [X] Caregiver present  [ ] Bed alarm activated      COMMUNICATION/COLLABORATION:   The patients plan of care was discussed with: Registered Nurse     Ijeoma Masterson, PT   Time Calculation: 14 mins

## 2017-07-01 NOTE — PROGRESS NOTES
0730:  Bedside shift change report given to Thea (oncoming nurse) by Paty Saul (offgoing nurse). Report included the following information SBAR, Kardex, MAR and Recent Results. 1930:  Bedside shift change report given to John (oncoming nurse) by Mone Duarte (offgoing nurse). Report included the following information SBAR, Kardex, MAR and Recent Results.

## 2017-07-01 NOTE — PROGRESS NOTES
[facility] Progress Note    Admit Date: 2017  POD:  3 Day Post-Op    Procedure:  Procedure(s):  MITRAL VALVE REPAIR, CORONARY ARTERY BYPASS GRAFTING X 1 WITH IBRAHIM, ECC, LONNIE AND EPIAORTIC U/S BY DR Sinai Burton        Subjective:   Pt seen with Dr. Valencia Bolton. OOB in chair. Tmax 99.6, on RA. Went into A fib 80s, now up to 120s. Objective:   Vitals:  Blood pressure 153/79, pulse 99, temperature 98.8 °F (37.1 °C), resp. rate 18, height 5' 7\" (1.702 m), weight 246 lb 4.1 oz (111.7 kg), SpO2 96 %. Temp (24hrs), Av.1 °F (37.3 °C), Min:98.7 °F (37.1 °C), Max:99.6 °F (37.6 °C)    EKG/Rhythm:    NSR 60s  Vs. A fib up to 120s    Oxygen Therapy:  Oxygen Therapy  O2 Sat (%): 96 % (17 0832)  Pulse via Oximetry: 66 beats per minute (17 0400)  O2 Device: Room air (17 0443)  O2 Flow Rate (L/min): 1 l/min (17 2309)  FIO2 (%): 50 % (17 1830)    CXR:  PA/lat Status post median sternotomy and valve replacement. No pneumothorax  or other acute abnormality following removal of left chest tube. Admission Weight: Last Weight   Weight: 241 lb 2 oz (109.4 kg) Weight: 246 lb 4.1 oz (111.7 kg)     Intake / Output / Drain:  Current Shift:    Last 24 hrs.:     Intake/Output Summary (Last 24 hours) at 17 0926  Last data filed at 17 0430   Gross per 24 hour   Intake              360 ml   Output              700 ml   Net             -340 ml       EXAM:  General:   NAD                                                                                           Lungs:   Clear to auscultation bilaterally. Incision:  No erythema, drainage or swelling. Heart:  Irregular rate and rhythm, S1, S2 normal, no murmur, click, rub or gallop. Abdomen:   Soft, non-tender. Bowel sounds normal. No masses,  No organomegaly. + flatus    Extremities:  No edema. PPP. Neurologic:  Gross motor and sensory apparatus intact.      Labs:   Recent Labs      17   0644  17   0429   17   0315   WBC --   9.1   --   11.5*   HGB   --   9.6*   --   9.7*   HCT   --   29.1*   --   29.9*   PLT   --   130*   --   128*   NA   --   139   --   137   K   --   3.3*   --   3.8   BUN   --   33*   --   45*   CREA   --   1.49*   --   2.48*   GLU   --   111*   --   154*   GLUCPOC  119*   --    < >   --    INR   --    --    --   1.1    < > = values in this interval not displayed. Assessment:     Active Problems:    Mitral regurgitation ()      Overview: mod-severe on echo 12      CAD (coronary artery disease) (2017)      Non-rheumatic mitral regurgitation (2017)      S/P MVR (mitral valve repair) (2017)      Overview: MITRAL VALVE REPAIR with 27 MM annuloplasty band, CORONARY ARTERY BYPASS       GRAFTING X 1 WITH LIMA to LAD      S/P CABG x 1 (2017)      Overview: MITRAL VALVE REPAIR with 27 MM annuloplasty band, CORONARY ARTERY BYPASS       GRAFTING X 1 WITH IBRAHIM to LAD         Plan/Recommendations/Medical Decision Makin. S/P MVr/CABG: Stable. On ASA, statin, and BB. Cont plavix for MVr x 2 months. 2. Atelectasis: IS, activity  3. Hypertension: On BB,  norvasc. No ACE/ARB d/t kidney issues. 4. REENA: Monitor. Avoid nephrotoxic meds. Renal following. Creat improved, diuretics per renal.   5. DM: DTC following. Resume lantus 30 units daily, humalog 10 units w/ meals, SSI per orders. BS checks ACHS. 6. Constipation: On pericolace, add miralax. + flatus. Mobilize. 7. Hypokalemia: Replete per orders. 8. Postop A fib:  Cont PO amio, amio bolus now. Start Lovenox 1 mg/kg BID. If HR remains elevated may need further boluses. 9. Dispo:  PT/OT. Remain in stepdown if rhythm improves.      Signed By: Marianna Barber NP

## 2017-07-01 NOTE — PROGRESS NOTES
[facility] - Progress Note    Subjective:   Daily Progress Note    Admission Date: 6/28/2017     Complaint: good U/O; creat improving    Current Facility-Administered Medications   Medication Dose Route Frequency    polyethylene glycol (MIRALAX) packet 17 g  17 g Oral DAILY    amiodarone (CORDARONE) 150 mg in dextrose 5% 100 mL bolus infusion  150 mg IntraVENous ONCE    amiodarone (CORDARONE) tablet 400 mg  400 mg Oral Q12H    enoxaparin (LOVENOX) injection 111 mg  111 mg SubCUTAneous Q12H    potassium chloride SR (KLOR-CON 10) tablet 40 mEq  40 mEq Oral BID    amLODIPine (NORVASC) tablet 5 mg  5 mg Oral DAILY    clopidogrel (PLAVIX) tablet 75 mg  75 mg Oral DAILY    famotidine (PEPCID) tablet 20 mg  20 mg Oral DAILY    insulin glargine (LANTUS) injection 30 Units  30 Units SubCUTAneous ACB    insulin lispro (HUMALOG) injection 10 Units  10 Units SubCUTAneous TIDAC    nebivolol (BYSTOLIC) tablet 2.5 mg  2.5 mg Oral DAILY    atorvastatin (LIPITOR) tablet 20 mg  20 mg Oral QHS    sodium chloride (NS) flush 5-10 mL  5-10 mL IntraVENous Q8H    sodium chloride (NS) flush 5-10 mL  5-10 mL IntraVENous PRN    zolpidem (AMBIEN) tablet 5 mg  5 mg Oral QHS PRN    ferrous sulfate tablet 325 mg  1 Tab Oral DAILY WITH BREAKFAST    mupirocin (BACTROBAN) 2 % ointment   Both Nostrils BID    ondansetron (ZOFRAN) injection 4 mg  4 mg IntraVENous Q4H PRN    albuterol (PROVENTIL VENTOLIN) nebulizer solution 2.5 mg  2.5 mg Nebulization Q4H PRN    aspirin chewable tablet 81 mg  81 mg Oral DAILY    chlorhexidine (PERIDEX) 0.12 % mouthwash 10 mL  10 mL Oral BID    bisacodyl (DULCOLAX) suppository 10 mg  10 mg Rectal DAILY PRN    glucose chewable tablet 16 g  4 Tab Oral PRN    glucagon (GLUCAGEN) injection 1 mg  1 mg IntraMUSCular PRN    insulin lispro (HUMALOG) injection   SubCUTAneous AC&HS    sodium chloride (NS) flush 10 mL  10 mL InterCATHeter PRN    bacitracin 500 unit/gram packet 1 Packet  1 Packet Topical PRN    HYDROcodone-acetaminophen (NORCO) 5-325 mg per tablet 1 Tab  1 Tab Oral Q4H PRN    senna-docusate (PERICOLACE) 8.6-50 mg per tablet 1 Tab  1 Tab Oral BID    dextrose 10 % infusion 125-250 mL  125-250 mL IntraVENous PRN       Review of Systems  Pertinent items are noted in HPI. Objective:     Visit Vitals    /79 (BP 1 Location: Left arm, BP Patient Position: Sitting)    Pulse 99    Temp 98.8 °F (37.1 °C)    Resp 18    Ht 5' 7\" (1.702 m)    Wt 111.7 kg (246 lb 4.1 oz)    SpO2 96%    BMI 38.57 kg/m2     Temp (24hrs), Av.1 °F (37.3 °C), Min:98.7 °F (37.1 °C), Max:99.6 °F (37.6 °C)           Physical Exam:   General: Alert, cooperative, no distress, appears stated age. Head:  Normocephalic, without obvious abnormality, atraumatic. Lungs: Clear to auscultation bilaterally. Heart: Regular rate and rhythm, S1, S2 normal, no S3, no rubs; S/P sternotomy  Abdomen: Soft, non-tender. Bowel sounds normal. No masses, No organomegaly.    Extremities: no edema    Neurologic: Grossly nonfocal       Data Review:     LABS:   Recent Results (from the past 24 hour(s))   GLUCOSE, POC    Collection Time: 17 11:11 AM   Result Value Ref Range    Glucose (POC) 173 (H) 65 - 100 mg/dL    Performed by [employer] (S0N)    GLUCOSTABILIZER    Collection Time: 17 11:13 AM   Result Value Ref Range    Glucose 173 mg/dL    Insulin order 9.0 units/hour    Insulin adminstered 9.0 units/hour    Multiplier 0.080     Low target 95 mg/dL    High target 130 mg/dL    D50 order 0.0 ml    D50 administered 0.00 ml    Minutes until next BG 60 min    Order initials SH     Administered initials [address]     GLSCOM Comments     GLUCOSE, POC    Collection Time: 17 12:15 PM   Result Value Ref Range    Glucose (POC) 145 (H) 65 - 100 mg/dL    Performed by [employer] (S0N)    GLUCOSTABILIZER    Collection Time: 17 12:16 PM   Result Value Ref Range    Glucose 145 mg/dL    Insulin order 7.7 units/hour    Insulin adminstered 7.7 units/hour    Multiplier 0.090     Low target 95 mg/dL    High target 130 mg/dL    D50 order 0.0 ml    D50 administered 0.00 ml    Minutes until next BG 60 min    Order initials [facility]     Administered initials [place] Comments     VANCOMYCIN, RANDOM    Collection Time: 06/30/17 12:49 PM   Result Value Ref Range    Vancomycin, random 22.9 UG/ML   GLUCOSE, POC    Collection Time: 06/30/17  1:18 PM   Result Value Ref Range    Glucose (POC) 101 (H) 65 - 100 mg/dL    Performed by Soren Woodall (S0N)    GLUCOSTABILIZER    Collection Time: 06/30/17  1:19 PM   Result Value Ref Range    Glucose 101 mg/dL    Insulin order 3.7 units/hour    Insulin adminstered 3.7 units/hour    Multiplier 0.090     Low target 95 mg/dL    High target 130 mg/dL    D50 order 0.0 ml    D50 administered 0.00 ml    Minutes until next BG 60 min    Order initials      Administered initials [place] Comments     GLUCOSE, POC    Collection Time: 06/30/17  2:21 PM   Result Value Ref Range    Glucose (POC) 115 (H) 65 - 100 mg/dL    Performed by Felix LUZ    GLUCOSE, POC    Collection Time: 06/30/17  4:54 PM   Result Value Ref Range    Glucose (POC) 114 (H) 65 - 100 mg/dL    Performed by Cici Larios    GLUCOSE, POC    Collection Time: 06/30/17  9:38 PM   Result Value Ref Range    Glucose (POC) 107 (H) 65 - 100 mg/dL    Performed by KAILASH Ray, BASIC    Collection Time: 07/01/17  4:29 AM   Result Value Ref Range    Sodium 139 136 - 145 mmol/L    Potassium 3.3 (L) 3.5 - 5.1 mmol/L    Chloride 107 97 - 108 mmol/L    CO2 23 21 - 32 mmol/L    Anion gap 9 5 - 15 mmol/L    Glucose 111 (H) 65 - 100 mg/dL    BUN 33 (H) 6 - 20 MG/DL    Creatinine 1.49 (H) 0.70 - 1.30 MG/DL    BUN/Creatinine ratio 22 (H) 12 - 20      GFR est AA 57 (L) >60 ml/min/1.73m2    GFR est non-AA 47 (L) >60 ml/min/1.73m2    Calcium 8.3 (L) 8.5 - 10.1 MG/DL   MAGNESIUM    Collection Time: 07/01/17 4:29 AM   Result Value Ref Range    Magnesium 2.3 1.6 - 2.4 mg/dL   CBC W/O DIFF    Collection Time: 07/01/17  4:29 AM   Result Value Ref Range    WBC 9.1 4.1 - 11.1 K/uL    RBC 3.18 (L) 4.10 - 5.70 M/uL    HGB 9.6 (L) 12.1 - 17.0 g/dL    HCT 29.1 (L) 36.6 - 50.3 %    MCV 91.5 80.0 - 99.0 FL    MCH 30.2 26.0 - 34.0 PG    MCHC 33.0 30.0 - 36.5 g/dL    RDW 15.0 (H) 11.5 - 14.5 %    PLATELET 269 (L) 745 - 400 K/uL   GLUCOSE, POC    Collection Time: 07/01/17  6:44 AM   Result Value Ref Range    Glucose (POC) 119 (H) 65 - 100 mg/dL    Performed by Sj Walsh          Assessment:     Active Problems:    Mitral regurgitation ()      Overview: mod-severe on echo 1/5/12      CAD (coronary artery disease) (6/19/2017)      Non-rheumatic mitral regurgitation (6/28/2017)      S/P MVR (mitral valve repair) (6/28/2017)      Overview: MITRAL VALVE REPAIR with 27 MM annuloplasty band, CORONARY ARTERY BYPASS       GRAFTING X 1 WITH LIMA to LAD      S/P CABG x 1 (6/28/2017)      Overview: MITRAL VALVE REPAIR with 27 MM annuloplasty band, CORONARY ARTERY BYPASS       GRAFTING X 1 WITH IBRAHIM to LAD    REENA on CKD :  - 2/2 ATN along expected lines   -improving nicely  - Renal US  OK     CKD Stage III :  - baseline Cr 1.4-1.6 mg/dl   - had some Gent toxicity in March   - CKD likely 2/2 DM, long standing HTN      Hx of MV endocarditis w/ Discitis   - S/P MVR 6/28     CAD  - s/p CABG X1 6/28     Blood Loss anemia / Thrombocytopenia      BPH s/p Prostatectomy      Nephrolithiasis        Plan:     No new Recs, follow labs

## 2017-07-02 VITALS
BODY MASS INDEX: 38.69 KG/M2 | RESPIRATION RATE: 18 BRPM | TEMPERATURE: 98.3 F | HEART RATE: 66 BPM | DIASTOLIC BLOOD PRESSURE: 68 MMHG | WEIGHT: 246.47 LBS | HEIGHT: 67 IN | SYSTOLIC BLOOD PRESSURE: 156 MMHG | OXYGEN SATURATION: 100 %

## 2017-07-02 DIAGNOSIS — Z98.890 S/P MVR (MITRAL VALVE REPAIR): ICD-10-CM

## 2017-07-02 DIAGNOSIS — Z95.1 S/P CABG X 1: ICD-10-CM

## 2017-07-02 DIAGNOSIS — E10.22 CKD STAGE 3 DUE TO TYPE 1 DIABETES MELLITUS (HCC): Primary | Chronic | ICD-10-CM

## 2017-07-02 DIAGNOSIS — N18.30 CKD STAGE 3 DUE TO TYPE 1 DIABETES MELLITUS (HCC): Primary | Chronic | ICD-10-CM

## 2017-07-02 LAB
ANION GAP BLD CALC-SCNC: 7 MMOL/L (ref 5–15)
BACTERIA SPEC CULT: NORMAL
BACTERIA SPEC CULT: NORMAL
BUN SERPL-MCNC: 34 MG/DL (ref 6–20)
BUN/CREAT SERPL: 22 (ref 12–20)
CALCIUM SERPL-MCNC: 8.3 MG/DL (ref 8.5–10.1)
CHLORIDE SERPL-SCNC: 108 MMOL/L (ref 97–108)
CO2 SERPL-SCNC: 25 MMOL/L (ref 21–32)
CREAT SERPL-MCNC: 1.55 MG/DL (ref 0.7–1.3)
ERYTHROCYTE [DISTWIDTH] IN BLOOD BY AUTOMATED COUNT: 15 % (ref 11.5–14.5)
GLUCOSE BLD STRIP.AUTO-MCNC: 109 MG/DL (ref 65–100)
GLUCOSE SERPL-MCNC: 98 MG/DL (ref 65–100)
GRAM STN SPEC: NORMAL
HCT VFR BLD AUTO: 28.9 % (ref 36.6–50.3)
HGB BLD-MCNC: 9.5 G/DL (ref 12.1–17)
MAGNESIUM SERPL-MCNC: 2.3 MG/DL (ref 1.6–2.4)
MCH RBC QN AUTO: 29.9 PG (ref 26–34)
MCHC RBC AUTO-ENTMCNC: 32.9 G/DL (ref 30–36.5)
MCV RBC AUTO: 90.9 FL (ref 80–99)
PLATELET # BLD AUTO: 151 K/UL (ref 150–400)
POTASSIUM SERPL-SCNC: 3.4 MMOL/L (ref 3.5–5.1)
RBC # BLD AUTO: 3.18 M/UL (ref 4.1–5.7)
SERVICE CMNT-IMP: ABNORMAL
SERVICE CMNT-IMP: NORMAL
SERVICE CMNT-IMP: NORMAL
SODIUM SERPL-SCNC: 140 MMOL/L (ref 136–145)
WBC # BLD AUTO: 8.2 K/UL (ref 4.1–11.1)

## 2017-07-02 PROCEDURE — 85027 COMPLETE CBC AUTOMATED: CPT | Performed by: NURSE PRACTITIONER

## 2017-07-02 PROCEDURE — 74011636637 HC RX REV CODE- 636/637: Performed by: NURSE PRACTITIONER

## 2017-07-02 PROCEDURE — 80048 BASIC METABOLIC PNL TOTAL CA: CPT | Performed by: NURSE PRACTITIONER

## 2017-07-02 PROCEDURE — 74011250637 HC RX REV CODE- 250/637: Performed by: PHYSICIAN ASSISTANT

## 2017-07-02 PROCEDURE — 82962 GLUCOSE BLOOD TEST: CPT

## 2017-07-02 PROCEDURE — 36415 COLL VENOUS BLD VENIPUNCTURE: CPT | Performed by: NURSE PRACTITIONER

## 2017-07-02 PROCEDURE — 74011250637 HC RX REV CODE- 250/637: Performed by: NURSE PRACTITIONER

## 2017-07-02 PROCEDURE — 83735 ASSAY OF MAGNESIUM: CPT | Performed by: NURSE PRACTITIONER

## 2017-07-02 RX ORDER — POTASSIUM CHLORIDE 1500 MG/1
40 TABLET, FILM COATED, EXTENDED RELEASE ORAL DAILY
Qty: 28 TAB | Refills: 0 | Status: SHIPPED | OUTPATIENT
Start: 2017-07-02 | End: 2017-08-11 | Stop reason: SDUPTHER

## 2017-07-02 RX ORDER — AMLODIPINE BESYLATE 5 MG/1
10 TABLET ORAL DAILY
Status: DISCONTINUED | OUTPATIENT
Start: 2017-07-02 | End: 2017-07-02 | Stop reason: HOSPADM

## 2017-07-02 RX ORDER — POTASSIUM CHLORIDE 750 MG/1
60 TABLET, FILM COATED, EXTENDED RELEASE ORAL
Status: COMPLETED | OUTPATIENT
Start: 2017-07-02 | End: 2017-07-02

## 2017-07-02 RX ORDER — CLOPIDOGREL BISULFATE 75 MG/1
75 TABLET ORAL DAILY
Qty: 30 TAB | Refills: 1 | Status: SHIPPED | OUTPATIENT
Start: 2017-07-02 | End: 2017-11-06 | Stop reason: ALTCHOICE

## 2017-07-02 RX ORDER — INSULIN LISPRO 100 [IU]/ML
20 INJECTION, SOLUTION INTRAVENOUS; SUBCUTANEOUS
Qty: 1 VIAL | Refills: 1 | Status: SHIPPED
Start: 2017-07-02 | End: 2019-05-29 | Stop reason: ALTCHOICE

## 2017-07-02 RX ORDER — HYDROCODONE BITARTRATE AND ACETAMINOPHEN 5; 325 MG/1; MG/1
1 TABLET ORAL
Qty: 40 TAB | Refills: 0 | Status: SHIPPED | OUTPATIENT
Start: 2017-07-02 | End: 2018-04-18

## 2017-07-02 RX ORDER — GUAIFENESIN 100 MG/5ML
81 LIQUID (ML) ORAL DAILY
Qty: 365 TAB | Refills: 0 | Status: SHIPPED | OUTPATIENT
Start: 2017-07-02

## 2017-07-02 RX ORDER — AMIODARONE HYDROCHLORIDE 200 MG/1
TABLET ORAL
Qty: 84 TAB | Refills: 0 | Status: SHIPPED | OUTPATIENT
Start: 2017-07-02 | End: 2017-08-21 | Stop reason: ALTCHOICE

## 2017-07-02 RX ORDER — AMOXICILLIN 250 MG
1 CAPSULE ORAL 2 TIMES DAILY
Qty: 30 TAB | Refills: 0 | Status: SHIPPED | OUTPATIENT
Start: 2017-07-02 | End: 2017-11-06

## 2017-07-02 RX ORDER — ATORVASTATIN CALCIUM 20 MG/1
20 TABLET, FILM COATED ORAL
Qty: 30 TAB | Refills: 1 | Status: SHIPPED | OUTPATIENT
Start: 2017-07-02 | End: 2017-08-11 | Stop reason: SDUPTHER

## 2017-07-02 RX ORDER — BUMETANIDE 1 MG/1
1 TABLET ORAL DAILY
Qty: 14 TAB | Refills: 0 | Status: SHIPPED | OUTPATIENT
Start: 2017-07-02 | End: 2017-08-11 | Stop reason: SDUPTHER

## 2017-07-02 RX ADMIN — INSULIN GLARGINE 30 UNITS: 100 INJECTION, SOLUTION SUBCUTANEOUS at 08:33

## 2017-07-02 RX ADMIN — AMIODARONE HYDROCHLORIDE 400 MG: 200 TABLET ORAL at 08:34

## 2017-07-02 RX ADMIN — HYDROCODONE BITARTRATE AND ACETAMINOPHEN 1 TABLET: 5; 325 TABLET ORAL at 09:59

## 2017-07-02 RX ADMIN — CLOPIDOGREL BISULFATE 75 MG: 75 TABLET ORAL at 08:34

## 2017-07-02 RX ADMIN — FERROUS SULFATE TAB 325 MG (65 MG ELEMENTAL FE) 325 MG: 325 (65 FE) TAB at 08:34

## 2017-07-02 RX ADMIN — HYDROCODONE BITARTRATE AND ACETAMINOPHEN 1 TABLET: 5; 325 TABLET ORAL at 00:00

## 2017-07-02 RX ADMIN — ASPIRIN 81 MG 81 MG: 81 TABLET ORAL at 08:34

## 2017-07-02 RX ADMIN — CHLORHEXIDINE GLUCONATE 10 ML: 1.2 RINSE ORAL at 09:00

## 2017-07-02 RX ADMIN — AMLODIPINE BESYLATE 10 MG: 5 TABLET ORAL at 08:34

## 2017-07-02 RX ADMIN — NEBIVOLOL HYDROCHLORIDE 2.5 MG: 2.5 TABLET ORAL at 09:59

## 2017-07-02 RX ADMIN — MUPIROCIN: 20 OINTMENT TOPICAL at 09:00

## 2017-07-02 RX ADMIN — FAMOTIDINE 20 MG: 20 TABLET ORAL at 08:34

## 2017-07-02 RX ADMIN — Medication 10 ML: at 07:13

## 2017-07-02 RX ADMIN — POTASSIUM CHLORIDE 60 MEQ: 750 TABLET, FILM COATED, EXTENDED RELEASE ORAL at 10:03

## 2017-07-02 NOTE — PROGRESS NOTES
Patient initially cleared for session per Subha Florez RN then later notice note from Seda Damon NP stating patient to stay in bed x1hr (AV wires cut) then d/c home with family. Upon PT arrival, pt in bed with spouse at bedside for support - both pleasant and agreeable to discuss d/c plans. Patient reports primary concern is entering the home via four steps; he then states his daughter will be there today so she and spouse can assist.  \"My armando will help me. \"  Education provided regarding sternal precautions (no pushing/no pulling/no lifting >5#); pt acknowledges that he tends to push to stand \"but only to steady myself. \"  PT and spouse advise use of bear to prevent urge to push. Patient also reports use of grabber to  items from floor. Patient states he has not slept in his bed in weeks due to difficulty getting in and out prior to procedure; \"we'll work that out when I get there. \"  Patient states he has no difficulty with ambulation and feels comfortable with returning home with assistance from spouse. Home health therapy services to begin soon after d/c. Patient appreciative of visit and reports no further concerns. ONEIDA BarillasT

## 2017-07-02 NOTE — PROGRESS NOTES
[facility] - Progress Note    Subjective:   Daily Progress Note    Admission Date: 6/28/2017     Complaint: continues to do well; edema remains    Current Facility-Administered Medications   Medication Dose Route Frequency    amLODIPine (NORVASC) tablet 10 mg  10 mg Oral DAILY    potassium chloride SR (KLOR-CON 10) tablet 60 mEq  60 mEq Oral NOW    polyethylene glycol (MIRALAX) packet 17 g  17 g Oral DAILY    amiodarone (CORDARONE) tablet 400 mg  400 mg Oral Q12H    famotidine (PEPCID) tablet 20 mg  20 mg Oral BID    clopidogrel (PLAVIX) tablet 75 mg  75 mg Oral DAILY    insulin glargine (LANTUS) injection 30 Units  30 Units SubCUTAneous ACB    insulin lispro (HUMALOG) injection 10 Units  10 Units SubCUTAneous TIDAC    nebivolol (BYSTOLIC) tablet 2.5 mg  2.5 mg Oral DAILY    atorvastatin (LIPITOR) tablet 20 mg  20 mg Oral QHS    sodium chloride (NS) flush 5-10 mL  5-10 mL IntraVENous Q8H    sodium chloride (NS) flush 5-10 mL  5-10 mL IntraVENous PRN    zolpidem (AMBIEN) tablet 5 mg  5 mg Oral QHS PRN    ferrous sulfate tablet 325 mg  1 Tab Oral DAILY WITH BREAKFAST    mupirocin (BACTROBAN) 2 % ointment   Both Nostrils BID    ondansetron (ZOFRAN) injection 4 mg  4 mg IntraVENous Q4H PRN    albuterol (PROVENTIL VENTOLIN) nebulizer solution 2.5 mg  2.5 mg Nebulization Q4H PRN    aspirin chewable tablet 81 mg  81 mg Oral DAILY    chlorhexidine (PERIDEX) 0.12 % mouthwash 10 mL  10 mL Oral BID    bisacodyl (DULCOLAX) suppository 10 mg  10 mg Rectal DAILY PRN    glucose chewable tablet 16 g  4 Tab Oral PRN    glucagon (GLUCAGEN) injection 1 mg  1 mg IntraMUSCular PRN    insulin lispro (HUMALOG) injection   SubCUTAneous AC&HS    sodium chloride (NS) flush 10 mL  10 mL InterCATHeter PRN    bacitracin 500 unit/gram packet 1 Packet  1 Packet Topical PRN    HYDROcodone-acetaminophen (NORCO) 5-325 mg per tablet 1 Tab  1 Tab Oral Q4H PRN    senna-docusate (PERICOLACE) 8.6-50 mg per tablet 1 Tab  1 Tab Oral BID    dextrose 10 % infusion 125-250 mL  125-250 mL IntraVENous PRN       Review of Systems  Pertinent items are noted in HPI. Objective:     Visit Vitals    /68 (BP 1 Location: Left arm, BP Patient Position: Sitting)    Pulse 66    Temp 98.3 °F (36.8 °C)    Resp 18    Ht 5' 7\" (1.702 m)    Wt 111.8 kg (246 lb 7.6 oz)    SpO2 100%    BMI 38.6 kg/m2     Temp (24hrs), Av.5 °F (36.9 °C), Min:98.1 °F (36.7 °C), Max:99.1 °F (37.3 °C)           Physical Exam:   General: Alert, cooperative, no distress, appears stated age. Head:  Normocephalic, without obvious abnormality, atraumatic. Lungs: Clear to auscultation bilaterally. Heart: Regular rate and rhythm, S1, S2 normal, no S3, no rubs; S/P sternotomy  Abdomen: Soft, non-tender. Bowel sounds normal. No masses, No organomegaly.    Extremities: 2+ edema  Neurologic: Grossly nonfocal       Data Review:     LABS:   Recent Results (from the past 24 hour(s))   GLUCOSE, POC    Collection Time: 17 11:28 AM   Result Value Ref Range    Glucose (POC) 126 (H) 65 - 100 mg/dL    Performed by Mara LUZ    GLUCOSE, POC    Collection Time: 17  5:02 PM   Result Value Ref Range    Glucose (POC) 81 65 - 100 mg/dL    Performed by Dino Lo    GLUCOSE, POC    Collection Time: 17  9:42 PM   Result Value Ref Range    Glucose (POC) 135 (H) 65 - 100 mg/dL    Performed by Whalen Mill    CBC W/O DIFF    Collection Time: 17  3:34 AM   Result Value Ref Range    WBC 8.2 4.1 - 11.1 K/uL    RBC 3.18 (L) 4.10 - 5.70 M/uL    HGB 9.5 (L) 12.1 - 17.0 g/dL    HCT 28.9 (L) 36.6 - 50.3 %    MCV 90.9 80.0 - 99.0 FL    MCH 29.9 26.0 - 34.0 PG    MCHC 32.9 30.0 - 36.5 g/dL    RDW 15.0 (H) 11.5 - 14.5 %    PLATELET 784 926 - 738 K/uL   METABOLIC PANEL, BASIC    Collection Time: 17  3:34 AM   Result Value Ref Range    Sodium 140 136 - 145 mmol/L    Potassium 3.4 (L) 3.5 - 5.1 mmol/L    Chloride 108 97 - 108 mmol/L    CO2 25 21 - 32 mmol/L    Anion gap 7 5 - 15 mmol/L    Glucose 98 65 - 100 mg/dL    BUN 34 (H) 6 - 20 MG/DL    Creatinine 1.55 (H) 0.70 - 1.30 MG/DL    BUN/Creatinine ratio 22 (H) 12 - 20      GFR est AA 55 (L) >60 ml/min/1.73m2    GFR est non-AA 45 (L) >60 ml/min/1.73m2    Calcium 8.3 (L) 8.5 - 10.1 MG/DL   MAGNESIUM    Collection Time: 07/02/17  3:34 AM   Result Value Ref Range    Magnesium 2.3 1.6 - 2.4 mg/dL   GLUCOSE, POC    Collection Time: 07/02/17  6:44 AM   Result Value Ref Range    Glucose (POC) 109 (H) 65 - 100 mg/dL    Performed by Laura Reyes          Assessment:     Active Problems:    Mitral regurgitation ()      Overview: mod-severe on echo 1/5/12      CAD (coronary artery disease) (6/19/2017)      Non-rheumatic mitral regurgitation (6/28/2017)      S/P MVR (mitral valve repair) (6/28/2017)      Overview: MITRAL VALVE REPAIR with 27 MM annuloplasty band, CORONARY ARTERY BYPASS       GRAFTING X 1 WITH LIMA to LAD      S/P CABG x 1 (6/28/2017)      Overview: MITRAL VALVE REPAIR with 27 MM annuloplasty band, CORONARY ARTERY BYPASS       GRAFTING X 1 WITH IBRAHIM to LAD    REENA on CKD :  - 2/2 ATN along expected lines   -improving nicely  - Renal US  OK     CKD Stage III :  - baseline Cr 1.4-1.6 mg/dl   - had some Gent toxicity in March   - CKD likely 2/2 DM, long standing HTN      Hx of MV endocarditis w/ Discitis   - S/P MVR 6/28     CAD  - s/p CABG X1 6/28     Blood Loss anemia / Thrombocytopenia      BPH s/p Prostatectomy      Nephrolithiasis        Plan:     OK with discharge  Would send home on oral Bumex 1 mg/day with follow up labs

## 2017-07-02 NOTE — DISCHARGE SUMMARY
[facility] Discharge Summary     Patient ID:  Winnie Stroud  [id number]  73 y.o.  1950    Admit date: 6/28/2017    Discharge date: 7/2/2017     Admitting Physician: Zakia Polanco MD     Referring Cardiologist:  N/a     PCP:  Sayra Ferguson MD     Admitting Diagnoses: CAD, MR     Discharge Diagnoses:     Hospital Problems  Date Reviewed: 6/28/2017          Codes Class Noted POA    Non-rheumatic mitral regurgitation ICD-10-CM: I34.0  ICD-9-CM: 424.0  6/28/2017 Unknown        S/P MVR (mitral valve repair) ICD-10-CM: R13.391  ICD-9-CM: V45.89  6/28/2017 Unknown    Overview Signed 6/28/2017  2:02 PM by ORLANDO Conrad     MITRAL VALVE REPAIR with 27 MM annuloplasty band, CORONARY ARTERY BYPASS GRAFTING X 1 WITH LIMA to LAD             S/P CABG x 1 ICD-10-CM: Z95.1  ICD-9-CM: V45.81  6/28/2017 Unknown    Overview Signed 6/28/2017  2:03 PM by ORLANDO Conrad     MITRAL VALVE REPAIR with 27 MM annuloplasty band, CORONARY ARTERY BYPASS GRAFTING X 1 WITH LIMA to LAD             CAD (coronary artery disease) ICD-10-CM: I25.10  ICD-9-CM: 414.00  6/19/2017 Unknown        Mitral regurgitation ICD-10-CM: I34.0  ICD-9-CM: 424.0  Unknown Yes    Overview Signed 1/5/2012  5:09 PM by Shaila Barragan MD     mod-severe on echo 1/5/12                   Discharged Condition: improved    Disposition: home, see patient instructions for treatment and plan    Procedures for this admission:  Procedure(s):  MITRAL VALVE REPAIR, CORONARY ARTERY BYPASS GRAFTING X 1 WITH IBRAHIM, ECC, LONNIE AND EPIAORTIC U/S BY DR Sy Humphrey    Discharge Medications:      Medication List      START taking these medications          aspirin 81 mg chewable tablet   Dose:  81 mg   Instructions: Take 1 Tab by mouth daily. atorvastatin 20 mg tablet   Dose:  20 mg   Instructions: Take 1 Tab by mouth nightly. Commonly known as:  LIPITOR       bumetanide 1 mg tablet   Dose:  1 mg   Instructions: Take 1 Tab by mouth daily.    Commonly known as:  Bc Kiran clopidogrel 75 mg Tab   Dose:  75 mg   Instructions: Take 1 Tab by mouth daily. Needs x 2 months only. Commonly known as:  PLAVIX       HYDROcodone-acetaminophen 5-325 mg per tablet   Dose:  1 Tab   Instructions: Take 1 Tab by mouth every six (6) hours as needed. Max Daily Amount: 4 Tabs. Commonly known as:  NORCO       potassium chloride SR 20 mEq tablet   Dose:  40 mEq   Instructions: Take 2 Tabs by mouth daily. Commonly known as:  K-TAB       senna-docusate 8.6-50 mg per tablet   Dose:  1 Tab   Instructions: Take 1 Tab by mouth two (2) times a day. Commonly known as:  PERICOLACE         CHANGE how you take these medications          amiodarone 200 mg tablet   Instructions: Take 400 mg by mouth twice a day x 2 weeks, then decrease to 400 mg by mouth once daily x 2 weeks, then stop med. What Changed:    - how much to take  - how to take this  - when to take this  - additional instructions   Commonly known as:  CORDARONE       insulin lispro 100 unit/mL injection   Dose:  20 Units   Instructions:  20 Units by SubCUTAneous route Before breakfast, lunch, and dinner. Start with 10 units SQ before breakfast, lunch, dinner, --- advance as diet improves.    What Changed:  additional instructions   Commonly known as:  HumaLOG         CONTINUE taking these medications          BYSTOLIC 10 mg tablet   Dose:  10 mg   Generic drug:  nebivolol       LEVEMIR 100 unit/mL injection   Dose:  30 Units   Generic drug:  insulin detemir       NORVASC 10 mg tablet   Dose:  10 mg   Generic drug:  amLODIPine       VITAMIN C 500 mg tablet   Dose:  500 mg   Generic drug:  ascorbic acid (vitamin C)       VITAMIN D2 50,000 unit capsule   Dose:  98101 Units   Generic drug:  ergocalciferol         STOP taking these medications          chlorhexidine 0.12 % solution   Commonly known as:  PERIDEX       HYDROmorphone 2 mg tablet   Commonly known as:  DILAUDID       losartan 100 mg tablet   Commonly known as:  COZAAR       mupirocin 2 % ointment   Commonly known as:  BACTROBAN            Where to Get Your Medications      These medications were sent to [facility]/pharmacy #[id number]- [place], [place] - [address] [place] [address]    Hours:  24-hours Phone:  [phone]     amiodarone 200 mg tablet    aspirin 81 mg chewable tablet    atorvastatin 20 mg tablet    bumetanide 1 mg tablet    clopidogrel 75 mg Tab    potassium chloride SR 20 mEq tablet    senna-docusate 8.6-50 mg per tablet         Information about where to get these medications is not yet available     ! Ask your nurse or doctor about these medications     HYDROcodone-acetaminophen 5-325 mg per tablet    insulin lispro 100 unit/mL injection             HPI:  Yoon Beebe is a 73 y.o. male who was referred for cardiac evaluation of mitral valve endocarditis with subsequent mitral regurgitation. Pt's PMH includes HTN, DM, hyperlipidemia, BPH, arthritis, MARIN (no longer uses CPAP). Pt was admitted to [address] [place] [address] on 3/15/17-3/24/17 w/ severe mitral regurgitation in the setting of myxomatous pathology complicated by endocarditis w/ concomitant lumbar diskitis. Pt has completed 8 weeks of IV antibiotics and now on 1 month of PO levaquin, now completed on 6/5/17. Per cardiologist, pt has been asymptomatic. Repeat lumbar MRI in late April 2017, demonstrated improved findings.      Pt followed up with Dr. Leonardo Sharma recently without issue, is due for blood cultures and CRP on 6/20/17.       Pt now admits to some LE edema in last 6 weeks, and SOB at rest & w/ exertion.       Pt uses cane, has sedentary lifestyle d/t chronic back pain. Uses PO dilaudid PRN for back pain. Pt was told to not lift anything, will eventually need back fusion. Now wearing support vest on abd/back about 2.5 weeks. Pt is former tobacco smoker, quit in 2013 (about 1/4 PPD for 20 years). Only social ETOH use. No drug use. + Heart disease in Father and Hypertension in mother. Lives in [facility], about 75 miles away from [place]. Has support system.       Cardiac Testing      Cardiac catheterization 5/2017: HEMODYNAMICS:  --  Hemodynamic assessment demonstrates moderately to severely elevated  LVEDP and moderately to markedly elevated pulmonary capillary wedge  pressure. -- Lorna Torres is mild to moderate pulmonary hypertension, due to  mitral valve disease. --  CARDIAC STRUCTURES:  --  Global left ventricular function was hypercontractile. EF calculated  by contrast ventriculography was 75 %. --  No significant aortic valve gradient. --  The mitral valve exhibited severe regurgitation. --  CORONARY CIRCULATION:  --  Mid LAD: There was a discrete 85 % stenosis at the origin of D1. IMPRESSIONS:  There is significant single vessel coronary artery disease. Left ventricular function is normal.      TTE 5/4/17: SUMMARY:  Procedure information: Limited study to evaluate mitral valve. Left ventricle: The ventricle was mildly dilated. Systolic function was  vigorous. Ejection fraction was estimated to be 70 %. No obvious wall  motion abnormalities identified in the views obtained. Wall thickness was  at the upper limits of normal.    Left atrium: The atrium was severely dilated. Mitral valve: There was a prolapse involving the anterior leaflet. There  was severe regurgitation. There was a definite, medium-sized, spherical,  mobile mass, measuring 11 mm x 7 mm on the tip of the anterior leaflet, on  the atrial aspect. COMPARISONS:  There has been no significant change. Comparison was made with the  previous study of 17-Mar-2017.      LONNIE 3/17: SUMMARY:  Left ventricle: Systolic function was vigorous. Ejection fraction was  estimated to be 65 %. There were no regional wall motion abnormalities. Left atrium: The atrium was moderately dilated. Atrial septum: Doppler and contrast studies were performed. There was no  right-to-left shunt, in the baseline state.     Mitral valve: There was mild diffuse thickening. There was severe  regurgitation. There was a definite, medium-sized, multilobulated,  echogenic, mobile vegetation, measuring 12 mm x 8 mm on the tip of the  anterior leaflet, on the atrial aspect but another lobe of the mass  appears to prolapse into the LV There was a possible, small, frond-like,  loosely organized vegetation on the base of the posterior leaflet, on the  atrial aspect. Aortic valve: No obvious mass, vegetation or thrombus noted.      MRI Spine 4/14/17: There is slightly diminished fluid signal and edema within the L3-4 and L5-S1  disc space extending to the endplates more so at A3-6. This is consistent with  improving/resolving discitis at both levels. Postinfectious changes of the facet  joints is slightly more pronounced. No epidural or paraspinal fluid collection.        Hospital Course:  Pt had MV repair and CABG x1 on 6/28/17 performed by DR. Colbert. See operative note for full details. Pt was transferred to ICU in stable condition on the following drips:  Amio, Precedex, Insulin, Johnny, Epi, and Dobut. Pt was extubated on 6/28/17 at 1837. POD#1:  1. S/P MVr/CABG: Stable. Off all gtts. Will start ASA, statin, and BB. 2. Atelectasis: IS, activity  3. Hypertension: Start BB   4. REENA: Monitor. Avoid nephrotoxic meds. 5. DM: On insulin gtt. DTC following. 6. Dispo: Transfer to [facility]. POD#2:  1. S/P MVr/CABG: Stable. On ASA, statin, and BB. Will start plavix for MVr x 2 months. 2. Atelectasis: IS, activity  3. Hypertension: On BB. Start norvasc. 4. REENA: Monitor. Avoid nephrotoxic meds. Consulting renal today. 5. DM: On insulin gtt. DTC following. 6. Dispo: DC chest tubes. PT/OT. POD#3:  1. S/P MVr/CABG: Stable. On ASA, statin, and BB. Cont plavix for MVr x 2 months. 2. Atelectasis: IS, activity  3. Hypertension: On BB,  norvasc. No ACE/ARB d/t kidney issues. 4. REENA: Monitor. Avoid nephrotoxic meds. Renal following.   Creat improved, diuretics per renal.   5. DM: DTC following. Resume lantus 30 units daily, humalog 10 units w/ meals, SSI per orders. BS checks ACHS. 6. Constipation: On pericolace, add miralax. + flatus. Mobilize. 7. Hypokalemia: Replete per orders. 8. Postop A fib:  Cont PO amio, amio bolus now. Start Lovenox 1 mg/kg BID. If HR remains elevated may need further boluses. 9. Dispo:  PT/OT. Remain in stepdown if rhythm improves. POD#4:  1. S/P MVr/CABG: Stable. On ASA, statin, and BB. Cont plavix for MVr x 2 months. Surgical path negative. 2. Atelectasis: IS, activity  3. Hypertension: On BB,  Increase norvasc. No ACE/ARB d/t kidney issues. 4. REENA: Monitor. Avoid nephrotoxic meds. Renal following. Creat improved, diuretics per renal.  Will give 1 mg PO bumex for d/c. Will check labs at office FU this week. 5. DM: DTC following. Cont lantus 30 units daily, humalog 10 units w/ meals, SSI per orders. BS checks ACHS. 6. Constipation: On pericolace, miralax. + BM x 2. Mobilize. 7. Hypokalemia: Replete per orders. 8. Postop A fib:  Cont PO amio, d/c lovenox. In SR.   9. Dispo:  PT/OT. [facility]. D/c wires. Referral to outpatient cardiac rehab made. Discharge Vital Signs:   Visit Vitals    /68 (BP 1 Location: Left arm, BP Patient Position: Sitting)    Pulse 66    Temp 98.3 °F (36.8 °C)    Resp 18    Ht 5' 7\" (1.702 m)    Wt 246 lb 7.6 oz (111.8 kg)    SpO2 100%    BMI 38.6 kg/m2       Labs:   Recent Labs      07/02/17   0644  07/02/17   0334   06/30/17   0315   WBC   --   8.2   < >  11.5*   HGB   --   9.5*   < >  9.7*   HCT   --   28.9*   < >  29.9*   PLT   --   151   < >  128*   NA   --   140   < >  137   K   --   3.4*   < >  3.8   BUN   --   34*   < >  45*   CREA   --   1.55*   < >  2.48*   GLU   --   98   < >  154*   GLUCPOC  109*   --    < >   --    INR   --    --    --   1.1    < > = values in this interval not displayed.        Diagnostics: PA/lat:  PA/lat Status post median sternotomy and valve replacement. No pneumothorax  or other acute abnormality following removal of left chest tube. Patient Instructions/Follow Up Care:  Discharge instructions were reviewed with the patient and family present. Questions were also answered at this time. Prescriptions and medications were reviewed. The patient has a follow up appointment with the Nurse Practitioner or [de-identified] Assistant on 7/6/17 at 1130am and with Dr. Adelita Salcido at 7/31/17at 1pm. The patient was also instructed to follow up with his primary care physician as needed. The patient and family were encouraged to call with any questions or concerns.        Signed:  Aby Zuniga NP  7/2/2017

## 2017-07-02 NOTE — DISCHARGE INSTRUCTIONS
Cardiac Surgery Specialist    [address] [facility] [address] [facility]       [address] [place] [address]                                        [address], [address] [facility]  Office- [phone]  Fax- [phone]       Office- [phone]  Fax- [phone]  _____________________________________________________________  [address] Dr., PA-C, PA-C     Name:Alec Duncan     Surgery & Date: Procedure(s):  MITRAL VALVE REPAIR, CORONARY ARTERY BYPASS GRAFTING X 1 WITH IBRAHIM, ECC, LONNIE AND EPIAORTIC U/S BY DR Bebeto Stevenson    Discharge Date: 7/2/17     MEDICATIONS:  Please refer to your After Visit Summary for your medication list. If you do not have a prescription for a new medication, you may purchase the medication over the counter. DO NOT TAKE ANY MEDICATIONS THAT ARE NOT ON THIS LIST    INSTRUCTIONS:  NO SMOKING OR TOBACCO PRODUCTS  Follow all the instructions in your discharge book  You may shower. Wash all incisions twice daily with mild soap and water. No lotions, ointments or powder. Call the office immediately for any redness, swelling, or drainage from your incision. Take your temperature daily and call for a temperature of 101 degrees or higher or for any symptoms that make you think you have and infection. Weigh yourself each morning. Call if you gain more than 5 pounds in 48 hours. Use the incentive spirometer 6-8 times a day-10 breaths each time. Use a pillow or your bear to splint your breastbone when coughing or sneezing. If you feel your breast bone clicking or popping, notify the office immediately. Walk several hundred feet several times daily. DIET  Eat an American Heart Association diet. If you are having trouble with your appetite, eat what you can.   Try eating small, frequent meals throughout the day. ACTIVITY  NO DRIVING--you will be evaluated to drive at your follow up visit. Increase your activity by walking several times a day. Stay out of bed most of the day. When sitting, keep your legs elevated. You may ride in a car, but you must get out every hour and walk around. If you ride in a car with an airbag that can not be switched off, put the seat ALL the way back or ride in the back seat. NO LIFTING MORE THAN 5 POUND FOR 1 MONTH, THEN YOU CAN INCREASE TO NO MORE THAN 10 LBS FOR THE 2nd MONTH AND NO MORE THAN 15 LBS FOR THE 3rd MONTH.  YOU WILL NO LONGER HAVE ANY LIFTING RESTRICTIONS AFTER 3 MONTHS. FOLLOW UP  1. Your first follow up appointment will be on 7-6-17 at 1130am. Please go to [facility] prior to appt for labs. Our office is located in [address] [place] [address] on floor G-5. Your second follow up appointment will be in four weeks, on 7-31-17 at 1pm. Please call our office at [phone] if you are unable to make either one of these appointments. 2. You will be receiving a call before your 5 day appointment to begin cardiac rehab. They are located in the [address] [place] [address] on [facility]. Their phone number is [phone]. Please call if you have not been contacted 2-3 weeks after discharge from the hospital.  3. We will make an appointment with your cardiologist at your last appointment. 4. Consult you primary care physician regarding your influenza &   pneumovax vaccines. 5.   Please bring all medications with you to your appointment.     Signature:___________________________________________________

## 2017-07-02 NOTE — PROGRESS NOTES
AV wires x 2 cut. Cleaned with chlorhexidine swabs prior. RN aware. Pt instructed to stay in bed x 1 hr, then may be discharged.

## 2017-07-02 NOTE — PROGRESS NOTES
CSS Progress Note    Admit Date: 2017  POD:  4 Day Post-Op    Procedure:  Procedure(s):  MITRAL VALVE REPAIR, CORONARY ARTERY BYPASS GRAFTING X 1 WITH IBRAHIM, ECC, LONNIE AND EPIAORTIC U/S BY DR Jaime Forman        Subjective:   Pt seen with Dr. Kaci Hickey. OOB in chair. Tmax 99.6, on RA. Converted back to SR yesterday morning. Objective:   Vitals:  Blood pressure 156/68, pulse 66, temperature 98.3 °F (36.8 °C), resp. rate 18, height 5' 7\" (1.702 m), weight 246 lb 7.6 oz (111.8 kg), SpO2 100 %. Temp (24hrs), Av.5 °F (36.9 °C), Min:98.1 °F (36.7 °C), Max:99.1 °F (37.3 °C)    EKG/Rhythm:    NSR 60s      Oxygen Therapy:  Oxygen Therapy  O2 Sat (%): 100 % (17 0828)  Pulse via Oximetry: 66 beats per minute (17 0400)  O2 Device: Room air (17 0331)  O2 Flow Rate (L/min): 1 l/min (17 2309)  FIO2 (%): 50 % (17 1830)    CXR:  PA/lat Status post median sternotomy and valve replacement. No pneumothorax  or other acute abnormality following removal of left chest tube. Admission Weight: Last Weight   Weight: 241 lb 2 oz (109.4 kg) Weight: 246 lb 7.6 oz (111.8 kg)     Intake / Output / Drain:  Current Shift:    Last 24 hrs.:     Intake/Output Summary (Last 24 hours) at 17 1063  Last data filed at 17 0420   Gross per 24 hour   Intake              356 ml   Output                0 ml   Net              356 ml       EXAM:  General:   NAD                                                                                           Lungs:   Clear to auscultation bilaterally. Incision:  No erythema, drainage or swelling. Heart:  regular rate and rhythm, S1, S2 normal, no murmur, click, rub or gallop. Abdomen:   Soft, non-tender. Bowel sounds normal. No masses,  No organomegaly. + flatus    Extremities:  Mild LE edema. PPP. Neurologic:  Gross motor and sensory apparatus intact.      Labs:   Recent Labs      17   0644  17   0334   17   0315   WBC   --   8.2   < >  11.5*   HGB   --   9.5*   < >  9.7*   HCT   --   28.9*   < >  29.9*   PLT   --   151   < >  128*   NA   --   140   < >  137   K   --   3.4*   < >  3.8   BUN   --   34*   < >  45*   CREA   --   1.55*   < >  2.48*   GLU   --   98   < >  154*   GLUCPOC  109*   --    < >   --    INR   --    --    --   1.1    < > = values in this interval not displayed. Assessment:     Active Problems:    Mitral regurgitation ()      Overview: mod-severe on echo 12      CAD (coronary artery disease) (2017)      Non-rheumatic mitral regurgitation (2017)      S/P MVR (mitral valve repair) (2017)      Overview: MITRAL VALVE REPAIR with 27 MM annuloplasty band, CORONARY ARTERY BYPASS       GRAFTING X 1 WITH LIMA to LAD      S/P CABG x 1 (2017)      Overview: MITRAL VALVE REPAIR with 27 MM annuloplasty band, CORONARY ARTERY BYPASS       GRAFTING X 1 WITH IBRAHIM to LAD         Plan/Recommendations/Medical Decision Makin. S/P MVr/CABG: Stable. On ASA, statin, and BB. Cont plavix for MVr x 2 months. Surgical path negative. 2. Atelectasis: IS, activity  3. Hypertension: On BB,  Increase norvasc. No ACE/ARB d/t kidney issues. 4. REENA: Monitor. Avoid nephrotoxic meds. Renal following. Creat improved, diuretics per renal.   5. DM: DTC following. Cont lantus 30 units daily, humalog 10 units w/ meals, SSI per orders. BS checks ACHS. 6. Constipation: On pericolace, miralax. + BM x 2. Mobilize. 7. Hypokalemia: Replete per orders. 8. Postop A fib:  Cont PO amio, d/c lovenox. In SR.   9. Dispo:  PT/OT. Home today w/ [facility]. D/c wires.      Signed By: Omar Cunningham NP

## 2017-07-02 NOTE — PROGRESS NOTES
1930: Bedside shift change report given to Uriel RNs (oncoming nurse) by Tommie Nagy RN (offgoing nurse). Report included the following information SBAR, Kardex, Intake/Output, MAR, Accordion, Recent Results, Med Rec Status and Cardiac Rhythm NSR    Uneventful shift. 0730: Bedside shift change report given to Susan Hernandez (oncoming nurse) by Mainor Ramirez RN (offgoing nurse).  Report included the following information SBAR, Kardex, Intake/Output, MAR, Accordion, Recent Results, Med Rec Status and Cardiac Rhythm NSR       Problem: Falls - Risk of  Goal: *Absence of falls  Outcome: Progressing Towards Goal  Gripper socks on, side rails x3, video monitoring for safety, call bell within reach, pt instructed to call for assistance     Problem: Pressure Injury - Risk of  Goal: *Prevention of pressure ulcer  Outcome: Progressing Towards Goal  Pt up OOB & ambulating to bathroom, skin assessment, no pressure ulcer noted, blood glucose monitoring     Problem: CABG: Post-Op Day 3  Goal: Activity/Safety  Outcome: Progressing Towards Goal  Pt instructed to use bear for splinting, reinforced importance of no pushing/pulling

## 2017-07-02 NOTE — PROGRESS NOTES
0730:  Bedside shift change report given to Thea (oncoming nurse) by Neal (offgoing nurse). Report included the following information SBAR, Kardex, MAR and Recent Results. 1210: I have reviewed discharge instructions with the patient and spouse. The patient and spouse verbalized understanding. Discharge medications reviewed with patient and spouse and appropriate educational materials and side effects teaching were provided.

## 2017-07-03 ENCOUNTER — TELEPHONE (OUTPATIENT)
Dept: CASE MANAGEMENT | Age: 67
End: 2017-07-03

## 2017-07-03 NOTE — TELEPHONE ENCOUNTER
Cardiac Surgery Discharge - Follow up call placed to Olivia Arora. Reviewed plan of care after discharge and encouraged Olivia Arora to verbalize. Discussed precautions and reviewed medications, patient without questions regarding medications. Encouraged continued use of the incentive spirometer. Confirmed follow up appts and reinforced importance of wearing red reminder bracelet. Olivia Arora is without questions or concerns.  Su Parra RN

## 2017-07-06 ENCOUNTER — OFFICE VISIT (OUTPATIENT)
Dept: CARDIOTHORACIC SURGERY | Age: 67
End: 2017-07-06

## 2017-07-06 VITALS
BODY MASS INDEX: 38.53 KG/M2 | RESPIRATION RATE: 24 BRPM | WEIGHT: 245.5 LBS | HEIGHT: 67 IN | TEMPERATURE: 98 F | OXYGEN SATURATION: 97 % | HEART RATE: 67 BPM | DIASTOLIC BLOOD PRESSURE: 62 MMHG | SYSTOLIC BLOOD PRESSURE: 128 MMHG

## 2017-07-06 DIAGNOSIS — Z95.1 S/P CABG X 1: ICD-10-CM

## 2017-07-06 DIAGNOSIS — Z98.890 S/P MVR (MITRAL VALVE REPAIR): Primary | ICD-10-CM

## 2017-07-06 NOTE — PROGRESS NOTES
Patient: Slava Solorzano   Age: 73 y.o. Patient Care Team:  Eric Amador MD as PCP - General (Family Practice)  Urszula Jimenes MD (Orthopedic Surgery)  Mehdi Bailey MD (Cardiology)  River Arias MD as Surgeon (Cardiothoracic Surgery)    Diagnosis: The primary encounter diagnosis was S/P MVR (mitral valve repair). A diagnosis of S/P CABG x 1 was also pertinent to this visit. 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 Not moving/doing exercises per wife. Pt feels tired. Not sleeping well, can't stay asleep. Hard to get comfortable, sleeping in recliner. No pain, dizziness. LE present, weight steady at 244 lbs. Still feels SOB. Appetite good, bowels regular. BS fasting , still taking reduced meal time insulin. Pt reports [facility] RN noting single episode of irregular HR when she assessed him. Current Medications:   Current Outpatient Prescriptions   Medication Sig Dispense Refill    amiodarone (CORDARONE) 200 mg tablet Take 400 mg by mouth twice a day x 2 weeks, then decrease to 400 mg by mouth once daily x 2 weeks, then stop med. 84 Tab 0    senna-docusate (PERICOLACE) 8.6-50 mg per tablet Take 1 Tab by mouth two (2) times a day.  30 Tab 0    insulin lispro (HUMALOG) 100 unit/mL injection 20 Units by SubCUTAneous route Before breakfast, lunch, and dinner. Start with 10 units SQ before breakfast, lunch, dinner, --- advance as diet improves. 1 Vial 1    aspirin 81 mg chewable tablet Take 1 Tab by mouth daily. 365 Tab 0    atorvastatin (LIPITOR) 20 mg tablet Take 1 Tab by mouth nightly. 30 Tab 1    clopidogrel (PLAVIX) 75 mg tab Take 1 Tab by mouth daily. Needs x 2 months only. 30 Tab 1    potassium chloride SR (K-TAB) 20 mEq tablet Take 2 Tabs by mouth daily. 28 Tab 0    bumetanide (BUMEX) 1 mg tablet Take 1 Tab by mouth daily. 14 Tab 0    nebivolol (BYSTOLIC) 10 mg tablet Take 10 mg by mouth daily.  amLODIPine (NORVASC) 10 mg tablet Take 10 mg by mouth daily.  ergocalciferol (VITAMIN D2) 50,000 unit capsule Take 50,000 Units by mouth every Wednesday. Every Wednesday      insulin detemir (LEVEMIR) 100 unit/mL injection 30 Units by SubCUTAneous route nightly.  ascorbic acid (VITAMIN C) 500 mg tablet Take 500 mg by mouth daily.  HYDROcodone-acetaminophen (NORCO) 5-325 mg per tablet Take 1 Tab by mouth every six (6) hours as needed. Max Daily Amount: 4 Tabs. 40 Tab 0       Vitals: Blood pressure 128/62, pulse 67, temperature 98 °F (36.7 °C), temperature source Oral, resp. rate 24, height 5' 7\" (1.702 m), weight 245 lb 8 oz (111.4 kg), SpO2 97 %. Allergies: is allergic to cephalexin; oxycodone; sulfa (sulfonamide antibiotics); and tamsulosin. Physical Exam:  Wounds: clean, dry, no drainage    Lungs: clear to auscultation bilaterally    Heart: regular rate and rhythm, S1, S2 normal, no murmur, click, rub or gallop    Extremities: mod LE edema, PPP     Assessment/Plan:   1. S/P MVr/CABG: Stable.  On ASA, statin, and BB. Cont plavix for MVr x 2 months.  Surgical path negative. 2. Atelectasis: IS, activity. Cont diuretics, if labs ok today, will increase. 3. Hypertension: On BB,  norvasc.   No ACE/ARB d/t kidney issues. 4. REENA: Monitor. Avoid nephrotoxic meds.   Labs pending, will adjust diuretics as allows. 5. DM: Cont lantus 30 units daily, humalog 10 units w/ meals, SSI per orders.  BS checks ACHS. May need to adjust dosing if BS low. 6. Constipation:  On pericolace, miralax.  Resolved. 7. Postop A fib:  Cont PO amio,  Regular rhythm today in office. 8. FU IN 2-3 weeks.      Rehab -yes   Walking: yes  Glucometer: yes  Antibiotic card for valves: yes

## 2017-07-07 ENCOUNTER — TELEPHONE (OUTPATIENT)
Dept: CARDIOTHORACIC SURGERY | Age: 67
End: 2017-07-07

## 2017-07-07 LAB
ALBUMIN SERPL-MCNC: 3.7 G/DL (ref 3.6–4.8)
BUN SERPL-MCNC: 16 MG/DL (ref 8–27)
BUN/CREAT SERPL: 11 (ref 10–24)
CALCIUM SERPL-MCNC: 8.7 MG/DL (ref 8.6–10.2)
CHLORIDE SERPL-SCNC: 104 MMOL/L (ref 96–106)
CO2 SERPL-SCNC: 25 MMOL/L (ref 18–29)
CREAT SERPL-MCNC: 1.45 MG/DL (ref 0.76–1.27)
GLUCOSE SERPL-MCNC: 128 MG/DL (ref 65–99)
PHOSPHATE SERPL-MCNC: 4 MG/DL (ref 2.5–4.5)
POTASSIUM SERPL-SCNC: 4.4 MMOL/L (ref 3.5–5.2)
SODIUM SERPL-SCNC: 146 MMOL/L (ref 134–144)

## 2017-07-07 NOTE — TELEPHONE ENCOUNTER
Increase bumex to BID 1 mg,  Same with KCL to BID. PT stated understanding. Will call Tuesday to FU on weight/swelling.

## 2017-07-31 ENCOUNTER — OFFICE VISIT (OUTPATIENT)
Dept: CARDIOTHORACIC SURGERY | Age: 67
End: 2017-07-31

## 2017-07-31 VITALS
RESPIRATION RATE: 16 BRPM | SYSTOLIC BLOOD PRESSURE: 160 MMHG | HEIGHT: 67 IN | BODY MASS INDEX: 39.24 KG/M2 | HEART RATE: 52 BPM | TEMPERATURE: 98.6 F | DIASTOLIC BLOOD PRESSURE: 70 MMHG | OXYGEN SATURATION: 98 % | WEIGHT: 250 LBS

## 2017-07-31 DIAGNOSIS — Z95.1 S/P CABG X 1: ICD-10-CM

## 2017-07-31 DIAGNOSIS — Z98.890 S/P MVR (MITRAL VALVE REPAIR): Primary | ICD-10-CM

## 2017-07-31 RX ORDER — LISINOPRIL 2.5 MG/1
2.5 TABLET ORAL DAILY
Qty: 30 TAB | Refills: 1 | Status: SHIPPED | OUTPATIENT
Start: 2017-07-31 | End: 2017-08-11 | Stop reason: SDUPTHER

## 2017-07-31 RX ORDER — IBUPROFEN 200 MG
200 TABLET ORAL
COMMUNITY
End: 2018-05-04

## 2017-07-31 NOTE — PROGRESS NOTES
Patient: Alexander Ponce   Age: 73 y.o. Patient Care Team:  Doreen Shen MD as PCP - General ([facility])  Roya Bauer MD (Orthopedic Surgery)  Bowen Palacios MD (Cardiology)  Keo Lindo MD as Surgeon (Cardiothoracic Surgery)    Diagnosis: The primary encounter diagnosis was S/P MVR (mitral valve repair). A diagnosis of S/P CABG x 1 was also pertinent to this visit. 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 Still sleeping on sofa but otherwise getting around well. Hypertensive. Current Medications:   Current Outpatient Prescriptions   Medication Sig Dispense Refill    ibuprofen (MOTRIN) 200 mg tablet Take 200 mg by mouth every eight (8) hours as needed for Pain.  amiodarone (CORDARONE) 200 mg tablet Take 400 mg by mouth twice a day x 2 weeks, then decrease to 400 mg by mouth once daily x 2 weeks, then stop med. 84 Tab 0    insulin lispro (HUMALOG) 100 unit/mL injection 20 Units by SubCUTAneous route Before breakfast, lunch, and dinner. Start with 10 units SQ before breakfast, lunch, dinner, --- advance as diet improves. 1 Vial 1    aspirin 81 mg chewable tablet Take 1 Tab by mouth daily. 365 Tab 0    atorvastatin (LIPITOR) 20 mg tablet Take 1 Tab by mouth nightly. 30 Tab 1    clopidogrel (PLAVIX) 75 mg tab Take 1 Tab by mouth daily. Needs x 2 months only.  30 Tab 1    nebivolol (BYSTOLIC) 10 mg tablet Take 10 mg by mouth daily.  amLODIPine (NORVASC) 10 mg tablet Take 10 mg by mouth daily.  ergocalciferol (VITAMIN D2) 50,000 unit capsule Take 50,000 Units by mouth every Wednesday. Every Wednesday      insulin detemir (LEVEMIR) 100 unit/mL injection 30 Units by SubCUTAneous route nightly.  ascorbic acid (VITAMIN C) 500 mg tablet Take 500 mg by mouth daily.  senna-docusate (PERICOLACE) 8.6-50 mg per tablet Take 1 Tab by mouth two (2) times a day. 30 Tab 0    HYDROcodone-acetaminophen (NORCO) 5-325 mg per tablet Take 1 Tab by mouth every six (6) hours as needed. Max Daily Amount: 4 Tabs. 40 Tab 0    potassium chloride SR (K-TAB) 20 mEq tablet Take 2 Tabs by mouth daily. 28 Tab 0    bumetanide (BUMEX) 1 mg tablet Take 1 Tab by mouth daily. 14 Tab 0       Vitals: Blood pressure 160/70, pulse (!) 52, temperature 98.6 °F (37 °C), temperature source Oral, resp. rate 16, height 5' 7\" (1.702 m), weight 250 lb (113.4 kg), SpO2 98 %. Allergies: is allergic to cephalexin; oxycodone; sulfa (sulfonamide antibiotics); and tamsulosin. Physical Exam:  Wounds: clean, dry, no drainage, healed    Lungs: clear to auscultation bilaterally    Heart: regular rate and rhythm, S1, S2 normal, no murmur, click, rub or gallop    Extremities: no edema    Assessment/Plan:   Making progress. Hypertensive- will start low dose lisinopril, last Cr in hospital 1.4. OK to drive.  Follow up with Dr Homer Underwood

## 2017-07-31 NOTE — MR AVS SNAPSHOT
Visit Information Date & Time Provider Department Dept. Phone Encounter #  
 7/31/2017  1:00 PM Madhav Cespedes MD [facility] [phone] [id number] Your Appointments 8/21/2017  9:20 AM  
ESTABLISHED PATIENT with Landen Mejia MD  
[facility] ([address]) Appt Note: post op for heart surgery - per Dr. Juarez Simpler - [phone]  
 [address] Upcoming Health Maintenance Date Due Hepatitis C Screening 1950 FOOT EXAM Q1 10/22/1961 MICROALBUMIN Q1 10/22/1961 EYE EXAM RETINAL OR DILATED Q1 10/22/1961 DTaP/Tdap/Td series (1 - Tdap) 10/22/1972 FOBT Q 1 YEAR AGE 50-75 10/22/2001 ZOSTER VACCINE AGE 60> 8/22/2011 LIPID PANEL Q1 4/3/2015 GLAUCOMA SCREENING Q2Y 10/22/2016 Pneumococcal 65+ Low/Medium Risk (1 of 2 - PCV13) 10/22/2016 MEDICARE YEARLY EXAM 10/22/2016 INFLUENZA AGE 9 TO ADULT 8/1/2017 HEMOGLOBIN A1C Q6M 12/19/2017 Allergies as of 7/31/2017  Review Complete On: 7/6/2017 By: Price Chavira NP Severity Noted Reaction Type Reactions Cephalexin  01/15/2017    Other (comments)  
 unknown Oxycodone  04/03/2014    Other (comments) Pt does not desire to take; causes altered mental status and constipation Sulfa (Sulfonamide Antibiotics)  12/20/2011    Hives Tamsulosin  01/15/2017    Other (comments) Vision loss Current Immunizations  Never Reviewed No immunizations on file. Not reviewed this visit You Were Diagnosed With   
  
 Codes Comments S/P MVR (mitral valve repair)    -  Primary ICD-10-CM: J82.133 ICD-9-CM: V45.89 S/P CABG x 1     ICD-10-CM: Z95.1 ICD-9-CM: V45.81 Vitals BP Pulse Temp Resp Height(growth percentile) Weight(growth percentile) 160/70 (BP 1 Location: Left arm, BP Patient Position: Sitting) (!) 52 98.6 °F (37 °C) (Oral) 16 5' 7\" (1.702 m) 250 lb (113.4 kg) SpO2 BMI Smoking Status 98% 39.16 kg/m2 Former Smoker Vitals History BMI and BSA Data Body Mass Index Body Surface Area  
 39.16 kg/m 2 2.32 m 2 Preferred Pharmacy Pharmacy Name Phone [id number] Jean Yenlindsey [address] [place] [address] [phone] Your Updated Medication List  
  
   
This list is accurate as of: 7/31/17  1:47 PM.  Always use your most recent med list.  
  
  
  
  
 amiodarone 200 mg tablet Commonly known as:  CORDARONE Take 400 mg by mouth twice a day x 2 weeks, then decrease to 400 mg by mouth once daily x 2 weeks, then stop med. aspirin 81 mg chewable tablet Take 1 Tab by mouth daily. atorvastatin 20 mg tablet Commonly known as:  LIPITOR Take 1 Tab by mouth nightly. bumetanide 1 mg tablet Commonly known as:  Ole Ernst Take 1 Tab by mouth daily. BYSTOLIC 10 mg tablet Generic drug:  nebivolol Take 10 mg by mouth daily. clopidogrel 75 mg Tab Commonly known as:  PLAVIX Take 1 Tab by mouth daily. Needs x 2 months only. HYDROcodone-acetaminophen 5-325 mg per tablet Commonly known as:  Verlee Shack Take 1 Tab by mouth every six (6) hours as needed. Max Daily Amount: 4 Tabs. ibuprofen 200 mg tablet Commonly known as:  MOTRIN Take 200 mg by mouth every eight (8) hours as needed for Pain. insulin lispro 100 unit/mL injection Commonly known as:  HumaLOG  
20 Units by SubCUTAneous route Before breakfast, lunch, and dinner. Start with 10 units SQ before breakfast, lunch, dinner, --- advance as diet improves. LEVEMIR 100 unit/mL injection Generic drug:  insulin detemir 30 Units by SubCUTAneous route nightly. lisinopril 2.5 mg tablet Commonly known as:  Lilli Drought Take 1 Tab by mouth daily. NORVASC 10 mg tablet Generic drug:  amLODIPine Take 10 mg by mouth daily. potassium chloride SR 20 mEq tablet Commonly known as:  K-TAB Take 2 Tabs by mouth daily. senna-docusate 8.6-50 mg per tablet Commonly known as:  Johnnye Smack Take 1 Tab by mouth two (2) times a day. VITAMIN C 500 mg tablet Generic drug:  ascorbic acid (vitamin C) Take 500 mg by mouth daily. VITAMIN D2 50,000 unit capsule Generic drug:  ergocalciferol Take 50,000 Units by mouth every Wednesday. Every Wednesday Introducing [facility]! Rony Jesse introduces [employer] patient portal. Now you can access parts of your medical record, email your doctor's office, and request medication refills online. 1. In your internet browser, go to [email or web address]. [employer]/[facility] 2. Click on the First Time User? Click Here link in the Sign In box. You will see the New Member Sign Up page. 3. Enter your [employer] Access Code exactly as it appears below. You will not need to use this code after youve completed the sign-up process. If you do not sign up before the expiration date, you must request a new code. · [employer] Access Code: [email or web address] Expires: 10/29/2017  1:47 PM 
 
4. Enter the last four digits of your Social Security Number (xxxx) and Date of Birth (mm/dd/yyyy) as indicated and click Submit. You will be taken to the next sign-up page. 5. Create a [employer] ID. This will be your [employer] login ID and cannot be changed, so think of one that is secure and easy to remember. 6. Create a [employer] password. You can change your password at any time. 7. Enter your Password Reset Question and Answer. This can be used at a later time if you forget your password. 8. Enter your e-mail address. You will receive e-mail notification when new information is available in [address] 9. Click Sign Up. You can now view and download portions of your medical record. 10. Click the Download Summary menu link to download a portable copy of your medical information. If you have questions, please visit the Frequently Asked Questions section of the [employer] website. Remember, [employer] is NOT to be used for urgent needs. For medical emergencies, dial 911. Now available from your iPhone and Android! Please provide this summary of care documentation to your next provider. Your primary care clinician is listed as Yu Jackson. If you have any questions after today's visit, please call [phone].

## 2017-08-11 RX ORDER — ATORVASTATIN CALCIUM 20 MG/1
20 TABLET, FILM COATED ORAL
Qty: 30 TAB | Refills: 1 | Status: CANCELLED | OUTPATIENT
Start: 2017-08-11

## 2017-08-11 RX ORDER — BUMETANIDE 1 MG/1
1 TABLET ORAL DAILY
Qty: 14 TAB | Refills: 0 | Status: CANCELLED | OUTPATIENT
Start: 2017-08-11

## 2017-08-11 RX ORDER — LISINOPRIL 2.5 MG/1
2.5 TABLET ORAL DAILY
Qty: 30 TAB | Refills: 0 | Status: SHIPPED | OUTPATIENT
Start: 2017-08-11 | End: 2017-08-21 | Stop reason: SDUPTHER

## 2017-08-11 RX ORDER — BUMETANIDE 1 MG/1
1 TABLET ORAL DAILY
Qty: 30 TAB | Refills: 0 | Status: SHIPPED | OUTPATIENT
Start: 2017-08-11 | End: 2017-08-22 | Stop reason: ALTCHOICE

## 2017-08-11 RX ORDER — ATORVASTATIN CALCIUM 20 MG/1
20 TABLET, FILM COATED ORAL
Qty: 30 TAB | Refills: 0 | Status: SHIPPED | OUTPATIENT
Start: 2017-08-11 | End: 2017-09-05 | Stop reason: SDUPTHER

## 2017-08-11 RX ORDER — AMLODIPINE BESYLATE 10 MG/1
10 TABLET ORAL DAILY
Status: CANCELLED | OUTPATIENT
Start: 2017-08-11

## 2017-08-11 RX ORDER — POTASSIUM CHLORIDE 1500 MG/1
40 TABLET, FILM COATED, EXTENDED RELEASE ORAL DAILY
Qty: 60 TAB | Refills: 0 | Status: SHIPPED | OUTPATIENT
Start: 2017-08-11 | End: 2017-08-22 | Stop reason: ALTCHOICE

## 2017-08-21 ENCOUNTER — OFFICE VISIT (OUTPATIENT)
Dept: CARDIOLOGY CLINIC | Age: 67
End: 2017-08-21

## 2017-08-21 DIAGNOSIS — I34.0 NON-RHEUMATIC MITRAL REGURGITATION: Primary | ICD-10-CM

## 2017-08-21 DIAGNOSIS — G47.33 OSA (OBSTRUCTIVE SLEEP APNEA): ICD-10-CM

## 2017-08-21 DIAGNOSIS — Z98.890 S/P MVR (MITRAL VALVE REPAIR): ICD-10-CM

## 2017-08-21 DIAGNOSIS — E78.5 DYSLIPIDEMIA: ICD-10-CM

## 2017-08-21 DIAGNOSIS — I10 ESSENTIAL HYPERTENSION: ICD-10-CM

## 2017-08-21 DIAGNOSIS — E11.9 TYPE 2 DIABETES MELLITUS WITHOUT COMPLICATION, WITH LONG-TERM CURRENT USE OF INSULIN (HCC): ICD-10-CM

## 2017-08-21 DIAGNOSIS — Z95.1 S/P CABG X 1: ICD-10-CM

## 2017-08-21 DIAGNOSIS — Z79.4 TYPE 2 DIABETES MELLITUS WITHOUT COMPLICATION, WITH LONG-TERM CURRENT USE OF INSULIN (HCC): ICD-10-CM

## 2017-08-21 DIAGNOSIS — I25.10 CORONARY ARTERY DISEASE INVOLVING NATIVE CORONARY ARTERY OF NATIVE HEART WITHOUT ANGINA PECTORIS: ICD-10-CM

## 2017-08-21 NOTE — PATIENT INSTRUCTIONS
Increase Lisinopril to 5 mg daily. Stop Bumex and Potassium. Monitor your blood pressures regularly at home.

## 2017-08-21 NOTE — PROGRESS NOTES
Ayden Schulz, [address]  [address],  [address]  [place], [address] [facility]    Office [phone]  Fax [phone]  Cell [phone]        Reg Lee is a 73 y.o. male Follow up MV repair on 6/28/17 at [facility]    Assessment  Encounter Diagnoses   Name Primary? 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 He has made a good recovery. He has no murmur of MR. No sxs of AF. Will obtain a baseline echo in 3 months at his next visit. BP remains high. Will increase Lisinopril to 5 mg/d, Stop Bumex and Potassium supplements. Continue Bystolic and Amlodipine. I emphasized the importance of exercise and weight loss particularly given his DM. We discussed cardiac rehab but geography is a limiting factor ([place]ives [place] miles away). Will check a BMP today. Follow-up Disposition:  Return in about 3 months (around 11/21/2017). With echo    Right and left heart cath near future  Right femoral approach  ASA 3  Airway 2      Subjective:    He underwent complex MV repair and 1 vessel CABG with LMIA-LAD 6/28/17. His post op course was uncomplicated. He states he feels stiff since surgery but otherwise feels well. His back has improved. Patient has not been doing cardiac rehab as he states he lives 60 miles away. He does not exercise and leads a fairly sedentary lifestyle. He has been discharged from cardiac surgery, lisinopril 2.5 mg was recently added.  He states he has gain 32 pounds since his back infection. Patient has MARIN but has not used CPAP in quite some time. He denies any fevers. He states his sugars have been good, has been doing well just on insulin. He is on ASA and Plavix. Patient denies any exertional chest pain, dyspnea, palpitations, syncope, orthopnea, edema or paroxysmal nocturnal dyspnea. Cardiac testing  ECHO 2-013: MVP mod-severe MR   ECHO 3/15/2017: EF 65-70%, mild LAE, mild LVH, mod-severe LAE, mod MR,   spherical, echogenic, fixed mass, 14 mm x 8 mm)- anterior leaflet, on the atrial aspect. LONNIE 2012: mod-severe MR   LONNIE 3/21/2017 -  - 8x11 mm semimobile echogenic mass (some central clearing) attached to atrial surface of anterior mitral leaflet, prolapsing into LV. Distal tip of anterior leaflet prolapses. Linear strands, semimobile attached to atrial surface of posterior leaflet. No annular abscess. Eccentric posterior jet of MR, clearly severe. Severe LAE, LVH, EF 70%, aortic/pulmonic/tricuspid valves normal. Aorta nl    Echo 5/4/17 - EF 70%. No WMA. Wall thickness upper limits of normal. Severe LAE. 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 Definite, medium sized spherical mobile mass, measuring 11 x 7 mm on the tip of the anterior leaflet on the atrial aspect. Cath 5/16/2017 - PA 45/20, PCW 23 (V wave 45), RV 44/4 RA 8 LVEDP 25, A wave to 35, no AV gradient, EF 75%, severe MR, LM normal, LAD mid 85% at bifurcating D1 (small vessels), LCX mild plaque, RCA mild plaque. - 8x11 mm semimobile echogenic mass (some central clearing) attached to atrial surface of anterior mitral leaflet, prolapsing into LV. Distal tip of anterior leaflet prolapses. Linear strands, semimobile attached to atrial surface of posterior leaflet. No annular abscess. Eccentric posterior jet of MR, clearly severe.  Severe LAE, LVH, EF 70%, aortic/pulmonic/tricuspid valves normal. Aorta nl    Past Medical History:   Diagnosis Date    Abnormal EKG     he says it has been abnormal for years    Arthritis     CAD (coronary artery disease)     Chronic pain     lower lumbar    Diabetes ([facility] [address]     type II    Dyslipidemia     Enlarged prostate Dr. Blaise Davies    HTN (hypertension)     Hypercholesterolemia     Kidney stone     Mitral regurgitation     mod-severe on echo 1/5/12    Murmur     Obesity     Other ill-defined conditions     BPH    Sleep apnea     stopped using CPAP many years ago            Allergies   Allergen Reactions    Cephalexin Other (comments)     unknown    Oxycodone Other (comments)     Pt does not desire to take; causes altered mental status and constipation    Sulfa (Sulfonamide Antibiotics) Hives    Tamsulosin Other (comments)     Vision loss          Review of Systems  Constitutional: Negative for fever, chills, malaise/fatigue and diaphoresis. Respiratory: Negative for cough, hemoptysis, sputum production, shortness of breath and wheezing. Cardiovascular: Negative for chest pain, palpitations, orthopnea, claudication, leg swelling and PND. Gastrointestinal: Negative for heartburn, nausea, vomiting, blood in stool and melena. Genitourinary: Negative for dysuria and flank pain. Musculoskeletal: Negative for joint pain. Skin: Negative for rash. Neurological: Negative for focal weakness, seizures, loss of consciousness, weakness and headaches. Endo/Heme/Allergies: Does not bruise/bleed easily. Psychiatric/Behavioral: Negative for memory loss. The patient does not have insomnia. Physical Exam    There were no vitals taken for this visit. Wt Readings from Last 3 Encounters:   07/31/17 250 lb (113.4 kg)   07/06/17 245 lb 8 oz (111.4 kg)   07/02/17 246 lb 7.6 oz (111.8 kg)      General - well developed well nourished  Neck - JVP normal, thyroid nl  Cardiac - normal S1, S2, no rubs or gallops. No clicks.    Vascular - carotids without bruits, radials, femorals and pedal pulses equal bilateral  Lungs - clear to auscultation bilaterals, no rales, wheezing or rhonchi  Abd - soft nontender, no HSM, no abd bruits  Extremities - 1+ pretibial edema   Skin - no rash  Neuro - nonfocal  Psych - normal mood and affect      Cardiographics  EKG 8/21/17- SB 54, diffuse inferolateral T wave inversions     Written by [employer] Player, as dictated by Chu Agrawal MD.   Chu Agrawal MD

## 2017-08-21 NOTE — MR AVS SNAPSHOT
Visit Information Date & Time Provider Department Dept. Phone Encounter #  
 8/21/2017  9:20 AM Chu Agrawal MD [facility] Della Arechiga [phone] [id number] Follow-up Instructions Return in about 3 months (around 11/21/2017). Upcoming Health Maintenance Date Due Hepatitis C Screening 1950 FOOT EXAM Q1 10/22/1961 MICROALBUMIN Q1 10/22/1961 EYE EXAM RETINAL OR DILATED Q1 10/22/1961 DTaP/Tdap/Td series (1 - Tdap) 10/22/1972 FOBT Q 1 YEAR AGE 50-75 10/22/2001 ZOSTER VACCINE AGE 60> 8/22/2011 LIPID PANEL Q1 4/3/2015 GLAUCOMA SCREENING Q2Y 10/22/2016 Pneumococcal 65+ Low/Medium Risk (1 of 2 - PCV13) 10/22/2016 MEDICARE YEARLY EXAM 10/22/2016 INFLUENZA AGE 9 TO ADULT 8/1/2017 HEMOGLOBIN A1C Q6M 12/19/2017 Allergies as of 8/21/2017  Review Complete On: 8/21/2017 By: Nabil Ferguson LPN Severity Noted Reaction Type Reactions Cephalexin  01/15/2017    Other (comments)  
 unknown Oxycodone  04/03/2014    Other (comments) Pt does not desire to take; causes altered mental status and constipation Sulfa (Sulfonamide Antibiotics)  12/20/2011    Hives Tamsulosin  01/15/2017    Other (comments) Vision loss Current Immunizations  Never Reviewed No immunizations on file. Not reviewed this visit You Were Diagnosed With   
  
 Codes Comments Non-rheumatic mitral regurgitation    -  Primary ICD-10-CM: I34.0 ICD-9-CM: 424.0 Essential hypertension     ICD-10-CM: I10 
ICD-9-CM: 401.9 Coronary artery disease involving native coronary artery of native heart without angina pectoris     ICD-10-CM: I25.10 ICD-9-CM: 414.01 Dyslipidemia     ICD-10-CM: E78.5 ICD-9-CM: 272.4   
 MARIN (obstructive sleep apnea)     ICD-10-CM: G47.33 
ICD-9-CM: 327.23 Type 2 diabetes mellitus without complication, with long-term current use of insulin (HCC)     ICD-10-CM: E11.9, Z79.4 ICD-9-CM: 250.00, V58.67 S/P MVR (mitral valve repair)     ICD-10-CM: V08.161 ICD-9-CM: V45.89 S/P CABG x 1     ICD-10-CM: Z95.1 ICD-9-CM: V45.81 Vitals Smoking Status Former Smoker Preferred Pharmacy Pharmacy Name Phone [id number] Travon Cramer [address] [place] [address] [phone] Your Updated Medication List  
  
   
This list is accurate as of: 8/21/17  9:58 AM.  Always use your most recent med list.  
  
  
  
  
 aspirin 81 mg chewable tablet Take 1 Tab by mouth daily. atorvastatin 20 mg tablet Commonly known as:  LIPITOR Take 1 Tab by mouth nightly. bumetanide 1 mg tablet Commonly known as:  Londell Dancer Take 1 Tab by mouth daily. BYSTOLIC 10 mg tablet Generic drug:  nebivolol Take 10 mg by mouth daily. clopidogrel 75 mg Tab Commonly known as:  PLAVIX Take 1 Tab by mouth daily. Needs x 2 months only. HYDROcodone-acetaminophen 5-325 mg per tablet Commonly known as:  Rebel Laity Take 1 Tab by mouth every six (6) hours as needed. Max Daily Amount: 4 Tabs. ibuprofen 200 mg tablet Commonly known as:  MOTRIN Take 200 mg by mouth every eight (8) hours as needed for Pain. insulin lispro 100 unit/mL injection Commonly known as:  HumaLOG  
20 Units by SubCUTAneous route Before breakfast, lunch, and dinner. Start with 10 units SQ before breakfast, lunch, dinner, --- advance as diet improves. LEVEMIR 100 unit/mL injection Generic drug:  insulin detemir 30 Units by SubCUTAneous route nightly. lisinopril 2.5 mg tablet Commonly known as:  Pecola Cypher Take 1 Tab by mouth daily. NORVASC 10 mg tablet Generic drug:  amLODIPine Take 10 mg by mouth daily. potassium chloride SR 20 mEq tablet Commonly known as:  K-TAB Take 2 Tabs by mouth daily. senna-docusate 8.6-50 mg per tablet Commonly known as:  Renato Kady Take 1 Tab by mouth two (2) times a day. VITAMIN C 500 mg tablet Generic drug:  ascorbic acid (vitamin C) Take 500 mg by mouth daily. VITAMIN D2 50,000 unit capsule Generic drug:  ergocalciferol Take 50,000 Units by mouth every Wednesday. Every Wednesday We Performed the Following AMB POC EKG ROUTINE W/ 12 LEADS, INTER & REP [65221 CPT(R)] METABOLIC PANEL, BASIC [77286 CPT(R)] Follow-up Instructions Return in about 3 months (around 11/21/2017). Patient Instructions Increase Lisinopril to 5 mg daily. Stop Bumex and Potassium. Monitor your blood pressures regularly at home. Introducing [facility]! Remedios Ronquillo introduces [employer] patient portal. Now you can access parts of your medical record, email your doctor's office, and request medication refills online. 1. In your internet browser, go to [email or web address] 2. Click on the First Time User? Click Here link in the Sign In box. You will see the New Member Sign Up page. 3. Enter your [employer] Access Code exactly as it appears below. You will not need to use this code after youve completed the sign-up process. If you do not sign up before the expiration date, you must request a new code. · [employer] Access Code: [email or web address] Expires: 10/29/2017  1:47 PM 
 
4. Enter the last four digits of your Social Security Number (xxxx) and Date of Birth (mm/dd/yyyy) as indicated and click Submit. You will be taken to the next sign-up page. 5. Create a [employer] ID. This will be your Emp[employer] login ID and cannot be changed, so think of one that is secure and easy to remember. 6. Create a Emp[employer] password. You can change your password at any time. 7. Enter your Password Reset Question and Answer. This can be used at a later time if you forget your password. 8. Enter your e-mail address. You will receive e-mail notification when new information is available in [address] 9. Click Sign Up. You can now view and download portions of your medical record. 10. Click the Download Summary menu link to download a portable copy of your medical information. If you have questions, please visit the Frequently Asked Questions section of the [employer] website. Remember, [employer] is NOT to be used for urgent needs. For medical emergencies, dial 911. Now available from your iPhone and Android! Please provide this summary of care documentation to your next provider. Your primary care clinician is listed as Linesy Granados. If you have any questions after today's visit, please call [phone].

## 2017-08-22 ENCOUNTER — TELEPHONE (OUTPATIENT)
Dept: CARDIOLOGY CLINIC | Age: 67
End: 2017-08-22

## 2017-08-22 DIAGNOSIS — E87.6 HYPOKALEMIA: Primary | ICD-10-CM

## 2017-08-22 LAB
BUN SERPL-MCNC: 19 MG/DL (ref 8–27)
BUN/CREAT SERPL: 16 (ref 10–24)
CALCIUM SERPL-MCNC: 9.6 MG/DL (ref 8.6–10.2)
CHLORIDE SERPL-SCNC: 100 MMOL/L (ref 96–106)
CO2 SERPL-SCNC: 28 MMOL/L (ref 18–29)
CREAT SERPL-MCNC: 1.21 MG/DL (ref 0.76–1.27)
GLUCOSE SERPL-MCNC: 58 MG/DL (ref 65–99)
POTASSIUM SERPL-SCNC: 3.3 MMOL/L (ref 3.5–5.2)
SODIUM SERPL-SCNC: 145 MMOL/L (ref 134–144)

## 2017-08-22 NOTE — TELEPHONE ENCOUNTER
Lab Results   Component Value Date/Time    Sodium 145 08/21/2017 10:32 AM    Potassium 3.3 08/21/2017 10:32 AM    Chloride 100 08/21/2017 10:32 AM    CO2 28 08/21/2017 10:32 AM    Anion gap 7 07/02/2017 03:34 AM    Glucose 58 08/21/2017 10:32 AM    BUN 19 08/21/2017 10:32 AM    Creatinine 1.21 08/21/2017 10:32 AM    BUN/Creatinine ratio 16 08/21/2017 10:32 AM    GFR est AA 72 08/21/2017 10:32 AM    GFR est non-AA 62 08/21/2017 10:32 AM    Calcium 9.6 08/21/2017 10:32 AM     Mr. Gt Aly was seen by Dr. Catherine Prather yesterday for routine office visit following MV repair and bypass surgery 2 months ago. A baseline BMP was ordered and Bumex and potasium supplement were discontinued. Lisinopril was increased to address elevated BP. I have advised Mr. Gt Aly to take a 1 time dose of KCl 20 mEq now. Remain off maintenance dose K and Bumex. Continue higher dose of ACE. Will plan to repeat BMP again in 2 weeks to re-evaluate. Will phone follow up at that time to review results and also review BP trend following medication change. He was encouraged to call with any questions or concerns prior our next phone discussion.

## 2017-08-26 RX ORDER — LISINOPRIL 5 MG/1
5 TABLET ORAL DAILY
Qty: 30 TAB | Refills: 5 | Status: SHIPPED | OUTPATIENT
Start: 2017-08-26 | End: 2017-09-06 | Stop reason: SDUPTHER

## 2017-09-06 DIAGNOSIS — I34.0 NON-RHEUMATIC MITRAL REGURGITATION: ICD-10-CM

## 2017-09-06 DIAGNOSIS — Z95.1 S/P CABG X 1: ICD-10-CM

## 2017-09-06 DIAGNOSIS — I10 ESSENTIAL HYPERTENSION: ICD-10-CM

## 2017-09-06 DIAGNOSIS — E11.9 TYPE 2 DIABETES MELLITUS WITHOUT COMPLICATION, WITH LONG-TERM CURRENT USE OF INSULIN (HCC): ICD-10-CM

## 2017-09-06 DIAGNOSIS — G47.33 OSA (OBSTRUCTIVE SLEEP APNEA): ICD-10-CM

## 2017-09-06 DIAGNOSIS — E78.5 DYSLIPIDEMIA: ICD-10-CM

## 2017-09-06 DIAGNOSIS — I25.10 CORONARY ARTERY DISEASE INVOLVING NATIVE CORONARY ARTERY OF NATIVE HEART WITHOUT ANGINA PECTORIS: ICD-10-CM

## 2017-09-06 DIAGNOSIS — Z98.890 S/P MVR (MITRAL VALVE REPAIR): ICD-10-CM

## 2017-09-06 DIAGNOSIS — Z79.4 TYPE 2 DIABETES MELLITUS WITHOUT COMPLICATION, WITH LONG-TERM CURRENT USE OF INSULIN (HCC): ICD-10-CM

## 2017-09-06 LAB
BUN SERPL-MCNC: 20 MG/DL (ref 8–27)
BUN/CREAT SERPL: 15 (ref 10–24)
CALCIUM SERPL-MCNC: 9.5 MG/DL (ref 8.6–10.2)
CHLORIDE SERPL-SCNC: 99 MMOL/L (ref 96–106)
CO2 SERPL-SCNC: 28 MMOL/L (ref 18–29)
CREAT SERPL-MCNC: 1.3 MG/DL (ref 0.76–1.27)
GLUCOSE SERPL-MCNC: 90 MG/DL (ref 65–99)
INTERPRETATION: NORMAL
POTASSIUM SERPL-SCNC: 4 MMOL/L (ref 3.5–5.2)
SODIUM SERPL-SCNC: 144 MMOL/L (ref 134–144)

## 2017-09-06 RX ORDER — LISINOPRIL 5 MG/1
5 TABLET ORAL DAILY
Qty: 30 TAB | Refills: 5 | Status: SHIPPED | OUTPATIENT
Start: 2017-09-06 | End: 2017-09-07 | Stop reason: SDUPTHER

## 2017-09-07 ENCOUNTER — TELEPHONE (OUTPATIENT)
Dept: CARDIOLOGY CLINIC | Age: 67
End: 2017-09-07

## 2017-09-07 RX ORDER — LISINOPRIL 5 MG/1
5 TABLET ORAL DAILY
Qty: 90 TAB | Refills: 3 | Status: SHIPPED | OUTPATIENT
Start: 2017-09-07 | End: 2018-04-05 | Stop reason: SDUPTHER

## 2017-09-07 RX ORDER — ATORVASTATIN CALCIUM 20 MG/1
TABLET, FILM COATED ORAL
Qty: 90 TAB | Refills: 3 | Status: SHIPPED | OUTPATIENT
Start: 2017-09-07

## 2017-09-07 RX ORDER — BUMETANIDE 1 MG/1
TABLET ORAL
Qty: 90 TAB | Refills: 3 | Status: SHIPPED | OUTPATIENT
Start: 2017-09-07 | End: 2017-11-06 | Stop reason: ALTCHOICE

## 2017-09-07 NOTE — TELEPHONE ENCOUNTER
Maxx Dyer, LUIS Camacho, CARLOS                     Please inform Mr. Sunita Pereira that his repeat lab work looks good - potassium level is now normal and renal function is stable. Patient notified. He voices understanding.

## 2017-11-06 ENCOUNTER — OFFICE VISIT (OUTPATIENT)
Dept: CARDIOLOGY CLINIC | Age: 67
End: 2017-11-06

## 2017-11-06 ENCOUNTER — CLINICAL SUPPORT (OUTPATIENT)
Dept: CARDIOLOGY CLINIC | Age: 67
End: 2017-11-06

## 2017-11-06 VITALS
OXYGEN SATURATION: 97 % | DIASTOLIC BLOOD PRESSURE: 90 MMHG | RESPIRATION RATE: 16 BRPM | HEART RATE: 54 BPM | HEIGHT: 67 IN | BODY MASS INDEX: 41.28 KG/M2 | SYSTOLIC BLOOD PRESSURE: 142 MMHG | WEIGHT: 263 LBS

## 2017-11-06 DIAGNOSIS — Z95.1 S/P CABG X 1: ICD-10-CM

## 2017-11-06 DIAGNOSIS — G47.33 OSA (OBSTRUCTIVE SLEEP APNEA): ICD-10-CM

## 2017-11-06 DIAGNOSIS — Z79.4 TYPE 2 DIABETES MELLITUS WITHOUT COMPLICATION, WITH LONG-TERM CURRENT USE OF INSULIN (HCC): ICD-10-CM

## 2017-11-06 DIAGNOSIS — E78.5 DYSLIPIDEMIA: ICD-10-CM

## 2017-11-06 DIAGNOSIS — I34.0 NON-RHEUMATIC MITRAL REGURGITATION: Primary | ICD-10-CM

## 2017-11-06 DIAGNOSIS — E10.22 CKD STAGE 3 DUE TO TYPE 1 DIABETES MELLITUS (HCC): Chronic | ICD-10-CM

## 2017-11-06 DIAGNOSIS — I25.10 CORONARY ARTERY DISEASE INVOLVING NATIVE CORONARY ARTERY OF NATIVE HEART WITHOUT ANGINA PECTORIS: ICD-10-CM

## 2017-11-06 DIAGNOSIS — E11.9 TYPE 2 DIABETES MELLITUS WITHOUT COMPLICATION, WITH LONG-TERM CURRENT USE OF INSULIN (HCC): ICD-10-CM

## 2017-11-06 DIAGNOSIS — I34.0 NON-RHEUMATIC MITRAL REGURGITATION: ICD-10-CM

## 2017-11-06 DIAGNOSIS — Z98.890 S/P MVR (MITRAL VALVE REPAIR): ICD-10-CM

## 2017-11-06 DIAGNOSIS — I10 ESSENTIAL HYPERTENSION: ICD-10-CM

## 2017-11-06 DIAGNOSIS — N18.30 CKD STAGE 3 DUE TO TYPE 1 DIABETES MELLITUS (HCC): Chronic | ICD-10-CM

## 2017-11-06 NOTE — MR AVS SNAPSHOT
Visit Information Date & Time Provider Department Dept. Phone Encounter #  
 11/6/2017  2:00 PM Quincy Maldonado MD [facility] Destiny Maria [phone] [id number] Follow-up Instructions Return in about 6 months (around 5/6/2018). Upcoming Health Maintenance Date Due Hepatitis C Screening 1950 FOOT EXAM Q1 10/22/1961 MICROALBUMIN Q1 10/22/1961 EYE EXAM RETINAL OR DILATED Q1 10/22/1961 DTaP/Tdap/Td series (1 - Tdap) 10/22/1972 FOBT Q 1 YEAR AGE 50-75 10/22/2001 ZOSTER VACCINE AGE 60> 8/22/2011 LIPID PANEL Q1 4/3/2015 GLAUCOMA SCREENING Q2Y 10/22/2016 Pneumococcal 65+ Low/Medium Risk (1 of 2 - PCV13) 10/22/2016 MEDICARE YEARLY EXAM 10/22/2016 INFLUENZA AGE 9 TO ADULT 8/1/2017 HEMOGLOBIN A1C Q6M 12/19/2017 Allergies as of 11/6/2017  Review Complete On: 11/6/2017 By: Preethi Platt LPN Severity Noted Reaction Type Reactions Cephalexin  01/15/2017    Other (comments)  
 unknown Oxycodone  04/03/2014    Other (comments) Pt does not desire to take; causes altered mental status and constipation Sulfa (Sulfonamide Antibiotics)  12/20/2011    Hives Tamsulosin  01/15/2017    Other (comments) Vision loss Current Immunizations  Never Reviewed No immunizations on file. Not reviewed this visit You Were Diagnosed With   
  
 Codes Comments Non-rheumatic mitral regurgitation    -  Primary ICD-10-CM: I34.0 ICD-9-CM: 424.0 Coronary artery disease involving native coronary artery of native heart without angina pectoris     ICD-10-CM: I25.10 ICD-9-CM: 414.01 Essential hypertension     ICD-10-CM: I10 
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   
 MARIN (obstructive sleep apnea)     ICD-10-CM: G47.33 
ICD-9-CM: 327.23 Vitals BP Pulse Resp Height(growth percentile) Weight(growth percentile) SpO2  
 142/90 (!) 54 16 5' 7\" (1.702 m) 263 lb (119.3 kg) 97% BMI Smoking Status 41.19 kg/m2 Former Smoker BMI and BSA Data Body Mass Index Body Surface Area  
 41.19 kg/m 2 2.37 m 2 Preferred Pharmacy Pharmacy Name Phone [id number] [place] [address] [phone] Your Updated Medication List  
  
   
This list is accurate as of: 11/6/17  2:17 PM.  Always use your most recent med list.  
  
  
  
  
 aspirin 81 mg chewable tablet Take 1 Tab by mouth daily. atorvastatin 20 mg tablet Commonly known as:  LIPITOR  
TAKE 1 TABLET BY MOUTH NIGHTLY. BYSTOLIC 10 mg tablet Generic drug:  nebivolol Take 10 mg by mouth daily. HYDROcodone-acetaminophen 5-325 mg per tablet Commonly known as:  Prabhakar Bent Take 1 Tab by mouth every six (6) hours as needed. Max Daily Amount: 4 Tabs. ibuprofen 200 mg tablet Commonly known as:  MOTRIN Take 200 mg by mouth every eight (8) hours as needed for Pain. insulin lispro 100 unit/mL injection Commonly known as:  HumaLOG  
20 Units by SubCUTAneous route Before breakfast, lunch, and dinner. Start with 10 units SQ before breakfast, lunch, dinner, --- advance as diet improves. LEVEMIR 100 unit/mL injection Generic drug:  insulin detemir 30 Units by SubCUTAneous route nightly. lisinopril 5 mg tablet Commonly known as:  Donnald Code Take 1 Tab by mouth daily. NORVASC 10 mg tablet Generic drug:  amLODIPine Take 10 mg by mouth daily. VITAMIN C 500 mg tablet Generic drug:  ascorbic acid (vitamin C) Take 500 mg by mouth daily. VITAMIN D2 50,000 unit capsule Generic drug:  ergocalciferol Take 50,000 Units by mouth every Wednesday. Every Wednesday Follow-up Instructions Return in about 6 months (around 5/6/2018). Patient Instructions Talk to Dr. Antelmo Hanna when you see him next. Monitor your blood pressure at home. Introducing [facility]! [facility] introduces [employer] patient portal. Now you can access parts of your medical record, email your doctor's office, and request medication refills online. 1. In your internet browser, go to [email or web address] 2. Click on the First Time User? Click Here link in the Sign In box. You will see the New Member Sign Up page. 3. Enter your [employer] Access Code exactly as it appears below. You will not need to use this code after youve completed the sign-up process. If you do not sign up before the expiration date, you must request a new code. · [employer] Access Code: [email or web address] Expires: 2/4/2018  2:17 PM 
 
4. Enter the last four digits of your Social Security Number (xxxx) and Date of Birth (mm/dd/yyyy) as indicated and click Submit. You will be taken to the next sign-up page. 5. Create a [employer] ID. This will be your [employer] login ID and cannot be changed, so think of one that is secure and easy to remember. 6. Create a [employer] password. You can change your password at any time. 7. Enter your Password Reset Question and Answer. This can be used at a later time if you forget your password. 8. Enter your e-mail address. You will receive e-mail notification when new information is available in [address] 9. Click Sign Up. You can now view and download portions of your medical record. 10. Click the Download Summary menu link to download a portable copy of your medical information. If you have questions, please visit the Frequently Asked Questions section of the [employer] website. Remember, [employer] is NOT to be used for urgent needs. For medical emergencies, dial 911. Now available from your iPhone and Android! Please provide this summary of care documentation to your next provider. Your primary care clinician is listed as Derrell Wyatt. If you have any questions after today's visit, please call [phone].

## 2017-11-06 NOTE — PROGRESS NOTES
[address]  [address], [place], [address] [facility]    Office [phone]  Fax [phone]  Cell [phone]        Earlene Chan is a 78 y.o. male Last seen 3 months ago. Assessment  Encounter Diagnoses   Name Primary? 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 Echo today demonstrates no residual MR with normal LV function. He does have moderate LAE but no hx of AF. BP borderline elevated on multidrug regimen, uncertain control at home. I have asked him to monitor his BP. I am sure that his weight and MARIN are playing a role. Regular CPAP should positively impact his BP. He is on insulin alone for T2DM. We discussed the role of cardioprotective drugs, specifically Jardiance. He will discuss further with Dr. Mahogany Mobley. Follow-up Disposition:  Return in about 6 months (around 5/6/2018). Subjective:    Mr. Barbara Lin feels as though he has almost fully recovered his MV repair 4 months ago. He has started to walk more with no exertional sxs. Patient denies any exertional chest pain, dyspnea, palpitations, syncope, orthopnea, edema or paroxysmal nocturnal dyspnea. He no longer takes Bumex. He does not check his BP at home. He started CPAP 3 days ago. DM monitored by Dr. Mahogany Mobley. Patient is on insulin but not any diabetes medications.      Cardiac testing  ECHO 2-013: MVP mod-severe MR   ECHO 3/15/2017: EF 65-70%, mild LAE, mild LVH, mod-severe LAE, mod MR,   spherical, echogenic, fixed mass, 14 mm x 8 mm)- anterior leaflet, on the atrial aspect. LONNIE 2012: mod-severe MR   LONNIE 3/21/2017 - - 8x11 mm semimobile echogenic mass (some central clearing) attached to atrial surface of anterior mitral leaflet, prolapsing into LV. Distal tip of anterior leaflet prolapses. Linear strands, semimobile attached to atrial surface of posterior leaflet. No annular abscess. Eccentric posterior jet of MR, clearly severe. Severe LAE, LVH, EF 70%, aortic/pulmonic/tricuspid valves normal. Aorta nl    Echo 5/4/17 - EF 70%. No WMA. Wall thickness upper limits of normal. Severe LAE. 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 Definite, medium sized spherical mobile mass, measuring 11 x 7 mm on the tip of the anterior leaflet on the atrial aspect. Cath 5/16/2017 - PA 45/20, PCW 23 (V wave 45), RV 44/4 RA 8 LVEDP 25, A wave to 35, no AV gradient, EF 75%, severe MR, LM normal, LAD mid 85% at bifurcating D1 (small vessels), LCX mild plaque, RCA mild plaque. Echo 11/6/17- LVEF 60% s/p MV repair, no MR, LAE - 8x11 mm semimobile echogenic mass (some central clearing) attached to atrial surface of anterior mitral leaflet, prolapsing into LV. Distal tip of anterior leaflet prolapses. Linear strands, semimobile attached to atrial surface of posterior leaflet. No annular abscess. Eccentric posterior jet of MR, clearly severe.  Severe LAE, LVH, EF 70%, aortic/pulmonic/tricuspid valves normal. Aorta nl    Past Medical History:   Diagnosis Date    Abnormal EKG     he says it has been abnormal for years    Arthritis     CAD (coronary artery disease)     Chronic pain     lower lumbar    Diabetes ([address]     type II    Dyslipidemia     Enlarged prostate Dr. Everett Lozada    HTN (hypertension)     Hypercholesterolemia     Kidney stone     Mitral regurgitation     mod-severe on echo 1/5/12    Murmur     Obesity     Other ill-defined conditions     BPH    Sleep apnea     stopped using CPAP many years ago            Allergies   Allergen Reactions    Cephalexin Other (comments)     unknown    Oxycodone Other (comments)     Pt does not desire to take; causes altered mental status and constipation    Sulfa (Sulfonamide Antibiotics) Hives    Tamsulosin Other (comments)     Vision loss          Review of Systems  Constitutional: Negative for fever, chills, malaise/fatigue and diaphoresis. Respiratory: Negative for cough, hemoptysis, sputum production, shortness of breath and wheezing. Cardiovascular: Negative for chest pain, palpitations, orthopnea, claudication, leg swelling and PND. Gastrointestinal: Negative for heartburn, nausea, vomiting, blood in stool and melena. Genitourinary: Negative for dysuria and flank pain. Musculoskeletal: Negative for joint pain. Skin: Negative for rash. Neurological: Negative for focal weakness, seizures, loss of consciousness, weakness and headaches. Endo/Heme/Allergies: Does not bruise/bleed easily. Psychiatric/Behavioral: Negative for memory loss. The patient does not have insomnia. Physical Exam    Visit Vitals    /90    Pulse (!) 54    Resp 16    Ht 5' 7\" (1.702 m)    Wt 263 lb (119.3 kg)    SpO2 97%    BMI 41.19 kg/m2     Wt Readings from Last 3 Encounters:   11/06/17 263 lb (119.3 kg)   07/31/17 250 lb (113.4 kg)   07/06/17 245 lb 8 oz (111.4 kg)      General - well developed well nourished  Neck - JVP normal, thyroid nl  Cardiac - normal S1, S2, no rubs or gallops. No clicks.    Vascular - carotids without bruits, radials, femorals and pedal pulses equal bilateral  Lungs - clear to auscultation bilaterals, no rales, wheezing or rhonchi  Abd - soft nontender, no HSM, no abd bruits  Extremities - 1+ pretibial edema   Skin - no rash  Neuro - nonfocal  Psych - normal mood and affect      Cardiographics  EKG 8/21/17- SB 54, diffuse inferolateral T wave inversions   Echo 11/6/17- LVEF 60% s/p MV repair, no MR, LAE    Written by Samuel Munoz, as dictated by Phoenix Ruiz MD.   Phoenix Ruiz MD

## 2018-04-05 ENCOUNTER — OFFICE VISIT (OUTPATIENT)
Dept: CARDIOLOGY CLINIC | Age: 68
End: 2018-04-05

## 2018-04-05 VITALS
DIASTOLIC BLOOD PRESSURE: 88 MMHG | WEIGHT: 285.4 LBS | RESPIRATION RATE: 18 BRPM | OXYGEN SATURATION: 97 % | HEART RATE: 54 BPM | HEIGHT: 67 IN | BODY MASS INDEX: 44.8 KG/M2 | SYSTOLIC BLOOD PRESSURE: 142 MMHG

## 2018-04-05 DIAGNOSIS — M51.36 DDD (DEGENERATIVE DISC DISEASE), LUMBAR: ICD-10-CM

## 2018-04-05 DIAGNOSIS — G47.33 OSA (OBSTRUCTIVE SLEEP APNEA): ICD-10-CM

## 2018-04-05 DIAGNOSIS — E10.22 CKD STAGE 3 DUE TO TYPE 1 DIABETES MELLITUS (HCC): Chronic | ICD-10-CM

## 2018-04-05 DIAGNOSIS — Z01.810 PRE-OPERATIVE CARDIOVASCULAR EXAMINATION: ICD-10-CM

## 2018-04-05 DIAGNOSIS — I25.10 CORONARY ARTERY DISEASE INVOLVING NATIVE CORONARY ARTERY OF NATIVE HEART WITHOUT ANGINA PECTORIS: ICD-10-CM

## 2018-04-05 DIAGNOSIS — Z79.4 TYPE 2 DIABETES MELLITUS WITHOUT COMPLICATION, WITH LONG-TERM CURRENT USE OF INSULIN (HCC): ICD-10-CM

## 2018-04-05 DIAGNOSIS — N18.30 CKD STAGE 3 DUE TO TYPE 1 DIABETES MELLITUS (HCC): Chronic | ICD-10-CM

## 2018-04-05 DIAGNOSIS — I10 ESSENTIAL HYPERTENSION: ICD-10-CM

## 2018-04-05 DIAGNOSIS — Z98.890 S/P MVR (MITRAL VALVE REPAIR): ICD-10-CM

## 2018-04-05 DIAGNOSIS — E78.5 DYSLIPIDEMIA: ICD-10-CM

## 2018-04-05 DIAGNOSIS — E11.9 TYPE 2 DIABETES MELLITUS WITHOUT COMPLICATION, WITH LONG-TERM CURRENT USE OF INSULIN (HCC): ICD-10-CM

## 2018-04-05 DIAGNOSIS — E66.01 OBESITY, MORBID (HCC): ICD-10-CM

## 2018-04-05 DIAGNOSIS — Z95.1 S/P CABG X 1: ICD-10-CM

## 2018-04-05 DIAGNOSIS — I34.0 NON-RHEUMATIC MITRAL REGURGITATION: Primary | ICD-10-CM

## 2018-04-05 PROBLEM — M46.40 DISCITIS: Status: RESOLVED | Noted: 2017-03-15 | Resolved: 2018-04-05

## 2018-04-05 RX ORDER — LISINOPRIL 10 MG/1
10 TABLET ORAL DAILY
Qty: 90 TAB | Refills: 3 | Status: SHIPPED | OUTPATIENT
Start: 2018-04-05 | End: 2018-10-05 | Stop reason: SDUPTHER

## 2018-04-05 NOTE — PROGRESS NOTES
Ayden JACKSON [place], [facility] [address]  [address],  [address]  [place], [address] [facility]    Office [phone]  Fax [phone]  Cell [phone]        Chalino Funez is a 78 y.o. male Last seen 5 months ago. Here for preoperative cardiac risk stratification prior to lumbar disc surgery scheduled for 5/2/18 with Dr. Ariel St at [facility]. Assessment  Encounter Diagnoses   Name Primary? 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 Echo 11/2017 demonstrated no residual MR with normal LV function. He does have moderate LAE but no hx of AF. BP borderline elevated on combination therapy in the setting of T2DM, MARIN and obesity. Favor increasing Lisinopril to 10 mg/d. Continue to monitor BP at home. T2DM and lipids managed by Arvin Moreno MD. He is on insulin alone. We again discussed the importance of new generation DM drugs such as Jardiance and Victoza that actually improve CV outcomes in addition to weight loss and BP lowering. Low cardiac risk for lumbar disc surgery under GA. It is safe to stop ASA 1 week prior to surgery. I or one of my colleagues will be happy to him after his surgery as needed. Follow-up Disposition:  Return in about 6 months (around 10/5/2018).       Subjective:    Mr. Ashok Hernández denies any interval cardiac issues. He continues to have back pain and is scheduled for lumbar disc surgery for 5/2/18 at [facility] with Dr. Frederic Marie. Back pain significantly limits his activity, states he cannot walk more than 1,000 yards at a time. BP at home has been around 140/80. His breathing has improved. He is adherent with CPAP. Patient denies any exertional chest pain, palpitations, syncope, orthopnea, edema or paroxysmal nocturnal dyspnea. He remains on insulin alone for his DM. I do not have recent A1c    Cardiac testing  ECHO 2-013: MVP mod-severe MR   ECHO 3/15/2017: EF 65-70%, mild LAE, mild LVH, mod-severe LAE, mod MR,   spherical, echogenic, fixed mass, 14 mm x 8 mm)- anterior leaflet, on the atrial aspect. LONNIE 2012: mod-severe MR   LONNIE 3/21/2017 - - 8x11 mm semimobile echogenic mass (some central clearing) attached to atrial surface of anterior mitral leaflet, prolapsing into LV. Distal tip of anterior leaflet prolapses. Linear strands, semimobile attached to atrial surface of posterior leaflet. No annular abscess. Eccentric posterior jet of MR, clearly severe. Severe LAE, LVH, EF 70%, aortic/pulmonic/tricuspid valves normal. Aorta nl    Echo 5/4/17 - EF 70%. No WMA. Wall thickness upper limits of normal. Severe LAE. 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 Definite, medium sized spherical mobile mass, measuring 11 x 7 mm on the tip of the anterior leaflet on the atrial aspect. Cath 5/16/2017 - PA 45/20, PCW 23 (V wave 45), RV 44/4 RA 8 LVEDP 25, A wave to 35, no AV gradient, EF 75%, severe MR, LM normal, LAD mid 85% at bifurcating D1 (small vessels), LCX mild plaque, RCA mild plaque. Echo 11/6/17- LVEF 60% s/p MV repair, no MR, LAE - 8x11 mm semimobile echogenic mass (some central clearing) attached to atrial surface of anterior mitral leaflet, prolapsing into LV. Distal tip of anterior leaflet prolapses. Linear strands, semimobile attached to atrial surface of posterior leaflet. No annular abscess. Eccentric posterior jet of MR, clearly severe. Severe LAE, LVH, EF 70%, aortic/pulmonic/tricuspid valves normal. Aorta nl    Past Medical History:   Diagnosis Date    Abnormal EKG     he says it has been abnormal for years    Arthritis     CAD (coronary artery disease)     Chronic pain     lower lumbar    Diabetes ([address]     type II    Dyslipidemia     Enlarged prostate Dr. Pooja Saba    HTN (hypertension)     Hypercholesterolemia     Kidney stone     Mitral regurgitation     mod-severe on echo 1/5/12    Murmur     Obesity     Other ill-defined conditions(799.89)     BPH    Sleep apnea     stopped using CPAP many years ago            Allergies   Allergen Reactions    Cephalexin Other (comments)     unknown    Oxycodone Other (comments)     Pt does not desire to take; causes altered mental status and constipation    Sulfa (Sulfonamide Antibiotics) Hives    Tamsulosin Other (comments)     Vision loss          Review of Systems  Constitutional: Negative for fever, chills, malaise/fatigue and diaphoresis. Respiratory: Negative for cough, hemoptysis, sputum production, shortness of breath and wheezing. Cardiovascular: Negative for chest pain, palpitations, orthopnea, claudication, leg swelling and PND. Gastrointestinal: Negative for heartburn, nausea, vomiting, blood in stool and melena. Genitourinary: Negative for dysuria and flank pain. Musculoskeletal: Negative for joint pain. +back pain  Skin: Negative for rash. Neurological: Negative for focal weakness, seizures, loss of consciousness, weakness and headaches. Endo/Heme/Allergies: Does not bruise/bleed easily. Psychiatric/Behavioral: Negative for memory loss. The patient does not have insomnia.       Physical Exam    Visit Vitals    /88 (BP 1 Location: Left arm)    Pulse (!) 54    Resp 18    Ht 5' 7\" (1.702 m)    Wt 285 lb 6.4 oz (129.5 kg)    SpO2 97%    BMI 44.7 kg/m2     Wt Readings from Last 3 Encounters:   04/05/18 285 lb 6.4 oz (129.5 kg)   11/06/17 263 lb (119.3 kg)   07/31/17 250 lb (113.4 kg)      General - well developed well nourished, obese WM  Neck - JVP normal, thyroid nl  Cardiac - normal S1, S2, no rubs or gallops. No clicks.    Vascular - carotids without bruits, radials, femorals and pedal pulses equal bilateral  Lungs - clear to auscultation bilaterals, no rales, wheezing or rhonchi  Abd - soft nontender, no HSM, no abd bruits  Extremities - 1+ pretibial edema   Skin - no rash  Neuro - nonfocal  Psych - normal mood and affect      Cardiographics  EKG 8/21/17- SB 54, diffuse inferolateral T wave inversions   Echo 11/6/17- LVEF 60% s/p MV repair, no MR, LAE  EKG 4/5/18- SB 54, inferolateral T wave inversions, unchanged from 8/21/17      Written by Moraima Amaya, as dictated by Elan Davey MD.   Elan Davey MD

## 2018-04-05 NOTE — MR AVS SNAPSHOT
[address] [facility] [address] [facility] 
[phone] Patient: Eileen Bullock MRN: [id number] ODP:[id number] Visit Information Date & Time Provider Department Dept. Phone Encounter #  
 4/5/2018  1:00 PM Radha Carvalho MD [facility] Stephany Guerrero [phone] [id number] Follow-up Instructions Return in about 6 months (around 10/5/2018). Upcoming Health Maintenance Date Due Hepatitis C Screening 1950 FOOT EXAM Q1 10/22/1961 MICROALBUMIN Q1 10/22/1961 EYE EXAM RETINAL OR DILATED Q1 10/22/1961 DTaP/Tdap/Td series (1 - Tdap) 10/22/1972 FOBT Q 1 YEAR AGE 50-75 10/22/2001 ZOSTER VACCINE AGE 60> 8/22/2011 LIPID PANEL Q1 4/3/2015 GLAUCOMA SCREENING Q2Y 10/22/2016 Pneumococcal 65+ Low/Medium Risk (1 of 2 - PCV13) 10/22/2016 Influenza Age 5 to Adult 8/1/2017 HEMOGLOBIN A1C Q6M 12/19/2017 MEDICARE YEARLY EXAM 3/15/2018 Allergies as of 4/5/2018  Review Complete On: 4/5/2018 By: Daja Vargas Severity Noted Reaction Type Reactions Cephalexin  01/15/2017    Other (comments)  
 unknown Oxycodone  04/03/2014    Other (comments) Pt does not desire to take; causes altered mental status and constipation Sulfa (Sulfonamide Antibiotics)  12/20/2011    Hives Tamsulosin  01/15/2017    Other (comments) Vision loss Current Immunizations  Never Reviewed No immunizations on file. Not reviewed this visit You Were Diagnosed With   
  
 Codes Comments Pre-operative cardiovascular examination    -  Primary ICD-10-CM: Z01.810 ICD-9-CM: V72.81 Non-rheumatic mitral regurgitation     ICD-10-CM: I34.0 ICD-9-CM: 424.0 Coronary artery disease involving native coronary artery of native heart without angina pectoris     ICD-10-CM: I25.10 ICD-9-CM: 414.01 Essential hypertension     ICD-10-CM: I10 
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   
 MARIN (obstructive sleep apnea)     ICD-10-CM: G47.33 
ICD-9-CM: 327.23 Vitals BP Pulse Resp Height(growth percentile) Weight(growth percentile) SpO2  
 142/88 (BP 1 Location: Left arm) (!) 54 18 5' 7\" (1.702 m) 285 lb 6.4 oz (129.5 kg) 97% BMI Smoking Status 44.7 kg/m2 Former Smoker Vitals History BMI and BSA Data Body Mass Index Body Surface Area 44.7 kg/m 2 2.47 m 2 Preferred Pharmacy Pharmacy Name Phone [id number] [place] [phone] Your Updated Medication List  
  
   
This list is accurate as of 4/5/18  1:19 PM.  Always use your most recent med list.  
  
  
  
  
 aspirin 81 mg chewable tablet Take 1 Tab by mouth daily. atorvastatin 20 mg tablet Commonly known as:  LIPITOR  
TAKE 1 TABLET BY MOUTH NIGHTLY. BYSTOLIC 10 mg tablet Generic drug:  nebivolol Take 10 mg by mouth daily. HYDROcodone-acetaminophen 5-325 mg per tablet Commonly known as:  Marisa Singh Take 1 Tab by mouth every six (6) hours as needed. Max Daily Amount: 4 Tabs. ibuprofen 200 mg tablet Commonly known as:  MOTRIN Take 200 mg by mouth every eight (8) hours as needed for Pain. insulin lispro 100 unit/mL injection Commonly known as:  HumaLOG U-100 Insulin 20 Units by SubCUTAneous route Before breakfast, lunch, and dinner. Start with 10 units SQ before breakfast, lunch, dinner, --- advance as diet improves. LEVEMIR U-100 INSULIN 100 unit/mL injection Generic drug:  insulin detemir U-100  
30 Units by SubCUTAneous route nightly. lisinopril 5 mg tablet Commonly known as:  Trey Burrell Take 1 Tab by mouth daily. NORVASC 10 mg tablet Generic drug:  amLODIPine Take 10 mg by mouth daily. VITAMIN C 500 mg tablet Generic drug:  ascorbic acid (vitamin C) Take 500 mg by mouth daily. VITAMIN D2 50,000 unit capsule Generic drug:  ergocalciferol Take 50,000 Units by mouth every Wednesday. Every Wednesday We Performed the Following AMB POC EKG ROUTINE W/ 12 LEADS, INTER & REP [50798 CPT(R)] Follow-up Instructions Return in about 6 months (around 10/5/2018). To-Do List   
 04/07/2018 11:30 AM  
  Appointment with [facility] MRI 2 at [facility] MRI Department ([phone]) 1. Please bring a list or a bag of your current medications to your appointment 2. Please be sure to remove ALL hair clips, pins, extensions, etc., prior to arriving for your MRI procedure. 3. If you have any medical implants or devices, please bring associated medical card with you. 4. Bring any non [facility] films or CDs pertaining to the area being imaged with you on the day of appointment. 5. A written order with a valid diagnosis and Physicians  signature is required for all scheduled tests. 6. Check in at registration 30min before your appointment time unless you were instructed to do otherwise. 04/07/2018 12:30 PM  
  Appointment with [facility] CT ER 1 at [facility] RAD [address] ([phone]) NON-CONTRAST STUDY: 1. Bring any non [facility] facility films/images pertaining to the area of interest with you on the day of appointment. 2. Check in at registration at least 30 minutes before appt time unless you were instructed to do otherwise. 3. If you have to drink oral contrast please pick it up any weekday prior to your appointment, if you cannot please check in 2 hrs  before appt time.   
  
 04/18/2018 9:00 AM  
 Appointment with [facility] PAT EXAM RM 5 at [address] [facility] ([phone])  
  
 04/18/2018 11:00 AM  
  Appointment with [facility] PAT CLASSROOM at [address] [facility] ([phone]) Patient Instructions Increase Lisinopril to 10 mg daily. Continue to monitor your BP at home. Talk again to Dr. Tay Ferraro about adding drugs such as Jardiance and/or Victoza to your diabetic regimen. Introducing [facility] SERVICES! Remedios Ronquillo introduces [employer] patient portal. Now you can access parts of your medical record, email your doctor's office, and request medication refills online. 1. In your internet browser, go to [email or web address] 2. Click on the First Time User? Click Here link in the Sign In box. You will see the New Member Sign Up page. 3. Enter your [employer] Access Code exactly as it appears below. You will not need to use this code after youve completed the sign-up process. If you do not sign up before the expiration date, you must request a new code. · [employer] Access Code: [email or web address] Expires: 6/25/2018 11:15 AM 
 
4. Enter the last four digits of your Social Security Number (xxxx) and Date of Birth (mm/dd/yyyy) as indicated and click Submit. You will be taken to the next sign-up page. 5. Create a [employer] ID. This will be your [employer] login ID and cannot be changed, so think of one that is secure and easy to remember. 6. Create a [employer] password. You can change your password at any time. 7. Enter your Password Reset Question and Answer. This can be used at a later time if you forget your password. 8. Enter your e-mail address. You will receive e-mail notification when new information is available in [address] 9. Click Sign Up. You can now view and download portions of your medical record. 10. Click the Download Summary menu link to download a portable copy of your medical information. If you have questions, please visit the Frequently Asked Questions section of the [employer] website. Remember, [employer] is NOT to be used for urgent needs. For medical emergencies, dial 911. Now available from your iPhone and Android! Please provide this summary of care documentation to your next provider. Your primary care clinician is listed as Davian De La Rosa. If you have any questions after today's visit, please call [phone].

## 2018-04-05 NOTE — PATIENT INSTRUCTIONS
Increase Lisinopril to 10 mg daily. Continue to monitor your BP at home. Talk again to Dr. Hai Hawkins about adding drugs such as Jardiance and/or Victoza to your diabetic regimen.

## 2018-04-07 ENCOUNTER — HOSPITAL ENCOUNTER (OUTPATIENT)
Dept: CT IMAGING | Age: 68
Discharge: HOME | End: 2018-04-07
Attending: ORTHOPAEDIC SURGERY
Payer: MEDICARE

## 2018-04-07 ENCOUNTER — HOSPITAL ENCOUNTER (OUTPATIENT)
Dept: MRI IMAGING | Age: 68
Discharge: HOME | End: 2018-04-07
Attending: ORTHOPAEDIC SURGERY
Payer: MEDICARE

## 2018-04-07 DIAGNOSIS — M48.061 LUMBAR STENOSIS: ICD-10-CM

## 2018-04-07 PROCEDURE — 72148 MRI LUMBAR SPINE W/O DYE: CPT

## 2018-04-07 PROCEDURE — 72131 CT LUMBAR SPINE W/O DYE: CPT

## 2018-04-18 ENCOUNTER — HOSPITAL ENCOUNTER (OUTPATIENT)
Dept: PREADMISSION TESTING | Age: 68
Discharge: HOME | End: 2018-04-18
Payer: MEDICARE

## 2018-04-18 VITALS
BODY MASS INDEX: 44.01 KG/M2 | TEMPERATURE: 97.7 F | HEART RATE: 66 BPM | WEIGHT: 280.38 LBS | SYSTOLIC BLOOD PRESSURE: 191 MMHG | DIASTOLIC BLOOD PRESSURE: 84 MMHG | HEIGHT: 67 IN

## 2018-04-18 LAB
ABO + RH BLD: NORMAL
ANION GAP SERPL CALC-SCNC: 7 MMOL/L (ref 5–15)
APPEARANCE UR: CLEAR
BACTERIA URNS QL MICRO: NEGATIVE /HPF
BILIRUB UR QL: NEGATIVE
BLOOD GROUP ANTIBODIES SERPL: NORMAL
BUN SERPL-MCNC: 22 MG/DL (ref 6–20)
BUN/CREAT SERPL: 16 (ref 12–20)
CALCIUM SERPL-MCNC: 8.9 MG/DL (ref 8.5–10.1)
CHLORIDE SERPL-SCNC: 105 MMOL/L (ref 97–108)
CO2 SERPL-SCNC: 31 MMOL/L (ref 21–32)
COLOR UR: ABNORMAL
CREAT SERPL-MCNC: 1.39 MG/DL (ref 0.7–1.3)
CRP SERPL-MCNC: 2.36 MG/DL (ref 0–0.6)
EPITH CASTS URNS QL MICRO: ABNORMAL /LPF
ERYTHROCYTE [DISTWIDTH] IN BLOOD BY AUTOMATED COUNT: 13.6 % (ref 11.5–14.5)
ERYTHROCYTE [SEDIMENTATION RATE] IN BLOOD: 29 MM/HR (ref 0–20)
EST. AVERAGE GLUCOSE BLD GHB EST-MCNC: 146 MG/DL
GLUCOSE SERPL-MCNC: 253 MG/DL (ref 65–100)
GLUCOSE UR STRIP.AUTO-MCNC: NEGATIVE MG/DL
HBA1C MFR BLD: 6.7 % (ref 4.2–6.3)
HCT VFR BLD AUTO: 43.8 % (ref 36.6–50.3)
HGB BLD-MCNC: 15.1 G/DL (ref 12.1–17)
HGB UR QL STRIP: ABNORMAL
HYALINE CASTS URNS QL MICRO: ABNORMAL /LPF (ref 0–5)
INR PPP: 1 (ref 0.9–1.1)
KETONES UR QL STRIP.AUTO: NEGATIVE MG/DL
LEUKOCYTE ESTERASE UR QL STRIP.AUTO: NEGATIVE
MCH RBC QN AUTO: 33.6 PG (ref 26–34)
MCHC RBC AUTO-ENTMCNC: 34.5 G/DL (ref 30–36.5)
MCV RBC AUTO: 97.6 FL (ref 80–99)
NITRITE UR QL STRIP.AUTO: NEGATIVE
NRBC # BLD: 0 K/UL (ref 0–0.01)
NRBC BLD-RTO: 0 PER 100 WBC
PH UR STRIP: 5.5 [PH] (ref 5–8)
PLATELET # BLD AUTO: 184 K/UL (ref 150–400)
PMV BLD AUTO: 11.6 FL (ref 8.9–12.9)
POTASSIUM SERPL-SCNC: 3.2 MMOL/L (ref 3.5–5.1)
PROT UR STRIP-MCNC: 30 MG/DL
PROTHROMBIN TIME: 10 SEC (ref 9–11.1)
RBC # BLD AUTO: 4.49 M/UL (ref 4.1–5.7)
RBC #/AREA URNS HPF: ABNORMAL /HPF (ref 0–5)
SODIUM SERPL-SCNC: 143 MMOL/L (ref 136–145)
SP GR UR REFRACTOMETRY: 1.01 (ref 1–1.03)
SPECIMEN EXP DATE BLD: NORMAL
UA: UC IF INDICATED,UAUC: ABNORMAL
UROBILINOGEN UR QL STRIP.AUTO: 0.2 EU/DL (ref 0.2–1)
WBC # BLD AUTO: 7.6 K/UL (ref 4.1–11.1)
WBC URNS QL MICRO: ABNORMAL /HPF (ref 0–4)

## 2018-04-18 PROCEDURE — 85652 RBC SED RATE AUTOMATED: CPT | Performed by: ORTHOPAEDIC SURGERY

## 2018-04-18 PROCEDURE — 85027 COMPLETE CBC AUTOMATED: CPT | Performed by: ORTHOPAEDIC SURGERY

## 2018-04-18 PROCEDURE — 86900 BLOOD TYPING SEROLOGIC ABO: CPT | Performed by: ORTHOPAEDIC SURGERY

## 2018-04-18 PROCEDURE — 81001 URINALYSIS AUTO W/SCOPE: CPT | Performed by: ORTHOPAEDIC SURGERY

## 2018-04-18 PROCEDURE — 86140 C-REACTIVE PROTEIN: CPT | Performed by: ORTHOPAEDIC SURGERY

## 2018-04-18 PROCEDURE — 80048 BASIC METABOLIC PNL TOTAL CA: CPT | Performed by: ORTHOPAEDIC SURGERY

## 2018-04-18 PROCEDURE — 83036 HEMOGLOBIN GLYCOSYLATED A1C: CPT | Performed by: ORTHOPAEDIC SURGERY

## 2018-04-18 PROCEDURE — 85610 PROTHROMBIN TIME: CPT | Performed by: ORTHOPAEDIC SURGERY

## 2018-04-18 RX ORDER — BUMETANIDE 1 MG/1
1 TABLET ORAL DAILY
COMMUNITY
End: 2018-05-30 | Stop reason: ALTCHOICE

## 2018-04-18 RX ORDER — FENOFIBRATE 160 MG/1
160 TABLET ORAL DAILY
COMMUNITY

## 2018-04-18 RX ORDER — LANOLIN ALCOHOL/MO/W.PET/CERES
1000 CREAM (GRAM) TOPICAL DAILY
COMMUNITY

## 2018-04-19 LAB
BACTERIA SPEC CULT: NORMAL
BACTERIA SPEC CULT: NORMAL
SERVICE CMNT-IMP: NORMAL

## 2018-04-20 NOTE — PERIOP NOTES
CALLED DR Hanna Cruz `S OFFICE AND LEFT A VOICEMAIL FOR SHANE  ABOUT ABNORMAL LABS, SED RATE 29. [place] ALL LABS  HAVE BEEN FAXED OVER TO OFFICE.
LABS AND EKG FAXED TO [address] [place] [address]. SPOKE WITH SHANE AGUILAR TO REPORT MULTIPLE ABNORMALS ON LABWORK: K+ 3.2,  GLUCOSE 253,  CREAT 1.39,  CRP 2.36,  PROTEIN & BLOOD IN URINE.
PREOPERATIVE INSTRUCTIONS REVIEWED WITH PATIENT. PATIENT GIVEN TWO--SIX PACKS OF CHG WIPES. INSTRUCTIONS REVIEWED ON USE OF CHG WIPES. PATIENT GIVEN SSI INFECTIONS SHEET; MRSA/MSSA TREATMENT INSTRUCTION SHEET GIVEN WITH AN EXPLANATION TO PATIENT THAT THEY WILL BE NOTIFIED IF TREATMENT INSTRUCTIONS NEED TO BE INITIATED. PATIENT WAS GIVEN THE OPPORTUNITY TO ASK QUESTIONS; REGARDING THE INFORMATION PROVIDED. PREOP DIET AND NUTRITION UPDATED GUIDELINES/ INSTRUCTIONS REVIEWED WITH PATIENT. PATIENT ATTENDED PREOP JOINT CLASS TODAY.
no

## 2018-05-01 ENCOUNTER — ANESTHESIA EVENT (OUTPATIENT)
Dept: SURGERY | Age: 68
DRG: 454 | End: 2018-05-01
Payer: MEDICARE

## 2018-05-02 ENCOUNTER — ANESTHESIA (OUTPATIENT)
Dept: SURGERY | Age: 68
DRG: 454 | End: 2018-05-02
Payer: MEDICARE

## 2018-05-02 ENCOUNTER — HOSPITAL ENCOUNTER (INPATIENT)
Age: 68
LOS: 2 days | Discharge: HOME HEALTH | DRG: 454 | End: 2018-05-04
Attending: ORTHOPAEDIC SURGERY | Admitting: ORTHOPAEDIC SURGERY
Payer: MEDICARE

## 2018-05-02 ENCOUNTER — APPOINTMENT (OUTPATIENT)
Dept: GENERAL RADIOLOGY | Age: 68
DRG: 454 | End: 2018-05-02
Attending: ORTHOPAEDIC SURGERY
Payer: MEDICARE

## 2018-05-02 DIAGNOSIS — M48.061 SPINAL STENOSIS OF LUMBAR REGION AT MULTIPLE LEVELS: Primary | ICD-10-CM

## 2018-05-02 PROBLEM — E11.65 TYPE 2 DIABETES MELLITUS WITH HYPERGLYCEMIA (HCC): Status: ACTIVE | Noted: 2018-05-02

## 2018-05-02 LAB
ANION GAP BLD CALC-SCNC: 16 MMOL/L (ref 10–20)
BUN BLD-MCNC: 23 MG/DL (ref 9–20)
CA-I BLD-MCNC: 1.17 MMOL/L (ref 1.12–1.32)
CHLORIDE BLD-SCNC: 102 MMOL/L (ref 98–107)
CO2 BLD-SCNC: 29 MMOL/L (ref 21–32)
CREAT BLD-MCNC: 1.3 MG/DL (ref 0.6–1.3)
GLUCOSE BLD STRIP.AUTO-MCNC: 120 MG/DL (ref 65–100)
GLUCOSE BLD STRIP.AUTO-MCNC: 222 MG/DL (ref 65–100)
GLUCOSE BLD STRIP.AUTO-MCNC: 224 MG/DL (ref 65–100)
GLUCOSE BLD-MCNC: 124 MG/DL (ref 65–100)
HCT VFR BLD CALC: 41 % (ref 36.6–50.3)
POTASSIUM BLD-SCNC: 3.1 MMOL/L (ref 3.5–5.1)
SERVICE CMNT-IMP: ABNORMAL
SODIUM BLD-SCNC: 144 MMOL/L (ref 136–145)

## 2018-05-02 PROCEDURE — 77030004391 HC BUR FLUT MEDT -C: Performed by: ORTHOPAEDIC SURGERY

## 2018-05-02 PROCEDURE — 77030008975 HC BN CANC CHP LIFV -E: Performed by: ORTHOPAEDIC SURGERY

## 2018-05-02 PROCEDURE — 74011250637 HC RX REV CODE- 250/637: Performed by: ORTHOPAEDIC SURGERY

## 2018-05-02 PROCEDURE — 74011250636 HC RX REV CODE- 250/636

## 2018-05-02 PROCEDURE — 77030029099 HC BN WAX SSPC -A: Performed by: ORTHOPAEDIC SURGERY

## 2018-05-02 PROCEDURE — 77030011264 HC ELECTRD BLD EXT COVD -A: Performed by: ORTHOPAEDIC SURGERY

## 2018-05-02 PROCEDURE — 77030012893

## 2018-05-02 PROCEDURE — 76210000017 HC OR PH I REC 1.5 TO 2 HR: Performed by: ORTHOPAEDIC SURGERY

## 2018-05-02 PROCEDURE — C1713 ANCHOR/SCREW BN/BN,TIS/BN: HCPCS | Performed by: ORTHOPAEDIC SURGERY

## 2018-05-02 PROCEDURE — 77030031139 HC SUT VCRL2 J&J -A: Performed by: ORTHOPAEDIC SURGERY

## 2018-05-02 PROCEDURE — 77030032490 HC SLV COMPR SCD KNE COVD -B: Performed by: ORTHOPAEDIC SURGERY

## 2018-05-02 PROCEDURE — 80047 BASIC METABLC PNL IONIZED CA: CPT

## 2018-05-02 PROCEDURE — 77030020268 HC MISC GENERAL SUPPLY: Performed by: ORTHOPAEDIC SURGERY

## 2018-05-02 PROCEDURE — 72100 X-RAY EXAM L-S SPINE 2/3 VWS: CPT

## 2018-05-02 PROCEDURE — 77030035339 HC CLMP RENSNCE KT DISP MAZR -G1: Performed by: ORTHOPAEDIC SURGERY

## 2018-05-02 PROCEDURE — 77030003666 HC NDL SPINAL BD -A: Performed by: ORTHOPAEDIC SURGERY

## 2018-05-02 PROCEDURE — 77030034475 HC MISC IMPL SPN: Performed by: ORTHOPAEDIC SURGERY

## 2018-05-02 PROCEDURE — 65270000029 HC RM PRIVATE

## 2018-05-02 PROCEDURE — 76060000041 HC ANESTHESIA 5 TO 5.5 HR: Performed by: ORTHOPAEDIC SURGERY

## 2018-05-02 PROCEDURE — 74011250636 HC RX REV CODE- 250/636: Performed by: FAMILY MEDICINE

## 2018-05-02 PROCEDURE — 77030008684 HC TU ET CUF COVD -B: Performed by: ANESTHESIOLOGY

## 2018-05-02 PROCEDURE — 74011000250 HC RX REV CODE- 250

## 2018-05-02 PROCEDURE — 77030012406 HC DRN WND PENRS BARD -A: Performed by: ORTHOPAEDIC SURGERY

## 2018-05-02 PROCEDURE — 74011250636 HC RX REV CODE- 250/636: Performed by: ANESTHESIOLOGY

## 2018-05-02 PROCEDURE — 77030020782 HC GWN BAIR PAWS FLX 3M -B

## 2018-05-02 PROCEDURE — 77030003029 HC SUT VCRL J&J -B: Performed by: ORTHOPAEDIC SURGERY

## 2018-05-02 PROCEDURE — 77030039266 HC ADH SKN EXOFIN S2SG -A: Performed by: ORTHOPAEDIC SURGERY

## 2018-05-02 PROCEDURE — 76001 XR FLUOROSCOPY OVER 60 MINUTES: CPT

## 2018-05-02 PROCEDURE — 77030032828 HC GRFT DBM FBR VESUVUS 30ML K2M -I1: Performed by: ORTHOPAEDIC SURGERY

## 2018-05-02 PROCEDURE — 77030034169 HC GRFT BN BIOACTV INTRFC 1G BSTEB -F: Performed by: ORTHOPAEDIC SURGERY

## 2018-05-02 PROCEDURE — 74011250636 HC RX REV CODE- 250/636: Performed by: ORTHOPAEDIC SURGERY

## 2018-05-02 PROCEDURE — 77030019908 HC STETH ESOPH SIMS -A: Performed by: ANESTHESIOLOGY

## 2018-05-02 PROCEDURE — 77030005515 HC CATH URETH FOL14 BARD -B: Performed by: ORTHOPAEDIC SURGERY

## 2018-05-02 PROCEDURE — 77030014647 HC SEAL FBRN TISSL BAXT -D: Performed by: ORTHOPAEDIC SURGERY

## 2018-05-02 PROCEDURE — 74011636637 HC RX REV CODE- 636/637: Performed by: FAMILY MEDICINE

## 2018-05-02 PROCEDURE — 77030038808 HC SPCR TLIF HLLYWD NM INLC -I1: Performed by: ORTHOPAEDIC SURGERY

## 2018-05-02 PROCEDURE — P9045 ALBUMIN (HUMAN), 5%, 250 ML: HCPCS

## 2018-05-02 PROCEDURE — 82962 GLUCOSE BLOOD TEST: CPT

## 2018-05-02 PROCEDURE — 77030013079 HC BLNKT BAIR HGGR 3M -A: Performed by: ANESTHESIOLOGY

## 2018-05-02 PROCEDURE — 74011000250 HC RX REV CODE- 250: Performed by: ORTHOPAEDIC SURGERY

## 2018-05-02 PROCEDURE — 77030026438 HC STYL ET INTUB CARD -A: Performed by: ANESTHESIOLOGY

## 2018-05-02 PROCEDURE — 74011000272 HC RX REV CODE- 272: Performed by: ORTHOPAEDIC SURGERY

## 2018-05-02 PROCEDURE — 76010000882 HC OR TIME 5 TO 5.5HR INTENSV - TIER 2: Performed by: ORTHOPAEDIC SURGERY

## 2018-05-02 PROCEDURE — 77030018836 HC SOL IRR NACL ICUM -A: Performed by: ORTHOPAEDIC SURGERY

## 2018-05-02 DEVICE — GRAFT BNE SUB 30CC DEMIN BNE MTRX FBR FOR OSTEOBIOLOGICAL: Type: IMPLANTABLE DEVICE | Site: SPINE LUMBAR | Status: FUNCTIONAL

## 2018-05-02 DEVICE — 5.5MM CURVED ROD 90MM TI ALLOY
Type: IMPLANTABLE DEVICE | Site: BACK | Status: FUNCTIONAL
Brand: TAURUS

## 2018-05-02 DEVICE — HOLLYWOOD VI NM STERILE 11MM X 30MM X 9MM, LORDOTIC
Type: IMPLANTABLE DEVICE | Site: BACK | Status: FUNCTIONAL
Brand: SEASPINE SPACER SYSTEM - HOLLYWOOD VI NANOMETALENE

## 2018-05-02 DEVICE — BONE CHIP CANC CRSH 1-8MM 40ML -- PCAN1/2 PRESERVON: Type: IMPLANTABLE DEVICE | Site: SPINE LUMBAR | Status: FUNCTIONAL

## 2018-05-02 RX ORDER — CYCLOBENZAPRINE HCL 10 MG
10 TABLET ORAL
Status: DISCONTINUED | OUTPATIENT
Start: 2018-05-02 | End: 2018-05-04 | Stop reason: HOSPADM

## 2018-05-02 RX ORDER — EPHEDRINE SULFATE 50 MG/ML
INJECTION, SOLUTION INTRAVENOUS AS NEEDED
Status: DISCONTINUED | OUTPATIENT
Start: 2018-05-02 | End: 2018-05-02 | Stop reason: HOSPADM

## 2018-05-02 RX ORDER — LEVOFLOXACIN 5 MG/ML
500 INJECTION, SOLUTION INTRAVENOUS ONCE
Status: COMPLETED | OUTPATIENT
Start: 2018-05-02 | End: 2018-05-02

## 2018-05-02 RX ORDER — FENTANYL CITRATE 50 UG/ML
INJECTION, SOLUTION INTRAMUSCULAR; INTRAVENOUS AS NEEDED
Status: DISCONTINUED | OUTPATIENT
Start: 2018-05-02 | End: 2018-05-02

## 2018-05-02 RX ORDER — MIDAZOLAM HYDROCHLORIDE 1 MG/ML
1 INJECTION, SOLUTION INTRAMUSCULAR; INTRAVENOUS AS NEEDED
Status: DISCONTINUED | OUTPATIENT
Start: 2018-05-02 | End: 2018-05-02 | Stop reason: HOSPADM

## 2018-05-02 RX ORDER — FENTANYL CITRATE 50 UG/ML
25 INJECTION, SOLUTION INTRAMUSCULAR; INTRAVENOUS
Status: DISCONTINUED | OUTPATIENT
Start: 2018-05-02 | End: 2018-05-02 | Stop reason: HOSPADM

## 2018-05-02 RX ORDER — VANCOMYCIN 2 GRAM/500 ML IN 0.9 % SODIUM CHLORIDE INTRAVENOUS
2000 EVERY 12 HOURS
Status: COMPLETED | OUTPATIENT
Start: 2018-05-02 | End: 2018-05-03

## 2018-05-02 RX ORDER — DIPHENHYDRAMINE HCL 12.5MG/5ML
12.5 ELIXIR ORAL
Status: ACTIVE | OUTPATIENT
Start: 2018-05-02 | End: 2018-05-03

## 2018-05-02 RX ORDER — SODIUM CHLORIDE, SODIUM LACTATE, POTASSIUM CHLORIDE, CALCIUM CHLORIDE 600; 310; 30; 20 MG/100ML; MG/100ML; MG/100ML; MG/100ML
100 INJECTION, SOLUTION INTRAVENOUS CONTINUOUS
Status: DISCONTINUED | OUTPATIENT
Start: 2018-05-02 | End: 2018-05-02 | Stop reason: HOSPADM

## 2018-05-02 RX ORDER — SODIUM CHLORIDE 0.9 % (FLUSH) 0.9 %
5-10 SYRINGE (ML) INJECTION AS NEEDED
Status: DISCONTINUED | OUTPATIENT
Start: 2018-05-02 | End: 2018-05-02 | Stop reason: HOSPADM

## 2018-05-02 RX ORDER — ROPIVACAINE HYDROCHLORIDE 5 MG/ML
30 INJECTION, SOLUTION EPIDURAL; INFILTRATION; PERINEURAL AS NEEDED
Status: DISCONTINUED | OUTPATIENT
Start: 2018-05-02 | End: 2018-05-02 | Stop reason: HOSPADM

## 2018-05-02 RX ORDER — BUMETANIDE 1 MG/1
1 TABLET ORAL DAILY
Status: DISCONTINUED | OUTPATIENT
Start: 2018-05-03 | End: 2018-05-04 | Stop reason: HOSPADM

## 2018-05-02 RX ORDER — VANCOMYCIN HYDROCHLORIDE 1 G/20ML
INJECTION, POWDER, LYOPHILIZED, FOR SOLUTION INTRAVENOUS AS NEEDED
Status: DISCONTINUED | OUTPATIENT
Start: 2018-05-02 | End: 2018-05-02 | Stop reason: HOSPADM

## 2018-05-02 RX ORDER — SODIUM CHLORIDE 0.9 % (FLUSH) 0.9 %
5-10 SYRINGE (ML) INJECTION EVERY 8 HOURS
Status: DISCONTINUED | OUTPATIENT
Start: 2018-05-02 | End: 2018-05-02 | Stop reason: HOSPADM

## 2018-05-02 RX ORDER — ONDANSETRON 2 MG/ML
4 INJECTION INTRAMUSCULAR; INTRAVENOUS AS NEEDED
Status: DISCONTINUED | OUTPATIENT
Start: 2018-05-02 | End: 2018-05-02 | Stop reason: HOSPADM

## 2018-05-02 RX ORDER — LANOLIN ALCOHOL/MO/W.PET/CERES
1000 CREAM (GRAM) TOPICAL DAILY
Status: DISCONTINUED | OUTPATIENT
Start: 2018-05-03 | End: 2018-05-04 | Stop reason: HOSPADM

## 2018-05-02 RX ORDER — DEXMEDETOMIDINE HYDROCHLORIDE 4 UG/ML
INJECTION, SOLUTION INTRAVENOUS AS NEEDED
Status: DISCONTINUED | OUTPATIENT
Start: 2018-05-02 | End: 2018-05-02 | Stop reason: HOSPADM

## 2018-05-02 RX ORDER — MIDAZOLAM HYDROCHLORIDE 1 MG/ML
0.5 INJECTION, SOLUTION INTRAMUSCULAR; INTRAVENOUS
Status: DISCONTINUED | OUTPATIENT
Start: 2018-05-02 | End: 2018-05-02 | Stop reason: HOSPADM

## 2018-05-02 RX ORDER — FENOFIBRATE 145 MG/1
145 TABLET, COATED ORAL DAILY
Status: DISCONTINUED | OUTPATIENT
Start: 2018-05-03 | End: 2018-05-04 | Stop reason: HOSPADM

## 2018-05-02 RX ORDER — LIDOCAINE HYDROCHLORIDE 20 MG/ML
INJECTION, SOLUTION EPIDURAL; INFILTRATION; INTRACAUDAL; PERINEURAL AS NEEDED
Status: DISCONTINUED | OUTPATIENT
Start: 2018-05-02 | End: 2018-05-02 | Stop reason: HOSPADM

## 2018-05-02 RX ORDER — FENTANYL CITRATE 50 UG/ML
50 INJECTION, SOLUTION INTRAMUSCULAR; INTRAVENOUS AS NEEDED
Status: DISCONTINUED | OUTPATIENT
Start: 2018-05-02 | End: 2018-05-02 | Stop reason: HOSPADM

## 2018-05-02 RX ORDER — LABETALOL HYDROCHLORIDE 5 MG/ML
INJECTION, SOLUTION INTRAVENOUS AS NEEDED
Status: DISCONTINUED | OUTPATIENT
Start: 2018-05-02 | End: 2018-05-02 | Stop reason: HOSPADM

## 2018-05-02 RX ORDER — LISINOPRIL 10 MG/1
10 TABLET ORAL
Status: DISCONTINUED | OUTPATIENT
Start: 2018-05-02 | End: 2018-05-04 | Stop reason: HOSPADM

## 2018-05-02 RX ORDER — PROPOFOL 10 MG/ML
INJECTION, EMULSION INTRAVENOUS AS NEEDED
Status: DISCONTINUED | OUTPATIENT
Start: 2018-05-02 | End: 2018-05-02 | Stop reason: HOSPADM

## 2018-05-02 RX ORDER — NEBIVOLOL 5 MG/1
10 TABLET ORAL DAILY
Status: DISCONTINUED | OUTPATIENT
Start: 2018-05-03 | End: 2018-05-04 | Stop reason: HOSPADM

## 2018-05-02 RX ORDER — AMLODIPINE BESYLATE 5 MG/1
10 TABLET ORAL DAILY
Status: DISCONTINUED | OUTPATIENT
Start: 2018-05-03 | End: 2018-05-04 | Stop reason: HOSPADM

## 2018-05-02 RX ORDER — DEXTROSE 50 % IN WATER (D50W) INTRAVENOUS SYRINGE
12.5-25 AS NEEDED
Status: DISCONTINUED | OUTPATIENT
Start: 2018-05-02 | End: 2018-05-04 | Stop reason: HOSPADM

## 2018-05-02 RX ORDER — INSULIN LISPRO 100 [IU]/ML
INJECTION, SOLUTION INTRAVENOUS; SUBCUTANEOUS
Status: DISCONTINUED | OUTPATIENT
Start: 2018-05-02 | End: 2018-05-04 | Stop reason: HOSPADM

## 2018-05-02 RX ORDER — SODIUM CHLORIDE 0.9 % (FLUSH) 0.9 %
5-10 SYRINGE (ML) INJECTION EVERY 8 HOURS
Status: DISCONTINUED | OUTPATIENT
Start: 2018-05-03 | End: 2018-05-04 | Stop reason: HOSPADM

## 2018-05-02 RX ORDER — SODIUM CHLORIDE 9 MG/ML
25 INJECTION, SOLUTION INTRAVENOUS CONTINUOUS
Status: DISCONTINUED | OUTPATIENT
Start: 2018-05-02 | End: 2018-05-02 | Stop reason: HOSPADM

## 2018-05-02 RX ORDER — DEXAMETHASONE SODIUM PHOSPHATE 4 MG/ML
INJECTION, SOLUTION INTRA-ARTICULAR; INTRALESIONAL; INTRAMUSCULAR; INTRAVENOUS; SOFT TISSUE AS NEEDED
Status: DISCONTINUED | OUTPATIENT
Start: 2018-05-02 | End: 2018-05-02 | Stop reason: HOSPADM

## 2018-05-02 RX ORDER — AMOXICILLIN 250 MG
1 CAPSULE ORAL 2 TIMES DAILY
Status: DISCONTINUED | OUTPATIENT
Start: 2018-05-02 | End: 2018-05-04 | Stop reason: HOSPADM

## 2018-05-02 RX ORDER — ATORVASTATIN CALCIUM 20 MG/1
20 TABLET, FILM COATED ORAL
Status: DISCONTINUED | OUTPATIENT
Start: 2018-05-02 | End: 2018-05-04 | Stop reason: HOSPADM

## 2018-05-02 RX ORDER — FACIAL-BODY WIPES
10 EACH TOPICAL DAILY PRN
Status: DISCONTINUED | OUTPATIENT
Start: 2018-05-04 | End: 2018-05-04 | Stop reason: HOSPADM

## 2018-05-02 RX ORDER — HYDROMORPHONE HCL/0.9% NACL/PF 0.5 MG/ML
PLASTIC BAG, INJECTION (ML) INTRAVENOUS CONTINUOUS
Status: DISCONTINUED | OUTPATIENT
Start: 2018-05-02 | End: 2018-05-03

## 2018-05-02 RX ORDER — ERGOCALCIFEROL 1.25 MG/1
50000 CAPSULE ORAL
Status: DISCONTINUED | OUTPATIENT
Start: 2018-05-02 | End: 2018-05-04 | Stop reason: HOSPADM

## 2018-05-02 RX ORDER — MIDAZOLAM HYDROCHLORIDE 1 MG/ML
INJECTION, SOLUTION INTRAMUSCULAR; INTRAVENOUS AS NEEDED
Status: DISCONTINUED | OUTPATIENT
Start: 2018-05-02 | End: 2018-05-02 | Stop reason: HOSPADM

## 2018-05-02 RX ORDER — HYDROMORPHONE HYDROCHLORIDE 2 MG/ML
INJECTION, SOLUTION INTRAMUSCULAR; INTRAVENOUS; SUBCUTANEOUS AS NEEDED
Status: DISCONTINUED | OUTPATIENT
Start: 2018-05-02 | End: 2018-05-02 | Stop reason: HOSPADM

## 2018-05-02 RX ORDER — MAGNESIUM SULFATE 100 %
4 CRYSTALS MISCELLANEOUS AS NEEDED
Status: DISCONTINUED | OUTPATIENT
Start: 2018-05-02 | End: 2018-05-04 | Stop reason: HOSPADM

## 2018-05-02 RX ORDER — LIDOCAINE HYDROCHLORIDE 10 MG/ML
0.1 INJECTION, SOLUTION EPIDURAL; INFILTRATION; INTRACAUDAL; PERINEURAL AS NEEDED
Status: DISCONTINUED | OUTPATIENT
Start: 2018-05-02 | End: 2018-05-02 | Stop reason: HOSPADM

## 2018-05-02 RX ORDER — FENTANYL CITRATE 50 UG/ML
50 INJECTION, SOLUTION INTRAMUSCULAR; INTRAVENOUS AS NEEDED
Status: COMPLETED | OUTPATIENT
Start: 2018-05-02 | End: 2018-05-02

## 2018-05-02 RX ORDER — HYDRALAZINE HYDROCHLORIDE 20 MG/ML
10 INJECTION INTRAMUSCULAR; INTRAVENOUS ONCE
Status: COMPLETED | OUTPATIENT
Start: 2018-05-02 | End: 2018-05-02

## 2018-05-02 RX ORDER — ONDANSETRON 2 MG/ML
INJECTION INTRAMUSCULAR; INTRAVENOUS AS NEEDED
Status: DISCONTINUED | OUTPATIENT
Start: 2018-05-02 | End: 2018-05-02 | Stop reason: HOSPADM

## 2018-05-02 RX ORDER — SUCCINYLCHOLINE CHLORIDE 20 MG/ML
INJECTION INTRAMUSCULAR; INTRAVENOUS AS NEEDED
Status: DISCONTINUED | OUTPATIENT
Start: 2018-05-02 | End: 2018-05-02 | Stop reason: HOSPADM

## 2018-05-02 RX ORDER — DIPHENHYDRAMINE HYDROCHLORIDE 50 MG/ML
12.5 INJECTION, SOLUTION INTRAMUSCULAR; INTRAVENOUS AS NEEDED
Status: DISCONTINUED | OUTPATIENT
Start: 2018-05-02 | End: 2018-05-02 | Stop reason: HOSPADM

## 2018-05-02 RX ORDER — NALOXONE HYDROCHLORIDE 0.4 MG/ML
0.4 INJECTION, SOLUTION INTRAMUSCULAR; INTRAVENOUS; SUBCUTANEOUS AS NEEDED
Status: DISCONTINUED | OUTPATIENT
Start: 2018-05-02 | End: 2018-05-04 | Stop reason: HOSPADM

## 2018-05-02 RX ORDER — ROCURONIUM BROMIDE 10 MG/ML
INJECTION, SOLUTION INTRAVENOUS AS NEEDED
Status: DISCONTINUED | OUTPATIENT
Start: 2018-05-02 | End: 2018-05-02 | Stop reason: HOSPADM

## 2018-05-02 RX ORDER — ASCORBIC ACID 500 MG
500 TABLET ORAL DAILY
Status: DISCONTINUED | OUTPATIENT
Start: 2018-05-03 | End: 2018-05-04 | Stop reason: HOSPADM

## 2018-05-02 RX ORDER — SODIUM CHLORIDE 0.9 % (FLUSH) 0.9 %
5-10 SYRINGE (ML) INJECTION AS NEEDED
Status: DISCONTINUED | OUTPATIENT
Start: 2018-05-02 | End: 2018-05-04 | Stop reason: HOSPADM

## 2018-05-02 RX ORDER — BUPIVACAINE HYDROCHLORIDE AND EPINEPHRINE 5; 5 MG/ML; UG/ML
INJECTION, SOLUTION EPIDURAL; INTRACAUDAL; PERINEURAL AS NEEDED
Status: DISCONTINUED | OUTPATIENT
Start: 2018-05-02 | End: 2018-05-02 | Stop reason: HOSPADM

## 2018-05-02 RX ORDER — ONDANSETRON 2 MG/ML
4 INJECTION INTRAMUSCULAR; INTRAVENOUS
Status: DISPENSED | OUTPATIENT
Start: 2018-05-02 | End: 2018-05-03

## 2018-05-02 RX ORDER — PHENYLEPHRINE HCL IN 0.9% NACL 0.4MG/10ML
SYRINGE (ML) INTRAVENOUS AS NEEDED
Status: DISCONTINUED | OUTPATIENT
Start: 2018-05-02 | End: 2018-05-02 | Stop reason: HOSPADM

## 2018-05-02 RX ORDER — VANCOMYCIN 2 GRAM/500 ML IN 0.9 % SODIUM CHLORIDE INTRAVENOUS
2000 ONCE
Status: COMPLETED | OUTPATIENT
Start: 2018-05-02 | End: 2018-05-02

## 2018-05-02 RX ORDER — SODIUM CHLORIDE 9 MG/ML
125 INJECTION, SOLUTION INTRAVENOUS CONTINUOUS
Status: DISPENSED | OUTPATIENT
Start: 2018-05-02 | End: 2018-05-03

## 2018-05-02 RX ORDER — POLYETHYLENE GLYCOL 3350 17 G/17G
17 POWDER, FOR SOLUTION ORAL DAILY
Status: DISCONTINUED | OUTPATIENT
Start: 2018-05-03 | End: 2018-05-04 | Stop reason: HOSPADM

## 2018-05-02 RX ORDER — ALBUMIN HUMAN 50 G/1000ML
SOLUTION INTRAVENOUS AS NEEDED
Status: DISCONTINUED | OUTPATIENT
Start: 2018-05-02 | End: 2018-05-02 | Stop reason: HOSPADM

## 2018-05-02 RX ORDER — ACETAMINOPHEN 325 MG/1
650 TABLET ORAL EVERY 6 HOURS
Status: DISCONTINUED | OUTPATIENT
Start: 2018-05-02 | End: 2018-05-04 | Stop reason: HOSPADM

## 2018-05-02 RX ADMIN — ACETAMINOPHEN 650 MG: 325 TABLET, FILM COATED ORAL at 19:57

## 2018-05-02 RX ADMIN — HYDROMORPHONE HYDROCHLORIDE 0.5 MG: 2 INJECTION, SOLUTION INTRAMUSCULAR; INTRAVENOUS; SUBCUTANEOUS at 16:25

## 2018-05-02 RX ADMIN — ROCURONIUM BROMIDE 5 MG: 10 INJECTION, SOLUTION INTRAVENOUS at 11:28

## 2018-05-02 RX ADMIN — EPHEDRINE SULFATE 10 MG: 50 INJECTION, SOLUTION INTRAVENOUS at 12:18

## 2018-05-02 RX ADMIN — FENTANYL CITRATE 25 MCG: 50 INJECTION, SOLUTION INTRAMUSCULAR; INTRAVENOUS at 16:27

## 2018-05-02 RX ADMIN — SODIUM CHLORIDE, SODIUM LACTATE, POTASSIUM CHLORIDE, AND CALCIUM CHLORIDE: 600; 310; 30; 20 INJECTION, SOLUTION INTRAVENOUS at 16:20

## 2018-05-02 RX ADMIN — Medication 120 MCG: at 12:17

## 2018-05-02 RX ADMIN — MIDAZOLAM HYDROCHLORIDE 2 MG: 1 INJECTION, SOLUTION INTRAMUSCULAR; INTRAVENOUS at 11:17

## 2018-05-02 RX ADMIN — PROPOFOL 150 MG: 10 INJECTION, EMULSION INTRAVENOUS at 11:28

## 2018-05-02 RX ADMIN — VANCOMYCIN HYDROCHLORIDE 2000 MG: 10 INJECTION, POWDER, LYOPHILIZED, FOR SOLUTION INTRAVENOUS at 23:21

## 2018-05-02 RX ADMIN — PROPOFOL 50 MG: 10 INJECTION, EMULSION INTRAVENOUS at 11:33

## 2018-05-02 RX ADMIN — LEVOFLOXACIN 500 MG: 5 INJECTION, SOLUTION INTRAVENOUS at 11:40

## 2018-05-02 RX ADMIN — VANCOMYCIN HYDROCHLORIDE 2000 MG: 10 INJECTION, POWDER, LYOPHILIZED, FOR SOLUTION INTRAVENOUS at 11:18

## 2018-05-02 RX ADMIN — HYDROMORPHONE HYDROCHLORIDE 0.5 MG: 2 INJECTION, SOLUTION INTRAMUSCULAR; INTRAVENOUS; SUBCUTANEOUS at 14:58

## 2018-05-02 RX ADMIN — FENTANYL CITRATE 25 MCG: 50 INJECTION, SOLUTION INTRAMUSCULAR; INTRAVENOUS at 16:36

## 2018-05-02 RX ADMIN — HYDRALAZINE HYDROCHLORIDE 10 MG: 20 INJECTION INTRAMUSCULAR; INTRAVENOUS at 23:22

## 2018-05-02 RX ADMIN — FENTANYL CITRATE 50 MCG: 50 INJECTION, SOLUTION INTRAMUSCULAR; INTRAVENOUS at 11:33

## 2018-05-02 RX ADMIN — HYDROMORPHONE HYDROCHLORIDE 0.5 MG: 2 INJECTION, SOLUTION INTRAMUSCULAR; INTRAVENOUS; SUBCUTANEOUS at 14:13

## 2018-05-02 RX ADMIN — SODIUM CHLORIDE, SODIUM LACTATE, POTASSIUM CHLORIDE, AND CALCIUM CHLORIDE 100 ML/HR: 600; 310; 30; 20 INJECTION, SOLUTION INTRAVENOUS at 10:43

## 2018-05-02 RX ADMIN — STANDARDIZED SENNA CONCENTRATE AND DOCUSATE SODIUM 1 TABLET: 8.6; 5 TABLET, FILM COATED ORAL at 19:57

## 2018-05-02 RX ADMIN — SODIUM CHLORIDE 125 ML/HR: 900 INJECTION, SOLUTION INTRAVENOUS at 19:46

## 2018-05-02 RX ADMIN — DEXMEDETOMIDINE HYDROCHLORIDE 10 MCG: 4 INJECTION, SOLUTION INTRAVENOUS at 16:00

## 2018-05-02 RX ADMIN — ONDANSETRON 4 MG: 2 INJECTION INTRAMUSCULAR; INTRAVENOUS at 15:52

## 2018-05-02 RX ADMIN — LIDOCAINE HYDROCHLORIDE 80 MG: 20 INJECTION, SOLUTION EPIDURAL; INFILTRATION; INTRACAUDAL; PERINEURAL at 11:28

## 2018-05-02 RX ADMIN — FENTANYL CITRATE 50 MCG: 50 INJECTION, SOLUTION INTRAMUSCULAR; INTRAVENOUS at 11:28

## 2018-05-02 RX ADMIN — INSULIN LISPRO 2 UNITS: 100 INJECTION, SOLUTION INTRAVENOUS; SUBCUTANEOUS at 20:39

## 2018-05-02 RX ADMIN — FENTANYL CITRATE 100 MCG: 50 INJECTION, SOLUTION INTRAMUSCULAR; INTRAVENOUS at 12:01

## 2018-05-02 RX ADMIN — EPHEDRINE SULFATE 10 MG: 50 INJECTION, SOLUTION INTRAVENOUS at 12:16

## 2018-05-02 RX ADMIN — ATORVASTATIN CALCIUM 20 MG: 20 TABLET, FILM COATED ORAL at 20:35

## 2018-05-02 RX ADMIN — ALBUMIN HUMAN 250 ML: 50 SOLUTION INTRAVENOUS at 12:58

## 2018-05-02 RX ADMIN — LABETALOL HYDROCHLORIDE 5 MG: 5 INJECTION, SOLUTION INTRAVENOUS at 11:07

## 2018-05-02 RX ADMIN — ERGOCALCIFEROL 50000 UNITS: 1.25 CAPSULE ORAL at 20:36

## 2018-05-02 RX ADMIN — LISINOPRIL 10 MG: 10 TABLET ORAL at 20:35

## 2018-05-02 RX ADMIN — Medication 80 MCG: at 12:12

## 2018-05-02 RX ADMIN — DEXMEDETOMIDINE HYDROCHLORIDE 10 MCG: 4 INJECTION, SOLUTION INTRAVENOUS at 16:20

## 2018-05-02 RX ADMIN — SUCCINYLCHOLINE CHLORIDE 140 MG: 20 INJECTION INTRAMUSCULAR; INTRAVENOUS at 11:28

## 2018-05-02 RX ADMIN — ALBUMIN HUMAN 250 ML: 50 SOLUTION INTRAVENOUS at 15:15

## 2018-05-02 RX ADMIN — ONDANSETRON 4 MG: 2 INJECTION INTRAMUSCULAR; INTRAVENOUS at 19:46

## 2018-05-02 RX ADMIN — DEXAMETHASONE SODIUM PHOSPHATE 4 MG: 4 INJECTION, SOLUTION INTRA-ARTICULAR; INTRALESIONAL; INTRAMUSCULAR; INTRAVENOUS; SOFT TISSUE at 15:52

## 2018-05-02 RX ADMIN — Medication: at 16:55

## 2018-05-02 RX ADMIN — HYDROMORPHONE HYDROCHLORIDE 0.5 MG: 2 INJECTION, SOLUTION INTRAMUSCULAR; INTRAVENOUS; SUBCUTANEOUS at 16:35

## 2018-05-02 RX ADMIN — Medication 80 MCG: at 12:10

## 2018-05-02 RX ADMIN — Medication 120 MCG: at 12:15

## 2018-05-02 RX ADMIN — SODIUM CHLORIDE, SODIUM LACTATE, POTASSIUM CHLORIDE, AND CALCIUM CHLORIDE: 600; 310; 30; 20 INJECTION, SOLUTION INTRAVENOUS at 12:26

## 2018-05-02 RX ADMIN — LABETALOL HYDROCHLORIDE 5 MG: 5 INJECTION, SOLUTION INTRAVENOUS at 11:02

## 2018-05-02 RX ADMIN — ROCURONIUM BROMIDE 30 MG: 10 INJECTION, SOLUTION INTRAVENOUS at 11:31

## 2018-05-02 NOTE — ROUTINE PROCESS
Patient: Yousuf Li MRN: [id number]  SSN: [id number]   YOB: 1950  Age: 78 y.o. Sex: male     Patient is status post Procedure(s):  L3-S1 DECOMPRESSION WITH POSTERIOR SPINAL FUSION USING MAZOR ROBOTIC GUIDANCE. Surgeon(s) and Role: Keri Gowers, MD - Primary    Local/Dose/Irrigation:  SEE MAR                  Peripheral IV 05/02/18 Left; Lower Arm (Active)       Peripheral IV 05/02/18 Right Wrist (Active)          Hemovac Posterior Back (Active)   Site Assessment Clean, dry, & intact 5/2/2018  3:48 PM   Dressing Status Clean, dry, & intact 5/2/2018  3:48 PM   Status Patent;Draining 5/2/2018  3:48 PM      Airway - Endotracheal Tube 05/02/18 Oral (Active)                   Dressing/Packing:  Wound Back-DRESSING TYPE: 4 x 4;Special tape (comment) (05/02/18 5849)  Splint/Cast:  ]    Other:

## 2018-05-02 NOTE — IP AVS SNAPSHOT
[facility] Shine Sawyer [address] 
[phone] Patient: Yoon Beebe MRN: [id number] XNY:69[birth year]24[birth year]41 About your hospitalization You were admitted on:  May 2, 2018 You last received care in the:  [address] [place] [address] You were discharged on: May 4, 2018 Why you were hospitalized Your primary diagnosis was:  Type 2 Diabetes Mellitus With Hyperglycemia (Hcc) Your diagnoses also included:  Spinal Stenosis Of Lumbar Region At Multiple Levels Follow-up Information Follow up With Details Comments Contact Info Fifi  Please call [phone] if you haven't heard from agency by Samanta Tena [address] [phone] Aime Ozuna MD   [address] [place] [address] 
[phone] Discharge Orders None A check jose indicates which time of day the medication should be taken. My Medications START taking these medications Instructions Each Dose to Equal  
 Morning Noon Evening Bedtime HYDROmorphone 2 mg tablet Commonly known as:  DILAUDID Your last dose was: Your next dose is: Take 1-2 Tabs by mouth every four (4) hours as needed. Max Daily Amount: 24 mg.  
 2-4 mg CHANGE how you take these medications Instructions Each Dose to Equal  
 Morning Noon Evening Bedtime  
 lisinopril 10 mg tablet Commonly known as:  Zoe Shah What changed:  when to take this Your last dose was: Your next dose is: Take 1 Tab by mouth daily. 10 mg CONTINUE taking these medications Instructions Each Dose to Equal  
 Morning Noon Evening Bedtime  
 aspirin 81 mg chewable tablet Your last dose was: Your next dose is: Take 1 Tab by mouth daily.   
 81 mg  
    
   
   
   
  
 atorvastatin 20 mg tablet Commonly known as:  LIPITOR Your last dose was: Your next dose is: TAKE 1 TABLET BY MOUTH NIGHTLY. bumetanide 1 mg tablet Commonly known as:  Katie John Your last dose was: Your next dose is: Take 1 mg by mouth daily. 1 mg BYSTOLIC 10 mg tablet Generic drug:  nebivolol Your last dose was: Your next dose is: Take 10 mg by mouth daily. 10 mg  
    
   
   
   
  
 fenofibrate 160 mg tablet Commonly known as:  LOFIBRA Your last dose was: Your next dose is: Take 160 mg by mouth daily. 160 mg  
    
   
   
   
  
 insulin lispro 100 unit/mL injection Commonly known as:  HumaLOG U-100 Insulin Your last dose was: Your next dose is:    
   
   
 20 Units by SubCUTAneous route Before breakfast, lunch, and dinner. Start with 10 units SQ before breakfast, lunch, dinner, --- advance as diet improves. 20 Units LEVEMIR U-100 INSULIN 100 unit/mL injection Generic drug:  insulin detemir U-100 Your last dose was: Your next dose is:    
   
   
 30 Units by SubCUTAneous route nightly. 30 Units NORVASC 10 mg tablet Generic drug:  amLODIPine Your last dose was: Your next dose is: Take 10 mg by mouth daily. 10 mg  
    
   
   
   
  
 VITAMIN B-12 1,000 mcg tablet Generic drug:  cyanocobalamin Your last dose was: Your next dose is: Take 1,000 mcg by mouth daily. 1000 mcg VITAMIN C 500 mg tablet Generic drug:  ascorbic acid (vitamin C) Your last dose was: Your next dose is: Take 500 mg by mouth daily. 500 mg  
    
   
   
   
  
 VITAMIN D2 50,000 unit capsule Generic drug:  ergocalciferol Your last dose was: Your next dose is: Take 50,000 Units by mouth every Wednesday. Every Wednesday [address] Units STOP taking these medications   
 ibuprofen 200 mg tablet Commonly known as:  MOTRIN Where to Get Your Medications Information on where to get these meds will be given to you by the nurse or doctor. ! Ask your nurse or doctor about these medications HYDROmorphone 2 mg tablet Opioid Education Prescription Opioids: What You Need to Know: 
 
 
PCP: Kylee Vital MD 
 
Follow up appointments 
-follow up with Dr. Frandy Rojas in 2 weeks. Call [phone] to make an appointment as soon as you get home from the hospital. 
 
When to call your Orthopaedic Surgeon: 
-Signs of infection-if your incision is red; continues to have drainage; drainage has a foul odor or if you have a persistent fever over 101 degrees for 24 hours 
-Nausea or vomiting, severe headache 
-Loss of bowel or bladder function, inability to urinate 
-Changes in sensation in your arms or legs (numbness, tingling, loss of color) -Increased weakness-greater than before your surgery 
-Severe pain or pain not relieved by medications -Signs of a blood clot in your leg-calf pain, tenderness, redness, swelling of lower leg When to call your Primary Care Physician: 
-Concerns about medical conditions such as diabetes, high blood pressure, asthma, congestive heart failure 
-Call if blood sugars are elevated, persistent headache or dizziness, coughing or congestion, constipation or diarrhea, burning with urination, abnormal heart rate When to call 911 and go to the nearest emergency room: 
-Acute onset of chest pain, shortness of breath, difficulty breathing Activity 
-You are going home a well person, be as active as possible. Your only exercise should be walking. Start with short frequent walks and increase your walking distance each day. 
-Limit the amount of time you sit to 20-30 minute intervals. Sitting for prolonged periods of time will be uncomfortable for you following surgery. 
-Do NOT lift anything over 5 pounds 
-Do NOT do any straining, twisting or bending 
-When you are in bed, you may lay on your back or on either side. Do NOT lie on your stomach Brace 
-If you have a back brace, you should wear your brace at all times when you are out of bed. Do not wear the brace while in bed or showering. 
-Remember to always wear a cotton t-shirt underneath your brace. 
-Do not bend or twist when your brace is off Diet 
-Resume usual diet; drink plenty of fluids; eat foods high in fiber 
-It is important to have regular bowel movements. Pain medications may cause constipation. You may want to take a stool softener (such as Senokot-S or Colace) to prevent constipation. 
 -If constipation occurs, take a laxative (such as Dulcolax tablets, Milk of Magnesia, or a suppository). Laxatives should only be used if the above preventable measures have failed and you still have not had a bowel movement after three days Driving 
-You may not drive or return to work until instructed by your physician. However, you may ride in the car for short periods of time. Incision Care 
-You may take brief showers but do not run the water run directly onto the wound. After showering or bathing, remove the wet dressing and gently blot the wound dry with a soft towel. 
-Do not rub or apply any lotions or ointments to your incision site.  
-Do not soak or scrub your wound 
-Keep a dry dressing (ABD and paper tape) on your incision and have it changed daily for 14 days after surgery; more often if your incision is draining. Have your caregiver wash their hands thoroughly before changing your dressing. 
-You will have absorbable sutures and steristrips (white tape) on your incision. Leave the steristrips on until they fall off. Showering 
-You may shower in approximately 4 days after your surgery.   
-Leave the dressing on during your shower. Do NOT allow the water to run directly onto your dressing. Once you get out of the shower, put on a dry dressing. 
-Reminder- your brace can be removed while showering. Remember to not bend or twist while your brace is off.   
-Do not take a tub bath. Preventing blood clots 
-You have been given T.E.D. stockings to wear. Continue to wear these for 7 days after your discharge. Put them on in the morning and take them off at night.   
-They are used to increase your circulation and prevent blood clots from forming in your legs 
-T. E.D. stockings can be machine washed, temperature not to exceed 160° F (71°C) and machine dried for 15 to 20 minutes, temperature not to exceed 250° F (121°C). Pain management 
-Take pain medication as prescribed; decrease the amount you use as your pain lessens 
-DO not wait until you are in extreme pain to take your medication. 
-Avoid alcoholic beverages while taking pain medication Pain Medication Safety DO: 
-Read the Medication Guide  
-Take your medicine exactly as prescribed -Store your medicine away from children and in a safe place  
-Flush unused medicine down the toilet  
-Call your healthcare provider for medical advice about side effects. You may report side effects to FDA at 9-205-FDA-5480.  
-Please be aware that many medications contain Tylenol. We do not want you to over medicate so please read the information below as a guide. Do not take more than 4 Grams of Tylenol in a 24 hour period. (There are 1000 milligrams in one Gram) Percocet contains 325 mg of Tylenol per tablet (do not take more than 12 tablets in 24 hours) Lortab contains 500 mg of Tylenol per tablet (do not take more than 8 tablets in 24 hours) Norco contains 325 mg of Tylenol per tablet (do not take more than 12 tablets in 24 hours). DO NOT: 
-Do not give your medicine to others  
-Do not take medicine unless it was prescribed for you  
-Do not stop taking your medicine without talking to your healthcare provider  
-Do not break, chew, crush, dissolve, or inject your medicine. If you cannot swallow your medicine whole, talk to your healthcare provider. 
-Do not drink alcohol while taking this medicine 
-Do not take anti-inflammatory medications or aspirin unless instructed by your      Physician. ** Start taking 20 U of Humalog with meals (continue 10 U if eating smaller meals). Continue to follow up with Dr Adan Tatum for further management of your diabetes. Introducing [facility]!    
 [facility] introduces nivio patient portal. Now you can access parts of your medical record, email your doctor's office, and request medication refills online. 1. In your internet browser, go to [email or web address] 2. Click on the First Time User? Click Here link in the Sign In box. You will see the New Member Sign Up page. 3. Enter your Apperian Access Code exactly as it appears below. You will not need to use this code after youve completed the sign-up process. If you do not sign up before the expiration date, you must request a new code. · Apperian Access Code: [email or web address] Expires: 6/25/2018 11:15 AM 
 
4. Enter the last four digits of your Social Security Number (xxxx) and Date of Birth (mm/dd/yyyy) as indicated and click Submit. You will be taken to the next sign-up page. 5. Create a GalaDot ID. This will be your Apperian login ID and cannot be changed, so think of one that is secure and easy to remember. 6. Create a Apperian password. You can change your password at any time. 7. Enter your Password Reset Question and Answer. This can be used at a later time if you forget your password. 8. Enter your e-mail address. You will receive e-mail notification when new information is available in [address] 9. Click Sign Up. You can now view and download portions of your medical record. 10. Click the Download Summary menu link to download a portable copy of your medical information. If you have questions, please visit the Frequently Asked Questions section of the Apperian website. Remember, Apperian is NOT to be used for urgent needs. For medical emergencies, dial 911. Now available from your iPhone and Android! Introducing Peng Cr As a Shadia Romo patient, I wanted to make you aware of our electronic visit tool called Peng Cr. [facility]ia [facility]o 24/7 allows you to connect within minutes with a medical provider 24 hours a day, seven days a week via a mobile device or tablet or logging into a secure website from your computer.   You can access [facility] SecConnectAndSell 24/7 from anywhere in the [place]. A virtual visit might be right for you when you have a simple condition and feel like you just dont want to get out of bed, or cant get away from work for an appointment, when your regular [facility] provider is not available (evenings, weekends or holidays), or when youre out of town and need minor care. Electronic visits cost only $49 and if the [facility] 24/7 provider determines a prescription is needed to treat your condition, one can be electronically transmitted to a nearby pharmacy*. Please take a moment to enroll today if you have not already done so. The enrollment process is free and takes just a few minutes. To enroll, please download the Splash 24/7 taina to your tablet or phone, or visit [email or web address] to enroll on your computer. And, as an [address] [place] [address] patient with a Ezuza account, the results of your visits will be scanned into your electronic medical record and your primary care provider will be able to view the scanned results. We urge you to continue to see your regular [facility] provider for your ongoing medical care. And while your primary care provider may not be the one available when you seek a Peng Cr virtual visit, the peace of mind you get from getting a real diagnosis real time can be priceless. For more information on Peng Cr, view our Frequently Asked Questions (FAQs) at [email or web address]. Sincerely, 
 
Eliud Cain MD 
Chief Medical Officer 8 Sarina Anders *:  certain medications cannot be prescribed via Peng Cr Providers Seen During Your Hospitalization Provider Specialty Primary office phone Jason Joyce MD Orthopedic Surgery [phone] Your Primary Care Physician (PCP) Primary Care Physician Office Phone Office Fax Vinod Cleary [phone] You are allergic to the following Allergen Reactions Cephalexin Other (comments)  
 unknown Oxycodone Other (comments) Pt does not desire to take; causes altered mental status and constipation Sulfa (Sulfonamide Antibiotics) Hives Tamsulosin Other (comments) Vision loss Recent Documentation Height Weight BMI Smoking Status 1.702 m 127.2 kg 43.91 kg/m2 Former Smoker Emergency Contacts Name Discharge Info Relation Home Work Mobile Ester Duncan DISCHARGE CAREGIVER [3] Spouse [3] 21 [phone] Patient Belongings The following personal items are in your possession at time of discharge: 
  Dental Appliances: None  Visual Aid: Glasses   Hearing Aids/Status: Does not own         Clothing: Other (comment) (clothing) Please provide this summary of care documentation to your next provider. Signatures-by signing, you are acknowledging that this After Visit Summary has been reviewed with you and you have received a copy. Patient Signature:  ____________________________________________________________ Date:  ____________________________________________________________  
  
[facility] Provider Signature:  ____________________________________________________________ Date:  ____________________________________________________________

## 2018-05-02 NOTE — PERIOP NOTES
TRANSFER - OUT REPORT:    Verbal report given to RN(name) on Racquel Stoner  being transferred to [facility](unit) for routine post - op       Report consisted of patients Situation, Background, Assessment and   Recommendations(SBAR). Time Pre op antibiotic given:1437  Anesthesia Stop time: 8194  Valdez Present on Transfer to floor:y  Order for Valdez on Chart:y  Discharge Prescriptions with Chart:na    Information from the following report(s) SBAR, Kardex, Intake/Output and MAR was reviewed with the receiving nurse. Opportunity for questions and clarification was provided. Is the patient on 02? YES       L/Min na       Other non rebreather    Is the patient on a monitor? NO    Is the nurse transporting with the patient? NO    Surgical Waiting Area notified of patient's transfer from PACU?  YES      The following personal items collected during your admission accompanied patient upon transfer:   Dental Appliance: Dental Appliances: None  Vision:    Hearing Aid:    Jewelry:    Clothing: Clothing: Other (comment) (clothing)  Other Valuables:    Valuables sent to safe:

## 2018-05-02 NOTE — ANESTHESIA PREPROCEDURE EVALUATION
Anesthetic History   No history of anesthetic complications            Review of Systems / Medical History  Patient summary reviewed, nursing notes reviewed and pertinent labs reviewed    Pulmonary  Within defined limits      Sleep apnea           Neuro/Psych   Within defined limits           Cardiovascular  Within defined limits  Hypertension  Valvular problems/murmurs: mitral insufficiency        CAD and CABG    Exercise tolerance: <4 METS     GI/Hepatic/Renal  Within defined limits       Renal disease: stones       Endo/Other  Within defined limits  Diabetes    Morbid obesity and arthritis     Other Findings                   Anesthesia Plan

## 2018-05-02 NOTE — BRIEF OP NOTE
BRIEF OPERATIVE NOTE    Date of Procedure: 5/2/2018   Preoperative Diagnosis: LUMBAR STENOSIS   SPONDYLOLITHESIS L3-S1  Postoperative Diagnosis: LUMBAR STENOSIS   SPONDYLOLITHESIS L3-S1    Procedure(s):  L3-S1 DECOMPRESSION WITH POSTERIOR SPINAL FUSION USING MAZOR ROBOTIC GUIDANCE  Surgeon(s) and Role: Preethi Akins MD - Primary         Surgical Assistant: Kristopher ralph    Surgical Staff:  Circ-1: Kika Becker RN  Circ-Relief: Kris Lim RN; Ynes Daily RN  Scrub RN-1: Guillermina Zepeda RN  Scrub RN-Relief: Aicha Loera RN; Kika Becker RN  Nurse Practitioner: Aliza Dunlap NP  Event Time In   Incision Start 1201   Incision Close      Anesthesia: General   Estimated Blood Loss: 600  Specimens: * No specimens in log *   Findings: stenosis   Complications: 0  Implants:   Implant Name Type Inv.  Item Serial No.  Lot No. LRB No. Used Action   GRAFT FIBER DEMINERALIZED 30ML -- [employer] - [id number]  GRAFT FIBER DEMINERALIZED 30ML -- VESUVIUS FIBERS [id number] [facility] N/A N/A 1 Implanted   BONE CHIP CANC [address] 1-8MM 40ML -- PCAN1/2 PRESERVON - [id number]  BONE CHIP CANC CRSH 1-8MM 40ML -- PCAN1/2 PRESERVON [id number] [facility] N/A N/A 1 Implanted   SYNTHETIC BONE GRAFT   N/A [facility] [id number] N/A 1 Implanted   SYNTHETIC BONE GRAFT   N/A [employer] [id number] N/A 1 Implanted   SPACER TLIF VI 8D [id number] -- DIANA VI NM STRL - [id number]  SPACER TLIF VI 8D [id number] -- DIANA VI NM STRL  [employer] [id number] N/A 1 Implanted   6.5 X 50MM SCREW   N/A [employer] N/A N/A 6 Implanted   7.5 X 40mm SCREW   N/A [employer] N/A N/A 2 Implanted   SET SCREW   N/A [employer] N/A N/A 8 Implanted   REDUCTION HEAD     N/A [employer] N/A N/A 8 Implanted

## 2018-05-03 LAB
ANION GAP SERPL CALC-SCNC: 11 MMOL/L (ref 5–15)
B-OH-BUTYR SERPL-SCNC: 0.11 MMOL/L
BUN SERPL-MCNC: 22 MG/DL (ref 6–20)
BUN/CREAT SERPL: 15 (ref 12–20)
CALCIUM SERPL-MCNC: 7.7 MG/DL (ref 8.5–10.1)
CHLORIDE SERPL-SCNC: 106 MMOL/L (ref 97–108)
CO2 SERPL-SCNC: 24 MMOL/L (ref 21–32)
CREAT SERPL-MCNC: 1.5 MG/DL (ref 0.7–1.3)
EST. AVERAGE GLUCOSE BLD GHB EST-MCNC: 166 MG/DL
GLUCOSE BLD STRIP.AUTO-MCNC: 106 MG/DL (ref 65–100)
GLUCOSE BLD STRIP.AUTO-MCNC: 178 MG/DL (ref 65–100)
GLUCOSE BLD STRIP.AUTO-MCNC: 229 MG/DL (ref 65–100)
GLUCOSE BLD STRIP.AUTO-MCNC: 239 MG/DL (ref 65–100)
GLUCOSE SERPL-MCNC: 265 MG/DL (ref 65–100)
HBA1C MFR BLD: 7.4 % (ref 4.2–6.3)
HGB BLD-MCNC: 12.3 G/DL (ref 12.1–17)
MAGNESIUM SERPL-MCNC: 1.8 MG/DL (ref 1.6–2.4)
POTASSIUM SERPL-SCNC: 3.7 MMOL/L (ref 3.5–5.1)
SERVICE CMNT-IMP: ABNORMAL
SODIUM SERPL-SCNC: 141 MMOL/L (ref 136–145)

## 2018-05-03 PROCEDURE — 82010 KETONE BODYS QUAN: CPT | Performed by: FAMILY MEDICINE

## 2018-05-03 PROCEDURE — 77030018846 HC SOL IRR STRL H20 ICUM -A

## 2018-05-03 PROCEDURE — 36600 WITHDRAWAL OF ARTERIAL BLOOD: CPT

## 2018-05-03 PROCEDURE — 8E0W0CZ ROBOTIC ASSISTED PROCEDURE OF TRUNK REGION, OPEN APPROACH: ICD-10-PCS | Performed by: ORTHOPAEDIC SURGERY

## 2018-05-03 PROCEDURE — 0SG30AJ FUSION OF LUMBOSACRAL JOINT WITH INTERBODY FUSION DEVICE, POSTERIOR APPROACH, ANTERIOR COLUMN, OPEN APPROACH: ICD-10-PCS | Performed by: ORTHOPAEDIC SURGERY

## 2018-05-03 PROCEDURE — 74011636637 HC RX REV CODE- 636/637: Performed by: FAMILY MEDICINE

## 2018-05-03 PROCEDURE — 83735 ASSAY OF MAGNESIUM: CPT | Performed by: ORTHOPAEDIC SURGERY

## 2018-05-03 PROCEDURE — G8987 SELF CARE CURRENT STATUS: HCPCS

## 2018-05-03 PROCEDURE — 74011636637 HC RX REV CODE- 636/637: Performed by: NURSE PRACTITIONER

## 2018-05-03 PROCEDURE — 0SG1071 FUSION OF 2 OR MORE LUMBAR VERTEBRAL JOINTS WITH AUTOLOGOUS TISSUE SUBSTITUTE, POSTERIOR APPROACH, POSTERIOR COLUMN, OPEN APPROACH: ICD-10-PCS | Performed by: ORTHOPAEDIC SURGERY

## 2018-05-03 PROCEDURE — 97165 OT EVAL LOW COMPLEX 30 MIN: CPT

## 2018-05-03 PROCEDURE — 74011250637 HC RX REV CODE- 250/637: Performed by: NURSE PRACTITIONER

## 2018-05-03 PROCEDURE — 36415 COLL VENOUS BLD VENIPUNCTURE: CPT | Performed by: ORTHOPAEDIC SURGERY

## 2018-05-03 PROCEDURE — 97535 SELF CARE MNGMENT TRAINING: CPT

## 2018-05-03 PROCEDURE — 01NB0ZZ RELEASE LUMBAR NERVE, OPEN APPROACH: ICD-10-PCS | Performed by: ORTHOPAEDIC SURGERY

## 2018-05-03 PROCEDURE — 97161 PT EVAL LOW COMPLEX 20 MIN: CPT

## 2018-05-03 PROCEDURE — 07DR3ZZ EXTRACTION OF ILIAC BONE MARROW, PERCUTANEOUS APPROACH: ICD-10-PCS | Performed by: ORTHOPAEDIC SURGERY

## 2018-05-03 PROCEDURE — 82962 GLUCOSE BLOOD TEST: CPT

## 2018-05-03 PROCEDURE — 97116 GAIT TRAINING THERAPY: CPT

## 2018-05-03 PROCEDURE — 51798 US URINE CAPACITY MEASURE: CPT

## 2018-05-03 PROCEDURE — 0SG3071 FUSION OF LUMBOSACRAL JOINT WITH AUTOLOGOUS TISSUE SUBSTITUTE, POSTERIOR APPROACH, POSTERIOR COLUMN, OPEN APPROACH: ICD-10-PCS | Performed by: ORTHOPAEDIC SURGERY

## 2018-05-03 PROCEDURE — 0ST40ZZ RESECTION OF LUMBOSACRAL DISC, OPEN APPROACH: ICD-10-PCS | Performed by: ORTHOPAEDIC SURGERY

## 2018-05-03 PROCEDURE — 65270000029 HC RM PRIVATE

## 2018-05-03 PROCEDURE — 74011250637 HC RX REV CODE- 250/637: Performed by: ORTHOPAEDIC SURGERY

## 2018-05-03 PROCEDURE — L0464 TLSO 4MOD SACRO-SCAP PRE: HCPCS

## 2018-05-03 PROCEDURE — G8988 SELF CARE GOAL STATUS: HCPCS

## 2018-05-03 PROCEDURE — 80048 BASIC METABOLIC PNL TOTAL CA: CPT | Performed by: ORTHOPAEDIC SURGERY

## 2018-05-03 PROCEDURE — 85018 HEMOGLOBIN: CPT | Performed by: ORTHOPAEDIC SURGERY

## 2018-05-03 PROCEDURE — 83036 HEMOGLOBIN GLYCOSYLATED A1C: CPT | Performed by: FAMILY MEDICINE

## 2018-05-03 RX ORDER — INSULIN GLARGINE 100 [IU]/ML
30 INJECTION, SOLUTION SUBCUTANEOUS
Status: DISCONTINUED | OUTPATIENT
Start: 2018-05-03 | End: 2018-05-04 | Stop reason: HOSPADM

## 2018-05-03 RX ORDER — INSULIN LISPRO 100 [IU]/ML
20 INJECTION, SOLUTION INTRAVENOUS; SUBCUTANEOUS
Status: DISCONTINUED | OUTPATIENT
Start: 2018-05-03 | End: 2018-05-04 | Stop reason: HOSPADM

## 2018-05-03 RX ORDER — INSULIN LISPRO 100 [IU]/ML
10 INJECTION, SOLUTION INTRAVENOUS; SUBCUTANEOUS
Status: DISCONTINUED | OUTPATIENT
Start: 2018-05-03 | End: 2018-05-03

## 2018-05-03 RX ORDER — HYDROMORPHONE HYDROCHLORIDE 2 MG/1
2-4 TABLET ORAL
Status: DISCONTINUED | OUTPATIENT
Start: 2018-05-03 | End: 2018-05-04 | Stop reason: HOSPADM

## 2018-05-03 RX ADMIN — Medication 10 ML: at 21:46

## 2018-05-03 RX ADMIN — HYDROMORPHONE HYDROCHLORIDE 4 MG: 2 TABLET ORAL at 13:51

## 2018-05-03 RX ADMIN — OXYCODONE HYDROCHLORIDE AND ACETAMINOPHEN 500 MG: 500 TABLET ORAL at 09:00

## 2018-05-03 RX ADMIN — NEBIVOLOL HYDROCHLORIDE 10 MG: 5 TABLET ORAL at 09:00

## 2018-05-03 RX ADMIN — ACETAMINOPHEN 650 MG: 325 TABLET, FILM COATED ORAL at 23:38

## 2018-05-03 RX ADMIN — INSULIN LISPRO 3 UNITS: 100 INJECTION, SOLUTION INTRAVENOUS; SUBCUTANEOUS at 07:24

## 2018-05-03 RX ADMIN — INSULIN LISPRO 10 UNITS: 100 INJECTION, SOLUTION INTRAVENOUS; SUBCUTANEOUS at 11:55

## 2018-05-03 RX ADMIN — HYDROMORPHONE HYDROCHLORIDE 4 MG: 2 TABLET ORAL at 09:08

## 2018-05-03 RX ADMIN — HYDROMORPHONE HYDROCHLORIDE 4 MG: 2 TABLET ORAL at 18:39

## 2018-05-03 RX ADMIN — ACETAMINOPHEN 650 MG: 325 TABLET, FILM COATED ORAL at 04:22

## 2018-05-03 RX ADMIN — POLYETHYLENE GLYCOL 3350 17 G: 17 POWDER, FOR SOLUTION ORAL at 09:00

## 2018-05-03 RX ADMIN — INSULIN LISPRO 3 UNITS: 100 INJECTION, SOLUTION INTRAVENOUS; SUBCUTANEOUS at 11:56

## 2018-05-03 RX ADMIN — INSULIN GLARGINE 30 UNITS: 100 INJECTION, SOLUTION SUBCUTANEOUS at 21:54

## 2018-05-03 RX ADMIN — ACETAMINOPHEN 650 MG: 325 TABLET, FILM COATED ORAL at 11:55

## 2018-05-03 RX ADMIN — Medication 1000 MCG: at 09:00

## 2018-05-03 RX ADMIN — LISINOPRIL 10 MG: 10 TABLET ORAL at 21:45

## 2018-05-03 RX ADMIN — STANDARDIZED SENNA CONCENTRATE AND DOCUSATE SODIUM 1 TABLET: 8.6; 5 TABLET, FILM COATED ORAL at 18:39

## 2018-05-03 RX ADMIN — INSULIN LISPRO 10 UNITS: 100 INJECTION, SOLUTION INTRAVENOUS; SUBCUTANEOUS at 09:00

## 2018-05-03 RX ADMIN — AMLODIPINE BESYLATE 10 MG: 5 TABLET ORAL at 09:00

## 2018-05-03 RX ADMIN — STANDARDIZED SENNA CONCENTRATE AND DOCUSATE SODIUM 1 TABLET: 8.6; 5 TABLET, FILM COATED ORAL at 09:00

## 2018-05-03 RX ADMIN — BUMETANIDE 1 MG: 1 TABLET ORAL at 09:00

## 2018-05-03 RX ADMIN — INSULIN LISPRO 20 UNITS: 100 INJECTION, SOLUTION INTRAVENOUS; SUBCUTANEOUS at 18:39

## 2018-05-03 RX ADMIN — FENOFIBRATE 145 MG: 145 TABLET ORAL at 09:00

## 2018-05-03 RX ADMIN — ATORVASTATIN CALCIUM 20 MG: 20 TABLET, FILM COATED ORAL at 21:45

## 2018-05-03 RX ADMIN — ACETAMINOPHEN 650 MG: 325 TABLET, FILM COATED ORAL at 18:39

## 2018-05-03 NOTE — PROGRESS NOTES
Bedside shift change report given to [address] (oncoming nurse) by Mary Fang RN (offgoing nurse). Report included the following information SBAR, Kardex, MAR and Recent Results.

## 2018-05-03 NOTE — PROGRESS NOTES
Orthopedic Spine Progress Note  Post Op day: 1 Day Post-Op    May 3, 2018 10:43 AM     Racquel Stoner    Vital Signs:    Patient Vitals for the past 8 hrs:   BP Temp Pulse Resp SpO2   18 0957 156/89 - 90 - 96 %   18 0945 152/89 - 85 - 95 %   18 0816 142/79 98 °F (36.7 °C) 76 16 94 %     Temp (24hrs), Av °F (36.7 °C), Min:97.4 °F (36.3 °C), Max:98.3 °F (36.8 °C)      Intake/Output:      1901 -  0700  In: 2250 [I.V.:2000]  Out: 6843 [Urine:1825; Drains:745]    Pain Control:   Pain Assessment  Pain Scale 1: Numeric (0 - 10)  Pain Intensity 1: 0  Pain Location 1: Back  Pain Orientation 1: Lower  Pain Intervention(s) 1: Encouraged PCA    LAB:    Recent Labs      18   0357   HGB  12.3     Lab Results   Component Value Date/Time    Sodium 141 2018 03:57 AM    Potassium 3.7 2018 03:57 AM    Chloride 106 2018 03:57 AM    CO2 24 2018 03:57 AM    Glucose 265 (H) 2018 03:57 AM    BUN 22 (H) 2018 03:57 AM    Creatinine 1.50 (H) 2018 03:57 AM    Calcium 7.7 (L) 2018 03:57 AM       Subjective:  Racquel Stoner is a 78 y.o. male s/p a  Procedure(s):  L3-S1 DECOMPRESSION WITH POSTERIOR SPINAL FUSION USING MAZOR ROBOTIC GUIDANCE   Procedure(s):  L3-S1 DECOMPRESSION WITH POSTERIOR SPINAL FUSION USING [place] ROBOTIC GUIDANCE. Tolerating diet. Objective: General: alert, cooperative, no distress. Gastrointestinal:  Soft, non-tender. Neurological: Neurovascular exam within normal limits. Sensation stable. Motor: unchanged C5-T1 and L2-S1. Legs feel good  Musculoskeletal:  Trish's sign negative in bilateral lower extremities. Calves soft, supple, non-tender upon palpation or with passive stretch. Skin: Incision - clean, dry and intact. No significant erythema or swelling.     Dressing: clean, dry, and intact     PT/OT:   Gait:                      Assessment:    s/p Procedure(s):  L3-S1 DECOMPRESSION WITH POSTERIOR SPINAL FUSION USING [place] ROBOTIC GUIDANCE    Principal Problem:    Type 2 diabetes mellitus with hyperglycemia ([facility] [address] (5/2/2018)    Active Problems:    Spinal stenosis of lumbar region at multiple levels (5/2/2018)         Plan:     1. Continue PT/OT  2. Continue established methods of pain control  3. VTE Prophylaxes - TEDS &/or SCDs              Discharge To:   Home tomorrow vs saturday    Signed By: Jennifer Hannah MD

## 2018-05-03 NOTE — PROGRESS NOTES
Spiritual Care Partner Volunteer visited patient in [address] on 5/3/18. Documented by:   Chaplain Denton MDiv, [place] [id number] [place] ([id number])

## 2018-05-03 NOTE — PROGRESS NOTES
Problem: Self Care Deficits Care Plan (Adult)  Goal: *Acute Goals and Plan of Care (Insert Text)  Occupational Therapy Goals  Initiated 5/3/2018    1. Patient will perform lower body dressing with supervision/set-up using Reacher, Stocking Aid and Dressing Stick PRN within 7 days. 2.  Patient will perform bathing with supervision/set-up using long-handled sponge within 7 days. 3.  Patient will perform toilet transfers at supervision/set-up within 7 days. 4.  Patient will don/doff back brace at supervision/set-up within 7 days. 5.  Patient will verbalize/demonstrate 3/3 back precautions during ADL tasks without cues within 7 days. Occupational Therapy EVALUATION  Patient: Chikis Wright (66 y.o. male)  Date: 5/3/2018  Primary Diagnosis: LUMBAR STENOSIS  SPONDYLOLITHESIS   Spinal stenosis of lumbar region at multiple levels  Procedure(s) (LRB):  L3-S1 DECOMPRESSION WITH POSTERIOR SPINAL FUSION USING MAZOR ROBOTIC GUIDANCE (N/A) 1 Day Post-Op   Precautions: back, fall      ASSESSMENT :  Based on the objective data described below, the patient presents with back precautions, impaired dynamic standing balance, impaired functional mobility, and impaired ADL independence s/p above surgery. Patient instructed in logrolling, requires min-A for rolling and sidelying-sit for trunk righting. Performs sit-stand with min-A from EOB and CGA from toilet. Patient unable to tailor sit, would benefit from AE for LB dressing/ bathing. Requires mod-A for toileting (unable to access perineal area), uses toilet aide at baseline but does not have his toilet aide present in hospital.  Performs grooming with CGA standing at sink. Patient and wife receptive to education and provided with handout on ADL modifications, activity recommendations, back precautions, and home safety. Anticipate d/c to home with family support pending progress.     Patient will benefit from skilled intervention to address the above impairments. Patients rehabilitation potential is considered to be Good  Factors which may influence rehabilitation potential include:   [x]             None noted  []             Mental ability/status  []             Medical condition  []             Home/family situation and support systems  []             Safety awareness  []             Pain tolerance/management  []             Other:      PLAN :  Recommendations and Planned Interventions:  [x]               Self Care Training                  [x]        Therapeutic Activities  [x]               Functional Mobility Training    []        Cognitive Retraining  [x]               Therapeutic Exercises           [x]        Endurance Activities  [x]               Balance Training                   []        Neuromuscular Re-Education  []               Visual/Perceptual Training     [x]   Home Safety Training  [x]               Patient Education                 [x]        Family Training/Education  []               Other (comment):    Frequency/Duration: Patient will be followed by occupational therapy 5 times a week to address goals. Discharge Recommendations: home with family support pending progress  Further Equipment Recommendations for Discharge: AE for LB dressing/ bathing     SUBJECTIVE:   Patient stated My back feels tight but I don't have much pain.     OBJECTIVE DATA SUMMARY:   HISTORY:   Past Medical History:   Diagnosis Date    Abnormal EKG     he says it has been abnormal for years    Arthritis     CAD (coronary artery disease)     Chronic kidney disease     KIDNEY AND BLADDER STONE    Chronic pain     lower lumbar    Diabetes ([facility] [address]     type II    Dyslipidemia     Enlarged prostate Dr. Pooja Saba    HTN (hypertension)     Hypercholesterolemia     Kidney stone     Mitral regurgitation     mod-severe on echo 1/5/12    Murmur     Obesity     Other ill-defined conditions(799.89)     BPH    Sleep apnea     stopped using CPAP many years ago     Past Surgical History:   Procedure Laterality Date    CARDIAC SURG PROCEDURE UNLIST  06/2017    [id number]    ECHO 2D ADULT  1/4/12    mild-mod LVH, LVEF 60%, probably grade 1 diastolic dysfunction, mod LAE, mod-severe MR (eccentroic ) - had severe HTN during study (173/108),     ECHO LIMITED  1/20/12    mod-severe MR    HX COLONOSCOPY  2013    small polyps removed; repeat in 5 years; Dr. Analia Weir  9/2014    RIGHT    HX LITHOTRIPSY  2011    HX OTHER SURGICAL      LONNIE 1/31/12 - LVEF 60%, mod-severe MR (post directed), mild-mod LAE, mild atheroma aorta    HX OTHER SURGICAL      Echo 9/5/13 - mild LVH, LVEF 60 % to 65 %. Mod LAE. Mod MR (post directed)     HX OTHER SURGICAL      Echo 9/5/13 - mild LVH, LVEF 60 % to 65 %. Mod LAE. Mod MR (post directed)     HX OTHER SURGICAL  02/2017    Bladder stones removed, and prostate \"trimmed\" down     HX UROLOGICAL      TRANSURETHRAL RESECTION OF THE PROSTATE    STRESS TEST CARDIOLITE  1/4/12    walked 4:30, stopping for severe FISH, no ischemic EKG changes (inferolateral TWI at start), normal perfusion (diaphragmatic attenuation), LVEF 48%, hypertensive respone to exercise       Prior Level of Function/Environment/Context: indep with ADLs and IADLs with no AD for mobility, using toilet aide for bowel hygiene. Difficulty ambulating long distances or doing anything physically exerting. Had sternotomy/ heart surgery 1 year ago.       Expanded or extensive additional review of patient history:     Home Situation  Home Environment: Private residence  # Steps to Enter: 4  Rails to Enter: Yes  Hand Rails : Bilateral (unable to reach together)  One/Two Story Residence: Two story, live on 1st floor  Living Alone: No  Support Systems: Family member(s)  Current DME Used/Available at Home: Walker, [place], Walker, rollator, Wheelchair, [employer], Atha Robert, straight, Raised toilet seat  Tub or Shower Type: Tub/Shower combination (grab bars)  []  Right hand dominant   []  Left hand dominant    EXAMINATION OF PERFORMANCE DEFICITS:  Cognitive/Behavioral Status:  Neurologic State: Alert  Orientation Level: Oriented X4  Cognition: Appropriate decision making; Appropriate for age attention/concentration; Appropriate safety awareness; Follows commands  Perception: Appears intact  Perseveration: No perseveration noted  Safety/Judgement: Awareness of environment;Good awareness of safety precautions; Insight into deficits    Skin: visible skin appears intact. Dressing c/d/i    Edema: none noted    Hearing: Auditory  Auditory Impairment: Hard of hearing, bilateral  Hearing Aids/Status: Does not own    Vision/Perceptual:                           Acuity: Within Defined Limits         Range of Motion:    AROM: Within functional limits (BUE)  PROM: Within functional limits (BUE)                      Strength:    Strength: Within functional limits (BUE)                Coordination:  Coordination: Within functional limits (BUE)  Fine Motor Skills-Upper: Left Intact; Right Intact    Gross Motor Skills-Upper: Left Intact; Right Intact    Tone & Sensation:    Tone: Normal  Sensation: Intact                      Balance:  Sitting: Intact  Standing: Impaired  Standing - Static: Good  Standing - Dynamic : Fair    Functional Mobility and Transfers for ADLs:  Bed Mobility:  Rolling: Minimum assistance  Supine to Sit: Minimum assistance (for trunk righting)    Transfers:  Sit to Stand: Contact guard assistance  Stand to Sit: Contact guard assistance (verbal cues for positioning)  Toilet Transfer : Contact guard assistance (on raised toilet seat, using grab bar)    ADL Assessment:  Feeding: Independent (inferred)    Oral Facial Hygiene/Grooming: Contact guard assistance (washing hands standing at sink)    Bathing:  Moderate assistance (inferred due to impaired access to LB/ precautions)    Upper Body Dressing: Minimum assistance (for donning gown as posterior robe, min-A for L sleeve)    Lower Body Dressing: Total assistance (for donning socks, unable to tailor sit, bending precaution)    Toileting: Moderate assistance (unable to reach perineal, uses toilet aide at baseline)                ADL Intervention and task modifications:                 Cognitive Retraining  Safety/Judgement: Awareness of environment;Good awareness of safety precautions; Insight into deficits      Functional Measure:  Barthel Index:    Bathin  Bladder: 10  Bowels: 10  Groomin  Dressin  Feeding: 10  Mobility: 0  Stairs: 0  Toilet Use: 5  Transfer (Bed to Chair and Back): 10  Total: 55       Barthel and G-code impairment scale:  Percentage of impairment CH  0% CI  1-19% CJ  20-39% CK  40-59% CL  60-79% CM  80-99% CN  100%   Barthel Score 0-100 100 99-80 79-60 59-40 20-39 1-19   0   Barthel Score 0-20 20 17-19 13-16 9-12 5-8 1-4 0      The Barthel ADL Index: Guidelines  1. The index should be used as a record of what a patient does, not as a record of what a patient could do. 2. The main aim is to establish degree of independence from any help, physical or verbal, however minor and for whatever reason. 3. The need for supervision renders the patient not independent. 4. A patient's performance should be established using the best available evidence. Asking the patient, friends/relatives and nurses are the usual sources, but direct observation and common sense are also important. However direct testing is not needed. 5. Usually the patient's performance over the preceding 24-48 hours is important, but occasionally longer periods will be relevant. 6. Middle categories imply that the patient supplies over 50 per cent of the effort. 7. Use of aids to be independent is allowed. Eliseo Dunn, Barthel, D.W. (0618). Functional evaluation: the Barthel Index. [facility] [address] [facility] (14)2.   MEGAN Marsh, Luis Rouse., Himanshu Mcgee (1999). Measuring the change indisability after inpatient rehabilitation; comparison of the responsiveness of the Barthel Index and Functional [place] Measure. Journal of Neurology, Neurosurgery, and Psychiatry, 66(4), 163-305. DEIRDRE Ruffin, DOC Florse, & Esha Juarez M.A. (2004.) Assessment of post-stroke quality of life in cost-effectiveness studies: The usefulness of the Barthel Index and the EuroQoL-5D. Quality of Life Research, 13, 630-26         G codes: In compliance with CMSs Claims Based Outcome Reporting, the following G-code set was chosen for this patient based on their primary functional limitation being treated: The outcome measure chosen to determine the severity of the functional limitation was the Barthel Index with a score of 55/100 which was correlated with the impairment scale. ? Self Care:     - CURRENT STATUS: CK - 40%-59% impaired, limited or restricted    - GOAL STATUS: CI - 1%-19% impaired, limited or restricted    - D/C STATUS:  ---------------To be determined---------------     Occupational Therapy Evaluation Charge Determination   History Examination Decision-Making   LOW Complexity : Brief history review  LOW Complexity : 1-3 performance deficits relating to physical, cognitive , or psychosocial skils that result in activity limitations and / or participation restrictions  MEDIUM Complexity : Patient may present with comorbidities that affect occupational performnce.  Miniml to moderate modification of tasks or assistance (eg, physical or verbal ) with assesment(s) is necessary to enable patient to complete evaluation       Based on the above components, the patient evaluation is determined to be of the following complexity level: LOW   Pain:  Pain Scale 1: Numeric (0 - 10)  Pain Intensity 1: 0              Activity Tolerance:   Vitals:    05/03/18 0945 05/03/18 0957   BP: 152/89 156/89   BP 1 Location: Right arm Right arm   BP Patient Position: At rest;Supine Sitting;During activity   Pulse: 85 90   SpO2: 95% on RA 96% on RA     Please refer to the flowsheet for vital signs taken during this treatment. After treatment:   [] Patient left in no apparent distress sitting up in chair  [x] Patient left in no apparent distress in bed  [x] Call bell left within reach  [x] Nursing notified  [] Caregiver present  [] Bed alarm activated    COMMUNICATION/EDUCATION:   The patients plan of care was discussed with: Physical Therapist and Registered Nurse. [x] Home safety education was provided and the patient/caregiver indicated understanding. [x] Patient/family have participated as able in goal setting and plan of care. [x] Patient/family agree to work toward stated goals and plan of care. [] Patient understands intent and goals of therapy, but is neutral about his/her participation. [] Patient is unable to participate in goal setting and plan of care. This patients plan of care is appropriate for delegation to \[facility] [place] [facility]\"".     Thank you for this referral.  Mayank Antonio OT  Time Calculation: 42 mins

## 2018-05-03 NOTE — PROGRESS NOTES
Hospitalist Progress Note  Abdiaziz De León NP  Answering service: [phone] OR [phone] from in house phone  Cell: [phone]      Date of Service:  5/3/2018  NAME:  Casie Patricia  :  1950  MRN:  [id number]      Admission Summary:   78 y.o. white male with past medical history of arthritis, CAD, CKD, chronic pain, type 2 diabetes mellitus, hyperlipidemia, mitral regurgitation, sleep apnea, enlarged prostate, and obesity presented for admission with diagnosis of lumbar stenosis and spondylolithesis of L3-S1. Today, patient underwent L3-S1 decompression with posterior spinal fusion using Mazor Robotic guidance. Tonight, patient is seen in consultation for management of type 2 diabetes mellitus. Interval history / Subjective:     Patient c/o back pain. Pain being managed per primary team. Denies any chest pain or dizziness. Has not voided since removal of catheter however has until 4pm. No abdominal pain. Blood sugars 220s-230s today. States blood sugars at home have been running a little higher around 150s.       Assessment & Plan:     Type 2 DM with longer term insulin use   -hgb a1c 7.4  -on Levimir 30U qhs and Humalog 10-20 U with meals (has only been taking 10 recently) at home  -Lantus 30U qhs, increase Humalog to 20U with meals  -may need to increase long acting however will monitor as patient received Decadron during surgery   -accuchecks     HTN   -improved today with pain control   -continue Amlodipine, Lisinopril and Bystolic   -monitor     Hyperlipidemia   -Lipitor and Fenofibrate     Morbid Obesity  -BMI 43.9  -encourage diet changes and increasing exercise once cleared by primary team     Sleep Apnea     CKD stage 3  -appears to be near baseline   -repeat labs in am     Lumbar stenosis   -mgmt per primary team     Code status: Full  DVT prophylaxis: SCDs    Care Plan discussed with: Patient/Family and Nurse Dr Sarkis Valentine NP  Disposition: Home w/Family, HH PT, OT, RN and TBD     Hospital Problems  Date Reviewed: 5/2/2018          Codes Class Noted POA    Spinal stenosis of lumbar region at multiple levels ICD-10-CM: M48.061  ICD-9-CM: 724.02  5/2/2018 Unknown        * (Principal)Type 2 diabetes mellitus with hyperglycemia ([facility] [address] ICD-10-CM: E11.65  ICD-9-CM: 250.00  5/2/2018 Unknown                Review of Systems:   A comprehensive review of systems was negative except for that written in the HPI. Vital Signs:    Last 24hrs VS reviewed since prior progress note. Most recent are:  Visit Vitals    /89 (BP 1 Location: Right arm, BP Patient Position: Sitting;During activity)    Pulse 90    Temp 98 °F (36.7 °C)    Resp 16    Ht 5' 7\" (1.702 m)    Wt 127.2 kg (280 lb 6 oz)    SpO2 96%    BMI 43.91 kg/m2         Intake/Output Summary (Last 24 hours) at 05/03/18 1415  Last data filed at 05/03/18 1403   Gross per 24 hour   Intake             1250 ml   Output             2825 ml   Net            -1575 ml        Physical Examination:             Constitutional:  Obese male resting in bed in no acute distress   ENT:  Oral mucous moist, oropharynx benign. Resp:  CTA bilaterally. No wheezing/rhonchi/rales. No accessory muscle use   CV:  Regular rhythm, normal rate, no murmurs, gallops, rubs    GI:  Soft, non distended, non tender. normoactive bowel sounds,     Musculoskeletal:  No edema, warm, 2+ pulses throughout    Neurologic:  Moves all extremities. AAOx3,      Psych:  Good insight, Not anxious nor agitated.        Data Review:    Review and/or order of clinical lab test  Review and/or order of tests in the radiology section of CPT  Review and/or order of tests in the medicine section of CPT      Labs:     Recent Labs      05/03/18   0357   HGB  12.3     Recent Labs      05/03/18 0357   NA  141   K  3.7   CL  106   CO2  24   BUN  22*   CREA  1.50*   GLU  265*   CA  7.7*   MG  1.8     No results for input(s): SGOT, GPT, ALT, AP, TBIL, TBILI, TP, ALB, GLOB, GGT, AML, LPSE in the last 72 hours. No lab exists for component: AMYP, HLPSE  No results for input(s): INR, PTP, APTT in the last 72 hours. No lab exists for component: INREXT   No results for input(s): FE, TIBC, PSAT, FERR in the last 72 hours. No results found for: FOL, RBCF   No results for input(s): PH, PCO2, PO2 in the last 72 hours. No results for input(s): CPK, CKNDX, TROIQ in the last 72 hours.     No lab exists for component: CPKMB  Lab Results   Component Value Date/Time    Cholesterol, total 182 04/03/2014 02:15 PM    HDL Cholesterol 35 (L) 04/03/2014 02:15 PM    LDL, calculated 105 (H) 04/03/2014 02:15 PM    Triglyceride 211 (H) 04/03/2014 02:15 PM     Lab Results   Component Value Date/Time    Glucose (POC) 229 (H) 05/03/2018 11:27 AM    Glucose (POC) 239 (H) 05/03/2018 06:41 AM    Glucose (POC) 224 (H) 05/02/2018 07:53 PM    Glucose (POC) 222 (H) 05/02/2018 06:12 PM    Glucose (POC) 124 (H) 05/02/2018 11:15 AM    Glucose (POC) 120 (H) 05/02/2018 10:44 AM     Lab Results   Component Value Date/Time    Color YELLOW/STRAW 04/18/2018 09:16 AM    Appearance CLEAR 04/18/2018 09:16 AM    Specific gravity 1.013 04/18/2018 09:16 AM    Specific gravity 1.021 08/27/2013 09:05 AM    pH (UA) 5.5 04/18/2018 09:16 AM    Protein 30 (A) 04/18/2018 09:16 AM    Glucose NEGATIVE  04/18/2018 09:16 AM    Ketone NEGATIVE  04/18/2018 09:16 AM    Bilirubin NEGATIVE  04/18/2018 09:16 AM    Urobilinogen 0.2 04/18/2018 09:16 AM    Nitrites NEGATIVE  04/18/2018 09:16 AM    Leukocyte Esterase NEGATIVE  04/18/2018 09:16 AM    Epithelial cells FEW 04/18/2018 09:16 AM    Bacteria NEGATIVE  04/18/2018 09:16 AM    WBC 0-4 04/18/2018 09:16 AM    RBC 0-5 04/18/2018 09:16 AM         Medications Reviewed:     Current Facility-Administered Medications   Medication Dose Route Frequency    insulin glargine (LANTUS) injection 30 Units  30 Units SubCUTAneous QHS    HYDROmorphone (DILAUDID) tablet 2-4 mg  2-4 mg Oral Q4H PRN    insulin lispro (HUMALOG) injection 20 Units  20 Units SubCUTAneous TIDAC    amLODIPine (NORVASC) tablet 10 mg  10 mg Oral DAILY    ascorbic acid (vitamin C) (VITAMIN C) tablet 500 mg  500 mg Oral DAILY    atorvastatin (LIPITOR) tablet 20 mg  20 mg Oral QHS    bumetanide (BUMEX) tablet 1 mg  1 mg Oral DAILY    cyanocobalamin (VITAMIN B12) tablet 1,000 mcg  1,000 mcg Oral DAILY    ergocalciferol (ERGOCALCIFEROL) capsule 50,000 Units  50,000 Units Oral every Wednesday    fenofibrate nanocrystallized (TRICOR) tablet 145 mg  145 mg Oral DAILY    lisinopril (PRINIVIL, ZESTRIL) tablet 10 mg  10 mg Oral QHS    nebivolol (BYSTOLIC) tablet 10 mg  10 mg Oral DAILY    0.9% sodium chloride infusion  125 mL/hr IntraVENous CONTINUOUS    sodium chloride (NS) flush 5-10 mL  5-10 mL IntraVENous Q8H    sodium chloride (NS) flush 5-10 mL  5-10 mL IntraVENous PRN    acetaminophen (TYLENOL) tablet 650 mg  650 mg Oral Q6H    naloxone (NARCAN) injection 0.4 mg  0.4 mg IntraVENous PRN    ondansetron (ZOFRAN) injection 4 mg  4 mg IntraVENous Q4H PRN    diphenhydrAMINE (BENADRYL) 12.5 mg/5 mL oral elixir 12.5 mg  12.5 mg Oral Q6H PRN    senna-docusate (PERICOLACE) 8.6-50 mg per tablet 1 Tab  1 Tab Oral BID    polyethylene glycol (MIRALAX) packet 17 g  17 g Oral DAILY    [START ON 5/4/2018] bisacodyl (DULCOLAX) suppository 10 mg  10 mg Rectal DAILY PRN    benzocaine-menthol (CEPACOL) lozenge 1 Lozenge  1 Lozenge Oral PRN    cyclobenzaprine (FLEXERIL) tablet 10 mg  10 mg Oral BID PRN    glucose chewable tablet 16 g  4 Tab Oral PRN    dextrose (D50W) injection syrg 12.5-25 g  12.5-25 g IntraVENous PRN    glucagon (GLUCAGEN) injection 1 mg  1 mg IntraMUSCular PRN    insulin lispro (HUMALOG) injection   SubCUTAneous AC&HS     ______________________________________________________________________  EXPECTED LENGTH OF STAY: 5d 9h  ACTUAL LENGTH OF STAY:          1                 Mustapha L Colleen Ruiz, NP

## 2018-05-03 NOTE — PROGRESS NOTES
Pt admitted to floor around 1830. Noted pt is a diabetic and no insulin orders resumed. Blood sugar at time 222. Called and spoke to on-call MD; Orders received from Dr. Roman Johnson to consult hospitalist.     2045: Dr. Kayla Oakes consulted for diabetes and for hypertension. MD made aware of blood pressures since resumed to floor. At time of bedside assessment (Dr. Kayla Oakes in room around 2100), per MD recheck BP one hour after scheduled PO lisinopril given and contact him if pressures still high. Dr. Kayla Oakes also notified patient's potassium was low on admission. Per MD check BMP and Magnesium levels in morning. Other lab orders placed at that time. 2207: Attempted to stick patient x5 times and utilized vein finder. Other RNs to attempt blood draws without success. Blood pressure still elevated. Attempted to contact Dr. Kayla Oakes regarding failed blood draw attempts and persistent elevated blood pressure. 2304: MD returned phone call. Orders received for 10mg hydralazine IVP once NOW and for one arterial stick for blood. Per MD check BP 1 hour after administration. Will implement orders and continue to monitor pt.

## 2018-05-03 NOTE — PROGRESS NOTES
Problem: Mobility Impaired (Adult and Pediatric)  Goal: *Acute Goals and Plan of Care (Insert Text)  Physical Therapy Goals  Initiated 5/3/2018    1. Patient will move from supine to sit and sit to supine  in bed with supervision/set-up within 4 days. 2. Patient will perform sit to stand with supervision/set-up within 4 days. 3. Patient will ambulate with supervision/set-up for 150 feet with the least restrictive device within 4 days. 4. Patient will ascend/descend 4 stairs with 2 handrail(s) with contact guard assist within 4 days. 5. Patient will verbalize and demonstrate understanding of spinal precautions (No bending, lifting greater than 5 lbs, or twisting; log-roll technique; frequent repositioning as instructed) within 4 days. physical Therapy TREATMENT  Patient: Olivia Arora (75 y.o. male)  Date: 5/3/2018  Diagnosis: LUMBAR STENOSIS  SPONDYLOLITHESIS   Spinal stenosis of lumbar region at multiple levels Type 2 diabetes mellitus with hyperglycemia (HCC)  Procedure(s) (LRB):  L3-S1 DECOMPRESSION WITH POSTERIOR SPINAL FUSION USING MAZOR ROBOTIC GUIDANCE (N/A) 1 Day Post-Op  Precautions: Fall, Spinal, brace  Chart, physical therapy assessment, plan of care and goals were reviewed. ASSESSMENT:  Pt seen for afternoon session following RN clearance. Pt agreeable to POC for bed mobility, transfers, gait, and return to bed. Wife present and supportive. Pt demonstrates increased performance with independence and activity tolerance this session. Pt progressing well with 1 person assistance recommended and remains appropriate for home with HHPT and family support once medically stable. Recommend increased gait and initiate stair training tomorrow as appropriate.   Further recommend OOB to chair for all meals and walking to/from bathroom for toileting with staff assist.  Progression toward goals:  []      Improving appropriately and progressing toward goals  [x]      Improving slowly and progressing toward goals  []      Not making progress toward goals and plan of care will be adjusted     PLAN:  Patient continues to benefit from skilled intervention to address the above impairments. Continue treatment per established plan of care. Discharge Recommendations:  Home Health  Further Equipment Recommendations for Discharge:  NA     SUBJECTIVE:   Patient stated Oh good. ..  Pt pleased he will not be asked to sit in chair following session today-reprots may be sat too long in chair this AM-reviewed 20-30' sitting increments. The patient stated 3/3 back precautions. Reviewed all 3 with patient. OBJECTIVE DATA SUMMARY:   Critical Behavior:  Neurologic State: Alert, Appropriate for age  Orientation Level: Appropriate for age  Cognition: Follows commands  Safety/Judgement: Fall prevention  Functional Mobility Training:  Bed Mobility:  Log Rolling: Minimum assistance; Additional time (roll to left)  Supine to Sit: Minimum assistance; Additional time  Sit to Supine: Minimum assistance; Additional time  Scooting: Contact guard assistance; Additional time  Brace donned with  moderate assistance   Transfers:  Sit to Stand: Contact guard assistance (EOB slightly elevated for ease)  Stand to Sit: Contact guard assistance; Additional time (cues for alignment/reach back/controlled descent)  Balance:  Sitting: Intact  Standing: Intact; With support  Standing - Static: Good  Standing - Dynamic : Fair  Ambulation/Gait Training:  Distance (ft): 45 Feet (ft) (x2)  Assistive Device: Gait belt;Brace/Splint (R HHA vs none)  Ambulation - Level of Assistance: Contact guard assistance;Minimal assistance; Additional time  Gait Abnormalities: Antalgic;Decreased step clearance (cues for postural extension)  Base of Support: Widened  Speed/Shara: Slow;Pace decreased (<100 feet/min)  Step Length: Right shortened;Left shortened  Stairs:  Stairs - Level of Assistance:  (NT)    Pain:  Pain Scale 1: Numeric (0 - 10)  Pain Intensity 1: 4  Activity Tolerance:   NAD  After treatment:   []  Patient left in no apparent distress sitting up in chair  [x]  Patient left in no apparent distress in bed  [x]  Call bell left within reach  [x]  Nursing notified  [x]  Caregiver present  []  Bed alarm activated    COMMUNICATION/COLLABORATION:   The patients plan of care was discussed with: Registered Nurse    Joaquim Sanchez   Time Calculation: 13 mins

## 2018-05-03 NOTE — CONSULTS
Medical Consultation Report    Patient:  Ashwini Grijalva  MRN:  [id number]  YOB: 1950  Age:  78 y.o. Primary care provider:  Mayo Butler MD    Date of admission:  5/2/2018    Date of consultation:  5/2/2018    Requesting physician:  Dr. Poncho Calvillo    Reason for consultation:  Diabetes mellitus                               History of present illness  Ashwini Grijalva is a 78 y.o. white male with past medical history of arthritis, CAD, CKD, chronic pain, type 2 diabetes mellitus, hyperlipidemia, mitral regurgitation, sleep apnea, enlarged prostate, and obesity presented for admission with diagnosis of lumbar stenosis and spondylolithesis of L3-S1. Today, patient underwent L3-S1 decompression with posterior spinal fusion using Mazor Robotic guidance. Tonight, patient is seen in consultation for management of type 2 diabetes mellitus. Per chart review, patient's home management consistent of Levemir 30 units sq qhs and Humalog qac. Last recorded hemoglobin A1c = 6.7% on 4/18/2018. Today, latest POC glucose = 224 mg/dl. Post-operatively, patient is currently on a clear liquid diet. There were no reports of new onset syncope, loss of consciousness, headache, neck pain, visual disturbance, numbness, paresthesias, focal weakness, chest pain, palpitations, shortness of breath, abdominal pain, nausea, vomiting, diarrhea, melena, dysuria, hematuria, calf pain, increased leg swelling/ edema (compared to baseline edema), fever, chills, or rash. At current, patient rates back pain ~ 6/10. Patient is currently on a Dilaudid PCA for pain control. Impression/Recomendations  1. Type 2 diabetes mellitus with hyperglycemia. Order Humalog insulin correctional coverage, scheduled blood glucose checks and check hemoglobin A1c level in a.m. Once diet is advanced, then place on carb consistent diet.     2. Hypertension- uncontrolled. Monitor BP closely. Continue on Amlodipine, Lisinopril, Bystolic. Treat pain on Dilaudid PCA. Repeat BP and if not improved, then give dose of Hydralazine IV for BP control. 3. Hyperlipidemia. On Atorvastatin and Fenofibrate. 4. Morbid obesity (BMI= 43.9). Would encourage eventual weight loss/ heart healthy diet/ and lifestyle modification. 5. Sleep apnea. Would encourage restarting use of CPAP. 6. CKD. Check BMP in a.m. Place on strict Is & Os.  7. Acute hypokalemia. Last K= 3.1 this morning. Repeat BMP tonight and replace with KCl if indicated. 8. High anion gap. Order BMP, serum glucose and check serum ketones (beta-hydroxybutyrate). Continue IV fluids for hydration. DVT prophylaxis:  SCDs to BLEs. Thank you very much for allowing us to participate in the care of this pleasant patient. The hospitalist service will continue to follow the patient's medical progress along with you.        Isaac Pardo MD   Hospitalist    Past Medical History:   Diagnosis Date    Abnormal EKG     he says it has been abnormal for years    Arthritis     CAD (coronary artery disease)     Chronic kidney disease     KIDNEY AND BLADDER STONE    Chronic pain     lower lumbar    Diabetes ([address]     type II    Dyslipidemia     Enlarged prostate Dr. Varun Byrne    HTN (hypertension)     Hypercholesterolemia     Kidney stone     Mitral regurgitation     mod-severe on echo 1/5/12    Murmur     Obesity     Other ill-defined conditions(799.89)     BPH    Sleep apnea     stopped using CPAP many years ago      Past Surgical History:   Procedure Laterality Date    CARDIAC SURG PROCEDURE UNLIST  06/2017    [id number]    ECHO 2D ADULT  1/4/12    mild-mod LVH, LVEF 60%, probably grade 1 diastolic dysfunction, mod LAE, mod-severe MR (eccentroic ) - had severe HTN during study (173/108),     ECHO LIMITED  1/20/12    mod-severe MR    HX COLONOSCOPY  2013    small polyps removed; repeat in 5 years; Dr. Catarina Dickerson  9/2014    RIGHT    HX LITHOTRIPSY  2011    HX OTHER SURGICAL      LONNIE 1/31/12 - LVEF 60%, mod-severe MR (post directed), mild-mod LAE, mild atheroma aorta    HX OTHER SURGICAL      Echo 9/5/13 - mild LVH, LVEF 60 % to 65 %. Mod LAE. Mod MR (post directed)     HX OTHER SURGICAL      Echo 9/5/13 - mild LVH, LVEF 60 % to 65 %. Mod LAE. Mod MR (post directed)     HX OTHER SURGICAL  02/2017    Bladder stones removed, and prostate \"trimmed\" down     HX UROLOGICAL      TRANSURETHRAL RESECTION OF THE PROSTATE    STRESS TEST CARDIOLITE  1/4/12    walked 4:30, stopping for severe FISH, no ischemic EKG changes (inferolateral TWI at start), normal perfusion (diaphragmatic attenuation), LVEF 48%, hypertensive respone to exercise        Prior to Admission medications    Medication Sig Start Date End Date Taking? Authorizing Provider   cyanocobalamin (VITAMIN B-12) 1,000 mcg tablet Take 1,000 mcg by mouth daily. Yes Historical Provider   fenofibrate (LOFIBRA) 160 mg tablet Take 160 mg by mouth daily. Yes Historical Provider   bumetanide (BUMEX) 1 mg tablet Take 1 mg by mouth daily. Yes Historical Provider   lisinopril (PRINIVIL, ZESTRIL) 10 mg tablet Take 1 Tab by mouth daily. Patient taking differently: Take 10 mg by mouth nightly. 4/5/18  Yes Salty Santiago MD   atorvastatin (LIPITOR) 20 mg tablet TAKE 1 TABLET BY MOUTH NIGHTLY. 9/7/17  Yes Salty Santiago MD   insulin lispro (HUMALOG) 100 unit/mL injection 20 Units by SubCUTAneous route Before breakfast, lunch, and dinner. Start with 10 units SQ before breakfast, lunch, dinner, --- advance as diet improves. 7/2/17  Yes Brenda Query, NP   aspirin 81 mg chewable tablet Take 1 Tab by mouth daily. 7/2/17  Yes Brenda Query, NP   nebivolol (BYSTOLIC) 10 mg tablet Take 10 mg by mouth daily.    Yes Historical Provider   amLODIPine (NORVASC) 10 mg tablet Take 10 mg by mouth daily. Yes Historical Provider   insulin detemir (LEVEMIR) 100 unit/mL injection 30 Units by SubCUTAneous route nightly. Yes Riri Pardo MD   ascorbic acid (VITAMIN C) 500 mg tablet Take 500 mg by mouth daily. Yes Historical Provider   ibuprofen (MOTRIN) 200 mg tablet Take 200 mg by mouth every eight (8) hours as needed for Pain. Historical Provider   ergocalciferol (VITAMIN D2) 50,000 unit capsule Take 50,000 Units by mouth every Wednesday.  Every Wednesday    Riri Pardo MD     Current Facility-Administered Medications   Medication Dose Route Frequency    [START ON 5/3/2018] amLODIPine (NORVASC) tablet 10 mg  10 mg Oral DAILY    [START ON 5/3/2018] ascorbic acid (vitamin C) (VITAMIN C) tablet 500 mg  500 mg Oral DAILY    atorvastatin (LIPITOR) tablet 20 mg  20 mg Oral QHS    [START ON 5/3/2018] bumetanide (BUMEX) tablet 1 mg  1 mg Oral DAILY    [START ON 5/3/2018] cyanocobalamin (VITAMIN B12) tablet 1,000 mcg  1,000 mcg Oral DAILY    ergocalciferol (ERGOCALCIFEROL) capsule 50,000 Units  50,000 Units Oral every Wednesday    [START ON 5/3/2018] fenofibrate nanocrystallized (TRICOR) tablet 145 mg  145 mg Oral DAILY    lisinopril (PRINIVIL, ZESTRIL) tablet 10 mg  10 mg Oral QHS    [START ON 5/3/2018] nebivolol (BYSTOLIC) tablet 10 mg  10 mg Oral DAILY    0.9% sodium chloride infusion  125 mL/hr IntraVENous CONTINUOUS    [START ON 5/3/2018] sodium chloride (NS) flush 5-10 mL  5-10 mL IntraVENous Q8H    sodium chloride (NS) flush 5-10 mL  5-10 mL IntraVENous PRN    acetaminophen (TYLENOL) tablet 650 mg  650 mg Oral Q6H    naloxone (NARCAN) injection 0.4 mg  0.4 mg IntraVENous PRN    ondansetron (ZOFRAN) injection 4 mg  4 mg IntraVENous Q4H PRN    diphenhydrAMINE (BENADRYL) 12.5 mg/5 mL oral elixir 12.5 mg  12.5 mg Oral Q6H PRN    senna-docusate (PERICOLACE) 8.6-50 mg per tablet 1 Tab  1 Tab Oral BID    [START ON 5/3/2018] polyethylene glycol (MIRALAX) packet 17 g  17 g Oral DAILY    [START ON 5/4/2018] bisacodyl (DULCOLAX) suppository 10 mg  10 mg Rectal DAILY PRN    benzocaine-menthol (CEPACOL) lozenge 1 Lozenge  1 Lozenge Oral PRN    cyclobenzaprine (FLEXERIL) tablet 10 mg  10 mg Oral BID PRN    vancomycin (VANCOCIN) 2000 mg in  ml infusion  2,000 mg IntraVENous Q12H    HYDROmorphone (PF) 25 mg/50 mL (DILAUDID) PCA   IntraVENous CONTINUOUS     Allergies   Allergen Reactions    Cephalexin Other (comments)     unknown    Oxycodone Other (comments)     Pt does not desire to take; causes altered mental status and constipation    Sulfa (Sulfonamide Antibiotics) Hives    Tamsulosin Other (comments)     Vision loss      Family History   Problem Relation Age of Onset    Coronary Artery Disease Father     Heart Disease Father     Stroke Father     Cancer Father      prostate    Hypertension Mother     Arthritis-osteo Mother     Malignant Hyperthermia Neg Hx     Pseudocholinesterase Deficiency Neg Hx     Delayed Awakening Neg Hx     Post-op Nausea/Vomiting Neg Hx     Emergence Delirium Neg Hx     Post-op Cognitive Dysfunction Neg Hx     Other Neg Hx     Anesth Problems Neg Hx         Social history  Living situation  x  Independent   x             Ambulates  x  Independently                 Alcohol history  x  denies             History   Smoking Status    Former Smoker    Packs/day: 0.25    Years: 20.00    Quit date: 9/9/2013   Smokeless Tobacco    Never Used     Illegal drugs:  Denies    Review of systems  Pertinent positives as noted in HPI. All other systems reviewed and are negative.     Physical Examination   Visit Vitals    /82    Pulse 67    Temp 97.8 °F (36.6 °C)    Resp 16    Ht 5' 7\" (1.702 m)    Wt 127.2 kg (280 lb 6 oz)    SpO2 99%    BMI 43.91 kg/m2           O2 Device: Non-rebreather mask    General:  Morbidly obese male in no acute respiratory distress   Head:  Atraumatic Eyes:  Conjunctivae/corneas clear. E/N/M/T: Clear oropharynx   Neck: No JVD   Lungs:   Symmetrical chest expansion and respiratory effort  Clear to auscultation bilaterally   Chest wall:  No tenderness or deformity   Heart:  Regular rate and rhythm   Sounds normal; no murmur, click, rub or gallop   Abdomen:   Soft, no tenderness  No rebound, guarding, or rigidity  Non-distended  Bowel sounds normal  No masses or hepatosplenomegaly  No hernias present       Extremities: Extremities normal, atraumatic  Trace non-pitting edema BUE/ BLE   Pulses 2+ radial/ 1+ DP bilateral pulses   Skin: No rashes or ulcers  Warm/ dry   Musculo-      skeletal: Gait not tested  No calf tenderness   Neuro: GCS 15. Moves all extremities x 4. No slurred speech. No facial droop. Sensation grossly intact. Psych: Alert, oriented x 3     Geniturinary: Valdez catheter in place with urine output     Data Review    EKG:       No results for input(s): WBC, HGB, HCT, PLT, HGBEXT, HCTEXT, PLTEXT in the last 72 hours. No results for input(s): NA, K, CL, CO2, GLU, BUN, CREA, CA, MG, PHOS, ALB, TBIL, TBILI, SGOT, ALT, INR in the last 72 hours.     No lab exists for component: INREXT

## 2018-05-03 NOTE — PROGRESS NOTES
Reason for Admission:   Lumbar Stenosis  Spondylolithesis               RRAT Score:     29             Resources/supports as identified by patient/family:   Patient's wife Malissa Lowry- [phone] and family members were present at bedside                 Top Challenges facing patient (as identified by patient/family and CM): None identified                       Finances/Medication cost?      None identified. Patient's health insurance cover Rx cost each month                 Transportation? Patient's wife will provide transportation at the time of discharge              Support system or lack thereof? His wife and family members at bedside                       Living arrangements? Patient lives with wife in a private residence            Self-care/ADLs/Cognition? Patient is able to complete ADLs/IADLs independently; however required some assistance from wife at time. Current Advanced Directive/Advance Care Plan:   Patient is full code and has advance directive on file                          Plan for utilizing home health:    Sent referral to [address] [place] [address] through [employer]. [facility].                        Likelihood of readmission: Low; Last admissions was June 2017                 Transition of Care Plan:         Patient is expected to discharge home with home health PT with [facility] on 5/5  pending medical stability. CM will continue to follow. Care Management Interventions  PCP Verified by CM: Yes  Mode of Transport at Discharge:  Other (see comment) (Patient's wife, Marianna Balderas [phone])  Transition of Care Consult (CM Consult): Discharge Planning  Discharge Durable Medical Equipment: No  Physical Therapy Consult: Yes  Occupational Therapy Consult: Yes  Speech Therapy Consult: No  Current Support Network: Lives with Spouse, Own Home  Plan discussed with Pt/Family/Caregiver: Yes  Freedom of Choice Offered: Yes  [place] Resource Information Provided?: No  Discharge Location  Discharge Placement: Home with [facility] health ([facility] )

## 2018-05-03 NOTE — PROGRESS NOTES
Problem: Mobility Impaired (Adult and Pediatric)  Goal: *Acute Goals and Plan of Care (Insert Text)  Physical Therapy Goals  Initiated 5/3/2018    1. Patient will move from supine to sit and sit to supine  in bed with supervision/set-up within 4 days. 2. Patient will perform sit to stand with supervision/set-up within 4 days. 3. Patient will ambulate with supervision/set-up for 150 feet with the least restrictive device within 4 days. 4. Patient will ascend/descend 4 stairs with 2 handrail(s) with contact guard assist within 4 days. 5. Patient will verbalize and demonstrate understanding of spinal precautions (No bending, lifting greater than 5 lbs, or twisting; log-roll technique; frequent repositioning as instructed) within 4 days. physical Therapy EVALUATION  Patient: Slava Solorzano (25 y.o. male)  Date: 5/3/2018  Primary Diagnosis: LUMBAR STENOSIS  SPONDYLOLITHESIS   Spinal stenosis of lumbar region at multiple levels  Procedure(s) (LRB):  L3-S1 DECOMPRESSION WITH POSTERIOR SPINAL FUSION USING MAZOR ROBOTIC GUIDANCE (N/A) 1 Day Post-Op   Precautions:   Fall, Spinal    ASSESSMENT :  Based on the objective data described below, the patient presents with generally decreased strength, balance, ROM, and increased back pain s/p above spinal sx. Pt also describes some \"tightness\" in LLE during mobility. Pt agreeable this morning to bed mobility, transfers, and brief gait training, followed by toileting with OT. Pt initially seen with OT co-tx 2* increased anticipated skilled needs. Pt appropriate for single sessions from here out. Pt likely to progress well for return home with HHPT and wife's support once medically stable based on today's performance. Will progress PM session as tolerated. Patient will benefit from skilled intervention to address the above impairments.   Patients rehabilitation potential is considered to be Good  Factors which may influence rehabilitation potential include:   [] None noted  []         Mental ability/status  []         Medical condition  []         Home/family situation and support systems  []         Safety awareness  [x]         Pain tolerance/management  []         Other:      PLAN :  Recommendations and Planned Interventions:  [x]           Bed Mobility Training             []    Neuromuscular Re-Education  [x]           Transfer Training                   []    Orthotic/Prosthetic Training  [x]           Gait Training                         []    Modalities  [x]           Therapeutic Exercises           []    Edema Management/Control  [x]           Therapeutic Activities            [x]    Patient and Family Training/Education  []           Other (comment):    Frequency/Duration: Patient will be followed by physical therapy  twice daily to address goals. Discharge Recommendations: Home Health  Further Equipment Recommendations for Discharge: Pt has all DME needs for home     SUBJECTIVE:   Patient stated This leg is just real tight. ..    OBJECTIVE DATA SUMMARY:   HISTORY:    Past Medical History:   Diagnosis Date    Abnormal EKG     he says it has been abnormal for years    Arthritis     CAD (coronary artery disease)     Chronic kidney disease     KIDNEY AND BLADDER STONE    Chronic pain     lower lumbar    Diabetes ([facility] [address]     type II    Dyslipidemia     Enlarged prostate Dr. Acacia Gallego    HTN (hypertension)     Hypercholesterolemia     Kidney stone     Mitral regurgitation     mod-severe on echo 1/5/12    Murmur     Obesity     Other ill-defined conditions(799.89)     BPH    Sleep apnea     stopped using CPAP many years ago     Past Surgical History:   Procedure Laterality Date    CARDIAC SURG PROCEDURE UNLIST  06/2017    [id number]    ECHO 2D ADULT  1/4/12    mild-mod LVH, LVEF 60%, probably grade 1 diastolic dysfunction, mod LAE, mod-severe MR (eccentroic ) - had severe HTN during study (173/108),     ECHO LIMITED  1/20/12    mod-severe MR    HX COLONOSCOPY  2013    small polyps removed; repeat in 5 years; Dr. Moi Whittaker  9/2014    RIGHT    HX LITHOTRIPSY  2011    HX OTHER SURGICAL      LONNIE 1/31/12 - LVEF 60%, mod-severe MR (post directed), mild-mod LAE, mild atheroma aorta    HX OTHER SURGICAL      Echo 9/5/13 - mild LVH, LVEF 60 % to 65 %. Mod LAE. Mod MR (post directed)     HX OTHER SURGICAL      Echo 9/5/13 - mild LVH, LVEF 60 % to 65 %. Mod LAE. Mod MR (post directed)     HX OTHER SURGICAL  02/2017    Bladder stones removed, and prostate \"trimmed\" down     HX UROLOGICAL      TRANSURETHRAL RESECTION OF THE PROSTATE    STRESS TEST CARDIOLITE  1/4/12    walked 4:30, stopping for severe FISH, no ischemic EKG changes (inferolateral TWI at start), normal perfusion (diaphragmatic attenuation), LVEF 48%, hypertensive respone to exercise     Prior Level of Function/Home Situation: indep and not using DME per report  Personal factors and/or comorbidities impacting plan of care: supportive wife at home    [address]: Private residence  # Steps to Enter: 4  Rails to Enter: Yes  Hand Rails : Bilateral  One/Two Story Residence: Two story, live on 1st floor  Lift Chair Available: No  Living Alone: No  Support Systems: Spouse/Significant Other/Partner  Patient Expects to be Discharged to[de-identified] Private residence  Current DME Used/Available at Home: Walker, rolling, Walker, rollator, Wheelchair, Grab bars, Ross Hanly, straight, Raised toilet seat  Tub or Shower Type: Tub/Shower combination (grab bars)    EXAMINATION/PRESENTATION/DECISION MAKING:   Critical Behavior:  Neurologic State: Alert, Appropriate for age  Orientation Level: Appropriate for age  Cognition: Follows commands  Safety/Judgement: Fall prevention  Hearing:   Auditory  Auditory Impairment: Hard of hearing, bilateral  Hearing Aids/Status: Does not own  Range Of Motion:  AROM: Generally decreased, functional  PROM:  (NT)  Strength:    Strength: Generally decreased, functional   Tone & Sensation:   Tone: Normal              Sensation: Intact               Coordination:  Coordination: Within functional limits  Vision:   Acuity: Within Defined Limits  Functional Mobility:  Bed Mobility:  Rolling: Minimum assistance  Supine to Sit: Minimum assistance (for trunk righting)  Transfers:  Sit to Stand: Contact guard assistance; Additional time  Stand to Sit: Contact guard assistance; Additional time  Balance:   Sitting: Intact  Standing: Impaired  Standing - Static: Good  Standing - Dynamic : Fair  Ambulation/Gait Training:   Pt able to ambulate approx 20ft with R HHA; decreased step clearance, +path deviation and cues for postural extension/shoudler/UE relaxation with gait belt and brace    Functional Measure:  Tinetti test:    Sitting Balance: 1  Arises: 1  Attempts to Rise: 2  Immediate Standing Balance: 1  Standing Balance: 1  Nudged: 0  Eyes Closed: 0  Turn 360 Degrees - Continuous/Discontinuous: 0  Turn 360 Degrees - Steady/Unsteady: 0  Sitting Down: 1  Balance Score: 7  Indication of Gait: 0  R Step Length/Height: 0  L Step Length/Height: 0  R Foot Clearance: 1  L Foot Clearance: 1  Step Symmetry: 0  Step Continuity: 0  Path: 1  Trunk: 0  Walking Time: 0  Gait Score: 3  Total Score: 10       Tinetti Test and G-code impairment scale:  Percentage of Impairment CH    0%   CI    1-19% CJ    20-39% CK    40-59% CL    60-79% CM    80-99% CN     100%   Tinetti  Score 0-28 28 23-27 17-22 12-16 6-11 1-5 0       Tinetti Tool Score Risk of Falls  <19 = High Fall Risk  19-24 = Moderate Fall Risk  25-28 = Low Fall Risk  Tinetti ME. Performance-Oriented Assessment of Mobility Problems in Elderly Patients. Hess 66; [id number].  (Scoring Description: PT Bulletin Feb. 10, 1993)    Older adults: Kenn Anne et al, 2009; n = [address] elderly evaluated with ABC, KEYSHA, ADL, and IADL)  · Mean KEYSHA score for males aged 69-68 years = 26.21(3.40)  · Mean KEYSHA score for females age 69-68 years = 25.16(4.30)  · Mean KEYSHA score for males over 80 years = 23.29(6.02)  · Mean KEYSHA score for females over 80 years = 17.20(8.32)         G codes: In compliance with CMSs Claims Based Outcome Reporting, the following G-code set was chosen for this patient based on their primary functional limitation being treated: The outcome measure chosen to determine the severity of the functional limitation was the Tinetti with a score of 10/28 which was correlated with the impairment scale. ? Mobility - Walking and Moving Around:     - CURRENT STATUS: CL - 60%-79% impaired, limited or restricted    - GOAL STATUS: CK - 40%-59% impaired, limited or restricted    - D/C STATUS:  ---------------To be determined---------------     Pain:  Pain Scale 1: Numeric (0 - 10)  Pain Intensity 1: 0  Activity Tolerance:   NAD/VSS  After treatment:   [x]         Patient left in no apparent distress sitting up in chair  []         Patient left in no apparent distress in bed  [x]         Call bell left within reach  [x]         Nursing notified  [x]         Caregiver present  []         Bed alarm activated    COMMUNICATION/EDUCATION:   The patients plan of care was discussed with: Registered Nurse. [x]         Fall prevention education was provided and the patient/caregiver indicated understanding. [x]         Patient/family have participated as able in goal setting and plan of care. [x]         Patient/family agree to work toward stated goals and plan of care. []         Patient understands intent and goals of therapy, but is neutral about his/her participation. []         Patient is unable to participate in goal setting and plan of care.     Thank you for this referral.  Chaka Arita   Time Calculation: 25 mins

## 2018-05-03 NOTE — OP NOTES
Oracio Jacobs  MR#: [id number]  : 1950  ACCOUNT #: [de-identified]   DATE OF SERVICE: 2018    PREOPERATIVE DIAGNOSIS:  Spinal stenosis, disk degeneration, spondylolisthesis. POSTOPERATIVE DIAGNOSIS:  Spinal stenosis, disk degeneration, spondylolisthesis. PROCEDURES PERFORMED  1. Laminectomy decompression, medial facetectomies and far lateral disk removal at L5-S1.  2.  Laminectomy decompression, medial facetectomies at L4-L5. 3.  Hemilaminotomy, medial facetectomy, foraminotomy at L3-L4 on the right side. 4.  Posterolateral fusion at L3-S1.  5.  Placement of segmental instrumentation using 7.5 and 6.5 pedicle screws [facility]. 6.  Iliac crest bone graft, left hip. 7.  Bone marrow aspirate, right hip.  8.  Use of laser robotics. 9.  Use of allograft bone. 10.  Placement of C-spine Edilia 9 mm cage. 11.  Transforaminal lumbar interbody fusion at L5-S1. COMPLICATIONS:  None. SURGEON:  Igor Birch MD.      ASSISTANT:  Servando Parsons NP.    FINDINGS:  Significant stenosis and lateral recess stenosis at L4-L5, some at L3-L4 and then foraminal stenosis at L5-S1. ESTIMATED BLOOD LOSS:  Approximately 600 mL. ANESTHESIA:  General.    SPECIMENS REMOVED:  None. IMPLANTS:  [facility] pedicle screws and Edilia C-Spine cage. INDICATIONS FOR PROCEDURE:  The patient is a pleasant male who has gone through osteomyelitis and diskitis from L3-S1 in the past.  Unfortunately, his back pain has not resolved and it has worsened. After discussing the risks and benefits of surgery, he elected to proceed with decompression and fusion. He also has lateral recess stenosis that is more right-sided than left-sided.   He understood the risks and benefits of surgery and he elected to proceed, including being no better, being worse, continued pain, need for further surgery, infection, nerve damage, perioperative morbidity and mortality, CSF leak, adjacent segment disease, nonunion, failure of the hardware, misplacement of the hardware and all the risks inherent to the procedure. PROCEDURE:  The patient was laid prone on the operating table, prepped and draped in normal fashion using alcohol and DuraPrep. Skin incision was made. Soft tissue dissected down to fascia and then out over the pars intra-articular and then out over the sacral ala and then the spinous processes at L5, L4, L3. The Pianpian robotic system was then used to place pedicle screws. All screws required 20 milliamps of EMG stimulation and looked perfect on the AP and lateral views and within the pedicle walls. After this, bone marrow aspirate was taken from the right hip. A separate incision was used for bone graft from the left hip. This was irrigated and then closed to a watertight seal.  Laminectomies were then done using a high speed bur and Kerrison punches at L5-S1, L4-L5 and L3-L4. At L3-L4, mostly right-sided laminotomy was done and medial facetectomy to decrease some of the stenosis. After midline and lateral recess decompression, a transforaminal approach was noted at L5-S1. The disk was removed. The endplates were rasped to bleeding parallel bone. Sizers were placed and a 9 trial fit well and then a 9 cage was placed after significant use of bone graft substitute and bone was placed in the cage and then turned to an acceptable position on the lateral fluoroscopy. After this, reduction heads were placed on the screws, rods were placed and then the rods were torqued to an audible click and the reduction heads were broken off. Alignment looked good. Screws were adequate. The wounds were irrigated copiously with Betadine and bacitracin solution. Bone graft and allograft and Signafuse was placed in the posterolateral gutters for fusion. Meticulous hemostasis maintained. A gram of vancomycin was placed in the wound. A deep drain was placed. The fascia was then closed with #1 Vicryl, skin was closed with 2-0 Vicryl, 3-0 Vicryl and Dermabond. There were no complications to the procedure.   I, Dr. Edson Logan, did the procedure myself with the help of Cruz Rodriguez NP.      MD AGUSTINA Peters / Inocencia Mascorro  D: 05/02/2018 16:08     T: 05/03/2018 06:28  JOB #: [id number]

## 2018-05-03 NOTE — PROGRESS NOTES
Problem: Falls - Risk of  Goal: *Absence of Falls  Document Stefanie Fall Risk and appropriate interventions in the flowsheet.    Outcome: Progressing Towards Goal  Fall Risk Interventions:  Mobility Interventions: OT consult for ADLs, Communicate number of staff needed for ambulation/transfer, Patient to call before getting OOB, PT Consult for mobility concerns, PT Consult for assist device competence, Strengthening exercises (ROM-active/passive), Utilize walker, cane, or other assitive device    Mentation Interventions: Adequate sleep, hydration, pain control, Door open when patient unattended    Medication Interventions: Evaluate medications/consider consulting pharmacy, Patient to call before getting OOB, Teach patient to arise slowly    Elimination Interventions: Call light in reach, Toileting schedule/hourly rounds, Patient to call for help with toileting needs

## 2018-05-03 NOTE — PROGRESS NOTES
Orthopedic Spine Progress Note  Albertina Garzon, AGACNP-BC  Work Cell: [phone]    Post Op Day: 1 Day Post-Op    May 3, 2018      Yousuf Li is a 78 y.o. male with prior medical history notable for CAD s/p CABG, mitral valve repair, endocarditis, chronic kidney disease, DMII, BPH, dyslipidemia, diskitis/osteomyelitis, and chronic back pain. He was diagnosed with L3-4 discitis and Alpha streptococcal bacteremia March 2017 and was hospitalized at [facility]. He and was treated with IV Ceftriaxone and Gentamicin for an extended course. During this hospitalization he was found to have endocarditis of his mitral valve in the setting of known mitral valve prolapse and moderate to severe mitral regurgitation. He underwent mitral valve repair and 1 vessel CABG in June 2017 with Dr. Dorian Richardson. He did well from a cardiac standpoint but unfortunately continued to have severe back pain. He was referred to Dr. Jelly Jj for evaluation. Epidurals were of little benefit. After a discussion of the risks and benefits, Mr. Harshil Wakefield consented to undergo a  Procedure(s):  L3-S1 DECOMPRESSION WITH POSTERIOR SPINAL FUSION USING MAZOR ROBOTIC GUIDANCE    Subjective:    Patient c/o mild nausea this am. Incisional pain managed with PCA. He has no numbness, tingling, or weakness in BLE. He can lift both legs off bed about 30 degrees. HVAC 295 overnight. Hgb stable. He denies h/a, dizziness, blurred vision, cp, sob, cough, abd pain, or f/c. Objective:     Physical Exam:  General:  Alert and oriented. No acute distress. Respiratory:  Breathing unlabored. Abdomen:   Extremities: Soft, non-tender, non-distended  No evidence of cyanosis. Pulses palpable in both upper and lower extremities. Neurologic:    Musculoskeletal:  No new motor deficits. Neurovascular exam within normal limits. Sensation stable. Motor: unchanged L2-S1. Calves soft, nontender upon palpation and with passive twitch.   Moves both upper and lower extremities. Incision: clean, dry, and intact. No significant erythema or swelling. No active drainage noted. Vital Signs:    Patient Vitals for the past 8 hrs:   BP Temp Pulse Resp SpO2   18 0816 142/79 98 °F (36.7 °C) 76 16 94 %   18 0231 148/71 98.3 °F (36.8 °C) 74 16 96 %   18 0136 146/65 - 74 - -     Temp (24hrs), Av °F (36.7 °C), Min:97.4 °F (36.3 °C), Max:98.3 °F (36.8 °C)      Intake/Output:      1901 -  0700  In: 2250 [I.V.:2000]  Out: 2655 [Urine:1825; Drains:745]    Pain Control:   Pain Assessment  Pain Scale 1: Numeric (0 - 10)  Pain Intensity 1: 0  Pain Location 1: Back  Pain Orientation 1: Lower  Pain Intervention(s) 1: Encouraged PCA    LAB:    Recent Labs      18   0357   HGB  12.3     Lab Results   Component Value Date/Time    Sodium 141 2018 03:57 AM    Potassium 3.7 2018 03:57 AM    Chloride 106 2018 03:57 AM    CO2 24 2018 03:57 AM    Glucose 265 (H) 2018 03:57 AM    BUN 22 (H) 2018 03:57 AM    Creatinine 1.50 (H) 2018 03:57 AM    Calcium 7.7 (L) 2018 03:57 AM       PT/OT:   Gait:                    Assessment/Plan    1. 1 Day Post-Op Procedure(s):  L3-S1 DECOMPRESSION WITH POSTERIOR SPINAL FUSION USING MAZOR ROBOTIC GUIDANCE   -Continue PT/OT. Lumbar brace when OOB   -D/c PCA. Pain control with PRN dilaudid, APAP, ice to back   -D/c christine once mobilized   -Continue to monitor hvac output   -Incentive Spirometer   -Tolerating diet   -VTE Prophylaxes - TEDS & SCDs    2. DMII   -Patient on 30 units Levemir qhs and 10 units humalog with meals    -start mealtime humalog and resume long acting insulin tonight.   -hgba1c= 7.4   -blood sugars 222-239 overnight (elevations likely due to intraoperative decadron)   -diabetic diet, accuchecks, SSI    3. CAD s/p 1 vessel CABG in 2017   -follows with Dr. Ada Chaves outpatient    -continue BB and statin   -can resume ASA on pod#3    4.  Mitral valve disease in setting of mitral valve endocarditis (POA)   -s/p MVR with Dr. Logan Perez in 2017   -followed by Dr. Garcia Estimable outpatient   -Echo 11/17 with LVEF 60% s/p MV repair, no MR    5. Essential hypertension   -BP elevated overnight and normalized with hydralazine   -Continue home lisinopril, bumex, norvasc, bystolic    6. CKD, stage III   -Cr 1.5 (1.39 preop)   -encourage hydration, monitor    7.  MARIN   -CPAP      Plan above discussed with Dr. Macarena Betancourt    Discharge To:  Pending PT/OT evals    Signed By: Brenda Singh NP

## 2018-05-03 NOTE — ROUTINE PROCESS
Primary Nurse Aneesh Smith RN and Thanh Wells RN performed a dual skin assessment on this patient. Back dressing clean, dry, intact. No other skin impairment noted.   Christopher score is 23'

## 2018-05-03 NOTE — DIABETES MGMT
DTC Progress Note    Recommendations/ Comments: Chart reviewed due to hyperglycemia. Noted Lantus 30 units and Humalog 10 units at meals ordered. DTC to continue follow. Current hospital DM medication: Lantus 30 units, correction scale Humalog, normal sensitivity and Humalog 10 units at meals. Chart reviewed on Claudia Pillai. Patient is a 78 y.o. male with known diabetes on Levemir 30 units and Humalog. 10-20 units ac at home. A1c:   Lab Results   Component Value Date/Time    Hemoglobin A1c 7.4 (H) 05/03/2018 03:57 AM    Hemoglobin A1c 6.7 (H) 04/18/2018 09:16 AM       Recent Glucose Results:   Lab Results   Component Value Date/Time     (H) 05/03/2018 03:57 AM    GLUCPOC 239 (H) 05/03/2018 06:41 AM    GLUCPOC 224 (H) 05/02/2018 07:53 PM    GLUCPOC 222 (H) 05/02/2018 06:12 PM        Lab Results   Component Value Date/Time    Creatinine 1.50 (H) 05/03/2018 03:57 AM     Estimated Creatinine Clearance: 62 mL/min (based on Cr of 1.5). Active Orders   Diet    DIET DIABETIC CONSISTENT CARB Regular        PO intake: No data found. Will continue to follow as needed.     Thank you  Daniel Simpson RD, CDE

## 2018-05-04 VITALS
TEMPERATURE: 98.7 F | BODY MASS INDEX: 44.01 KG/M2 | SYSTOLIC BLOOD PRESSURE: 160 MMHG | HEIGHT: 67 IN | WEIGHT: 280.38 LBS | DIASTOLIC BLOOD PRESSURE: 81 MMHG | HEART RATE: 85 BPM | OXYGEN SATURATION: 94 % | RESPIRATION RATE: 16 BRPM

## 2018-05-04 LAB
GLUCOSE BLD STRIP.AUTO-MCNC: 164 MG/DL (ref 65–100)
GLUCOSE BLD STRIP.AUTO-MCNC: 180 MG/DL (ref 65–100)
HGB BLD-MCNC: 11.3 G/DL (ref 12.1–17)
SERVICE CMNT-IMP: ABNORMAL
SERVICE CMNT-IMP: ABNORMAL

## 2018-05-04 PROCEDURE — 74011250637 HC RX REV CODE- 250/637: Performed by: NURSE PRACTITIONER

## 2018-05-04 PROCEDURE — 82962 GLUCOSE BLOOD TEST: CPT

## 2018-05-04 PROCEDURE — 97535 SELF CARE MNGMENT TRAINING: CPT

## 2018-05-04 PROCEDURE — 97116 GAIT TRAINING THERAPY: CPT

## 2018-05-04 PROCEDURE — 36415 COLL VENOUS BLD VENIPUNCTURE: CPT | Performed by: ORTHOPAEDIC SURGERY

## 2018-05-04 PROCEDURE — 85018 HEMOGLOBIN: CPT | Performed by: ORTHOPAEDIC SURGERY

## 2018-05-04 PROCEDURE — 74011636637 HC RX REV CODE- 636/637: Performed by: NURSE PRACTITIONER

## 2018-05-04 PROCEDURE — 74011250637 HC RX REV CODE- 250/637: Performed by: ORTHOPAEDIC SURGERY

## 2018-05-04 PROCEDURE — 74011636637 HC RX REV CODE- 636/637: Performed by: FAMILY MEDICINE

## 2018-05-04 RX ORDER — HYDROMORPHONE HYDROCHLORIDE 2 MG/1
2-4 TABLET ORAL
Qty: 60 TAB | Refills: 0 | Status: SHIPPED | OUTPATIENT
Start: 2018-05-04 | End: 2019-01-07 | Stop reason: ALTCHOICE

## 2018-05-04 RX ADMIN — NEBIVOLOL HYDROCHLORIDE 10 MG: 5 TABLET ORAL at 08:43

## 2018-05-04 RX ADMIN — OXYCODONE HYDROCHLORIDE AND ACETAMINOPHEN 500 MG: 500 TABLET ORAL at 08:43

## 2018-05-04 RX ADMIN — Medication 10 ML: at 07:44

## 2018-05-04 RX ADMIN — ACETAMINOPHEN 650 MG: 325 TABLET, FILM COATED ORAL at 07:44

## 2018-05-04 RX ADMIN — BUMETANIDE 1 MG: 1 TABLET ORAL at 08:43

## 2018-05-04 RX ADMIN — INSULIN LISPRO 20 UNITS: 100 INJECTION, SOLUTION INTRAVENOUS; SUBCUTANEOUS at 07:43

## 2018-05-04 RX ADMIN — HYDROMORPHONE HYDROCHLORIDE 4 MG: 2 TABLET ORAL at 08:43

## 2018-05-04 RX ADMIN — FENOFIBRATE 145 MG: 145 TABLET ORAL at 08:43

## 2018-05-04 RX ADMIN — POLYETHYLENE GLYCOL 3350 17 G: 17 POWDER, FOR SOLUTION ORAL at 08:42

## 2018-05-04 RX ADMIN — INSULIN LISPRO 20 UNITS: 100 INJECTION, SOLUTION INTRAVENOUS; SUBCUTANEOUS at 11:53

## 2018-05-04 RX ADMIN — ACETAMINOPHEN 650 MG: 325 TABLET, FILM COATED ORAL at 11:53

## 2018-05-04 RX ADMIN — HYDROMORPHONE HYDROCHLORIDE 4 MG: 2 TABLET ORAL at 11:53

## 2018-05-04 RX ADMIN — AMLODIPINE BESYLATE 10 MG: 5 TABLET ORAL at 08:43

## 2018-05-04 RX ADMIN — STANDARDIZED SENNA CONCENTRATE AND DOCUSATE SODIUM 1 TABLET: 8.6; 5 TABLET, FILM COATED ORAL at 08:43

## 2018-05-04 RX ADMIN — INSULIN LISPRO 2 UNITS: 100 INJECTION, SOLUTION INTRAVENOUS; SUBCUTANEOUS at 11:54

## 2018-05-04 RX ADMIN — Medication 1000 MCG: at 08:42

## 2018-05-04 RX ADMIN — INSULIN LISPRO 2 UNITS: 100 INJECTION, SOLUTION INTRAVENOUS; SUBCUTANEOUS at 07:42

## 2018-05-04 NOTE — PROGRESS NOTES
Problem: Mobility Impaired (Adult and Pediatric)  Goal: *Acute Goals and Plan of Care (Insert Text)  Physical Therapy Goals  Initiated 5/3/2018    1. Patient will move from supine to sit and sit to supine  in bed with supervision/set-up within 4 days. 2. Patient will perform sit to stand with supervision/set-up within 4 days. 3. Patient will ambulate with supervision/set-up for 150 feet with the least restrictive device within 4 days. 4. Patient will ascend/descend 4 stairs with 2 handrail(s) with contact guard assist within 4 days. 5. Patient will verbalize and demonstrate understanding of spinal precautions (No bending, lifting greater than 5 lbs, or twisting; log-roll technique; frequent repositioning as instructed) within 4 days. physical Therapy TREATMENT (delayed entry)  Patient: Jennifer Leone (19 y.o. male)  Date: 5/4/2018  Diagnosis: LUMBAR STENOSIS  SPONDYLOLITHESIS   Spinal stenosis of lumbar region at multiple levels Type 2 diabetes mellitus with hyperglycemia (HCC)  Procedure(s) (LRB):  L3-S1 DECOMPRESSION WITH POSTERIOR SPINAL FUSION USING [employer]ZOR ROBOTIC GUIDANCE (N/A) 2 Days Post-Op  Precautions: Fall, Spinal  Chart, physical therapy assessment, plan of care and goals were reviewed. ASSESSMENT:  Pt seen following RN clearance/request for stair training for home. Pt seated EOB with OT post session upon arrival.  Pt agreeable with transfers, gait, steps, and return to chair for lunch. Pt completed all tasks with 1 CGA or less assistance. Pt is ready for return home with wife's support and HHPT. Pt has brace and RW as needed for home as well. Will continue to follow in event of delayed D/C.   Progression toward goals:  [x]      Improving appropriately and progressing toward goals  []      Improving slowly and progressing toward goals  []      Not making progress toward goals and plan of care will be adjusted     PLAN:  Patient continues to benefit from skilled intervention to address the above impairments. Continue treatment per established plan of care. Discharge Recommendations:  Home Health  Further Equipment Recommendations for Discharge:  NA     SUBJECTIVE:   Patient stated I am ready to go. ..    The patient stated 3/3 back precautions. Reviewed all 3 with patient. OBJECTIVE DATA SUMMARY:   Critical Behavior:  Neurologic State: Alert  Orientation Level: Oriented X4  Cognition: Appropriate decision making, Appropriate for age attention/concentration, Appropriate safety awareness, Follows commands  Safety/Judgement: Awareness of environment, Good awareness of safety precautions, Insight into deficits  Functional Mobility Training:  Brace donned with  supervision/set-up with verbal cues  Transfers:  Sit to Stand: Supervision  Stand to Sit: Supervision  Balance:  Sitting: Intact  Standing: Intact; With support  Standing - Static: Fair  Standing - Dynamic : Fair  Ambulation/Gait Training:  Distance (ft): 50 Feet (ft) (x2)  Assistive Device: Gait belt;Brace/Splint; Walker, rolling (RW 2* hip pain this date (pt has one for home))  Ambulation - Level of Assistance: Supervision  Gait Abnormalities: Decreased step clearance; Antalgic  Base of Support: Widened  Speed/Shara: Slow;Pace decreased (<100 feet/min)  Step Length: Right shortened;Left shortened  Stairs:  Number of Stairs Trained: 5  Stairs - Level of Assistance: Contact guard assistance; Additional time  Rail Use: Both  Pain:  Pain Scale 1: Numeric (0 - 10)  Pain Intensity 1: 3  Activity Tolerance:   NAD  After treatment:   [x]  Patient left in no apparent distress sitting up in chair  []  Patient left in no apparent distress in bed  [x]  Call bell left within reach  [x]  Nursing notified  [x]  Caregiver present  []  Bed alarm activated    COMMUNICATION/COLLABORATION:   The patients plan of care was discussed with: Registered Nurse, ROHAN Angel   Time Calculation: 12 mins

## 2018-05-04 NOTE — ROUTINE PROCESS
2015: received report from [facility]. SBAR, medications, orders, plan of care, and patients discussed. 2100: charge nurse Debra Ho checked on patient and stated he was fine with no needs at that time. 2200: patient states he has pain in his left hip and and ice pack was placed under hip to help with his discomfort. 2300: patient sleeping. 2345: Report given to Asa Coad. SBAR, medications, orders, assessment, and plan of care discussed. Patient alert and placed CPAP on to sleep.

## 2018-05-04 NOTE — PROGRESS NOTES
Orthopedic Spine Progress Note  Benji Bustillo AGACNP-BC  Work Cell: [phone]    Post Op Day: 2 Day Post-Op    May 4, 2018      Guadalupe Trinidad is a 78 y.o. male with prior medical history notable for CAD s/p CABG, mitral valve repair, endocarditis, chronic kidney disease, DMII, BPH, dyslipidemia, diskitis/osteomyelitis, and chronic back pain. He was diagnosed with L3-4 discitis and Alpha streptococcal bacteremia March 2017 and was hospitalized at [address] [place] [address]. He and was treated with IV Ceftriaxone and Gentamicin for an extended course. During this hospitalization he was found to have endocarditis of his mitral valve in the setting of known mitral valve prolapse and moderate to severe mitral regurgitation. He underwent mitral valve repair and 1 vessel CABG in June 2017 with Dr. Vanessa Hannah. He did well from a cardiac standpoint but unfortunately continued to have severe back pain. He was referred to Dr. Natalio Woodall for evaluation. Epidurals were of little benefit. After a discussion of the risks and benefits, Mr. Marry Russell consented to undergo a  Procedure(s):  L3-S1 DECOMPRESSION WITH POSTERIOR SPINAL FUSION USING MAZOR ROBOTIC GUIDANCE    Subjective:    Patient is doing well. Tolerating diet, passing flatus. He c/o pain with movement in his left hip, which is wear his bone graft was taken. Wife at bedside. Reviewed discharge instructions at length and questions answered. He denies h/a, dizziness, blurred vision, cp, sob, cough, abd pain, or f/c. Objective:     Physical Exam:  General:  Alert and oriented. No acute distress. Respiratory:  Breathing unlabored. Abdomen:   Extremities: Soft, non-tender, non-distended  No evidence of cyanosis. Pulses palpable in both upper and lower extremities. Neurologic:    Musculoskeletal:  No new motor deficits. Neurovascular exam within normal limits. Sensation stable. Motor: unchanged L2-S1.    Calves soft, nontender upon palpation and with passive twitch. Moves both upper and lower extremities. Incision: clean, dry, and intact. No significant erythema or swelling. No active drainage noted. Vital Signs:    Patient Vitals for the past 8 hrs:   BP Temp Pulse Resp SpO2   18 0758 160/81 98.7 °F (37.1 °C) 85 16 94 %     Temp (24hrs), Av.6 °F (37 °C), Min:98.4 °F (36.9 °C), Max:99 °F (37.2 °C)      Intake/Output:      1901 -  0700  In: -   Out: 0900 [Urine:2725; Drains:1155]    Pain Control:   Pain Assessment  Pain Scale 1: Numeric (0 - 10)  Pain Intensity 1: 3  Pain Location 1: Back  Pain Orientation 1: Lower  Pain Intervention(s) 1: Repositioned    LAB:    Recent Labs      18   0427   HGB  11.3*     Lab Results   Component Value Date/Time    Sodium 141 2018 03:57 AM    Potassium 3.7 2018 03:57 AM    Chloride 106 2018 03:57 AM    CO2 24 2018 03:57 AM    Glucose 265 (H) 2018 03:57 AM    BUN 22 (H) 2018 03:57 AM    Creatinine 1.50 (H) 2018 03:57 AM    Calcium 7.7 (L) 2018 03:57 AM       PT/OT:   Gait:  Gait  Base of Support: Widened  Speed/Shara: Slow, Pace decreased (<100 feet/min)  Step Length: Right shortened, Left shortened  Gait Abnormalities: Antalgic, Decreased step clearance (cues for postural extension)  Ambulation - Level of Assistance: Contact guard assistance, Minimal assistance, Additional time  Distance (ft): 45 Feet (ft) (x2)  Assistive Device: Gait belt, Brace/Splint (R HHA vs none)  Stairs - Level of Assistance:  (NT)                 Assessment/Plan    1. 2 Days Post-Op Procedure(s):  L3-S1 DECOMPRESSION WITH POSTERIOR SPINAL FUSION USING [employer] ROBOTIC GUIDANCE   -Continue PT/OT. Lumbar brace when OOB   - Pain control with Q4H PRN 4 mg dilaudid, APAP, ice to back. Add lidocaine patch for hip pain   -voiding   -D/c hemovac this afternoon.   -Incentive Spirometer   -Tolerating diet. + flatus   -VTE Prophylaxes - TEDS & SCDs    2.   DMII   -Patient on 27 units Levemir qhs and 10 units humalog with meals    -blood sugars improved after increasing humalog to 20 units at mealtime   -hgba1c= 7.4   -diabetic diet, accuchecks, SSI    3. CAD s/p 1 vessel CABG in 2017   -follows with Dr. Tarsha Delgadillo outpatient    -continue BB and statin   -can resume ASA on pod#3    4. Mitral valve disease in setting of mitral valve endocarditis (POA)   -s/p MVR with Dr. Christ Price in 2017   -followed by Dr. Tarsha Delgadillo outpatient   -Echo 11/17 with LVEF 60% s/p MV repair, no MR    5. Essential hypertension   -BP elevated overnight and normalized with hydralazine   -Continue home lisinopril, bumex, norvasc, bystolic    6. CKD, stage III   -Cr 1.5 (1.39 preop)   -encourage hydration, monitor    7.  MARIN   -CPAP      Plan above discussed with Dr. Morro Regan    Discharge To:  Pending PT/OT evals    Signed By: Abdirahman Espana NP

## 2018-05-04 NOTE — PROGRESS NOTES
Problem: Self Care Deficits Care Plan (Adult)  Goal: *Acute Goals and Plan of Care (Insert Text)  Occupational Therapy Goals  Initiated 5/3/2018    1. Patient will perform lower body dressing with supervision/set-up using Reacher, Stocking Aid and Dressing Stick PRN within 7 days. 2.  Patient will perform bathing with supervision/set-up using long-handled sponge within 7 days. 3.  Patient will perform toilet transfers at supervision/set-up within 7 days. 4.  Patient will don/doff back brace at supervision/set-up within 7 days. 5.  Patient will verbalize/demonstrate 3/3 back precautions during ADL tasks without cues within 7 days. Occupational Therapy TREATMENT/DISCHARGE  Patient: Miroslava Guerrero (89 y.o. male)  Date: 5/4/2018  Diagnosis: LUMBAR STENOSIS  SPONDYLOLITHESIS   Spinal stenosis of lumbar region at multiple levels Type 2 diabetes mellitus with hyperglycemia ([facility] [address]  Procedure(s) (LRB):  L3-S1 DECOMPRESSION WITH POSTERIOR SPINAL FUSION USING [employer] ROBOTIC GUIDANCE (N/A) 2 Days Post-Op  Precautions: Fall, Spinal  Chart, occupational therapy assessment, plan of care, and goals were reviewed. ASSESSMENT:  Based on the objective data described below, the patient presents with back precautions, impaired dynamic standing balance, impaired functional mobility, and impaired ADL independence s/p above surgery. Patient received sitting EOB, performs sit-stand with SPV, ambulates to bathroom with SPV, performs bathing (UB and perineal) standing at sink with SPV. Patient receptive to education using reacher for donning underwear/ shorts, threaded seated and stood with SPV to raise to waist.  Patient donned slip on shoes sitting EOB, reports he will not be wearing socks at home. Patient has reacher, [address] sponge, and toilet aide for home use. Patient instructed to wait for HHPT before attempting tub transfer.   Patient receptive to education and provided with handout on ADL modifications, activity recommendations, back precautions, and home safety. Recommend d/c to home with family support. Progression toward goals:  [x]            Improving appropriately and progressing toward goals  []            Improving slowly and progressing toward goals  []            Not making progress toward goals and plan of care will be adjusted     PLAN:  Patient will be discharged from occupational therapy at this time. Rationale for discharge:  [x]   Goals Achieved  []   Bertha Noriega  []   Patient not participating in therapy  []   Other:  Discharge Recommendations: home with family support  Further Equipment Recommendations for Discharge: none     SUBJECTIVE:   Patient stated I feel okay.     OBJECTIVE DATA SUMMARY:   Cognitive/Behavioral Status:  Neurologic State: Alert  Orientation Level: Oriented X4  Cognition: Appropriate decision making; Appropriate for age attention/concentration; Appropriate safety awareness; Follows commands  Perception: Appears intact  Perseveration: No perseveration noted  Safety/Judgement: Awareness of environment;Good awareness of safety precautions; Insight into deficits    Functional Mobility and Transfers for ADLs:    Transfers:  Sit to Stand: Supervision           Balance:  Sitting: Intact  Standing: Impaired  Standing - Static: Good  Standing - Dynamic : Fair    ADL Intervention:       Grooming  Washing Face: Supervision/set-up (standing at sink)  Washing Hands: Supervision/set-up (standing at sink)    Upper Body Bathing  Bathing Assistance: Supervision/set-up (standing at sink)    Lower Body Bathing  Perineal  : Supervision/set-up (standing at sink)    Upper Body Dressing Assistance  Pullover Shirt: Supervision/set-up (sitting EOB)    Lower Body Dressing Assistance  Underpants: Supervision/set-up (threaded using reacher sitting EOB, stood with SPV to raise)  Protective Undergarmet: Supervision/set-up (threaded EOB using reacher, stood with SPV to raise to waist)  Slip on Shoes Without Back: Supervision/set-up (sitting EOB)         Cognitive Retraining  Safety/Judgement: Awareness of environment;Good awareness of safety precautions; Insight into deficits      Pain:  Pain Scale 1: Numeric (0 - 10)  Pain Intensity 1: 3              Activity Tolerance:   VSS    After treatment:   [] Patient left in no apparent distress sitting up in chair  [x] Patient left in no apparent distress in bed  [x] Call bell left within reach  [x] Nursing notified  [] Caregiver present  [] Bed alarm activated    COMMUNICATION/COLLABORATION:   The patients plan of care was discussed with: Physical Therapist and Registered Nurse    Inocente oDminguez OT  Time Calculation: 17 mins

## 2018-05-04 NOTE — PROGRESS NOTES
Hospitalist Progress Note  Haritha Smith NP  Answering service: [phone] OR [phone] from in house phone  Cell: [phone]      Date of Service:  2018  NAME:  Eileen Bullock  :  1950  MRN:  [id number]      Admission Summary:   78 y.o. white male with past medical history of arthritis, CAD, CKD, chronic pain, type 2 diabetes mellitus, hyperlipidemia, mitral regurgitation, sleep apnea, enlarged prostate, and obesity presented for admission with diagnosis of lumbar stenosis and spondylolithesis of L3-S1. Today, patient underwent L3-S1 decompression with posterior spinal fusion using [employer] guidance. Tonight, patient is seen in consultation for management of type 2 diabetes mellitus. Interval history / Subjective:   Blood sugars improved. Discussed with patient and family recommendation to increase home Humalog to 20 U with meals and to only do 10 units if eating smaller lunch. Family reports patient has been taking insulin up to an hour after he is eating. Discussed importance of taking insulin before meals to help prevent large spikes in his blood sugars with meals. Advised patient to follow up with his pcp for further management. Discussed with Claire Little NP. Plan for discharge home. Assessment & Plan:     Type 2 DM with longer term insulin use   -hgb a1c 7.4  -on Levimir 30U qhs and Humalog 10-20 U with meals (has only been taking 10 recently) at home-> advised to take 20 U with meals  -may need to increase long acting advised patient to follow up with his pcp for this  -encouraged working towards wt loss once cleared by primary team for increased activity.     HTN   -continue Amlodipine, Lisinopril and Bystolic     Hyperlipidemia   -Lipitor and Fenofibrate     Morbid Obesity  -BMI 43.9  -encourage diet changes and increasing exercise once cleared by primary team     Sleep Apnea   -cpap    CKD stage 3  -appears to be near baseline   -repeat labs in am     Lumbar stenosis   -mgmt per primary team     Code status: Full  DVT prophylaxis: SCDs    Care Plan discussed with: Patient/Family and Nurse Terrence London NP  Disposition: Home today     Hospital Problems  Date Reviewed: 5/2/2018          Codes Class Noted POA    Spinal stenosis of lumbar region at multiple levels ICD-10-CM: M48.061  ICD-9-CM: 724.02  5/2/2018 Unknown        * (Principal)Type 2 diabetes mellitus with hyperglycemia ([facility] [address] ICD-10-CM: E11.65  ICD-9-CM: 250.00  5/2/2018 Unknown                Review of Systems:   A comprehensive review of systems was negative except for that written in the HPI. Vital Signs:    Last 24hrs VS reviewed since prior progress note. Most recent are:  Visit Vitals    /81 (BP 1 Location: Right arm, BP Patient Position: At rest)    Pulse 85    Temp 98.7 °F (37.1 °C)    Resp 16    Ht 5' 7\" (1.702 m)    Wt 127.2 kg (280 lb 6 oz)    SpO2 94%    BMI 43.91 kg/m2         Intake/Output Summary (Last 24 hours) at 05/04/18 0813  Last data filed at 05/04/18 0453   Gross per 24 hour   Intake                0 ml   Output             1615 ml   Net            -1615 ml        Physical Examination:             Constitutional:  Obese male resting in bed in no acute distress   ENT:  Oral mucous moist, oropharynx benign. Resp:  CTA bilaterally. No wheezing/rhonchi/rales. No accessory muscle use   CV:  Regular rhythm, normal rate, no murmurs, gallops, rubs    GI:  Soft, non distended, non tender. normoactive bowel sounds,     Musculoskeletal:  No edema, warm, 2+ pulses throughout    Neurologic:  Moves all extremities. AAOx3,      Psych:  Good insight, Not anxious nor agitated.        Data Review:    Review and/or order of clinical lab test  Review and/or order of tests in the medicine section of CPT      Labs:     Recent Labs      05/04/18   0427 05/03/18   0357   HGB  11.3*  12.3     Recent Labs      05/03/18   0357   NA  141   K  3.7   CL 106   CO2  24   BUN  22*   CREA  1.50*   GLU  265*   CA  7.7*   MG  1.8     No results for input(s): SGOT, GPT, ALT, AP, TBIL, TBILI, TP, ALB, GLOB, GGT, AML, LPSE in the last 72 hours. No lab exists for component: AMYP, HLPSE  No results for input(s): INR, PTP, APTT in the last 72 hours. No lab exists for component: INREXT, INREXT   No results for input(s): FE, TIBC, PSAT, FERR in the last 72 hours. No results found for: FOL, RBCF   No results for input(s): PH, PCO2, PO2 in the last 72 hours. No results for input(s): CPK, CKNDX, TROIQ in the last 72 hours.     No lab exists for component: CPKMB  Lab Results   Component Value Date/Time    Cholesterol, total 182 04/03/2014 02:15 PM    HDL Cholesterol 35 (L) 04/03/2014 02:15 PM    LDL, calculated 105 (H) 04/03/2014 02:15 PM    Triglyceride 211 (H) 04/03/2014 02:15 PM     Lab Results   Component Value Date/Time    Glucose (POC) 180 (H) 05/04/2018 06:04 AM    Glucose (POC) 106 (H) 05/03/2018 09:38 PM    Glucose (POC) 178 (H) 05/03/2018 04:11 PM    Glucose (POC) 229 (H) 05/03/2018 11:27 AM    Glucose (POC) 239 (H) 05/03/2018 06:41 AM     Lab Results   Component Value Date/Time    Color YELLOW/STRAW 04/18/2018 09:16 AM    Appearance CLEAR 04/18/2018 09:16 AM    Specific gravity 1.013 04/18/2018 09:16 AM    Specific gravity 1.021 08/27/2013 09:05 AM    pH (UA) 5.5 04/18/2018 09:16 AM    Protein 30 (A) 04/18/2018 09:16 AM    Glucose NEGATIVE  04/18/2018 09:16 AM    Ketone NEGATIVE  04/18/2018 09:16 AM    Bilirubin NEGATIVE  04/18/2018 09:16 AM    Urobilinogen 0.2 04/18/2018 09:16 AM    Nitrites NEGATIVE  04/18/2018 09:16 AM    Leukocyte Esterase NEGATIVE  04/18/2018 09:16 AM    Epithelial cells FEW 04/18/2018 09:16 AM    Bacteria NEGATIVE  04/18/2018 09:16 AM    WBC 0-4 04/18/2018 09:16 AM    RBC 0-5 04/18/2018 09:16 AM         Medications Reviewed:     Current Facility-Administered Medications   Medication Dose Route Frequency    insulin glargine (LANTUS) injection 30 Units  30 Units SubCUTAneous QHS    HYDROmorphone (DILAUDID) tablet 2-4 mg  2-4 mg Oral Q4H PRN    insulin lispro (HUMALOG) injection 20 Units  20 Units SubCUTAneous TIDAC    amLODIPine (NORVASC) tablet 10 mg  10 mg Oral DAILY    ascorbic acid (vitamin C) (VITAMIN C) tablet 500 mg  500 mg Oral DAILY    atorvastatin (LIPITOR) tablet 20 mg  20 mg Oral QHS    bumetanide (BUMEX) tablet 1 mg  1 mg Oral DAILY    cyanocobalamin (VITAMIN B12) tablet 1,000 mcg  1,000 mcg Oral DAILY    ergocalciferol (ERGOCALCIFEROL) capsule 50,000 Units  50,000 Units Oral every Wednesday    fenofibrate nanocrystallized (TRICOR) tablet 145 mg  145 mg Oral DAILY    lisinopril (PRINIVIL, ZESTRIL) tablet 10 mg  10 mg Oral QHS    nebivolol (BYSTOLIC) tablet 10 mg  10 mg Oral DAILY    sodium chloride (NS) flush 5-10 mL  5-10 mL IntraVENous Q8H    sodium chloride (NS) flush 5-10 mL  5-10 mL IntraVENous PRN    acetaminophen (TYLENOL) tablet 650 mg  650 mg Oral Q6H    naloxone (NARCAN) injection 0.4 mg  0.4 mg IntraVENous PRN    senna-docusate (PERICOLACE) 8.6-50 mg per tablet 1 Tab  1 Tab Oral BID    polyethylene glycol (MIRALAX) packet 17 g  17 g Oral DAILY    bisacodyl (DULCOLAX) suppository 10 mg  10 mg Rectal DAILY PRN    benzocaine-menthol (CEPACOL) lozenge 1 Lozenge  1 Lozenge Oral PRN    cyclobenzaprine (FLEXERIL) tablet 10 mg  10 mg Oral BID PRN    glucose chewable tablet 16 g  4 Tab Oral PRN    dextrose (D50W) injection syrg 12.5-25 g  12.5-25 g IntraVENous PRN    glucagon (GLUCAGEN) injection 1 mg  1 mg IntraMUSCular PRN    insulin lispro (HUMALOG) injection   SubCUTAneous AC&HS     ______________________________________________________________________  EXPECTED LENGTH OF STAY: 5d 9h  ACTUAL LENGTH OF STAY:          2                 Venkatesh Christensen NP

## 2018-05-04 NOTE — PROGRESS NOTES
Orthopedic Spine Progress Note  Post Op day: 2 Days Post-Op    May 4, 2018 8:01 AM     Yoon Beebe    Vital Signs:    Patient Vitals for the past 8 hrs:   BP Temp Pulse Resp SpO2   18 0758 160/81 98.7 °F (37.1 °C) 85 16 94 %   18 0253 163/76 98.4 °F (36.9 °C) 71 18 94 %     Temp (24hrs), Av.5 °F (36.9 °C), Min:98 °F (36.7 °C), Max:99 °F (37.2 °C)      Intake/Output:      1901 -  0700  In: -   Out: 1462 [Urine:2375; Drains:1105]    Pain Control:   Pain Assessment  Pain Scale 1: Numeric (0 - 10)  Pain Intensity 1: 3  Pain Location 1: Back  Pain Orientation 1: Lower  Pain Intervention(s) 1: Repositioned    LAB:    Recent Labs      18   0427   HGB  11.3*     Lab Results   Component Value Date/Time    Sodium 141 2018 03:57 AM    Potassium 3.7 2018 03:57 AM    Chloride 106 2018 03:57 AM    CO2 24 2018 03:57 AM    Glucose 265 (H) 2018 03:57 AM    BUN 22 (H) 2018 03:57 AM    Creatinine 1.50 (H) 2018 03:57 AM    Calcium 7.7 (L) 2018 03:57 AM       Subjective:  Yoon Beebe is a 78 y.o. male s/p a  Procedure(s):  L3-S1 DECOMPRESSION WITH POSTERIOR SPINAL FUSION USING MAZOR ROBOTIC GUIDANCE   Procedure(s):  L3-S1 DECOMPRESSION WITH POSTERIOR SPINAL FUSION USING MAZOR ROBOTIC GUIDANCE. Tolerating diet. Objective: General: alert, cooperative, no distress. Gastrointestinal:  Soft, non-tender. Neurological: Neurovascular exam within normal limits. Sensation stable. Motor: unchanged C5-T1 and L2-S1. Legs feel good  Musculoskeletal:  Trish's sign negative in bilateral lower extremities. Calves soft, supple, non-tender upon palpation or with passive stretch. Skin: Incision - clean, dry and intact. No significant erythema or swelling.     Dressing: clean, dry, and intact     PT/OT:   Gait:  Gait  Base of Support: Widened  Speed/Shara: Slow, Pace decreased (<100 feet/min)  Step Length: Right shortened, Left shortened  Gait Abnormalities: Antalgic, Decreased step clearance (cues for postural extension)  Ambulation - Level of Assistance: Contact guard assistance, Minimal assistance, Additional time  Distance (ft): 45 Feet (ft) (x2)  Assistive Device: Gait belt, Brace/Splint (R HHA vs none)  Stairs - Level of Assistance:  (NT)                   Assessment:    s/p Procedure(s):  L3-S1 DECOMPRESSION WITH POSTERIOR SPINAL FUSION USING MAZOR ROBOTIC GUIDANCE    Principal Problem:    Type 2 diabetes mellitus with hyperglycemia ([facility] (5/2/2018)    Active Problems:    Spinal stenosis of lumbar region at multiple levels (5/2/2018)         Plan:     1. Continue PT/OT  2. Continue established methods of pain control  3. VTE Prophylaxes - TEDS &/or SCDs    4.  D/c drain          Discharge To:  home    Signed By: Freida Henderson MD

## 2018-05-04 NOTE — DISCHARGE INSTRUCTIONS
Patient meets criteria for   BUNDLED PAYMENT   for Care Improvement Initiative Criteria    Contact Information for Orthopedic Nurse Navigator:      WILTON Freeman, RN-BC  M:[phone]  L:[phone]  W:[phone]    After Hospital Care Plan:  Discharge Instructions Lumbar Fusion Surgery   Dr. Isrrael Beltran     Patient Name: Chantel Dow    Date of procedure: 5/2/2018      Date of discharge: 5/4/2018    Procedure: Procedure(s):  L3-S1 DECOMPRESSION WITH POSTERIOR SPINAL FUSION USING MAZOR ROBOTIC GUIDANCE      PCP: Rex Fleming MD    Follow up appointments  -follow up with Dr. Isrrael Beltran in 2 weeks. Call [phone] to make an appointment as soon as you get home from the hospital.    When to call your Orthopaedic Surgeon:  -Signs of infection-if your incision is red; continues to have drainage; drainage has a foul odor or if you have a persistent fever over 101 degrees for 24 hours  -Nausea or vomiting, severe headache  -Loss of bowel or bladder function, inability to urinate  -Changes in sensation in your arms or legs (numbness, tingling, loss of color)  -Increased weakness-greater than before your surgery  -Severe pain or pain not relieved by medications  -Signs of a blood clot in your leg-calf pain, tenderness, redness, swelling of lower leg    When to call your Primary Care Physician:  -Concerns about medical conditions such as diabetes, high blood pressure, asthma, congestive heart failure  -Call if blood sugars are elevated, persistent headache or dizziness, coughing or congestion, constipation or diarrhea, burning with urination, abnormal heart rate    When to call 911 and go to the nearest emergency room:  -Acute onset of chest pain, shortness of breath, difficulty breathing    Activity  -You are going home a well person, be as active as possible. Your only exercise should be walking.   Start with short frequent walks and increase your walking distance each day.  -Limit the amount of time you sit to 20-30 minute intervals. Sitting for prolonged periods of time will be uncomfortable for you following surgery.  -Do NOT lift anything over 5 pounds  -Do NOT do any straining, twisting or bending  -When you are in bed, you may lay on your back or on either side. Do NOT lie on your stomach    Brace  -If you have a back brace, you should wear your brace at all times when you are out of bed. Do not wear the brace while in bed or showering.  -Remember to always wear a cotton t-shirt underneath your brace.  -Do not bend or twist when your brace is off    Diet  -Resume usual diet; drink plenty of fluids; eat foods high in fiber  -It is important to have regular bowel movements. Pain medications may cause constipation. You may want to take a stool softener (such as Senokot-S or Colace) to prevent constipation.   -If constipation occurs, take a laxative (such as Dulcolax tablets, Milk of Magnesia, or a suppository). Laxatives should only be used if the above preventable measures have failed and you still have not had a bowel movement after three days    Driving  -You may not drive or return to work until instructed by your physician. However, you may ride in the car for short periods of time. Incision Care  -You may take brief showers but do not run the water run directly onto the wound. After showering or bathing, remove the wet dressing and gently blot the wound dry with a soft towel.  -Do not rub or apply any lotions or ointments to your incision site.   -Do not soak or scrub your wound  -Keep a dry dressing (ABD and paper tape) on your incision and have it changed daily for 14 days after surgery; more often if your incision is draining. Have your caregiver wash their hands thoroughly before changing your dressing.  -You will have absorbable sutures and steristrips (white tape) on your incision. Leave the steristrips on until they fall off.     Showering  -You may shower in approximately 4 days after your surgery.    -Leave the dressing on during your shower. Do NOT allow the water to run directly onto your dressing. Once you get out of the shower, put on a dry dressing.  -Reminder- your brace can be removed while showering. Remember to not bend or twist while your brace is off.    -Do not take a tub bath. Preventing blood clots  -You have been given T.E.D. stockings to wear. Continue to wear these for 7 days after your discharge. Put them on in the morning and take them off at night.    -They are used to increase your circulation and prevent blood clots from forming in your legs  -T. E.D. stockings can be machine washed, temperature not to exceed 160° F (71°C) and machine dried for 15 to 20 minutes, temperature not to exceed 250° F (121°C). Pain management  -Take pain medication as prescribed; decrease the amount you use as your pain lessens  -DO not wait until you are in extreme pain to take your medication.  -Avoid alcoholic beverages while taking pain medication    Pain Medication Safety  DO:  -Read the Medication Guide   -Take your medicine exactly as prescribed   -Store your medicine away from children and in a safe place   -Flush unused medicine down the toilet   -Call your healthcare provider for medical advice about side effects. You may report side effects to FDA at 7-950-FDA-2737.   -Please be aware that many medications contain Tylenol. We do not want you to over medicate so please read the information below as a guide. Do not take more than 4 Grams of Tylenol in a 24 hour period.   (There are 1000 milligrams in one Gram)                                                                                                                                                                                                                                                                                                                                                                  Percocet contains 325 mg of Tylenol per tablet (do not take more than 12 tablets in 24 hours)  Lortab contains 500 mg of Tylenol per tablet (do not take more than 8 tablets in 24 hours)  Norco contains 325 mg of Tylenol per tablet (do not take more than 12 tablets in 24 hours). DO NOT:  -Do not give your medicine to others   -Do not take medicine unless it was prescribed for you   -Do not stop taking your medicine without talking to your healthcare provider   -Do not break, chew, crush, dissolve, or inject your medicine. If you cannot swallow your medicine whole, talk to your healthcare provider.  -Do not drink alcohol while taking this medicine  -Do not take anti-inflammatory medications or aspirin unless instructed by your      Physician. ** Start taking 20 U of Humalog with meals (continue 10 U if eating smaller meals). Continue to follow up with Dr Danilo Crowder for further management of your diabetes.

## 2018-05-04 NOTE — PROGRESS NOTES
Reviewed medical chart and received orders. Patient is discharging home today. Called [facility] [phone] to confirm discharge plan. Services will begin on 5/7. Patient is agreement to discharge plan. Patient's wife will transport at the time of discharge. CM will be available in case any needs may arise. RANGEL De Santiago    Care Management Interventions  PCP Verified by CM: Yes  Mode of Transport at Discharge:  Other (see comment) (Patient's wife, Paul Stroud [phone])  Transition of Care Consult (CM Consult): Discharge Planning  Discharge Durable Medical Equipment: No  Physical Therapy Consult: Yes  Occupational Therapy Consult: Yes  Speech Therapy Consult: No  Current Support Network: Lives with Spouse, Own Home  Plan discussed with Pt/Family/Caregiver: Yes  Freedom of Choice Offered: Yes  [address] Provided?: No  Discharge Location  Discharge Placement: Home with home health ([facility] )

## 2018-05-04 NOTE — ROUTINE PROCESS
Bedside and Verbal shift change report given to [facility] (oncoming nurse) by Martha Bianchi (offgoing nurse). Report included the following information SBAR, Kardex, OR Summary, Intake/Output, MAR, Accordion and Recent Results.

## 2018-05-09 NOTE — DISCHARGE SUMMARY
[address] [place] [address]   [address]  [phone]     Discharge Summary       PATIENT ID: Racquel Stoner  MRN: [id number]   YOB: 1950    DATE OF ADMISSION: 5/2/2018  8:26 AM    DATE OF DISCHARGE: 5/4/2018   PRIMARY CARE PROVIDER: Sharlene Petersen MD     CONSULTATIONS: None    PROCEDURES/SURGERIES: Procedure(s):  L3-S1 DECOMPRESSION WITH POSTERIOR SPINAL FUSION USING MAZOR ROBOTIC GUIDANCE    History of Present Illness:  Racquel Stoner is a 78 y.o. male with prior medical history notable for CAD s/p CABG, mitral valve repair, endocarditis, chronic kidney disease, DMII, BPH, dyslipidemia, diskitis/osteomyelitis, and chronic back pain. He was diagnosed with L3-4 discitis and Alpha streptococcal bacteremia March 2017 and was hospitalized at [address] [place] [address]. He and was treated with IV Ceftriaxone and Gentamicin for an extended course. During this hospitalization he was found to have endocarditis of his mitral valve in the setting of known mitral valve prolapse and moderate to severe mitral regurgitation. He underwent mitral valve repair and 1 vessel CABG in June 2017 with Dr. Elise Wright. He did well from a cardiac standpoint but unfortunately continued to have severe back pain. He was referred to Dr. Paz Desai for evaluation. Epidurals were of little benefit. After failing conservative therapy and a discussion of the risks, benefits, alternatives, perioperative course, and potential complications of surgery, he consented to undergo a Procedure(s):  L3-S1 DECOMPRESSION WITH POSTERIOR SPINAL FUSION USING MAZOR ROBOTIC GUIDANCE. Hospital Course:  Racquel Stoner tolerated the procedure well. He was transferred  to the recovery room in stable condition.   After a brief stay the patient was then transferred to the Spinal Surgery Unit at [address] [place] [address].  On postoperative day #1, the dressing was clean and dry, he was neurovascularly intact. He was transitioned from the PCA to oral pain medications. The patient was afebrile and vital signs were stable. Calves were soft and non-tender bilaterally. The patient was tolerating a regular diet, voiding, and making progress with physical therapy. The hospitalist service was consulted for hyperglycemia postoperatively. His hemovac drain was removed on postoperative day #2. Hemoglobin prior to discharge were   Lab Results   Component Value Date/Time    HGB 11.3 (L) 05/04/2018 04:27 AM        Alexander Ponce made satisfactory progress with physical therapy and was discharged to Home in stable condition on postoperative day 2. He was provided with routine postoperative instructions and advised to follow up with Roberto Bello MD in 2 weeks following discharge from the hospital.  He was instructed to wear his LSO brace when out of bed. FOLLOW UP APPOINTMENTS:    Follow-up Information     Follow up With Details Comments [address] [place] [address]  Please call [phone] if you haven't heard from agency by Antonio Rogers [address] [place] [address]  [phone]             DISCHARGE MEDICATIONS:  Discharge Medication List as of 5/4/2018 10:52 AM      START taking these medications    Details   HYDROmorphone (DILAUDID) 2 mg tablet Take 1-2 Tabs by mouth every four (4) hours as needed. Max Daily Amount: 24 mg., Print, Disp-60 Tab, R-0         CONTINUE these medications which have NOT CHANGED    Details   cyanocobalamin (VITAMIN B-12) 1,000 mcg tablet Take 1,000 mcg by mouth daily. , Historical Med      fenofibrate (LOFIBRA) 160 mg tablet Take 160 mg by mouth daily. , Historical Med      bumetanide (BUMEX) 1 mg tablet Take 1 mg by mouth daily. , Historical Med      lisinopril (PRINIVIL, ZESTRIL) 10 mg tablet Take 1 Tab by mouth daily. , Normal, Disp-90 Tab, R-3 atorvastatin (LIPITOR) 20 mg tablet TAKE 1 TABLET BY MOUTH NIGHTLY., Normal, Disp-90 Tab, R-3      insulin lispro (HUMALOG) 100 unit/mL injection 20 Units by SubCUTAneous route Before breakfast, lunch, and dinner. Start with 10 units SQ before breakfast, lunch, dinner, --- advance as diet improves. , No Print, Disp-1 Vial, R-1      aspirin 81 mg chewable tablet Take 1 Tab by mouth daily. , Normal, Disp-365 Tab, R-0      nebivolol (BYSTOLIC) 10 mg tablet Take 10 mg by mouth daily. , Historical Med      amLODIPine (NORVASC) 10 mg tablet Take 10 mg by mouth daily. , Historical Med      ergocalciferol (VITAMIN D2) 50,000 unit capsule Take 50,000 Units by mouth every Wednesday. Every Wednesday, Historical Med      insulin detemir (LEVEMIR) 100 unit/mL injection 30 Units by SubCUTAneous route nightly., Historical Med      ascorbic acid (VITAMIN C) 500 mg tablet Take 500 mg by mouth daily. , Historical Med         STOP taking these medications       ibuprofen (MOTRIN) 200 mg tablet Comments:   Reason for Stopping:               PHYSICAL EXAMINATION AT DISCHARGE:  General: Pleasant, alert, cooperative, no distress. EENT: EOMI. Anicteric sclerae. Resp: Equal chest expansion. No accessory muscle use. CV:  No cyanosis or clubbing. No edema appreciated in the extremities. Gastrointestinal:  Soft, non-tender, non-distended. Neurological: Follows commands. CHILD. Speech clear. Sensation intact to light touch. Motor: unchanged C5-T1 and L2-S1. Musculoskeletal:  Calves soft, non-tender upon palpation or with passive stretch. Psych: Good insight. Not anxious nor agitated. Skin: No obvious rashes or lesions. Incision - clean, dry and intact. No significant erythema or swelling.       CHRONIC MEDICAL DIAGNOSES:  Problem List as of 5/4/2018  Date Reviewed: 5/2/2018          Codes Class Noted - Resolved    Spinal stenosis of lumbar region at multiple levels ICD-10-CM: M48.061  ICD-9-CM: 724.02  5/2/2018 - Present        * (Principal)Type 2 diabetes mellitus with hyperglycemia ([facility] ICD-10-CM: E11.65  ICD-9-CM: 250.00  5/2/2018 - Present        Obesity, morbid ([facility] ICD-10-CM: E66.01  ICD-9-CM: 278.01  4/5/2018 - Present        DDD (degenerative disc disease), lumbar ICD-10-CM: M51.36  ICD-9-CM: 722.52  4/5/2018 - Present        Type 2 diabetes mellitus without complication, with long-term current use of insulin (HCC) ICD-10-CM: E11.9, Z79.4  ICD-9-CM: 250.00, V58.67  8/21/2017 - Present        Non-rheumatic mitral regurgitation ICD-10-CM: I34.0  ICD-9-CM: 424.0  6/28/2017 - Present        S/P MVR (mitral valve repair) ICD-10-CM: X01.441  ICD-9-CM: V45.89  6/28/2017 - Present    Overview Signed 6/28/2017  2:02 PM by ORLANDO De Oliveira     MITRAL VALVE REPAIR with 27 MM annuloplasty band, CORONARY ARTERY BYPASS GRAFTING X 1 WITH LIMA to LAD             S/P CABG x 1 ICD-10-CM: Z95.1  ICD-9-CM: V45.81  6/28/2017 - Present    Overview Signed 6/28/2017  2:03 PM by ORLANDO De Oliveira     MITRAL VALVE REPAIR with 27 MM annuloplasty band, CORONARY ARTERY BYPASS GRAFTING X 1 WITH LIMA to LAD             CAD (coronary artery disease) ICD-10-CM: I25.10  ICD-9-CM: 414.00  6/19/2017 - Present        CKD stage 3 due to type 1 diabetes mellitus (HCC) (Chronic) ICD-10-CM: E10.22, N18.3  ICD-9-CM: 250.41, 585.3  2/3/2017 - Present        BPH (benign prostatic hyperplasia) ICD-10-CM: N40.0  ICD-9-CM: 600.00  2/2/2017 - Present        Bladder stones ICD-10-CM: N21.0  ICD-9-CM: 594.1  1/26/2017 - Present        BPH (benign prostatic hypertrophy) with urinary obstruction ICD-10-CM: N40.1, N13.8  ICD-9-CM: 600.01, 599.69  1/26/2017 - Present        MARIN (obstructive sleep apnea) ICD-10-CM: G47.33  ICD-9-CM: 327.23  9/10/2013 - Present    Overview Signed 9/10/2013  3:06 PM by Jennifer Billings     Hasn't used CPAP in 10 years.  Amenable to new designs though             Mitral regurgitation ICD-10-CM: I34.0  ICD-9-CM: 424.0  Unknown - Present    Overview Signed 1/5/2012  5:09 PM by De Duncan MD     mod-severe on echo 1/5/12             HTN (hypertension) ICD-10-CM: C81  ICD-9-CM: 401.9  Unknown - Present        Dyslipidemia ICD-10-CM: E78.5  ICD-9-CM: 272.4  Unknown - Present        RESOLVED: Discitis ICD-10-CM: M46.40  ICD-9-CM: 722.90  3/15/2017 - 4/5/2018        RESOLVED: DM (diabetes mellitus) ([facility] ICD-10-CM: E11.9  ICD-9-CM: 250.00  3/15/2017 - 8/21/2017        RESOLVED: Abnormal EKG ICD-10-CM: R94.31  ICD-9-CM: 794.31  12/20/2011 - 5/4/2017        RESOLVED: Murmur ICD-10-CM: R01.1  ICD-9-CM: 083. 2  Unknown - 5/4/2017              Signed:   Christina Menendez NP  5/9/2018  10:12 AM

## 2018-05-30 ENCOUNTER — TELEPHONE (OUTPATIENT)
Dept: CARDIOLOGY CLINIC | Age: 68
End: 2018-05-30

## 2018-05-30 RX ORDER — FUROSEMIDE 40 MG/1
40 TABLET ORAL DAILY
Qty: 30 TAB | Refills: 3 | Status: SHIPPED | OUTPATIENT
Start: 2018-05-30 | End: 2018-10-09 | Stop reason: SDUPTHER

## 2018-05-30 RX ORDER — FUROSEMIDE 40 MG/1
TABLET ORAL DAILY
COMMUNITY
End: 2018-05-30 | Stop reason: SDUPTHER

## 2018-07-31 ENCOUNTER — TELEPHONE (OUTPATIENT)
Dept: CARDIOLOGY CLINIC | Age: 68
End: 2018-07-31

## 2018-07-31 DIAGNOSIS — I10 ESSENTIAL HYPERTENSION: Primary | ICD-10-CM

## 2018-07-31 RX ORDER — METOPROLOL SUCCINATE 25 MG/1
25 TABLET, EXTENDED RELEASE ORAL DAILY
Qty: 30 TAB | Refills: 1 | Status: SHIPPED | OUTPATIENT
Start: 2018-07-31 | End: 2018-10-05

## 2018-07-31 NOTE — TELEPHONE ENCOUNTER
Pt cannot afford Bystolic medication and would like alternative. He has Medicare and [facility] Medicare replacement for Rx. Will forward to Evelyn Lombardo NP for recommendation.

## 2018-07-31 NOTE — TELEPHONE ENCOUNTER
Medication changes made per VO of LUIS Pizano.     STOP Bystolic 81ET daily. START metoprolol succinate 25mg daily. Sent to pharmacy.

## 2018-07-31 NOTE — TELEPHONE ENCOUNTER
Request from [facility] stating that if pt may be switched from Bystolic 71CF to either metoprolol 100mg or atenolol 100 mg, he may save over $130 per 90 day fills. Please advise if this appropriate to change.     Attempted to reach pt to ask if he has any indication against switch and if this would be okay if approved by provider but phone had continuous busy signal.    lov 4/5/18    10/5/2018 2:00 PM Georgette Sauceda MD

## 2018-08-01 ENCOUNTER — TELEPHONE (OUTPATIENT)
Dept: CARDIOLOGY CLINIC | Age: 68
End: 2018-08-01

## 2018-08-01 RX ORDER — METOPROLOL TARTRATE 50 MG/1
50 TABLET ORAL 2 TIMES DAILY
Qty: 180 TAB | Refills: 1 | Status: SHIPPED | OUTPATIENT
Start: 2018-08-01 | End: 2018-10-05 | Stop reason: SDUPTHER

## 2018-08-01 NOTE — TELEPHONE ENCOUNTER
Cassia Short, LUIS Morales        Caller: Unspecified (Yesterday,  1:54 PM)                     Metoprolol tartrate 50 mg BID

## 2018-08-01 NOTE — TELEPHONE ENCOUNTER
Pt notified of change in medication from Bystolic to metoprolol per request of pharmacy    Pt verbalized understanding and had no further questions

## 2018-10-05 ENCOUNTER — OFFICE VISIT (OUTPATIENT)
Dept: CARDIOLOGY CLINIC | Age: 68
End: 2018-10-05

## 2018-10-05 VITALS
RESPIRATION RATE: 12 BRPM | HEART RATE: 70 BPM | OXYGEN SATURATION: 98 % | HEIGHT: 67 IN | SYSTOLIC BLOOD PRESSURE: 170 MMHG | DIASTOLIC BLOOD PRESSURE: 90 MMHG | BODY MASS INDEX: 44.57 KG/M2 | WEIGHT: 284 LBS

## 2018-10-05 DIAGNOSIS — Z95.1 S/P CABG X 1: ICD-10-CM

## 2018-10-05 DIAGNOSIS — E11.9 TYPE 2 DIABETES MELLITUS WITHOUT COMPLICATION, WITH LONG-TERM CURRENT USE OF INSULIN (HCC): ICD-10-CM

## 2018-10-05 DIAGNOSIS — G47.33 OSA (OBSTRUCTIVE SLEEP APNEA): ICD-10-CM

## 2018-10-05 DIAGNOSIS — E66.01 OBESITY, MORBID (HCC): ICD-10-CM

## 2018-10-05 DIAGNOSIS — I34.0 NON-RHEUMATIC MITRAL REGURGITATION: Primary | ICD-10-CM

## 2018-10-05 DIAGNOSIS — E78.5 DYSLIPIDEMIA: ICD-10-CM

## 2018-10-05 DIAGNOSIS — N18.30 CKD STAGE 3 DUE TO TYPE 1 DIABETES MELLITUS (HCC): Chronic | ICD-10-CM

## 2018-10-05 DIAGNOSIS — I10 ESSENTIAL HYPERTENSION: ICD-10-CM

## 2018-10-05 DIAGNOSIS — Z98.890 S/P MVR (MITRAL VALVE REPAIR): ICD-10-CM

## 2018-10-05 DIAGNOSIS — I25.10 CORONARY ARTERY DISEASE INVOLVING NATIVE CORONARY ARTERY OF NATIVE HEART WITHOUT ANGINA PECTORIS: ICD-10-CM

## 2018-10-05 DIAGNOSIS — Z79.4 TYPE 2 DIABETES MELLITUS WITHOUT COMPLICATION, WITH LONG-TERM CURRENT USE OF INSULIN (HCC): ICD-10-CM

## 2018-10-05 DIAGNOSIS — E10.22 CKD STAGE 3 DUE TO TYPE 1 DIABETES MELLITUS (HCC): Chronic | ICD-10-CM

## 2018-10-05 RX ORDER — INSULIN ASPART 100 [IU]/ML
INJECTION, SOLUTION INTRAVENOUS; SUBCUTANEOUS
Refills: 1 | COMMUNITY
Start: 2018-09-13 | End: 2019-05-29 | Stop reason: ALTCHOICE

## 2018-10-05 NOTE — PROGRESS NOTES
Ayden Trevizo MD [facility] [address] [place], [address] [facility] Office [phone] Fax [phone] Cell [phone] Aakash Hernandez is a 78 y.o. male. Last seen 6 months ago. Assessment Encounter Diagnoses Name Primary?  Essential hypertension  Non-rheumatic mitral regurgitation Yes  Coronary artery disease involving native coronary artery of native heart without angina pectoris  Dyslipidemia  Type 2 diabetes mellitus without complication, with long-term current use of insulin ([address]  S/P CABG x 1   
 S/P MVR (mitral valve repair)  MARIN (obstructive sleep apnea)  CKD stage 3 due to type 1 diabetes mellitus ([address]  Obesity, morbid ([address] Recommendations: 
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 Echo 11/2017 demonstrated no residual MR with normal LV function. He does have moderate LAE but no hx of AF. Repeat echo in 3 months. BP, sub-optimal control on combination therapy. Will increase Metoprolol to 100 mg BID and Lisinopril to 20 mg daily. He is adherent with CPAP but remains morbidly obese. He emphasized the importance of home BP monitoring. He will phone follow up in 3-4 weeks. T2DM and lipids managed by Afia Qiu MD. He is on insulin alone. We again discussed the importance of new generation DM drugs such as Jardiance and Victoza that actually improve CV outcomes in addition to weight loss and BP lowering. Concerned about underlying depression, which would not be unreasonable. I asked him to discuss further with Afia Qiu MD 
 
Follow-up Disposition: 
Return in about 3 months (around 1/5/2019). With echo Subjective: 
Mr. Ely Prather underwent lumbar spinal surgery in 5/2018.  He admits to a fairly sedentary lifestyle, but hopes to increase exercise now that his back pain has improved. He states his last HgA1c was \"below 7.0%\". He is adherent with insulin alone. He states he eats a clean diet during the day, but often indulges in excess late night eating. He does not check his blood pressure at home. He states last value during previous doctor's appointment was 140/80. He checks his blood sugar regularly at home, and states values have been elevated, with BS of 200 after lunch yesterday. He has MARIN and is adherent with his CPAP. He reports some decreased mental acuity. He and his wife are concerned about possible depression. Patient denies any exertional chest pain, dyspnea, palpitations, syncope, orthopnea, edema or paroxysmal nocturnal dyspnea. Cardiac Risk Factors: DM - yes HTN - yes Former smoker - quit 2013 Cardiac testing ECHO 2-013: MVP mod-severe MR  
ECHO 3/15/2017: EF 65-70%, mild LAE, mild LVH, mod-severe LAE, mod MR,  
spherical, echogenic, fixed mass, 14 mm x 8 mm)- anterior leaflet, on the atrial aspect. LONNIE 2012: mod-severe MR  
LONNIE 3/21/2017 - - 8x11 mm semimobile echogenic mass (some central clearing) attached to atrial surface of anterior mitral leaflet, prolapsing into LV. Distal tip of anterior leaflet prolapses. Linear strands, semimobile attached to atrial surface of posterior leaflet. No annular abscess. Eccentric posterior jet of MR, clearly severe. Severe LAE, LVH, EF 70%, aortic/pulmonic/tricuspid valves normal. Aorta nl Echo 5/4/17 - EF 70%. No WMA. Wall thickness upper limits of normal. Severe LAE. 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 Definite, medium sized spherical mobile mass, measuring 11 x 7 mm on the tip of the anterior leaflet on the atrial aspect. Cath 5/16/2017 - PA 45/20, PCW 23 (V wave 45), RV 44/4 RA 8 LVEDP 25, A wave to 35, no AV gradient, EF 75%, severe MR, LM normal, LAD mid 85% at bifurcating D1 (small vessels), LCX mild plaque, RCA mild plaque. Echo 11/6/17- LVEF 60% s/p MV repair, no MR, LAE - 8x11 mm semimobile echogenic mass (some central clearing) attached to atrial surface of anterior mitral leaflet, prolapsing into LV. Distal tip of anterior leaflet prolapses. Linear strands, semimobile attached to atrial surface of posterior leaflet. No annular abscess. Eccentric posterior jet of MR, clearly severe. Severe LAE, LVH, EF 70%, aortic/pulmonic/tricuspid valves normal. Aorta nl Past Medical History:  
Diagnosis Date  Abnormal EKG   
 he says it has been abnormal for years  Arthritis  CAD (coronary artery disease)  Chronic kidney disease KIDNEY AND BLADDER STONE  Chronic pain   
 lower lumbar  Diabetes ([address] type II  
 Dyslipidemia  Enlarged prostate Dr. Leo Patience  
 HTN (hypertension)  Hypercholesterolemia  Kidney stone  Mitral regurgitation   
 mod-severe on echo 1/5/12  Murmur  Obesity  Other ill-defined conditions(799.89) BPH  Sleep apnea   
 stopped using CPAP many years ago Allergies Allergen Reactions  Cephalexin Other (comments)  
  unknown  Oxycodone Other (comments) Pt does not desire to take; causes altered mental status and constipation  Sulfa (Sulfonamide Antibiotics) Hives  Tamsulosin Other (comments) Vision loss Review of Systems Constitutional: Negative for fever, chills, malaise/fatigue and diaphoresis. Respiratory: Negative for cough, hemoptysis, sputum production, shortness of breath and wheezing. Cardiovascular: Negative for chest pain, palpitations, orthopnea, claudication, leg swelling and PND. Gastrointestinal: Negative for heartburn, nausea, vomiting, blood in stool and melena. Genitourinary: Negative for dysuria and flank pain. Musculoskeletal: Negative for joint pain. +back pain Skin: Negative for rash. Neurological: Negative for focal weakness, seizures, loss of consciousness, weakness and headaches. Endo/Heme/Allergies: Does not bruise/bleed easily. Psychiatric/Behavioral: Negative for memory loss. The patient does not have insomnia. Physical Exam 
 
Visit Vitals  /90 (BP 1 Location: Left arm, BP Patient Position: Sitting)  Pulse 70  Resp 12  Ht 5' 7\" (1.702 m)  Wt 284 lb (128.8 kg)  SpO2 98%  BMI 44.48 kg/m2 Wt Readings from Last 3 Encounters:  
10/05/18 284 lb (128.8 kg) 05/02/18 280 lb 6 oz (127.2 kg) 04/18/18 280 lb 6 oz (127.2 kg) General - well developed well nourished, obese WM Neck - JVP normal, thyroid nl Cardiac - normal S1, S2, no rubs or gallops. No clicks. Vascular - carotids without bruits, radials, femorals and pedal pulses equal bilateral 
Lungs - clear to auscultation bilaterals, no rales, wheezing or rhonchi Abd - soft nontender, no HSM, no abd bruits Extremities - 1+ pretibial edema Skin - no rash Neuro - nonfocal 
Psych - normal mood and affect Cardiographics EKG 8/21/17- SB 54, diffuse inferolateral T wave inversions Echo 11/6/17- LVEF 60% s/p MV repair, no MR, LAE 
EKG 4/5/18- SB 54, inferolateral T wave inversions, unchanged from 8/21/17 Written by Val Lizarraga, as dictated by Dr. Esequiel Menjivar.   
 
Esequiel Menjivar MD

## 2018-10-05 NOTE — PROGRESS NOTES
1. Have you been to the ER, urgent care clinic since your last visit? Hospitalized since your last visit? No 
 
2. Have you seen or consulted any other health care providers outside of the [address] [place] [address] since your last visit? Include any pap smears or colon screening. No  
 
Chief Complaint Patient presents with  Coronary Artery Disease Visit Vitals  /90 (BP 1 Location: Left arm, BP Patient Position: Sitting)  Pulse 70  Resp 12  Ht 5' 7\" (1.702 m)  Wt 284 lb (128.8 kg)  SpO2 98%  BMI 44.48 kg/m2

## 2018-10-05 NOTE — PATIENT INSTRUCTIONS
Increase Lisinopril to 20 mg/day. Increase Metoprolol Tartrate to 100 mg twice a day Check your blood pressure at home. Call in 3 weeks and let us know how BP values are running. Ask for Earlene Ybarra.

## 2018-10-05 NOTE — MR AVS SNAPSHOT
[address] [facility] [address] [facility] 
[phone] Patient: Gina Reardon MRN: [id number] Visit Information Date & Time Provider Department Dept. Phone Encounter #  
 10/5/2018  2:00 PM Nisreen Allen MD [facility] Aylin Fontanez [phone] [id number] Follow-up Instructions Return in about 3 months (around 1/5/2019). Your Appointments 1/7/2019  3:40 PM  
ESTABLISHED PATIENT with Nisreen Allen MD  
[facility] ([address]) Appt Note: 3 month follow up per Dr. Ruth Rogers [address] [facility] [address] [address] [facility]  
[address] Upcoming Health Maintenance Date Due Hepatitis C Screening 1950 FOOT EXAM Q1 10/22/1961 MICROALBUMIN Q1 10/22/1961 EYE EXAM RETINAL OR DILATED Q1 10/22/1961 DTaP/Tdap/Td series (1 - Tdap) 10/22/1972 Shingrix Vaccine Age 50> (1 of 2) 10/22/2001 FOBT Q 1 YEAR AGE 50-75 10/22/2001 LIPID PANEL Q1 4/3/2015 GLAUCOMA SCREENING Q2Y 10/22/2016 Pneumococcal 65+ Low/Medium Risk (1 of 2 - PCV13) 10/22/2016 MEDICARE YEARLY EXAM 3/15/2018 Influenza Age 5 to Adult 8/1/2018 HEMOGLOBIN A1C Q6M 11/3/2018 Allergies as of 10/5/2018  Review Complete On: 10/5/2018 By: Daquan Brown Ring Severity Noted Reaction Type Reactions Cephalexin  01/15/2017    Other (comments)  
 unknown Oxycodone  04/03/2014    Other (comments) Pt does not desire to take; causes altered mental status and constipation Sulfa (Sulfonamide Antibiotics)  12/20/2011    Hives Tamsulosin  01/15/2017    Other (comments) Vision loss Current Immunizations  Never Reviewed No immunizations on file. Not reviewed this visit You Were Diagnosed With   
  
 Codes Comments Essential hypertension    -  Primary ICD-10-CM: I10 
ICD-9-CM: 401.9 Non-rheumatic mitral regurgitation     ICD-10-CM: I34.0 ICD-9-CM: 424.0 Coronary artery disease involving native coronary artery of native heart without angina pectoris     ICD-10-CM: I25.10 ICD-9-CM: 414.01 Dyslipidemia     ICD-10-CM: E78.5 ICD-9-CM: 272.4 Type 2 diabetes mellitus without complication, with long-term current use of insulin (HCC)     ICD-10-CM: E11.9, Z79.4 ICD-9-CM: 250.00, V58.67 S/P CABG x 1     ICD-10-CM: Z95.1 ICD-9-CM: V45.81 S/P MVR (mitral valve repair)     ICD-10-CM: M24.513 ICD-9-CM: V45.89   
 MARIN (obstructive sleep apnea)     ICD-10-CM: G47.33 
ICD-9-CM: 327.23 Vitals BP Pulse Resp Height(growth percentile) Weight(growth percentile) SpO2  
 170/90 (BP 1 Location: Left arm, BP Patient Position: Sitting) 70 12 5' 7\" (1.702 m) 284 lb (128.8 kg) 98% BMI Smoking Status 44.48 kg/m2 Former Smoker Vitals History BMI and BSA Data Body Mass Index Body Surface Area 44.48 kg/m 2 2.47 m 2 Preferred Pharmacy Pharmacy Name Phone [id number] [address] Your Updated Medication List  
  
   
This list is accurate as of 10/5/18  2:21 PM.  Always use your most recent med list.  
  
  
  
  
 aspirin 81 mg chewable tablet Take 1 Tab by mouth daily. atorvastatin 20 mg tablet Commonly known as:  LIPITOR  
TAKE 1 TABLET BY MOUTH NIGHTLY. fenofibrate 160 mg tablet Commonly known as:  LOFIBRA Take 160 mg by mouth daily. furosemide 40 mg tablet Commonly known as:  LASIX Take 1 Tab by mouth daily. HYDROmorphone 2 mg tablet Commonly known as:  DILAUDID Take 1-2 Tabs by mouth every four (4) hours as needed. Max Daily Amount: 24 mg.  
  
 insulin lispro 100 unit/mL injection Commonly known as:  HumaLOG U-100 Insulin 20 Units by SubCUTAneous route Before breakfast, lunch, and dinner. Start with 10 units SQ before breakfast, lunch, dinner, --- advance as diet improves. LEVEMIR U-100 INSULIN 100 unit/mL injection Generic drug:  insulin detemir U-100  
30 Units by SubCUTAneous route nightly. lisinopril 10 mg tablet Commonly known as:  Romain Spinner Take 1 Tab by mouth daily. metoprolol tartrate 50 mg tablet Commonly known as:  LOPRESSOR Take 1 Tab by mouth two (2) times a day. NORVASC 10 mg tablet Generic drug:  amLODIPine Take 10 mg by mouth daily. NovoLOG 100 unit/mL Crtg Generic drug:  insulin aspart U-100 INJECT 20 UNITS SUBCUTANEOUSLY BEFORE MEALS  
  
 VITAMIN B-12 1,000 mcg tablet Generic drug:  cyanocobalamin Take 1,000 mcg by mouth daily. VITAMIN C 500 mg tablet Generic drug:  ascorbic acid (vitamin C) Take 500 mg by mouth daily. VITAMIN D2 50,000 unit capsule Generic drug:  ergocalciferol Take 50,000 Units by mouth every Wednesday. Every Wednesday Follow-up Instructions Return in about 3 months (around 1/5/2019). Patient Instructions Increase Lisinopril to 20 mg/day. Increase Metoprolol Tartrate to 100 mg twice a day Check your blood pressure at home. Call in 3 weeks and let us know how BP values are running. Ask for Earlene Ybarra. Introducing [facility]! [facility] introduces [employer] patient portal. Now you can access parts of your medical record, email your doctor's office, and request medication refills online. 1. In your internet browser, go to [email or web address] 2. Click on the First Time User? Click Here link in the Sign In box. You will see the New Member Sign Up page. 3. Enter your [employer] Access Code exactly as it appears below. You will not need to use this code after youve completed the sign-up process.  If you do not sign up before the expiration date, you must request a new code. · "[employer] Access Code: [email or web address] Expires: 1/3/2019  2:13 PM 
 
4. Enter the last four digits of your Social Security Number (xxxx) and Date of Birth (mm/dd/yyyy) as indicated and click Submit. You will be taken to the next sign-up page. 5. Create a "[employer] ID. This will be your "[employer] login ID and cannot be changed, so think of one that is secure and easy to remember. 6. Create a "[employer] password. You can change your password at any time. 7. Enter your Password Reset Question and Answer. This can be used at a later time if you forget your password. 8. Enter your e-mail address. You will receive e-mail notification when new information is available in [address] 9. Click Sign Up. You can now view and download portions of your medical record. 10. Click the Download Summary menu link to download a portable copy of your medical information. If you have questions, please visit the Frequently Asked Questions section of the "[employer] website. Remember, "[employer] is NOT to be used for urgent needs. For medical emergencies, dial 911. Now available from your iPhone and Android! Please provide this summary of care documentation to your next provider. Your primary care clinician is listed as Seldon Goodpasture. If you have any questions after today's visit, please call [phone].

## 2018-10-07 RX ORDER — METOPROLOL TARTRATE 100 MG/1
100 TABLET ORAL 2 TIMES DAILY
Qty: 180 TAB | Refills: 3 | Status: SHIPPED | OUTPATIENT
Start: 2018-10-07

## 2018-10-07 RX ORDER — LISINOPRIL 20 MG/1
20 TABLET ORAL DAILY
Qty: 90 TAB | Refills: 3 | Status: SHIPPED | OUTPATIENT
Start: 2018-10-07 | End: 2019-12-02 | Stop reason: SINTOL

## 2018-10-09 RX ORDER — FUROSEMIDE 40 MG/1
TABLET ORAL
Qty: 30 TAB | Refills: 3 | Status: SHIPPED | OUTPATIENT
Start: 2018-10-09 | End: 2018-11-05 | Stop reason: SDUPTHER

## 2018-11-05 RX ORDER — FUROSEMIDE 40 MG/1
40 TABLET ORAL DAILY
Qty: 90 TAB | Refills: 1 | Status: SHIPPED | OUTPATIENT
Start: 2018-11-05 | End: 2019-06-06 | Stop reason: SDUPTHER

## 2019-01-07 ENCOUNTER — OFFICE VISIT (OUTPATIENT)
Dept: CARDIOLOGY CLINIC | Age: 69
End: 2019-01-07

## 2019-01-07 VITALS
SYSTOLIC BLOOD PRESSURE: 168 MMHG | RESPIRATION RATE: 18 BRPM | BODY MASS INDEX: 45.01 KG/M2 | HEIGHT: 67 IN | DIASTOLIC BLOOD PRESSURE: 100 MMHG | HEART RATE: 68 BPM | WEIGHT: 286.8 LBS

## 2019-01-07 DIAGNOSIS — Z98.890 S/P MVR (MITRAL VALVE REPAIR): ICD-10-CM

## 2019-01-07 DIAGNOSIS — Z95.1 S/P CABG X 1: ICD-10-CM

## 2019-01-07 DIAGNOSIS — I10 ESSENTIAL HYPERTENSION: Primary | ICD-10-CM

## 2019-01-07 DIAGNOSIS — Z79.4 TYPE 2 DIABETES MELLITUS WITHOUT COMPLICATION, WITH LONG-TERM CURRENT USE OF INSULIN (HCC): ICD-10-CM

## 2019-01-07 DIAGNOSIS — E78.5 DYSLIPIDEMIA: ICD-10-CM

## 2019-01-07 DIAGNOSIS — G47.33 OSA (OBSTRUCTIVE SLEEP APNEA): ICD-10-CM

## 2019-01-07 DIAGNOSIS — E11.9 TYPE 2 DIABETES MELLITUS WITHOUT COMPLICATION, WITH LONG-TERM CURRENT USE OF INSULIN (HCC): ICD-10-CM

## 2019-01-07 DIAGNOSIS — I34.0 NON-RHEUMATIC MITRAL REGURGITATION: ICD-10-CM

## 2019-01-07 DIAGNOSIS — I25.10 CORONARY ARTERY DISEASE INVOLVING NATIVE CORONARY ARTERY OF NATIVE HEART WITHOUT ANGINA PECTORIS: ICD-10-CM

## 2019-01-07 NOTE — PROGRESS NOTES
Visit Vitals  BP (!) 168/100 (BP 1 Location: Left arm)   Pulse 68   Resp 18   Ht 5' 7\" (1.702 m)   Wt 286 lb 12.8 oz (130.1 kg)   BMI 44.92 kg/m²       Patient is here for follow up. Doing well. No complaints. Blood pressure is elevated today.

## 2019-01-07 NOTE — PATIENT INSTRUCTIONS
Increase Lisinopril to 40 mg (2 tablets) daily. Take your blood pressure twice daily for a week and write down values. Call us in 1 week and let us know how your BP has been running.

## 2019-01-07 NOTE — PROGRESS NOTES
[address]  [address]  [place], [address] [facility]    Office [phone]  Fax [phone]  Cell [phone]    Alexander Ponce is a 80 y.o. male. Last seen 3 months ago. Assessment:  Encounter Diagnoses   Name Primary? 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 Echo 11/2017 demonstrated no residual MR with normal LV function. He does have moderate LAE but no hx of AF. Repeat echo in 3 months. HTN, remains sub-optimal on combination therapy, recently increased. He remains adherent with CPAP, but could do much better in terms of his diet. He remains morbidly obese. I emphasized the importance of home BP monitoring. Will increase Lisinopril to 40 mg/d. He will phone follow up in 1-2 weeks. T2DM and lipids managed by Carrie Lyn MD. He is on insulin alone. We again discussed the importance of new generation DM drugs such as Jardiance and Victoza that actually improve CV outcomes in addition to weight loss and BP lowering. Morbid Obesity. Long discussion about healthy weight loss. We discussed the role of PT for his back, as well as portion control, ect. He does not seem to be terribly motivated. Follow-up Disposition:  Return in about 3 months (around 4/7/2019). Subjective:  Mr. Camille Guzman notes he is feeling fairly well overall. He admits to being sedentary at home due, and does not walk regularly.  He has some shortness of breath with the things he does do. He is interested in seeing a physical therapist. His wife states he has been snacking late at night, including sausage. He does not choose low-sodium options. He notes he does not check his blood pressure at home. He is adherent with his medications. He states his BP was 190/70 at his PCP's office earlier today. His DM is followed by Jose Kimball MD. He is on insulin alone. Patient denies any exertional chest pain, palpitations, syncope, orthopnea, edema or paroxysmal nocturnal dyspnea. He is adherent with his CPAP for MARIN. He is here today with his wife. Cardiac Risk Factors:  DM - yes  HTN - yes  Former smoker - quit 2013    Cardiac testing  ECHO 2-013: MVP mod-severe MR   ECHO 3/15/2017: EF 65-70%, mild LAE, mild LVH, mod-severe LAE, mod MR,   spherical, echogenic, fixed mass, 14 mm x 8 mm)- anterior leaflet, on the atrial aspect. LONNIE 2012: mod-severe MR   LONNIE 3/21/2017 - - 8x11 mm semimobile echogenic mass (some central clearing) attached to atrial surface of anterior mitral leaflet, prolapsing into LV. Distal tip of anterior leaflet prolapses. Linear strands, semimobile attached to atrial surface of posterior leaflet. No annular abscess. Eccentric posterior jet of MR, clearly severe. Severe LAE, LVH, EF 70%, aortic/pulmonic/tricuspid valves normal. Aorta nl    Echo 5/4/17 - EF 70%. No WMA. Wall thickness upper limits of normal. Severe LAE. 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 Definite, medium sized spherical mobile mass, measuring 11 x 7 mm on the tip of the anterior leaflet on the atrial aspect. Cath 5/16/2017 - PA 45/20, PCW 23 (V wave 45), RV 44/4 RA 8 LVEDP 25, A wave to 35, no AV gradient, EF 75%, severe MR, LM normal, LAD mid 85% at bifurcating D1 (small vessels), LCX mild plaque, RCA mild plaque.     Echo 11/6/17- LVEF 60% s/p MV repair, no MR, LAE - 8x11 mm semimobile echogenic mass (some central clearing) attached to atrial surface of anterior mitral leaflet, prolapsing into LV. Distal tip of anterior leaflet prolapses. Linear strands, semimobile attached to atrial surface of posterior leaflet. No annular abscess. Eccentric posterior jet of MR, clearly severe. Severe LAE, LVH, EF 70%, aortic/pulmonic/tricuspid valves normal. Aorta nl    Past Medical History:   Diagnosis Date    Abnormal EKG     he says it has been abnormal for years    Arthritis     CAD (coronary artery disease)     Chronic kidney disease     KIDNEY AND BLADDER STONE    Chronic pain     lower lumbar    Diabetes ([address]     type II    Dyslipidemia     Enlarged prostate Dr. Amadou Beebe    HTN (hypertension)     Hypercholesterolemia     Kidney stone     Mitral regurgitation     mod-severe on echo 1/5/12    Murmur     Obesity     Other ill-defined conditions(799.89)     BPH    Sleep apnea     stopped using CPAP many years ago            Allergies   Allergen Reactions    Cephalexin Other (comments)     unknown    Oxycodone Other (comments)     Pt does not desire to take; causes altered mental status and constipation    Sulfa (Sulfonamide Antibiotics) Hives    Tamsulosin Other (comments)     Vision loss          Review of Systems  Constitutional: Negative for fever, chills, malaise/fatigue and diaphoresis. Respiratory: Negative for cough, hemoptysis, sputum production,  and wheezing. +shortness of breath  Cardiovascular: Negative for chest pain, palpitations, orthopnea, claudication, leg swelling and PND. Gastrointestinal: Negative for heartburn, nausea, vomiting, blood in stool and melena. Genitourinary: Negative for dysuria and flank pain. Musculoskeletal: Negative for joint pain. +back pain  Skin: Negative for rash. Neurological: Negative for focal weakness, seizures, loss of consciousness, weakness and headaches. Endo/Heme/Allergies: Does not bruise/bleed easily. Psychiatric/Behavioral: Negative for memory loss. The patient does not have insomnia.       Physical Exam    Visit Vitals  BP (!) 168/100 (BP 1 Location: Left arm)   Pulse 68   Resp 18   Ht 5' 7\" (1.702 m)   Wt 286 lb 12.8 oz (130.1 kg)   BMI 44.92 kg/m²     Wt Readings from Last 3 Encounters:   01/07/19 286 lb 12.8 oz (130.1 kg)   10/05/18 284 lb (128.8 kg)   05/02/18 280 lb 6 oz (127.2 kg)      General - well developed well nourished, obese WM  Neck - JVP normal, thyroid nl  Cardiac - normal S1, S2, no rubs or gallops. No clicks. Vascular - carotids without bruits, radials, femorals and pedal pulses equal bilateral  Lungs - clear to auscultation bilaterals, no rales, wheezing or rhonchi  Abd - soft nontender, no HSM, no abd bruits  Extremities - 1+ pretibial edema   Skin - no rash  Neuro - nonfocal  Psych - normal mood and affect      Cardiographics  EKG 8/21/17- SB 54, diffuse inferolateral T wave inversions   Echo 11/6/17- LVEF 60% s/p MV repair, no MR, LAE  EKG 4/5/18- SB 54, inferolateral T wave inversions, unchanged from 8/21/17    Written by Beth Hannah, as dictated by Dr. Bowen Palacios.      Bowen Palacios MD

## 2019-05-29 ENCOUNTER — OFFICE VISIT (OUTPATIENT)
Dept: CARDIOLOGY CLINIC | Age: 69
End: 2019-05-29

## 2019-05-29 VITALS
OXYGEN SATURATION: 98 % | WEIGHT: 280 LBS | HEIGHT: 67 IN | BODY MASS INDEX: 43.95 KG/M2 | RESPIRATION RATE: 20 BRPM | SYSTOLIC BLOOD PRESSURE: 138 MMHG | HEART RATE: 62 BPM | DIASTOLIC BLOOD PRESSURE: 88 MMHG

## 2019-05-29 DIAGNOSIS — E78.5 DYSLIPIDEMIA: ICD-10-CM

## 2019-05-29 DIAGNOSIS — Z95.1 S/P CABG X 1: ICD-10-CM

## 2019-05-29 DIAGNOSIS — I10 ESSENTIAL HYPERTENSION: ICD-10-CM

## 2019-05-29 DIAGNOSIS — E11.9 TYPE 2 DIABETES MELLITUS WITHOUT COMPLICATION, WITH LONG-TERM CURRENT USE OF INSULIN (HCC): ICD-10-CM

## 2019-05-29 DIAGNOSIS — Z98.890 S/P MVR (MITRAL VALVE REPAIR): ICD-10-CM

## 2019-05-29 DIAGNOSIS — G47.33 OSA (OBSTRUCTIVE SLEEP APNEA): ICD-10-CM

## 2019-05-29 DIAGNOSIS — I34.0 NON-RHEUMATIC MITRAL REGURGITATION: ICD-10-CM

## 2019-05-29 DIAGNOSIS — I25.10 CORONARY ARTERY DISEASE INVOLVING NATIVE CORONARY ARTERY OF NATIVE HEART WITHOUT ANGINA PECTORIS: Primary | ICD-10-CM

## 2019-05-29 DIAGNOSIS — E66.01 OBESITY, MORBID (HCC): ICD-10-CM

## 2019-05-29 DIAGNOSIS — Z79.4 TYPE 2 DIABETES MELLITUS WITHOUT COMPLICATION, WITH LONG-TERM CURRENT USE OF INSULIN (HCC): ICD-10-CM

## 2019-05-29 NOTE — PROGRESS NOTES
Ayden Alvarez [facility] [address]  [address]  [address]  [place], [address] [facility]    Office [phone]  Fax [phone]  Cell [phone]    Damien Howe is a 80 y.o. male. Last seen 5 months ago. Assessment:  Encounter Diagnoses   Name Primary? 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 Updated echo today demonstrated no residual MR with normal LV function. He does have moderate LAE but no hx of AF. Continue annual surveillance echoes. HTN, controlled on combination therapy, recently increased. He remains adherent with CPAP. T2DM and lipids managed by Heriberto Oh MD. Beverlyn Kehr was added recently with apparent improvement in A1c. I do not have recent labs. Morbid Obesity. Long discussion about healthy weight loss. We discussed the role of PT for his back, as well as portion control, ect. He is looking into therapy in [place]. Follow-up and Dispositions    · Return in about 6 months (around 11/29/2019). Subjective:  Mr. Julio C Ward notes he is feeling fairly well overall. He admits to being mostly sedentary at home, and does not walk regularly. He does stay somewhat active with yard work. He has some shortness of breath with the things he does do. Patient notes he has received a referral to physical therapy through Heriberto Oh MD and plans to start PT at [place] soon.  He states he is trying to lose weight and is down 6 lbs since last visit in January 2019. He states he checks his BP at home with average systolic BP [id number]. He is adherent with his medications. His DM is followed by Leslie Hobson MD. He is on Levemir and was also started on Ozempic since last visit. Patient reports improvement in A1c. Patient denies any exertional chest pain, palpitations, syncope, orthopnea, edema or paroxysmal nocturnal dyspnea. He is adherent with his CPAP for MARIN. He is here today with his wife. Cardiac Risk Factors:  DM - yes  HTN - yes  Former smoker - quit 2013    Cardiac testing  ECHO 2-013: MVP mod-severe MR   ECHO 3/15/2017: EF 65-70%, mild LAE, mild LVH, mod-severe LAE, mod MR,   spherical, echogenic, fixed mass, 14 mm x 8 mm)- anterior leaflet, on the atrial aspect. LONNIE 2012: mod-severe MR   LONNIE 3/21/2017 - - 8x11 mm semimobile echogenic mass (some central clearing) attached to atrial surface of anterior mitral leaflet, prolapsing into LV. Distal tip of anterior leaflet prolapses. Linear strands, semimobile attached to atrial surface of posterior leaflet. No annular abscess. Eccentric posterior jet of MR, clearly severe. Severe LAE, LVH, EF 70%, aortic/pulmonic/tricuspid valves normal. Aorta nl    Echo 5/4/17 - EF 70%. No WMA. Wall thickness upper limits of normal. Severe LAE. 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 Definite, medium sized spherical mobile mass, measuring 11 x 7 mm on the tip of the anterior leaflet on the atrial aspect. Cath 5/16/2017 - PA 45/20, PCW 23 (V wave 45), RV 44/4 RA 8 LVEDP 25, A wave to 35, no AV gradient, EF 75%, severe MR, LM normal, LAD mid 85% at bifurcating D1 (small vessels), LCX mild plaque, RCA mild plaque. Echo 11/6/17- LVEF 60% s/p MV repair, no MR, LAE    Echo 05/29/19 - EF 60%, s/p MV repair, trace MR, moderate LAE - 8x11 mm semimobile echogenic mass (some central clearing) attached to atrial surface of anterior mitral leaflet, prolapsing into LV.  Distal tip of anterior leaflet prolapses. Linear strands, semimobile attached to atrial surface of posterior leaflet. No annular abscess. Eccentric posterior jet of MR, clearly severe. Severe LAE, LVH, EF 70%, aortic/pulmonic/tricuspid valves normal. Aorta nl    Past Medical History:   Diagnosis Date    Abnormal EKG     he says it has been abnormal for years    Arthritis     CAD (coronary artery disease)     Chronic kidney disease     KIDNEY AND BLADDER STONE    Chronic pain     lower lumbar    Diabetes ([facility]     type II    Dyslipidemia     Enlarged prostate [facility]    HTN (hypertension)     Hypercholesterolemia     Kidney stone     Mitral regurgitation     mod-severe on echo 1/5/12    Murmur     Obesity     Other ill-defined conditions(799.89)     BPH    Sleep apnea     stopped using CPAP many years ago            Allergies   Allergen Reactions    Cephalexin Other (comments)     unknown    Oxycodone Other (comments)     Pt does not desire to take; causes altered mental status and constipation    Sulfa (Sulfonamide Antibiotics) Hives    Tamsulosin Other (comments)     Vision loss          Review of Systems  Constitutional: Negative for fever, chills, malaise/fatigue and diaphoresis. Respiratory: Negative for cough, hemoptysis, sputum production,  and wheezing. +shortness of breath  Cardiovascular: Negative for chest pain, palpitations, orthopnea, claudication, leg swelling and PND. Gastrointestinal: Negative for heartburn, nausea, vomiting, blood in stool and melena. Genitourinary: Negative for dysuria and flank pain. Musculoskeletal: Negative for joint pain. +chronic back pain  Skin: Negative for rash. Neurological: Negative for focal weakness, seizures, loss of consciousness, weakness and headaches. Endo/Heme/Allergies: Does not bruise/bleed easily. Psychiatric/Behavioral: Negative for memory loss. The patient does not have insomnia.       Physical Exam    Visit Vitals  /88   Pulse 62   Resp 20   Ht 5' 7\" (1.702 m)   Wt 280 lb (127 kg)   SpO2 98%   BMI 43.85 kg/m²     Wt Readings from Last 3 Encounters:   05/29/19 280 lb (127 kg)   05/29/19 280 lb (127 kg)   01/07/19 286 lb 12.8 oz (130.1 kg)      General - well developed well nourished, obese WM  Neck - JVP normal, thyroid nl  Cardiac - normal S1, S2, no rubs or gallops. No clicks. Vascular - carotids without bruits, radials, femorals and pedal pulses equal bilateral  Lungs - clear to auscultation bilaterals, no rales, wheezing or rhonchi  Abd - soft nontender, no HSM, no abd bruits  Extremities - 1+ pretibial edema   Skin - no rash  Neuro - nonfocal  Psych - normal mood and affect      Cardiographics  EKG 8/21/17- SB 54, diffuse inferolateral T wave inversions   Echo 11/6/17- LVEF 60% s/p MV repair, no MR, LAE  EKG 4/5/18- SB 54, inferolateral T wave inversions, unchanged from 8/21/17  Echo 05/29/19 - EF 60%, s/p MV repair, trace MR, moderate LAE  EKG 05/29/19 - SR, inferior/lateral T wave inversion, isolated PVC, unchanged from 04/15/18. Written by Philip Angel, as dictated by Dr. Avril Squires.      Avril Squires MD

## 2019-06-06 RX ORDER — FUROSEMIDE 40 MG/1
TABLET ORAL
Qty: 90 TAB | Refills: 1 | Status: SHIPPED | OUTPATIENT
Start: 2019-06-06 | End: 2020-08-26

## 2019-12-02 ENCOUNTER — OFFICE VISIT (OUTPATIENT)
Dept: CARDIOLOGY CLINIC | Age: 69
End: 2019-12-02

## 2019-12-02 VITALS
HEIGHT: 67 IN | RESPIRATION RATE: 18 BRPM | BODY MASS INDEX: 42.69 KG/M2 | HEART RATE: 64 BPM | WEIGHT: 272 LBS | SYSTOLIC BLOOD PRESSURE: 146 MMHG | DIASTOLIC BLOOD PRESSURE: 84 MMHG | OXYGEN SATURATION: 95 %

## 2019-12-02 DIAGNOSIS — I34.0 NONRHEUMATIC MITRAL VALVE REGURGITATION: ICD-10-CM

## 2019-12-02 DIAGNOSIS — E11.9 TYPE 2 DIABETES MELLITUS WITHOUT COMPLICATION, WITH LONG-TERM CURRENT USE OF INSULIN (HCC): ICD-10-CM

## 2019-12-02 DIAGNOSIS — G47.33 OSA (OBSTRUCTIVE SLEEP APNEA): ICD-10-CM

## 2019-12-02 DIAGNOSIS — E78.5 DYSLIPIDEMIA: ICD-10-CM

## 2019-12-02 DIAGNOSIS — Z95.1 S/P CABG X 1: ICD-10-CM

## 2019-12-02 DIAGNOSIS — Z98.890 S/P MVR (MITRAL VALVE REPAIR): ICD-10-CM

## 2019-12-02 DIAGNOSIS — Z79.4 TYPE 2 DIABETES MELLITUS WITHOUT COMPLICATION, WITH LONG-TERM CURRENT USE OF INSULIN (HCC): ICD-10-CM

## 2019-12-02 DIAGNOSIS — I25.10 CORONARY ARTERY DISEASE INVOLVING NATIVE CORONARY ARTERY OF NATIVE HEART WITHOUT ANGINA PECTORIS: Primary | ICD-10-CM

## 2019-12-02 DIAGNOSIS — I10 ESSENTIAL HYPERTENSION: ICD-10-CM

## 2019-12-02 DIAGNOSIS — E66.01 OBESITY, MORBID (HCC): ICD-10-CM

## 2019-12-02 RX ORDER — SERTRALINE HYDROCHLORIDE 100 MG/1
TABLET, FILM COATED ORAL
Refills: 0 | COMMUNITY
Start: 2019-11-14

## 2019-12-02 RX ORDER — INSULIN ASPART 100 [IU]/ML
INJECTION, SOLUTION INTRAVENOUS; SUBCUTANEOUS
Refills: 1 | COMMUNITY
Start: 2019-11-18

## 2019-12-02 NOTE — PROGRESS NOTES
[facility]  [address]  [place], [address] [facility]    Office [phone]  Fax [phone]  Cell [phone]        Ryanne Lam is a 77 y.o. male. Last seen 6 months ago. Assessment:  Encounter Diagnoses   Name Primary? 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 Updated echo 6 months ago demonstrated no residual MR with normal LV function. He does have moderate LAE but no hx of AF. Continue annual surveillance echoes - repeat in 6 months. HTN, controlled on combination therapy. He is now off Lisinopril d/t unknown side effect. Continue home BP monitoring. He remains adherent with CPAP. T2DM and lipids managed by Lester Desai MD. Recent A1c 7.9% from October. ,     Morbid Obesity. He has lost 8 pounds since his last visit. He is working on trying to lose weight. Follow-up and Dispositions    · Return in about 6 months (around 6/2/2020). Subjective:    Ryanne Lam reports he is feeling well. He has been walking 1/2 a mile for exercise with no exertional sxs. Patient denies any exertional chest pain, palpitations, syncope, orthopnea, edema or paroxysmal nocturnal dyspnea. He reports Lester Desai MD d/c Lisinopril several months ago d/t side effects, but he does not recall which side effect. His recent BP last week with BP was 120/80.      His DM is followed by Lester Desai MD. He is on Levemir and was also started on Ozempic since last visit. Patient reports improvement in A1c. He is adherent with his CPAP for MARIN. He is recovering from a recent cold. Cardiac Risk Factors:  DM - yes  HTN - yes  Former smoker - quit 2013    Cardiac testing  ECHO 2-013: MVP mod-severe MR   ECHO 3/15/2017: EF 65-70%, mild LAE, mild LVH, mod-severe LAE, mod MR,   spherical, echogenic, fixed mass, 14 mm x 8 mm)- anterior leaflet, on the atrial aspect. LONNIE 2012: mod-severe MR   LONNIE 3/21/2017 - - 8x11 mm semimobile echogenic mass (some central clearing) attached to atrial surface of anterior mitral leaflet, prolapsing into LV. Distal tip of anterior leaflet prolapses. Linear strands, semimobile attached to atrial surface of posterior leaflet. No annular abscess. Eccentric posterior jet of MR, clearly severe. Severe LAE, LVH, EF 70%, aortic/pulmonic/tricuspid valves normal. Aorta nl    Echo 5/4/17 - EF 70%. No WMA. Wall thickness upper limits of normal. Severe LAE. 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 Definite, medium sized spherical mobile mass, measuring 11 x 7 mm on the tip of the anterior leaflet on the atrial aspect. Cath 5/16/2017 - PA 45/20, PCW 23 (V wave 45), RV 44/4 RA 8 LVEDP 25, A wave to 35, no AV gradient, EF 75%, severe MR, LM normal, LAD mid 85% at bifurcating D1 (small vessels), LCX mild plaque, RCA mild plaque. Echo 11/6/17- LVEF 60% s/p MV repair, no MR, LAE    Echo 05/29/19 - EF 60%, s/p MV repair, trace MR, moderate LAE - 8x11 mm semimobile echogenic mass (some central clearing) attached to atrial surface of anterior mitral leaflet, prolapsing into LV. Distal tip of anterior leaflet prolapses. Linear strands, semimobile attached to atrial surface of posterior leaflet. No annular abscess. Eccentric posterior jet of MR, clearly severe.  Severe LAE, LVH, EF 70%, aortic/pulmonic/tricuspid valves normal. Aorta nl    Past Medical History:   Diagnosis Date    Abnormal EKG     he says it has been abnormal for years    Arthritis     CAD (coronary artery disease)     Chronic kidney disease     KIDNEY AND BLADDER STONE    Chronic pain     lower lumbar    Diabetes ([facility]     type II    Dyslipidemia     Enlarged prostate Dr. Thaddeus Levine    HTN (hypertension)     Hypercholesterolemia     Kidney stone     Mitral regurgitation     mod-severe on echo 1/5/12    Murmur     Obesity     Other ill-defined conditions(799.89)     BPH    Sleep apnea     stopped using CPAP many years ago            Allergies   Allergen Reactions    Cephalexin Other (comments)     unknown    Oxycodone Other (comments)     Pt does not desire to take; causes altered mental status and constipation    Sulfa (Sulfonamide Antibiotics) Hives    Tamsulosin Other (comments)     Vision loss          Review of Systems  Constitutional: Negative for fever, chills, malaise/fatigue and diaphoresis. Respiratory: Negative for cough, hemoptysis, sputum production,  and wheezing. +shortness of breath  Cardiovascular: Negative for chest pain, palpitations, orthopnea, claudication, leg swelling and PND. Gastrointestinal: Negative for heartburn, nausea, vomiting, blood in stool and melena. Genitourinary: Negative for dysuria and flank pain. Musculoskeletal: Negative for joint pain. +chronic back pain  Skin: Negative for rash. Neurological: Negative for focal weakness, seizures, loss of consciousness, weakness and headaches. Endo/Heme/Allergies: Does not bruise/bleed easily. Psychiatric/Behavioral: Negative for memory loss. The patient does not have insomnia.       Physical Exam    Visit Vitals  /84 (BP 1 Location: Left arm, BP Patient Position: Sitting)   Pulse 64   Resp 18   Ht 5' 7\" (1.702 m)   Wt 272 lb (123.4 kg)   SpO2 95%   BMI 42.60 kg/m²     Wt Readings from Last 3 Encounters:   12/02/19 272 lb (123.4 kg)   05/29/19 280 lb (127 kg)   05/29/19 280 lb (127 kg)      General - well developed well nourished, obese WM  Neck - JVP normal, thyroid nl  Cardiac - normal S1, S2, no rubs or gallops. No clicks. Vascular - carotids without bruits, radials, femorals and pedal pulses equal bilateral  Lungs - clear to auscultation bilaterals, no rales, wheezing or rhonchi  Abd - soft nontender, no HSM, no abd bruits  Extremities - 1+ pretibial edema   Skin - no rash  Neuro - nonfocal  Psych - normal mood and affect      Cardiographics  EKG 8/21/17- SB 54, diffuse inferolateral T wave inversions   Echo 11/6/17- LVEF 60% s/p MV repair, no MR, LAE  EKG 4/5/18- SB 54, inferolateral T wave inversions, unchanged from 8/21/17  Echo 05/29/19 - EF 60%, s/p MV repair, trace MR, moderate LAE  EKG 05/29/19 - SR, inferior/lateral T wave inversion, isolated PVC, unchanged from 04/15/18. Written by Lorenzo Winchester, as dictated by Mk Murry M.D.      Mk Murry MD

## 2019-12-02 NOTE — PROGRESS NOTES
Jairon Sauceda is a 77 y.o. male    Chief Complaint   Patient presents with    Follow-up     6 mo f/u    Coronary Artery Disease    Mitral Regurgitation    Hypertension    Cholesterol Problem       Visit Vitals  /84 (BP 1 Location: Left arm, BP Patient Position: Sitting)   Pulse 64   Resp 18   Ht 5' 7\" (1.702 m)   Wt 272 lb (123.4 kg)   SpO2 95%   BMI 42.60 kg/m²       1. Have you been to the ER, urgent care clinic since your last visit? Hospitalized since your last visit? No    2. Have you seen or consulted any other health care providers outside of the [address] [place] [address] since your last visit? Include any pap smears or colon screening.  No

## 2019-12-02 NOTE — LETTER
12/2/19 Patient: Lokesh Magana YOB: 1950 Date of Visit: 12/2/2019 Alma Pretty MD 
[address] [facility] [address] [place] [id number] VIA Facsimile: [phone] Dear Alma Pretty MD, Thank you for referring Mr. Sierra Morales to [address] [place], [place] [address]  for evaluation. My notes for this consultation are attached. If you have questions, please do not hesitate to call me. I look forward to following your patient along with you. Sincerely, Ting East MD

## 2020-10-30 NOTE — ROUTINE PROCESS
"I fell. I hurt my hip and my knee." Bedside and Verbal shift change report given to Erika Perrin RN (oncoming nurse) by Gabriela Greco RN (offgoing nurse). Report included the following information SBAR, Procedure Summary, Intake/Output, MAR and Recent Results.

## 2021-03-26 NOTE — PROGRESS NOTES
Bedside and Verbal shift change report given to Vandana Solomon RN (oncoming nurse) by Blaise Dill RN (offgoing nurse). Report included the following information SBAR, OR Summary, Procedure Summary and MAR. .

## 2023-01-05 ENCOUNTER — TRANSCRIBE ORDER (OUTPATIENT)
Dept: SCHEDULING | Age: 73
End: 2023-01-05

## 2023-01-05 DIAGNOSIS — N40.0 BENIGN PROSTATIC HYPERPLASIA: ICD-10-CM

## 2023-01-05 DIAGNOSIS — N18.32 STAGE 3B CHRONIC KIDNEY DISEASE (HCC): ICD-10-CM

## 2023-01-05 DIAGNOSIS — E87.6 HYPOKALEMIA: ICD-10-CM

## 2023-01-05 DIAGNOSIS — E11.65 TYPE 2 DIABETES MELLITUS WITH HYPERGLYCEMIA (HCC): ICD-10-CM

## 2023-01-05 DIAGNOSIS — I10 HYPERTENSIVE DISORDER: Primary | ICD-10-CM

## 2023-01-09 ENCOUNTER — HOSPITAL ENCOUNTER (OUTPATIENT)
Dept: VASCULAR SURGERY | Age: 73
Discharge: HOME | End: 2023-01-09
Attending: INTERNAL MEDICINE
Payer: MEDICARE

## 2023-01-09 DIAGNOSIS — E87.6 HYPOKALEMIA: ICD-10-CM

## 2023-01-09 DIAGNOSIS — N40.0 BENIGN PROSTATIC HYPERPLASIA: ICD-10-CM

## 2023-01-09 DIAGNOSIS — N18.32 STAGE 3B CHRONIC KIDNEY DISEASE (HCC): ICD-10-CM

## 2023-01-09 DIAGNOSIS — E11.65 TYPE 2 DIABETES MELLITUS WITH HYPERGLYCEMIA (HCC): ICD-10-CM

## 2023-01-09 DIAGNOSIS — I10 HYPERTENSIVE DISORDER: ICD-10-CM

## 2023-01-09 LAB
ABDOMINAL PROX AORTA VEL: 88.9 CM/S
CELIAC EDV: 35.5 CM/S
CELIAC PSV: 153.4 CM/S
LEFT KIDNEY LENGTH: 11.43 CM
LEFT KIDNEY WIDTH: 5.86 CM
LEFT RENAL DIST DIAS: 20.6 CM/S
LEFT RENAL DIST RAR: 0.67
LEFT RENAL DIST RI: 0.66
LEFT RENAL DIST SYS: 59.9 CM/S
LEFT RENAL LOWER PARENCHYMA MAX: 25.1 CM/S
LEFT RENAL LOWER PARENCHYMA MIN: 8.5 CM/S
LEFT RENAL LOWER PARENCHYMA RI: 0.66
LEFT RENAL MID DIAS: 24.6 CM/S
LEFT RENAL MID RAR: 1.26
LEFT RENAL MID RI: 0.78
LEFT RENAL MID SYS: 112.4 CM/S
LEFT RENAL MIDDLE PARENCHYMA MAX: 19.3 CM/S
LEFT RENAL MIDDLE PARENCHYMA MIN: 6 CM/S
LEFT RENAL MIDDLE PARENCHYMA RI: 0.69
LEFT RENAL PROX DIAS: 20.2 CM/S
LEFT RENAL PROX RAR: 1.45
LEFT RENAL PROX RI: 0.84
LEFT RENAL PROX SYS: 129 CM/S
LEFT RENAL UPPER PARENCHYMA MAX: 18.3 CM/S
LEFT RENAL UPPER PARENCHYMA MIN: 7 CM/S
LEFT RENAL UPPER PARENCHYMA RI: 0.62
PROX AORTA LONG DIAM: 1.42 CM
PROX AORTIC AP: 1.64 CM
PROX AORTIC TRANS: 2.03 CM
PROX SMA EDV: 16.8 CM/S
PROX SMA PSV: 233.9 CM/S
RIGHT KIDNEY LENGTH: 12.28 CM
RIGHT KIDNEY WIDTH: 5.38 CM
RIGHT RENAL DIST DIAS: 16.1 CM/S
RIGHT RENAL DIST RAR: 0.58
RIGHT RENAL DIST RI: 0.69
RIGHT RENAL DIST SYS: 51.9 CM/S
RIGHT RENAL LOWER PARENCHYMA MAX: 20.3 CM/S
RIGHT RENAL LOWER PARENCHYMA MIN: 8.1 CM/S
RIGHT RENAL LOWER PARENCHYMA RI: 0.6
RIGHT RENAL MID DIAS: 14.4 CM/S
RIGHT RENAL MID RAR: 0.54
RIGHT RENAL MID RI: 0.7
RIGHT RENAL MID SYS: 48.4 CM/S
RIGHT RENAL MIDDLE PARENCHYMA MAX: 21.9 CM/S
RIGHT RENAL MIDDLE PARENCHYMA MIN: 5.9 CM/S
RIGHT RENAL MIDDLE PARENCHYMA RI: 0.73
RIGHT RENAL PROX DIAS: 19.9 CM/S
RIGHT RENAL PROX RAR: 0.82
RIGHT RENAL PROX RI: 0.73
RIGHT RENAL PROX SYS: 72.7 CM/S
RIGHT RENAL UPPER PARENCHYMA MAX: 19.8 CM/S
RIGHT RENAL UPPER PARENCHYMA MIN: 8 CM/S
RIGHT RENAL UPPER PARENCHYMA RI: 0.6

## 2023-01-09 PROCEDURE — 93975 VASCULAR STUDY: CPT

## 2023-01-12 ENCOUNTER — TRANSCRIBE ORDER (OUTPATIENT)
Dept: SCHEDULING | Age: 73
End: 2023-01-12

## 2023-01-12 DIAGNOSIS — E26.09 OTHER PRIMARY HYPERALDOSTERONISM (HCC): Primary | ICD-10-CM

## 2023-01-26 ENCOUNTER — HOSPITAL ENCOUNTER (OUTPATIENT)
Dept: CT IMAGING | Age: 73
Discharge: HOME | End: 2023-01-26
Attending: INTERNAL MEDICINE
Payer: MEDICARE

## 2023-01-26 DIAGNOSIS — E26.09 OTHER PRIMARY HYPERALDOSTERONISM (HCC): ICD-10-CM

## 2023-01-26 PROCEDURE — 74176 CT ABD & PELVIS W/O CONTRAST: CPT

## 2023-03-28 NOTE — H&P
[place]   urology   [facility]  [address] Darlean Crigler  [phone]  [place] [address] f   [phone]       ProgressNotes: Arpita Green MD          Current Medications Reason for Appointment    1. bladder stone X 2    History of Present Illness    BPH / LUTS:   Presentation   Date 01/16/2017  Main c/o Irritative, Obstructive bladder calculi - Back Pain  Duration 1-2, weeks  Nature Constant, Stable  Other Sxs denies hematuria, N/V/F/C  Risk Factors / Pertinant +/- none  Previous Treatment Proscar He had visual changes w/ one dose of Flomax in the past!!! Taking  Vital Signs   Humalog  /81 mm Hg, Ht 67 in, Wt 250 lbs, BMI 39.15 Index. Levemir     Amlodipine Besylate     Losartan Potassium     ASA     Finasteride , Notes: 5MG     Tramadol-Acetaminophen     Colcrys     Medication List reviewed and reconciled with the patient      Examination    Urinalysis:  RBCs: none. WBCs: none. BACTERIA: none. EPITHELIAL CELLS: none. Crystals none. Past Medical History Physical Examination   SLEEP APNEA/CPAP. Male NP Exam:   Genl: WDWN, NAD. Skin No obvious lesion or rash. HEENT grossly nl, EOMI, neck supple - FROM, nl dentition. Chest unlabored / nl respiratory mvmt w/o audible wheeze. Abd S/NT/ND, no hernia, no CVAT.  Nl male, T b/l descended w/o tenderness or mass. Rectal Nl tone / no masses 4+ Prostate w/o nodule or induration. Musculo-Skeletal / Ext FROM, no edema, Nl gait. Neuro non-focal.      LEAKY HEART VALVE.      GOUT.      HTN. ELEVATED CHOLESTEROL. DM.      KIDNEY STONES. Surgical History Assessments   ESWL  1. Benign prostatic hyperplasia with lower urinary tract symptoms - N40.1 (Primary)   KNEE REPLACEMENT  2. Chronic prostatitis without hematuria - N41.1    3. Bladder calculi - N21.0    4. Left flank pain - R10.9    5.  Other obstructive and reflux uropathy - N13.8   Family History Treatment   Father: diagnosed with Heart Disease, Prostate Ca 1. Benign prostatic hyperplasia with lower urinary tract symptoms   Notes: Unable to tolerate alpha blockers - will best be tx'd w/ cystolitholipaxy / TURP. However, current back pain is severe and not related to his bladder stones or voiding issues. 2. Chronic prostatitis without hematuria   Start Ciprofloxacin HCl Tablet, 500 MG, 1 tablet, Orally, every 12 hrs, 30 days, 60 Tablet, Refills 1  Start Ibuprofen Tablet, 600 MG, 1 tablet, Orally, Three times a day, 30 day(s), 90, Refills 3  Notes: Possible source of back pain. 3. Bladder calculi   Notes: cystolitholipaxy in future, but not related to acute pain. 4. Left flank pain   Notes: may be prosatatitis, but also possible back or other etiology - he is also severly constipated - bowel regimen given. He will d/w Tyson Flores and Leonora Bustamante. 5. Others   Notes: Learning About Transurethral Resection of the Prostate (TURP) material was printed  Extensive eval / mgmt - review of CT and discussion of dx / treatment options - 55 min of e&m. Social History    Tobacco/EtOH/Other:   Cigarette Hx: former smoker, QUIT 2016. Other Tobacco Products: none. Alcohol Use: social.   Drug Use: never. Household & Work:   Marital Status: . Occupation: HVAC TECH. Allergies    SULFA    OXYCODONE    TAMSULOSIN    CEPHALEXIN    Hospitalization/Major Diagnostic Procedure Procedure Codes   No Hospitalization History. [id number] URINALYSIS     P-HTN/HTN BP DOC F/U NOT RSN NOT GV   Review of Systems Follow Up   14 Point ROS:   General Feeling well / no F/C, , Weight Gain. Eyes Denies dry or irritated eyes / vision changes. Head Pt denies nosebleeds, Sinus problems, sore throat, and dry mouth. CV Denies recent CP, palpitations, edema. Pulm Denies cough, wheezing, SOB. GI Denies abd pain, N/V, and diarrhea. Musculo-Skeletal Denies muscle Joint Pain, ache, Back Pain, swelling. Skin Denies lesion or rash.  Neuro Denies weakness, numbness, dizziness, and HA. Psych Denies depression, anxiety, EtOH, and other substance abuse. Endocrine Denies fatigue, increased thirst, temp intolerance. Heme / Tariq Lucrecia Denies swollen glands, easy bruising or bleeding. Allergy Denies runny nose, hives, itching, and sneezing. Other Pt denies other symptoms. .  See HPI.    2 Weeks Rhofade Pregnancy And Lactation Text: This medication has not been assigned a Pregnancy Risk Category. It is unknown if the medication is excreted in breast milk.

## 2025-03-21 NOTE — PROGRESS NOTES
Ayden Ayon MD [facility]  [id number]  [place], [address] [facility]    Office [phone]  Fax [phone]  Cell [phone]        Tiana Barber is a 73 y.o. male is here today for follow up after 3/15/17-3/24/17 hositalization for lumbar diskitis/mitral valve endocarditis. Assessment  Encounter Diagnoses   Name Primary? 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 He has almost completed 6 weeks of IV antibiotics. We will schedule a cardiac cath in the next few week along with repeat TTE, then refer to cardiac surgery for MVR. Recent lumbar MRI demonstrated improved findings. He will be seeing Dr. Gerard Treadwell from [facility] in the next few weeks. Will check CRP along with usual pre cath labs. Will obtain ECHO next week. Right and left heart cath near future  Right femoral approach  ASA 3  Airway 2      Subjective:  See discharge summary and my notes from hospitalization. He leads a sedentary lifestyle due to significant low back pain. He ambulates with a walker some. He had an MRI 2 weeks go that shows improvement of his infection. He has 2 more weeks of his current antibiotic course. No recurrent fevers. He had an appointment with Dr. Gerard Treadwell on 5/17/17. He will be followed by Dr. Sandee Joyce for his back. Patient denies any exertional chest pain, dyspnea, palpitations, syncope, orthopnea, edema or paroxysmal nocturnal dyspnea.       Cardiac testing  ECHO Call patient:  Normal kidneys and normal liver and gallbladder.  Internal organs appear to  be doing well and not a source for previous abdominal pain   2-013: MVP mod-severe MR   ECHO 3/15/2017: EF 65-70%, mild LAE, mild LVH, mod-severe LAE, mod MR,   spherical, echogenic, fixed mass, 14 mm x 8 mm)- anterior leaflet, on the atrial aspect. LONNIE 2012: mod-severe MR   LONNIE 3/21/2017    Echo 5/4/17 - EF 70%. No WMA. Wall thickness upper limits of normal. Severe LAE. 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 Definite, medium sized spherical mobile mass, measuring 11 x 7 mm on the tip of the anterior leaflet on the atrial aspect. - 8x11 mm semimobile echogenic mass (some central clearing) attached to atrial surface of anterior mitral leaflet, prolapsing into LV. Distal tip of anterior leaflet prolapses. Linear strands, semimobile attached to atrial surface of posterior leaflet. No annular abscess. Eccentric posterior jet of MR, clearly severe. Severe LAE, LVH, EF 70%, aortic/pulmonic/tricuspid valves normal. Aorta nl    Past Medical History:   Diagnosis Date    Abnormal EKG     he says it has been abnormal for years    Arthritis     Chronic pain     lower lumbar    Diabetes ([address]     type II    Dyslipidemia     Enlarged prostate Dr. Qasim Cha    HTN (hypertension)     Hypercholesterolemia     Kidney stone     Mitral regurgitation     mod-severe on echo 1/5/12    Murmur     Obesity     Other ill-defined conditions     BPH    Sleep apnea     stopped using CPAP many years ago            Allergies   Allergen Reactions    Cephalexin Other (comments)     unknown    Oxycodone Other (comments)     Pt does not desire to take; causes altered mental status and constipation    Sulfa (Sulfonamide Antibiotics) Hives    Tamsulosin Other (comments)     Vision loss          Review of Systems  Constitutional: Negative for fever, chills, malaise/fatigue and diaphoresis. Respiratory: Negative for cough, hemoptysis, sputum production, shortness of breath and wheezing.    Cardiovascular: Negative for chest pain, palpitations, orthopnea, claudication, leg swelling and PND.  Gastrointestinal: Negative for heartburn, nausea, vomiting, blood in stool and melena. Genitourinary: Negative for dysuria and flank pain. Musculoskeletal: Negative for joint pain. Positive for back pain. Skin: Negative for rash. Neurological: Negative for focal weakness, seizures, loss of consciousness, weakness and headaches. Endo/Heme/Allergies: Does not bruise/bleed easily. Psychiatric/Behavioral: Negative for memory loss. The patient does not have insomnia. Physical Exam    Visit Vitals    /74 (BP 1 Location: Left arm, BP Patient Position: Sitting)    Pulse 68    Resp 20    Ht 5' 7\" (1.702 m)    Wt 221 lb 9.6 oz (100.5 kg)    SpO2 98%    BMI 34.71 kg/m2     Wt Readings from Last 3 Encounters:   04/28/17 221 lb 9.6 oz (100.5 kg)   04/14/17 217 lb (98.4 kg)   03/21/17 224 lb 13.9 oz (102 kg)      General - well developed well nourished  Neck - JVP normal, thyroid nl  Cardiac - normal S1, S2, no rubs or gallops. No clicks. Apical grade 3 systolic murmur.   Vascular - carotids without bruits, radials, femorals and pedal pulses equal bilateral  Lungs - clear to auscultation bilaterals, no rales, wheezing or rhonchi  Abd - soft nontender, no HSM, no abd bruits  Extremities - no edema  Skin - no rash  Neuro - nonfocal  Psych - normal mood and affect      Cardiographics      Written by [facility], as dictated by Melida Ocampo MD.   Melida Ocampo MD

## (undated) DEVICE — (D)SYR 10ML 1/5ML GRAD NSAF -- PKGING CHANGE USE ITEM 338027

## (undated) DEVICE — Device

## (undated) DEVICE — BAG RED 3PLY 2MIL 30X40 IN

## (undated) DEVICE — Z DISCONTINUEDSOLUTION PREP 2OZ 10% POVIDONE IOD SCR CAP BTL

## (undated) DEVICE — SET TRNQT L55IN RED BLU CLR CLR CODE TB DLP

## (undated) DEVICE — STERILE POLYISOPRENE POWDER-FREE SURGICAL GLOVES: Brand: PROTEXIS

## (undated) DEVICE — REM POLYHESIVE ADULT PATIENT RETURN ELECTRODE: Brand: VALLEYLAB

## (undated) DEVICE — SUTURE VCRL 2-0 L27IN ABSRB UD CP-2 L26MM 1/2 CIR REV CUT J869H

## (undated) DEVICE — INFECTION CONTROL KIT SYS

## (undated) DEVICE — MEDI-VAC NON-CONDUCTIVE SUCTION TUBING: Brand: CARDINAL HEALTH

## (undated) DEVICE — SOLUTION IV 1000ML PH 7.4 INJ NRMSOL R

## (undated) DEVICE — GAUZE SPONGES,12 PLY: Brand: CURITY

## (undated) DEVICE — 6 FOOT DISPOSABLE EXTENSION CABLE WITH SAFE CONNECT / SCREW-DOWN

## (undated) DEVICE — Y-TYPE TUR IRRIGATION SET

## (undated) DEVICE — STERILE POLYISOPRENE POWDER-FREE SURGICAL GLOVES WITH EMOLLIENT COATING: Brand: PROTEXIS

## (undated) DEVICE — STOPCOCK IV 3W LUER

## (undated) DEVICE — SUTURE MCRYL SZ 3-0 L27IN ABSRB UD L24MM PS-1 3/8 CIR PRIM Y936H

## (undated) DEVICE — KENDALL SCD EXPRESS SLEEVES, KNEE LENGTH, MEDIUM: Brand: KENDALL SCD

## (undated) DEVICE — SUT PROL 7-0 24IN BV1 DA BLU --

## (undated) DEVICE — SYR 50ML LR LCK 1ML GRAD NSAF --

## (undated) DEVICE — JELLY,LUBE,STERILE,FLIP TOP,TUBE,4-OZ: Brand: MEDLINE

## (undated) DEVICE — PREP KIT PEEL PTCH POVIDONE IOD

## (undated) DEVICE — BLADE OPHTH W1.5MM 15DEG ORNG HNDL SHRP SHRP DEL FOR CATRCT

## (undated) DEVICE — DEVON™ KNEE AND BODY STRAP 60" X 3" (1.5 M X 7.6 CM): Brand: DEVON

## (undated) DEVICE — SENSOR TEMP SKIN DISP

## (undated) DEVICE — (D)PREP SKN CHLRAPRP APPL 26ML -- CONVERT TO ITEM 371833

## (undated) DEVICE — PLEDGET SURG W3/16XL0.25IN THK1.65MM PTFE OVL FELT FOR THE

## (undated) DEVICE — SUT PROL 3-0 36IN SH DA BLU --

## (undated) DEVICE — SYR 3ML LL TIP 1/10ML GRAD --

## (undated) DEVICE — SUTURE S STL SZ 6 L18IN NONABSORBABLE SIL L48MM V-40 1/2 M649G

## (undated) DEVICE — BAG COLLECTION FLD OR-TBL NS --

## (undated) DEVICE — MEDI-TRACE CADENCE ADULT, DEFIBRILLATION ELECTRODE -RTS  (10 PR/PK) - PHILIPS: Brand: MEDI-TRACE CADENCE

## (undated) DEVICE — ABDOMINAL PAD: Brand: DERMACEA

## (undated) DEVICE — 72" ARTERIAL PRESSURE TUBING: Brand: ICU MEDICAL

## (undated) DEVICE — NEEDLE HYPO 22GA L1.5IN BLK POLYPR HUB S STL REG BVL STR

## (undated) DEVICE — Z DISCONTINUED USE 2599823 ELECTRODE ENDO 27FR 0.3MM MPLR LOOP DISP

## (undated) DEVICE — CATHETER IV 14GA L2IN POLYUR STR ORNG HUB SFTY RADPQ DISP

## (undated) DEVICE — KIT BLWR MISTER 5P 15L W/ TBNG SET IRRIG MIST TO IMPROVE

## (undated) DEVICE — PLEDGET SUT SFT OVL 3 8X5 16IN

## (undated) DEVICE — DRAPE FLD WRM W44XL66IN C6L FOR INTRATEMP SYS THERMABASIN

## (undated) DEVICE — SOLUTION IRRIG 1000ML H2O STRL BLT

## (undated) DEVICE — DRAPE C-ARMOUR C-ARM KIT --

## (undated) DEVICE — 40418 TRENDELENBURG ONE-STEP ARM PROTECTORS LARGE (1 PAIR): Brand: 40418 TRENDELENBURG ONE-STEP ARM PROTECTORS LARGE (1 PAIR)

## (undated) DEVICE — MARKER SKN RUL VIO STRL

## (undated) DEVICE — CANNULA PERF 15FR L12.5IN RG STPCOCK WIREWOUND BODY

## (undated) DEVICE — FLOSEAL HEMOSTATIC MATRIX, 10 ML: Brand: FLOSEAL

## (undated) DEVICE — KIT INFECTION CTRL ST FRAN --

## (undated) DEVICE — SUT PROL 4-0 30IN SH1 DA BLU --

## (undated) DEVICE — SUT PROL 3-0 30IN SH1 DA BLU --

## (undated) DEVICE — NEEDLE HYPO 18GA L1.5IN PNK S STL HUB POLYPR SHLD REG BVL

## (undated) DEVICE — SUTURE VCRL 0 L27IN ABSRB UD CT L40MM 1/2 CIR TAPERPOINT J280H

## (undated) DEVICE — DRAPE PRB US TRNSDCR 6X96IN --

## (undated) DEVICE — SUT PROL 4-0 36IN RB1 DA BLU --

## (undated) DEVICE — PRESSURE MONITORING SET: Brand: TRUWAVE

## (undated) DEVICE — KIT CLMP DISPOSABLE

## (undated) DEVICE — SUMP PERICARD AD 20FR WT TIP VERSATILE DSGN MULT PRT VENT

## (undated) DEVICE — DRAPE SURG W41XL74IN CLR FULL SZ C ARM 3 ADH POLY STRP E

## (undated) DEVICE — 4-PORT MANIFOLD: Brand: NEPTUNE 2

## (undated) DEVICE — TOOL 14MH30 LEGEND 14CM 3MM: Brand: MIDAS REX ™

## (undated) DEVICE — LAMINECTOMY RICHMOND-LF: Brand: MEDLINE INDUSTRIES, INC.

## (undated) DEVICE — DRAIN KT WND 10FR RND 400ML --

## (undated) DEVICE — SOLUTION IV 1000ML 0.9% SOD CHL

## (undated) DEVICE — SOLUTION IRRIGATION NACL 0.9% 1000 ML FLX CONTAINER

## (undated) DEVICE — TRAY CATH W/ 16FR CATH URIN M CTRL FIT OUTLT TB F STATLOK

## (undated) DEVICE — TIP SUCT BLU PLAS BLB W/O CTRL VENT YANK

## (undated) DEVICE — INTENDED TO STANDARDIZE OR CAMERAS TO ALLOW FOR THE USE OF THE OR CAMERA COVER: Brand: ASPEN® O.R. CAMERA COVER

## (undated) DEVICE — APPLICATOR BNDG 1MM ADH PREMIERPRO EXOFIN

## (undated) DEVICE — NDL SPNE QNCKE 18GX3.5IN LF --

## (undated) DEVICE — TRANSFER PK BLD PROD 300ML --

## (undated) DEVICE — COR-KNOT MINI® COMBO KITBASE PACKAGE TYPE - KITEACH STERILE PACKAGE KIT CONTAINS (2) SINGLE PATIENT USE COR-KNOT MINI® DEVICES AND (12) COR-KNOT® QUICK LOADS®.: Brand: COR-KNOT MINI®

## (undated) DEVICE — LEAD PCMKR MYOCARDL BPLR TEMP. --

## (undated) DEVICE — BONE WAX WHITE: Brand: BONE WAX WHITE

## (undated) DEVICE — CATHETER URETH 22FR 30CC BLLN F 3 W SPEC M RND TIP TWO

## (undated) DEVICE — INSERT INTRACK ULT DBL TRAC WHITE-WHITE 86 MM LEN

## (undated) DEVICE — LIGHT HANDLE: Brand: DEVON

## (undated) DEVICE — SUTURE PROL SZ 5-0 L30IN NONABSORBABLE BLU L13MM RB-2 1/2 8710H

## (undated) DEVICE — GOWN,PLEAT,SPECIALTY,XL,STRL: Brand: MEDLINE

## (undated) DEVICE — SUTURE TICRON DBL ARMED 2 0 CV 305 42IN BLU N ABSRB BRAID 8886303551

## (undated) DEVICE — SOLUTION IV 500ML 0.9% SOD CHL FLX CONT

## (undated) DEVICE — TEMP PACING WIRE: Brand: MYO/WIRE

## (undated) DEVICE — BLADE ELECTRODE: Brand: EDGE

## (undated) DEVICE — SUTURE NONABSORBABLE BRAIDED 2-0 SH 10X30 IN ETHBND EXCEL 10X82

## (undated) DEVICE — DRESSING AQUACEL ADVANTAGE ALG W4XL5IN RECT GREATER ABSRB HYDRFBR TECHNOLOGY

## (undated) DEVICE — COVER,TABLE,HEAVY DUTY,60"X90",STRL: Brand: MEDLINE

## (undated) DEVICE — SUTURE VCRL 1 L27IN ABSRB VLT TP-1 L65MM 1/2 CIR TAPERPOINT J650G

## (undated) DEVICE — HOOK LOCK LATEX FREE ELASTIC BANDAGE D/L 6INX10YD

## (undated) DEVICE — CANNULA PERF 28FR L30.5CM CONN SITE 3/8IN VEN R ANG SGL STG

## (undated) DEVICE — STERNUM BLADE, OFFSET (31.7 X 0.64 X 6.3MM)

## (undated) DEVICE — DRAIN SURG 24FR L5/16IN DIA8MM SIL RND HUBLESS FULL FLUT

## (undated) DEVICE — CANNULA AORT ROOT INTRO STD TIP 5FR OVERALL LEN 55IN DLP

## (undated) DEVICE — GOWN,SIRUS,NONRNF,SETINSLV,XL,20/CS: Brand: MEDLINE

## (undated) DEVICE — SOL IRR GLYC 1.5 % 3000ML --

## (undated) DEVICE — PILLOW POS AD L7IN R FOAM HD REST INTUB SLOT DISP

## (undated) DEVICE — K WIRE

## (undated) DEVICE — SINGLE STAGE VENOUS RETURN CANNULA: Brand: THIN-FLEX SINGLE STAGE VENOUS DRAINAGE CANNULA

## (undated) DEVICE — SUTURE PROL SZ 8-0 L24IN NONABSORBABLE BLU L6.5MM BV130-5 8732H

## (undated) DEVICE — DRIP REDUCTION MANIFOLD

## (undated) DEVICE — SUTURE VCRL SZ 3-0 L27IN ABSRB UD L24MM PS-1 3/8 CIR PRIM J936H

## (undated) DEVICE — FOGARTY - HYDRAGRIP SURGICAL - CLAMP INSERTS: Brand: FOGARTY SOFTJAW

## (undated) DEVICE — 1200 GUARD II KIT W/5MM TUBE W/O VAC TUBE: Brand: GUARDIAN

## (undated) DEVICE — TRAY CATHETER 16FR F INCLUDE LUB URIN M TEMP SENS 2 STATLOK STBL

## (undated) DEVICE — PREP SKN PREVAIL 40ML APPL --

## (undated) DEVICE — CATHETER URETH 18FR RED RUB INTMIT ALL PURP

## (undated) DEVICE — AORTIC PERFUSION CANNULA: Brand: EDWARDS LIFESCIENCES AORTIC PERFUSION CANNULA

## (undated) DEVICE — WRAP SURG W1.31XL1.34M CARD FOR PT 165-172CM THERMOWRP

## (undated) DEVICE — BIPOLAR FORCEPS CORD: Brand: VALLEYLAB